# Patient Record
Sex: FEMALE | Race: BLACK OR AFRICAN AMERICAN | Employment: OTHER | URBAN - METROPOLITAN AREA
[De-identification: names, ages, dates, MRNs, and addresses within clinical notes are randomized per-mention and may not be internally consistent; named-entity substitution may affect disease eponyms.]

---

## 2017-01-09 ENCOUNTER — OFFICE VISIT (OUTPATIENT)
Dept: INTERNAL MEDICINE CLINIC | Age: 62
End: 2017-01-09

## 2017-01-09 VITALS
WEIGHT: 204.6 LBS | HEART RATE: 58 BPM | BODY MASS INDEX: 34.93 KG/M2 | SYSTOLIC BLOOD PRESSURE: 124 MMHG | OXYGEN SATURATION: 100 % | HEIGHT: 64 IN | TEMPERATURE: 97.8 F | RESPIRATION RATE: 14 BRPM | DIASTOLIC BLOOD PRESSURE: 66 MMHG

## 2017-01-09 DIAGNOSIS — E11.8 TYPE 2 DIABETES MELLITUS WITH COMPLICATION, UNSPECIFIED LONG TERM INSULIN USE STATUS: Primary | ICD-10-CM

## 2017-01-09 DIAGNOSIS — Z23 NEED FOR ZOSTER VACCINATION: ICD-10-CM

## 2017-01-09 DIAGNOSIS — E79.0 HYPERURICEMIA: ICD-10-CM

## 2017-01-09 DIAGNOSIS — E55.9 VITAMIN D DEFICIENCY: ICD-10-CM

## 2017-01-09 DIAGNOSIS — E04.9 THYROID ENLARGEMENT: ICD-10-CM

## 2017-01-09 DIAGNOSIS — I10 ESSENTIAL HYPERTENSION: ICD-10-CM

## 2017-01-09 LAB — HBA1C MFR BLD HPLC: 6.6 %

## 2017-01-09 NOTE — PATIENT INSTRUCTIONS
It was a pleasure to see you! As discussed:    Diabetes  Continue to take your medication   Try to  following a diabetic diet and continue exercising regularly  We will recheck your hemoglobin A1c at your next visit  For now check your glucose at home if your glucose is <90 or >300 call me or seek immediate medical attention if you faint, feel dizzy, confused, have chest pain. How can you care for yourself at home? Learn to recognize the early signs of low blood sugar. Signs include:   Nausea. Hunger. Feeling nervous, irritable, or shaky. Cold, clammy, wet skin. Sweating (when you are not exercising). A fast heartbeat. Numbness or tingling of the fingertips or lips. If you feel an episode of low blood sugar coming on, drink fruit juice or sugared (not diet) soda, or eat sugar in the form of candy, cubes, or tablets. [employer] are another [employer]. Eat small, frequent meals so that you do not get too hungry between meals. Balance extra exercise with eating more. Keep a written record of your low blood sugar episodes, including when you last ate and what you ate, so that you can learn what causes your blood sugar to drop. Make sure your family, friends, and coworkers know the symptoms of low blood sugar and know what to do to get your sugar level up. Wear medical alert jewelry that lists your condition. You can buy this at most drugsSearchMees. When should you call for help? Call 911 anytime you think you may need emergency care. For example, call if:   You have a seizure. You passed out (lost consciousness), or you suddenly become very sleepy or confused. (You may have very low blood sugar.)  Call your doctor now or seek immediate medical care if:   You have symptoms of low blood sugar, such as:   Sweating. Feeling nervous, shaky, and weak. Extreme hunger and slight nausea. Dizziness and headache. Blurred vision.           Musculoskeletal Chest Pain: Care Instructions  Your Care Instructions  Chest pain is not always a sign that something is wrong with your heart or that you have another serious problem. The doctor thinks your chest pain is caused by strained muscles or ligaments, inflamed chest cartilage, or another problem in your chest, rather than by your heart. You may need more tests to find the cause of your chest pain. Follow-up care is a key part of your treatment and safety. Be sure to make and go to all appointments, and call your doctor if you are having problems. Its also a good idea to know your test results and keep a list of the medicines you take. How can you care for yourself at home? · Take pain medicines exactly as directed. ¨ If the doctor gave you a prescription medicine for pain, take it as prescribed. ¨ If you are not taking a prescription pain medicine, ask your doctor if you can take an over-the-counter medicine. · Rest and protect the sore area. · Stop, change, or take a break from any activity that may be causing your pain or soreness. · Put ice or a cold pack on the sore area for 10 to 20 minutes at a time. Try to do this every 1 to 2 hours for the next 3 days (when you are awake) or until the swelling goes down. Put a thin cloth between the ice and your skin. · After 2 or 3 days, apply a heating pad set on low or a warm cloth to the area that hurts. Some doctors suggest that you go back and forth between hot and cold. · Do not wrap or tape your ribs for support. This may cause you to take smaller breaths, which could increase your risk of lung problems. · Mentholated creams such as Bengay or Icy Hot may soothe sore muscles. Follow the instructions on the package. · Follow your doctor's instructions for exercising. · Gentle stretching and massage may help you get better faster. Stretch slowly to the point just before pain begins, and hold the stretch for at least 15 to 30 seconds. Do this 3 or 4 times a day.  Stretch just after you have applied heat.  · As your pain gets better, slowly return to your normal activities. Any increased pain may be a sign that you need to rest a while longer. When should you call for help? Call 911 anytime you think you may need emergency care. For example, call if:  · You have chest pain or pressure. This may occur with:  ¨ Sweating. ¨ Shortness of breath. ¨ Nausea or vomiting. ¨ Pain that spreads from the chest to the neck, jaw, or one or both shoulders or arms. ¨ Dizziness or lightheadedness. ¨ A fast or uneven pulse. After calling 911, chew 1 adult-strength aspirin. Wait for an ambulance. Do not try to drive yourself. · You have sudden chest pain and shortness of breath, or you cough up blood. Call your doctor now or seek immediate medical care if:  · You have any trouble breathing. · Your chest pain gets worse. · Your chest pain occurs consistently with exercise and is relieved by rest.  Watch closely for changes in your health, and be sure to contact your doctor if:  · Your chest pain does not get better after 1 week. Where can you learn more? Go to http://nicola-roberto.info/. Enter V293 in the search box to learn more about \"Musculoskeletal Chest Pain: Care Instructions. \"  Current as of: May 27, 2016  Content Version: 11.1  © 4406-4692 [employer]. Care instructions adapted under license by [employer] (which disclaims liability or warranty for this information). If you have questions about a medical condition or this instruction, always ask your healthcare professional. [place] [address] any warranty or liability for your use of this information.

## 2017-01-09 NOTE — PROGRESS NOTES
HISTORY OF PRESENT ILLNESS  Delma Ron is a 59 y.o. female. HPI  Cardiovascular Review:  The patient has diabetes and hypertension. Body mass index is 35.12 kg/(m^2). Diet and Lifestyle: generally follows a diabetic diet regularly, nonsmoker  Home BP Monitoring: is not measured at home. Fasting glucose 140s  Pertinent ROS: taking medications as instructed, no medication side effects noted, no TIA's, no chest pain on exertion, no dyspnea on exertion, no swelling of ankles. Health Maintenance   Topic Date Due    ZOSTER VACCINE AGE 60>  05/26/2013    HEMOGLOBIN A1C Q6M  03/27/2017    EYE EXAM RETINAL OR DILATED Q1  04/19/2017    FOOT EXAM Q1  05/04/2017    MICROALBUMIN Q1  05/04/2017    LIPID PANEL Q1  05/04/2017    BREAST CANCER SCRN MAMMOGRAM  01/27/2018    PAP AKA CERVICAL CYTOLOGY  01/30/2018    GLAUCOMA SCREENING Q2Y  04/19/2018    COLONOSCOPY  06/13/2023    DTaP/Tdap/Td series (2 - Td) 05/04/2026    Hepatitis C Screening  Completed    Pneumococcal 19-64 Medium Risk  Completed    INFLUENZA AGE 9 TO ADULT  Completed       Review of Systems   Constitutional: Negative for diaphoresis, fever and weight loss. Eyes: Negative for blurred vision and pain. Respiratory: Negative for shortness of breath. Cardiovascular: Negative for chest pain, orthopnea and leg swelling. Neurological: Negative for focal weakness and headaches. Psychiatric/Behavioral: Negative for depression.      Patient Active Problem List    Diagnosis Date Noted    History of breast cancer 10/30/2015    Diabetes mellitus type 2, controlled ([address] 10/30/2015    HTN (hypertension) 01/21/2015    Hyperuricemia 01/21/2015       Current Outpatient Prescriptions   Medication Sig Dispense Refill    potassium chloride SA (MICRO-K) 10 mEq capsule TAKE 3 CAPSULES BY MOUTH EVERY  Cap 1    metFORMIN (GLUCOPHAGE) 1,000 mg tablet TAKE 1 TABLET BY MOUTH TWICE DAILY 180 Tab 3    hydroCHLOROthiazide (HYDRODIURIL) 25 mg tablet TAKE 1 TABLET BY MOUTH EVERY DAY 90 Tab 2    lisinopril (PRINIVIL, ZESTRIL) 10 mg tablet TAKE 1 TABLET BY MOUTH EVERY DAY 90 Tab 2    diclofenac (VOLTAREN) 1 % gel Apply  to affected area four (4) times daily as needed. 100 g 0    simvastatin (ZOCOR) 10 mg tablet TAKE 1 TABLET BY MOUTH EVERY EVENING 90 Tab 2    BETA-CAROTENE,A, W-C & E/ZN/CU (VISION-CHARANJIT PRESERVE PO) Take  by mouth two (2) times a day.  glucose blood VI test strips (ACCU-CHEK SMARTVIEW TEST STRIP) strip Please use two times daily as needed for Dx code 250.02 100 Strip 11    Cholecalciferol, Vitamin D3, (VITAMIN D3) 1,000 unit cap Take 1,000 Units by mouth daily.  glipiZIDE SR (GLUCOTROL) 5 mg CR tablet TAKE 1 TABLET BY MOUTH EVERY DAY 90 Tab 0       Allergies   Allergen Reactions    Pcn [Penicillins] Hives      Visit Vitals    /66 (BP 1 Location: Right arm, BP Patient Position: Sitting)    Pulse (!) 58    Temp 97.8 °F (36.6 °C) (Oral)    Resp 14    Ht 5' 4\" (1.626 m)    Wt 204 lb 9.6 oz (92.8 kg)    SpO2 100%    BMI 35.12 kg/m2       Physical Exam   Constitutional: She is oriented to person, place, and time. She appears well-developed. No distress. Eyes: Conjunctivae are normal.   Neck: Neck supple. Thyromegaly (possible RLL nodule ) present. Cardiovascular: Normal rate, regular rhythm and normal heart sounds. Pulmonary/Chest: Effort normal and breath sounds normal. No respiratory distress. She has no wheezes. She has no rales. She exhibits no tenderness. Lymphadenopathy:     She has cervical adenopathy. Neurological: She is alert and oriented to person, place, and time. Skin: Skin is warm. Psychiatric: She has a normal mood and affect. Recent Results (from the past 12 hour(s))   AMB POC HEMOGLOBIN A1C    Collection Time: 01/09/17  8:00 AM   Result Value Ref Range    Hemoglobin A1c (POC) 6.6 %       ASSESSMENT and PLAN  [place] was seen today for diabetes.     Diagnoses and all orders for this visit:    Type 2 diabetes mellitus with complication, unspecified long term insulin use status ([facility] [address]- A1c controlled. Optimize lifestyle   -     AMB POC HEMOGLOBIN A1C    Thyroid enlargement- thryomegaly on exam. Check for nodule  -     CBC WITH AUTOMATED DIFF  -     US THYROID/PARATHYROID/SOFT TISS; Future  -     TSH 3RD GENERATION  -     THYROID PANEL    Hyperuricemia  -     URIC ACID    Essential hypertension- well controlled. Counseled on diet and exercise. Continue current regimen.     -     METABOLIC PANEL, COMPREHENSIVE  -     LIPID PANEL  -     HEMOGLOBIN A1C WITH EAG    Vitamin D deficiency  -     VITAMIN D, 25 HYDROXY    Need for zoster vaccination  -     varicella zoster vacine live (ZOSTAVAX) 19,400 unit/0.65 mL susr injection; 1 Vial by SubCUTAneous route once for 1 dose. Follow-up Disposition:  Return in about 14 weeks (around 4/17/2017) for Follow-up. Medication risks/benefits/costs/interactions/alternatives discussed with patient. Victorino Mitchell  was given an after visit summary which includes diagnoses, current medications, & vitals. she expressed understanding with the diagnosis and plan.

## 2017-01-09 NOTE — MR AVS SNAPSHOT
Visit Information Date & Time Provider Department Dept. Phone Encounter #  
 1/9/2017  8:00 AM Angel Kim MD [facility] Internal Medicine [phone] [id number] Upcoming Health Maintenance Date Due ZOSTER VACCINE AGE 60> 5/26/2013 HEMOGLOBIN A1C Q6M 3/27/2017 EYE EXAM RETINAL OR DILATED Q1 4/19/2017 FOOT EXAM Q1 5/4/2017 MICROALBUMIN Q1 5/4/2017 LIPID PANEL Q1 5/4/2017 BREAST CANCER SCRN MAMMOGRAM 1/27/2018 PAP AKA CERVICAL CYTOLOGY 1/30/2018 GLAUCOMA SCREENING Q2Y 4/19/2018 COLONOSCOPY 6/13/2023 DTaP/Tdap/Td series (2 - Td) 5/4/2026 Allergies as of 1/9/2017  Review Complete On: 1/9/2017 By: Angel Kim MD  
  
 Severity Noted Reaction Type Reactions Pcn [Penicillins]  01/21/2015    Hives Current Immunizations  Never Reviewed Name Date Tdap 5/4/2016 Not reviewed this visit You Were Diagnosed With   
  
 Codes Comments Type 2 diabetes mellitus with complication, unspecified long term insulin use status (HCC)    -  Primary ICD-10-CM: E11.8 ICD-9-CM: 250.90 Thyroid enlargement     ICD-10-CM: E04.9 ICD-9-CM: 240.9 Hyperuricemia     ICD-10-CM: E79.0 ICD-9-CM: 790.6 Essential hypertension     ICD-10-CM: I10 
ICD-9-CM: 401.9 Vitamin D deficiency     ICD-10-CM: E55.9 ICD-9-CM: 268.9 Need for zoster vaccination     ICD-10-CM: B01 ICD-9-CM: V04.89 Vitals BP Pulse Temp Resp Height(growth percentile) Weight(growth percentile) 124/66 (BP 1 Location: Right arm, BP Patient Position: Sitting) (!) 58 97.8 °F (36.6 °C) (Oral) 14 5' 4\" (1.626 m) 204 lb 9.6 oz (92.8 kg) SpO2 BMI OB Status Smoking Status 100% 35.12 kg/m2 Postmenopausal Never Smoker Vitals History BMI and BSA Data Body Mass Index Body Surface Area  
 35.12 kg/m 2 2.05 m 2 Preferred Pharmacy Pharmacy Name Phone  [phone] [facility],[address], [address] [phone] Your Updated Medication List  
  
   
This list is accurate as of: 17  8:27 AM.  Always use your most recent med list.  
  
  
  
  
 diclofenac 1 % Gel Commonly known as:  VOLTAREN Apply  to affected area four (4) times daily as needed. glipiZIDE SR 5 mg CR tablet Commonly known as:  GLUCOTROL XL  
TAKE 1 TABLET BY MOUTH EVERY DAY  
  
 glucose blood VI test strips strip Commonly known as:  [address] Please use two times daily as needed for Dx code 250.02  
  
 hydroCHLOROthiazide 25 mg tablet Commonly known as:  HYDRODIURIL  
TAKE 1 TABLET BY MOUTH EVERY DAY  
  
 lisinopril 10 mg tablet Commonly known as:  PRINIVIL, ZESTRIL  
TAKE 1 TABLET BY MOUTH EVERY DAY  
  
 metFORMIN 1,000 mg tablet Commonly known as:  GLUCOPHAGE  
TAKE 1 TABLET BY MOUTH TWICE DAILY potassium chloride SA 10 mEq capsule Commonly known as:  MICRO-K  
TAKE 3 CAPSULES BY MOUTH EVERY DAY  
  
 simvastatin 10 mg tablet Commonly known as:  ZOCOR  
TAKE 1 TABLET BY MOUTH EVERY EVENING  
  
 varicella zoster vacine live 19,400 unit/0.65 mL Susr injection Commonly known as:  ZOSTAVAX  
1 Vial by SubCUTAneous route once for 1 dose. VISION-CHARANJIT PRESERVE PO Take  by mouth two (2) times a day. VITAMIN D3 1,000 unit Cap Generic drug:  cholecalciferol Take 1,000 Units by mouth daily. Prescriptions Printed Refills  
 varicella zoster vacine live (ZOSTAVAX) 19,400 unit/0.65 mL susr injection 0 Si Vial by SubCUTAneous route once for 1 dose. Class: Print Route: SubCUTAneous We Performed the Following AMB POC HEMOGLOBIN A1C [19462 CPT(R)] CBC WITH AUTOMATED DIFF [55221 CPT(R)] HEMOGLOBIN A1C WITH EAG [13138 CPT(R)] LIPID PANEL [19907 CPT(R)] METABOLIC PANEL, COMPREHENSIVE [72423 CPT(R)] THYROID PANEL P2398285 CPT(R)] TSH 3RD GENERATION [40375 CPT(R)] URIC ACID P0681244 CPT(R)] VITAMIN D, 25 HYDROXY D0065215 CPT(R)] To-Do List   
 01/09/2017 Imaging:  US THYROID/PARATHYROID/SOFT TISS   
  
 01/30/2017 1:20 PM  
  Appointment with [facility] at [address] [place] [address] ([phone]) Shower or bathe using soap and water. Do not use deodorant, powder, perfumes, or lotion the day of your exam.  If your prior mammograms were not performed at [facility] please bring films with you or forward prior images 2 days before your procedure. Check in at registration 15min before your appointment time unless you were instructed to do otherwise. A script is not necessary, but if you have one, please bring it on the day of the mammogram or have it faxed to the department. [facility] [phone] [facility]  [phone] [facility] [address] [facility]  [phone] [facility] [phone] [facility] [address] [facility] [address] [phone] Patient Instructions It was a pleasure to see you! As discussed: 
 
Diabetes Continue to take your medication Try to  following a diabetic diet and continue exercising regularly We will recheck your hemoglobin A1c at your next visit For now check your glucose at home if your glucose is <90 or >300 call me or seek immediate medical attention if you faint, feel dizzy, confused, have chest pain. How can you care for yourself at home? Learn to recognize the early signs of low blood sugar. Signs include:  
Nausea. Hunger. Feeling nervous, irritable, or shaky. Cold, clammy, wet skin. Sweating (when you are not exercising). A fast heartbeat. Numbness or tingling of the fingertips or lips. If you feel an episode of low blood sugar coming on, drink fruit juice or sugared (not diet) soda, or eat sugar in the form of candy, cubes, or tablets. [employer] are another [employer]. Eat small, frequent meals so that you do not get too hungry between meals. Balance extra exercise with eating more. Keep a written record of your low blood sugar episodes, including when you last ate and what you ate, so that you can learn what causes your blood sugar to drop. Make sure your family, friends, and coworkers know the symptoms of low blood sugar and know what to do to get your sugar level up. Wear medical alert jewelry that lists your condition. You can buy this at most drugstores. When should you call for help? Call 911 anytime you think you may need emergency care. For example, call if:  
You have a seizure. You passed out (lost consciousness), or you suddenly become very sleepy or confused. (You may have very low blood sugar.) Call your doctor now or seek immediate medical care if:  
You have symptoms of low blood sugar, such as:  
Sweating. Feeling nervous, shaky, and weak. Extreme hunger and slight nausea. Dizziness and headache. Blurred vision. Musculoskeletal Chest Pain: Care Instructions Your Care Instructions Chest pain is not always a sign that something is wrong with your heart or that you have another serious problem. The doctor thinks your chest pain is caused by strained muscles or ligaments, inflamed chest cartilage, or another problem in your chest, rather than by your heart. You may need more tests to find the cause of your chest pain. Follow-up care is a key part of your treatment and safety. Be sure to make and go to all appointments, and call your doctor if you are having problems. Its also a good idea to know your test results and keep a list of the medicines you take. How can you care for yourself at home? · Take pain medicines exactly as directed. ¨ If the doctor gave you a prescription medicine for pain, take it as prescribed. ¨ If you are not taking a prescription pain medicine, ask your doctor if you can take an over-the-counter medicine. · Rest and protect the sore area.  
· Stop, change, or take a break from any activity that may be causing your pain or soreness. · Put ice or a cold pack on the sore area for 10 to 20 minutes at a time. Try to do this every 1 to 2 hours for the next 3 days (when you are awake) or until the swelling goes down. Put a thin cloth between the ice and your skin. · After 2 or 3 days, apply a heating pad set on low or a warm cloth to the area that hurts. Some doctors suggest that you go back and forth between hot and cold. · Do not wrap or tape your ribs for support. This may cause you to take smaller breaths, which could increase your risk of lung problems. · Mentholated creams such as Bengay or Icy Hot may soothe sore muscles. Follow the instructions on the package. · Follow your doctor's instructions for exercising. · Gentle stretching and massage may help you get better faster. Stretch slowly to the point just before pain begins, and hold the stretch for at least 15 to 30 seconds. Do this 3 or 4 times a day. Stretch just after you have applied heat. · As your pain gets better, slowly return to your normal activities. Any increased pain may be a sign that you need to rest a while longer. When should you call for help? Call 911 anytime you think you may need emergency care. For example, call if: 
· You have chest pain or pressure. This may occur with: ¨ Sweating. ¨ Shortness of breath. ¨ Nausea or vomiting. ¨ Pain that spreads from the chest to the neck, jaw, or one or both shoulders or arms. ¨ Dizziness or lightheadedness. ¨ A fast or uneven pulse. After calling 911, chew 1 adult-strength aspirin. Wait for an ambulance. Do not try to drive yourself. · You have sudden chest pain and shortness of breath, or you cough up blood. Call your doctor now or seek immediate medical care if: 
· You have any trouble breathing. · Your chest pain gets worse. · Your chest pain occurs consistently with exercise and is relieved by rest. 
Watch closely for changes in your health, and be sure to contact your doctor if: · Your chest pain does not get better after 1 week. Where can you learn more? Go to http://nicola-roberto.info/. Enter V293 in the search box to learn more about \"Musculoskeletal Chest Pain: Care Instructions. \" Current as of: May 27, 2016 Content Version: 11.1 © 1624-6761 [employer]. Care instructions adapted under license by [employer] (which disclaims liability or warranty for this information). If you have questions about a medical condition or this instruction, always ask your healthcare professional. [address] any warranty or liability for your use of this information. Introducing \[facility] [place] [facility]\"" & HEALTH SERVICES! Arthur Nichols introduces [employer] patient portal. Now you can access parts of your medical record, email your doctor's office, and request medication refills online. 1. In your internet browser, go to [email or web address] 2. Click on the First Time User? Click Here link in the Sign In box. You will see the New Member Sign Up page. 3. Enter your [employer] Access Code exactly as it appears below. You will not need to use this code after youve completed the sign-up process. If you do not sign up before the expiration date, you must request a new code. · [employer] Access Code: [email or web address] Expires: 4/5/2017  9:59 AM 
 
4. Enter the last four digits of your Social Security Number (xxxx) and Date of Birth (mm/dd/yyyy) as indicated and click Submit. You will be taken to the next sign-up page. 5. Create a Broadcast[employer] ID. This will be your [employer] login ID and cannot be changed, so think of one that is secure and easy to remember. 6. Create a [employer] password. You can change your password at any time. 7. Enter your Password Reset Question and Answer. This can be used at a later time if you forget your password. 8. Enter your e-mail address.  You will receive e-mail notification when new information is available in [employer]. 9. Click Sign Up. You can now view and download portions of your medical record. 10. Click the Download Summary menu link to download a portable copy of your medical information. If you have questions, please visit the Frequently Asked Questions section of the [employer] website. Remember, [employer] is NOT to be used for urgent needs. For medical emergencies, dial 911. Now available from your iPhone and Android! Please provide this summary of care documentation to your next provider. Your primary care clinician is listed as Mehran Old. If you have any questions after today's visit, please call [phone].

## 2017-01-12 ENCOUNTER — HOSPITAL ENCOUNTER (OUTPATIENT)
Dept: ULTRASOUND IMAGING | Age: 62
Discharge: HOME | End: 2017-01-12
Attending: INTERNAL MEDICINE
Payer: COMMERCIAL

## 2017-01-12 DIAGNOSIS — E04.9 THYROID ENLARGEMENT: ICD-10-CM

## 2017-01-12 PROCEDURE — 76536 US EXAM OF HEAD AND NECK: CPT

## 2017-01-13 DIAGNOSIS — E04.2 MULTINODULAR GOITER: Primary | ICD-10-CM

## 2017-01-13 NOTE — PROGRESS NOTES
Please let Dahlia Rizo know her ultrasound shows her thyroid is enlarged. I need her to complete labs to determine the next steps. I have ordered a new set of labs for her - how would she like to receive the lab orders?  They have been printed

## 2017-01-17 NOTE — PROGRESS NOTES
Patient has been informed per drs result notes and recommendations, patient verbalizes understanding , lab slip mailed to pt

## 2017-01-24 LAB
FT4I SERPL CALC-MCNC: 2.1 (ref 1.2–4.9)
T3RU NFR SERPL: 30 % (ref 24–39)
T4 SERPL-MCNC: 7.1 UG/DL (ref 4.5–12)
THYROGLOB AB SERPL-ACNC: <1 IU/ML (ref 0–0.9)
THYROPEROXIDASE AB SERPL-ACNC: 35 IU/ML (ref 0–34)
TSH SERPL DL<=0.005 MIU/L-ACNC: 1.22 UIU/ML (ref 0.45–4.5)

## 2017-01-30 ENCOUNTER — HOSPITAL ENCOUNTER (OUTPATIENT)
Dept: MAMMOGRAPHY | Age: 62
Discharge: HOME | End: 2017-01-30
Attending: INTERNAL MEDICINE
Payer: COMMERCIAL

## 2017-01-30 DIAGNOSIS — Z12.31 VISIT FOR SCREENING MAMMOGRAM: ICD-10-CM

## 2017-01-30 PROCEDURE — 77067 SCR MAMMO BI INCL CAD: CPT

## 2017-02-10 DIAGNOSIS — R76.8 THYROID ANTIBODY POSITIVE: ICD-10-CM

## 2017-02-10 DIAGNOSIS — E04.2 MULTINODULAR GOITER: Primary | ICD-10-CM

## 2017-02-11 NOTE — PROGRESS NOTES
Please let Wilberto Celaya know her level is slightly elevated I recommend she see an endocrinologist (thyroid doctor) for further assessment and management. I have ordered a referral to Dr. Ihsan Garcia. She will be contacted by their office with an appt.

## 2017-02-16 ENCOUNTER — OFFICE VISIT (OUTPATIENT)
Dept: ENDOCRINOLOGY | Age: 62
End: 2017-02-16

## 2017-02-16 VITALS
BODY MASS INDEX: 34.83 KG/M2 | HEIGHT: 64 IN | WEIGHT: 204 LBS | HEART RATE: 62 BPM | RESPIRATION RATE: 16 BRPM | OXYGEN SATURATION: 99 % | SYSTOLIC BLOOD PRESSURE: 138 MMHG | DIASTOLIC BLOOD PRESSURE: 66 MMHG

## 2017-02-16 DIAGNOSIS — E04.2 MULTINODULAR GOITER: Primary | ICD-10-CM

## 2017-02-16 NOTE — PROGRESS NOTES
Endocrinology Visit    CC: thyroid nodules    Referring provider: Rosalia Walters MD    History of present illness:  Patient is an 59y.o. year old with history of DM2, breast cancer, HTN who referred for a multinodular goiter. This was discovered on exam by Dr Selena Acuña. An ultrasound has been done and showed multiple nodules, three of which were dominant: a 1.8 x 2.3 x 2.6 cm solid nodule on the right, a 2.6 x 2.7 x 3.1 cm solid-cystic nodule in the left, and a 1.3 x 2.6 x 4.4 cm solid nodule in the isthmus. The patient endorses dysphagia but notes she did not start noticing this until she found out she had nodules. She denies supine compression or changes to her voice. She has no history of craniocervical radiation. She has no family history of thyroid cancer. She has no history of thyroid dysfunction and recent TFTs were normal. TPO antibody was just slightly elevated. She denies racing heart, tremors, palpitations, heat or cold intolerance, changes to her weight or energy level. Other PMH is notable for type 2 diabetes which is well-controlled (A1c 6.9%) on metformin and glipizde combination therapy.     ROS: see HPI for pertinent positives and negatives, otherwise a 10 system review is negative    Problem list:  Patient Active Problem List   Diagnosis Code    HTN (hypertension) I10    Hyperuricemia E79.0    History of breast cancer Z85.3    Diabetes mellitus type 2, controlled ([address] E11.9    Multinodular goiter E04.2    Thyroid antibody positive R76.0    Diabetes ([address] E11.9    Hypertension I10       Medical history:  Past Medical History   Diagnosis Date    Breast cancer ([address]      lumpectomy with radiation, 2009    Diabetes ([address]     Hyperlipidemia     Hypertension        Past surgical history:  Past Surgical History   Procedure Laterality Date    Pr breast surgery procedure unlisted       lumpectomy    Hx orthopaedic       Bunion Surgery    Hx tubal ligation      Hx breast lumpectomy Left      2009       Medications:    Current Outpatient Prescriptions:     VIT A/C/E AC/ZNOX/CUPRIC OXIDE (EYE VITAMIN AND MINERALS PO), Take  by mouth., Disp: , Rfl:     potassium chloride SA (MICRO-K) 10 mEq capsule, TAKE 3 CAPSULES BY MOUTH EVERY DAY, Disp: 270 Cap, Rfl: 1    metFORMIN (GLUCOPHAGE) 1,000 mg tablet, TAKE 1 TABLET BY MOUTH TWICE DAILY, Disp: 180 Tab, Rfl: 3    hydroCHLOROthiazide (HYDRODIURIL) 25 mg tablet, TAKE 1 TABLET BY MOUTH EVERY DAY, Disp: 90 Tab, Rfl: 2    lisinopril (PRINIVIL, ZESTRIL) 10 mg tablet, TAKE 1 TABLET BY MOUTH EVERY DAY, Disp: 90 Tab, Rfl: 2    diclofenac (VOLTAREN) 1 % gel, Apply  to affected area four (4) times daily as needed. , Disp: 100 g, Rfl: 0    simvastatin (ZOCOR) 10 mg tablet, TAKE 1 TABLET BY MOUTH EVERY EVENING, Disp: 90 Tab, Rfl: 2    glucose blood VI test strips (ACCU-CHEK SMARTVIEW TEST STRIP) strip, Please use two times daily as needed for Dx code 250.02, Disp: 100 Strip, Rfl: 11    Cholecalciferol, Vitamin D3, (VITAMIN D3) 1,000 unit cap, Take 1,000 Units by mouth daily. , Disp: , Rfl:     glipiZIDE SR (GLUCOTROL) 5 mg CR tablet, TAKE 1 TABLET BY MOUTH EVERY DAY, Disp: 90 Tab, Rfl: 0    BETA-CAROTENE,A, W-C & E/ZN/CU (VISION-CHARANJIT PRESERVE PO), Take  by mouth two (2) times a day., Disp: , Rfl:     Allergies: Allergies   Allergen Reactions    Pcn [Penicillins] Hives       Social History:  Social History     Social History    Marital status:      Spouse name: N/A    Number of children: N/A    Years of education: N/A     Occupational History    Not on file.      Social History Main Topics    Smoking status: Never Smoker    Smokeless tobacco: Never Used    Alcohol use No    Drug use: Not on file    Sexual activity: No     Other Topics Concern    Not on file     Social History Narrative       Family History:  Family History   Problem Relation Age of Onset    Diabetes Father     Stroke Sister     Diabetes Brother     Heart Disease Mother        Physical Exam:  Visit Vitals    /66    Pulse 62    Resp 16    Ht 5' 4\" (1.626 m)    Wt 204 lb (92.5 kg)    SpO2 99%    BMI 35.02 kg/m2     Gen: NAD  Heent: normocephalic, atraumatic, mucous membranes moist, no lid lag, stare or exophthalmos. No scleral or conjunctival injection. Extra ocular muscles intact. Thyroid: moves well with swallowing, nodular but soft - nodules palpable at isthmus and in each lobe, no thyroid bruit  Heme/lymph: no cervical, supraclavicular or submandibular lymphadenopathy is appreciated. Pulmonary: clear to auscultation bilaterally, no wheezes/rhonchi or rales  Cardiovascular: regular rate and rhythm, no murmurs, rubs or gallops, good distal pulses  GI: soft non tender, nondistended, normal bowel sounds  Extremities: no clubbing, cyanosis or edema. No onocholysis   Neuro: no tremor of outstretched hands, reflexes are normal with normal relaxation phase.  Normal gait, normal concentration  Skin: normal texture, warm and dry  Psyche: normal affect with good insight into her medical conditions    Pertinent lab review:  Lab Results   Component Value Date/Time    TSH 1.220 01/23/2017 11:17 AM    T3 Uptake 30 01/23/2017 11:17 AM    T4, Free 1.20 05/04/2016 09:23 AM    T4, Total 7.1 01/23/2017 11:17 AM     Component      Latest Ref Rng & Units 1/23/2017 1/23/2017 1/23/2017 5/4/2016          11:17 AM 11:17 AM 11:17 AM  9:23 AM   T4, Total      4.5 - 12.0 ug/dL  7.1     T3 Uptake      24 - 39 %  30     Free Thyroxine Index      1.2 - 4.9  2.1     Thyroid peroxidase Ab      0 - 34 IU/mL 35 (H)      Thyroglobulin Ab      0.0 - 0.9 IU/mL <1.0      T4, Free      0.82 - 1.77 ng/dL       TSH      0.450 - 4.500 uIU/mL   1.220 1.130     Component      Latest Ref Rng & Units 5/4/2016           9:23 AM   T4, Total      4.5 - 12.0 ug/dL    T3 Uptake      24 - 39 %    Free Thyroxine Index      1.2 - 4.9    Thyroid peroxidase Ab      0 - 34 IU/mL    Thyroglobulin Ab      0.0 - 0.9 IU/mL    T4, Free      0.82 - 1.77 ng/dL 1.20   TSH      0.450 - 4.500 uIU/mL      Lab Results   Component Value Date/Time    Hemoglobin A1c 6.9 09/27/2016 04:04 PM    Hemoglobin A1c (POC) 6.6 01/09/2017 08:00 AM    Hemoglobin A1c, External 8.0 10/15/2014 08:35 AM     Ultrasound Jan 2017  EXAM: US THYROID/PARATHYROID/SOFT TISS      INDICATION: Enlarged thyroid gland. Nontoxic goiter.     COMPARISON: None.     TECHNIQUE: Real-time sonography of the thyroid gland was performed with a high  frequency linear transducer. Multiple static images were obtained.     FINDINGS:  The thyroid is heterogeneous in echotexture with multiple nodules on the right  measuring 1.8 x 2.3 x 2.6 and 0.6 x 0.7 x 0.9 cm, on the left large complex  cystic measuring 2.6 x 2.7 x 3.1 cm and isthmus measuring 1.3 x 2.6 x 4.4 cm.     The right lobe measures 3.9 x 4.5 x 9.9 cm and the left lobe measures 4.6 x 5.1  x 10.8 cm. The isthmus measures 1.4 cm.     IMPRESSION: Enlarged multinodular thyroid gland. Assessment and Plan:  Patient is a pleasant 59y.o. year old here for multinodular goiter. We reviewed her images together. We also discussed the pathophysiology of thyroid nodules and the approximate 5% risk of malignancy in solid nodules. Based on the FEROZ guidelines, I recommend thyroid FNA of the two dominant solid nodules: the 4.4cm one at the isthmus and the 2.6cm one in the right (the large left sided nodule has a prominent cystic component and is most likely benign). We have gone over this procedure in great detail and patient agrees to proceed. We discussed the implications of both positive and negative results. If benign pathology, we will plan to repeat ultrasound in 8-12 months. If positive, will refer for thyroidectomy and follow the patient closely thereafter. Patients thyroid levels were normal when checked in January.  Since TPO antibody is slightly elevated, recommend TFTs be checked annually since she is probably at increased risk of developing Hashimotos thyroiditis. I spent 45 minutes with the patient today and > 50% of the time was spent counseling the patient about thyroid nodules: their pathophysiology, diagnostic work-up, and management. She will return in 6 months or sooner if needed. Thank you for the opportunity to partake in this patients care.     Iggy Thompson MD  [facility]  [address] [place] [address]

## 2017-02-16 NOTE — MR AVS SNAPSHOT
Visit Information Date & Time Provider Department Dept. Phone Encounter #  
 2/16/2017  8:30 AM Ant Veliz, [address] [facility] [phone] [id number] Follow-up Instructions Return in about 6 months (around 8/16/2017). Your Appointments 4/17/2017  8:00 AM  
ROUTINE CARE with Kana Pollard MD  
[facility] Internal Medicine [facility]) Appt Note: f/u 14wks [address] [facility] [address]  
[address] [facility] Upcoming Health Maintenance Date Due  
 EYE EXAM RETINAL OR DILATED Q1 4/19/2017 FOOT EXAM Q1 5/4/2017 MICROALBUMIN Q1 5/4/2017 LIPID PANEL Q1 5/4/2017 HEMOGLOBIN A1C Q6M 7/9/2017 PAP AKA CERVICAL CYTOLOGY 1/30/2018 GLAUCOMA SCREENING Q2Y 4/19/2018 BREAST CANCER SCRN MAMMOGRAM 1/30/2019 COLONOSCOPY 6/13/2023 DTaP/Tdap/Td series (2 - Td) 5/4/2026 Allergies as of 2/16/2017  Review Complete On: 2/16/2017 By: Sreedhar Marquez Severity Noted Reaction Type Reactions Pcn [Penicillins]  01/21/2015    Hives Current Immunizations  Never Reviewed Name Date Tdap 5/4/2016 Not reviewed this visit You Were Diagnosed With   
  
 Codes Comments Multinodular goiter    -  Primary ICD-10-CM: E04.2 ICD-9-CM: 230. 1 Vitals BP Pulse Resp Height(growth percentile) Weight(growth percentile) SpO2  
 138/66 62 16 5' 4\" (1.626 m) 204 lb (92.5 kg) 99% BMI OB Status Smoking Status 35.02 kg/m2 Postmenopausal Never Smoker Vitals History BMI and BSA Data Body Mass Index Body Surface Area 35.02 kg/m 2 2.04 m 2 Preferred Pharmacy Pharmacy Name Phone [facility]  Ladora Prichard [phone] Your Updated Medication List  
  
   
 This list is accurate as of: 2/16/17  9:26 AM.  Always use your most recent med list.  
  
  
  
  
 diclofenac 1 % Gel Commonly known as:  VOLTAREN Apply  to affected area four (4) times daily as needed. EYE VITAMIN AND MINERALS PO Take  by mouth. glipiZIDE SR 5 mg CR tablet Commonly known as:  GLUCOTROL XL  
TAKE 1 TABLET BY MOUTH EVERY DAY  
  
 glucose blood VI test strips strip Commonly known as:  [address] Please use two times daily as needed for Dx code 250.02  
  
 hydroCHLOROthiazide 25 mg tablet Commonly known as:  HYDRODIURIL  
TAKE 1 TABLET BY MOUTH EVERY DAY  
  
 lisinopril 10 mg tablet Commonly known as:  PRINIVIL, ZESTRIL  
TAKE 1 TABLET BY MOUTH EVERY DAY  
  
 metFORMIN 1,000 mg tablet Commonly known as:  GLUCOPHAGE  
TAKE 1 TABLET BY MOUTH TWICE DAILY potassium chloride SA 10 mEq capsule Commonly known as:  MICRO-K  
TAKE 3 CAPSULES BY MOUTH EVERY DAY  
  
 simvastatin 10 mg tablet Commonly known as:  ZOCOR  
TAKE 1 TABLET BY MOUTH EVERY EVENING  
  
 VISION-CHARANJIT PRESERVE PO Take  by mouth two (2) times a day. VITAMIN D3 1,000 unit Cap Generic drug:  cholecalciferol Take 1,000 Units by mouth daily. Follow-up Instructions Return in about 6 months (around 8/16/2017). To-Do List   
 Around 02/20/2017 Imaging:  US GUIDE FINE NDL ASP W IMAGE Introducing [address]! Arthru Nichols introduces [employer] patient portal. Now you can access parts of your medical record, email your doctor's office, and request medication refills online. 1. In your internet browser, go to [email or web address] 2. Click on the First Time User? Click Here link in the Sign In box. You will see the New Member Sign Up page. 3. Enter your [employer] Access Code exactly as it appears below. You will not need to use this code after youve completed the sign-up process.  If you do not sign up before the expiration date, you must request a new code. · [employer] Access Code: [email or web address] Expires: 5/17/2017  9:26 AM 
 
4. Enter the last four digits of your Social Security Number (xxxx) and Date of Birth (mm/dd/yyyy) as indicated and click Submit. You will be taken to the next sign-up page. 5. Create a [employer] ID. This will be your [employer] login ID and cannot be changed, so think of one that is secure and easy to remember. 6. Create a [employer] password. You can change your password at any time. 7. Enter your Password Reset Question and Answer. This can be used at a later time if you forget your password. 8. Enter your e-mail address. You will receive e-mail notification when new information is available in [address] 9. Click Sign Up. You can now view and download portions of your medical record. 10. Click the Download Summary menu link to download a portable copy of your medical information. If you have questions, please visit the Frequently Asked Questions section of the [employer] website. Remember, [employer] is NOT to be used for urgent needs. For medical emergencies, dial 911. Now available from your iPhone and Android! Please provide this summary of care documentation to your next provider. Your primary care clinician is listed as Eri Ronquillo. If you have any questions after today's visit, please call [phone].

## 2017-02-16 NOTE — PROGRESS NOTES
Chief Complaint   Patient presents with    Thyroid Problem     \"I was preferred by my primary care because I have enlarge thyroids. \"

## 2017-03-03 ENCOUNTER — HOSPITAL ENCOUNTER (OUTPATIENT)
Dept: ULTRASOUND IMAGING | Age: 62
Discharge: HOME | End: 2017-03-03
Attending: INTERNAL MEDICINE
Payer: COMMERCIAL

## 2017-03-03 DIAGNOSIS — E04.2 MULTINODULAR GOITER: ICD-10-CM

## 2017-03-03 PROCEDURE — 76942 ECHO GUIDE FOR BIOPSY: CPT

## 2017-03-03 RX ORDER — LIDOCAINE HYDROCHLORIDE 10 MG/ML
1 INJECTION INFILTRATION; PERINEURAL
Status: ACTIVE | OUTPATIENT
Start: 2017-03-03 | End: 2017-03-03

## 2017-03-03 NOTE — DISCHARGE INSTRUCTIONS
[address] [facility]  Department of Radiology  [phone] Ultrasound Department  [phone] Emergency Department    BIOPSY DISCHARGE INSTRUCTIONS for Deng Contreras on 3/3/17  General Instructions:  - A biopsy is the removal of a small piece of tissue for microscopic examination or testing.  - Healthy tissue can be obtained for the purpose of tissue-type matching for transplants. - Unhealthy tissues are more commonly biopsied to diagnose disease. General Biopsy:  - A mass can grow in any area of the body, and we are taking a specimen as ordered by your doctor. - The risks are the same. They include bleeding, pain, and infection. Home Care Instructions:  - You may resume your regular diet and medication regimen. - You may use over the counter acetaminophen (Tylenol) or ibuprofen (Advil) for the soreness. - You may apply an ice pack to the affected area for 20-30 minutes at time for the first 24 hours. -- After that, you may apply a heat pack. - You may remove the bandaid tonight. - You may take a shower tonight. - Please keep the site clean and dry for 24-48 hours. - Do not soak in any kind of water (bath tub, hot tub, pool, ocean, etc) for 24-48 hours. - Do not participate in any kind of activity making you vigorously sweat for 24-48 hours. - Do not use any moisturizer or makeup on the site for 24-48 hours. Call If:  - You should call Dr. Yobani Perry if you have any questions or concerns about the biopsy site. - Call if you should have increased pain, fever, redness, drainage, or bleeding more than a small spot on the bandage. Follow-Up Instructions:  - Please see Dr. Yobani Perry as she has requested.     Discharging Technologist: Martha Diez

## 2017-03-31 LAB
25(OH)D3+25(OH)D2 SERPL-MCNC: 43.2 NG/ML (ref 30–100)
ALBUMIN SERPL-MCNC: 4.3 G/DL (ref 3.6–4.8)
ALBUMIN/GLOB SERPL: 1.4 {RATIO} (ref 1.2–2.2)
ALP SERPL-CCNC: 67 IU/L (ref 39–117)
ALT SERPL-CCNC: 16 IU/L (ref 0–32)
AST SERPL-CCNC: 16 IU/L (ref 0–40)
BASOPHILS # BLD AUTO: 0 X10E3/UL (ref 0–0.2)
BASOPHILS NFR BLD AUTO: 0 %
BILIRUB SERPL-MCNC: 0.5 MG/DL (ref 0–1.2)
BUN SERPL-MCNC: 19 MG/DL (ref 8–27)
BUN/CREAT SERPL: 23 (ref 11–26)
CALCIUM SERPL-MCNC: 9.9 MG/DL (ref 8.7–10.3)
CHLORIDE SERPL-SCNC: 103 MMOL/L (ref 96–106)
CHOLEST SERPL-MCNC: 184 MG/DL (ref 100–199)
CO2 SERPL-SCNC: 22 MMOL/L (ref 18–29)
CREAT SERPL-MCNC: 0.81 MG/DL (ref 0.57–1)
EOSINOPHIL # BLD AUTO: 0.1 X10E3/UL (ref 0–0.4)
EOSINOPHIL NFR BLD AUTO: 2 %
ERYTHROCYTE [DISTWIDTH] IN BLOOD BY AUTOMATED COUNT: 14.9 % (ref 12.3–15.4)
EST. AVERAGE GLUCOSE BLD GHB EST-MCNC: 146 MG/DL
FT4I SERPL CALC-MCNC: 2.4 (ref 1.2–4.9)
GLOBULIN SER CALC-MCNC: 3 G/DL (ref 1.5–4.5)
GLUCOSE SERPL-MCNC: 92 MG/DL (ref 65–99)
HBA1C MFR BLD: 6.7 % (ref 4.8–5.6)
HCT VFR BLD AUTO: 33.1 % (ref 34–46.6)
HDLC SERPL-MCNC: 71 MG/DL
HGB BLD-MCNC: 10.5 G/DL (ref 11.1–15.9)
IMM GRANULOCYTES # BLD: 0 X10E3/UL (ref 0–0.1)
IMM GRANULOCYTES NFR BLD: 0 %
LDLC SERPL CALC-MCNC: 94 MG/DL (ref 0–99)
LYMPHOCYTES # BLD AUTO: 1.8 X10E3/UL (ref 0.7–3.1)
LYMPHOCYTES NFR BLD AUTO: 36 %
MCH RBC QN AUTO: 27 PG (ref 26.6–33)
MCHC RBC AUTO-ENTMCNC: 31.7 G/DL (ref 31.5–35.7)
MCV RBC AUTO: 85 FL (ref 79–97)
MONOCYTES # BLD AUTO: 0.3 X10E3/UL (ref 0.1–0.9)
MONOCYTES NFR BLD AUTO: 7 %
NEUTROPHILS # BLD AUTO: 2.6 X10E3/UL (ref 1.4–7)
NEUTROPHILS NFR BLD AUTO: 55 %
PLATELET # BLD AUTO: 227 X10E3/UL (ref 150–379)
POTASSIUM SERPL-SCNC: 4.3 MMOL/L (ref 3.5–5.2)
PROT SERPL-MCNC: 7.3 G/DL (ref 6–8.5)
RBC # BLD AUTO: 3.89 X10E6/UL (ref 3.77–5.28)
SODIUM SERPL-SCNC: 145 MMOL/L (ref 134–144)
T3RU NFR SERPL: 33 % (ref 24–39)
T4 SERPL-MCNC: 7.3 UG/DL (ref 4.5–12)
TRIGL SERPL-MCNC: 93 MG/DL (ref 0–149)
TSH SERPL DL<=0.005 MIU/L-ACNC: 1.55 UIU/ML (ref 0.45–4.5)
URATE SERPL-MCNC: 8 MG/DL (ref 2.5–7.1)
VLDLC SERPL CALC-MCNC: 19 MG/DL (ref 5–40)
WBC # BLD AUTO: 4.8 X10E3/UL (ref 3.4–10.8)

## 2017-04-17 ENCOUNTER — OFFICE VISIT (OUTPATIENT)
Dept: INTERNAL MEDICINE CLINIC | Age: 62
End: 2017-04-17

## 2017-04-17 VITALS
OXYGEN SATURATION: 98 % | RESPIRATION RATE: 16 BRPM | HEART RATE: 62 BPM | BODY MASS INDEX: 35.34 KG/M2 | SYSTOLIC BLOOD PRESSURE: 112 MMHG | HEIGHT: 64 IN | DIASTOLIC BLOOD PRESSURE: 64 MMHG | TEMPERATURE: 97.9 F | WEIGHT: 207 LBS

## 2017-04-17 DIAGNOSIS — D64.9 ANEMIA, UNSPECIFIED TYPE: ICD-10-CM

## 2017-04-17 DIAGNOSIS — I10 ESSENTIAL HYPERTENSION: ICD-10-CM

## 2017-04-17 DIAGNOSIS — E11.9 CONTROLLED TYPE 2 DIABETES MELLITUS WITHOUT COMPLICATION, WITHOUT LONG-TERM CURRENT USE OF INSULIN (HCC): Primary | ICD-10-CM

## 2017-04-17 NOTE — LETTER
4/17/2017 8:32 AM 
 
Ms. Orion Mustafa [phone] Lab Review Your A1c has improved Your cholesterol is at goal.  
You were slightly anemic (red blood cells). I have ordered labs to recheck this at before your next appointment. The remainder of your labs were normal. Some labs that may have been tested and their explanation are: 
Your electrolytes, kidney & liver function (Metabolic Panel) Thyroid (TSH + T4, T3) Hormones (prolactin, vitamin D ) Resulted Orders CBC WITH AUTOMATED DIFF Result Value Ref Range WBC 4.8 3.4 - 10.8 x10E3/uL  
 RBC 3.89 3.77 - 5.28 x10E6/uL HGB 10.5 (L) 11.1 - 15.9 g/dL HCT 33.1 (L) 34.0 - 46.6 % MCV 85 79 - 97 fL  
 MCH 27.0 26.6 - 33.0 pg  
 MCHC 31.7 31.5 - 35.7 g/dL  
 RDW 14.9 12.3 - 15.4 % PLATELET 066 451 - 377 x10E3/uL NEUTROPHILS 55 % Lymphocytes 36 % MONOCYTES 7 % EOSINOPHILS 2 % BASOPHILS 0 %  
 ABS. NEUTROPHILS 2.6 1.4 - 7.0 x10E3/uL Abs Lymphocytes 1.8 0.7 - 3.1 x10E3/uL  
 ABS. MONOCYTES 0.3 0.1 - 0.9 x10E3/uL  
 ABS. EOSINOPHILS 0.1 0.0 - 0.4 x10E3/uL  
 ABS. BASOPHILS 0.0 0.0 - 0.2 x10E3/uL IMMATURE GRANULOCYTES 0 %  
 ABS. IMM. GRANS. 0.0 0.0 - 0.1 x10E3/uL Narrative Performed at:  [address]  [id number] : Tyrell Izquierdo MD, Phone:  [phone] METABOLIC PANEL, COMPREHENSIVE Result Value Ref Range Glucose 92 65 - 99 mg/dL BUN 19 8 - 27 mg/dL Creatinine 0.81 0.57 - 1.00 mg/dL GFR est non-AA 78 >59 mL/min/1.73 GFR est AA 89 >59 mL/min/1.73  
 BUN/Creatinine ratio 23 11 - 26 Comment: **Effective April 3, 2017 BUN/Creatinine Ratio** 
  reference interval will be changing to: Age                  Male          Female 
     0 days   -  7 days          9 - 25         9 - 26 
     8 days   - 30 days          8 - 28        10 - 35 1 month  -  6 months       11 - 57        11 - 54 
     7 months -  1 year         20 - 71 20 - 71 
     2 years  -  5 years        23 - 52        20 - 52 
     6 years  - 12 years        15 - 29        12 - 28 
    13 years  - 17 years        8 - 25        10 - 25 
    18 years  - 61 years         9 - 21         9 - 21 
               >59 years        10 - 25        15 - 29 Sodium 145 (H) 134 - 144 mmol/L Potassium 4.3 3.5 - 5.2 mmol/L Chloride 103 96 - 106 mmol/L  
 CO2 22 18 - 29 mmol/L Calcium 9.9 8.7 - 10.3 mg/dL Protein, total 7.3 6.0 - 8.5 g/dL Albumin 4.3 3.6 - 4.8 g/dL GLOBULIN, TOTAL 3.0 1.5 - 4.5 g/dL A-G Ratio 1.4 1.2 - 2.2 Comment: **Please note reference interval change** Bilirubin, total 0.5 0.0 - 1.2 mg/dL Alk. phosphatase 67 39 - 117 IU/L  
 AST (SGOT) 16 0 - 40 IU/L  
 ALT (SGPT) 16 0 - 32 IU/L Narrative Performed at:  [address]  [id number] : Alexandria Gillespie MD, Phone:  [phone] LIPID PANEL Result Value Ref Range Cholesterol, total 184 100 - 199 mg/dL Triglyceride 93 0 - 149 mg/dL HDL Cholesterol 71 >39 mg/dL VLDL, calculated 19 5 - 40 mg/dL LDL, calculated 94 0 - 99 mg/dL Narrative Performed at:  [address]  [id number] : Alexandria Gillespie MD, Phone:  [phone] HEMOGLOBIN A1C WITH EAG Result Value Ref Range Hemoglobin A1c 6.7 (H) 4.8 - 5.6 % Comment:  
            Pre-diabetes: 5.7 - 6.4 Diabetes: >6.4 Glycemic control for adults with diabetes: <7.0 Estimated average glucose 146 mg/dL Narrative Performed at:  [address]  [id number] : Alexandria Gillespie MD, Phone:  [phone] VITAMIN D, 25 HYDROXY Result Value Ref Range VITAMIN D, 25-HYDROXY 43.2 30.0 - 100.0 ng/mL Comment:  
   Vitamin D deficiency has been defined by the [facility] practice guideline as a 
level of serum 25-OH vitamin D less than 20 ng/mL (1,2). The Endocrine Society went on to further define vitamin D 
insufficiency as a level between 21 and 29 ng/mL (2). 1. IOM ([place] of [employer]). 2010. Dietary reference 
   intakes for calcium and D. [facility]: The 
   [employer]. 2. Phan MF, Marvin NC, Emiliano MULLER, et al. 
   Evaluation, treatment, and prevention of vitamin D 
   deficiency: an Endocrine Society clinical practice 
   guideline. JCEM. 2011 Jul; 96(7):1911-30. Narrative Performed at:  [address]  [id number] : Kaila Contreras MD, Phone:  [phone] URIC ACID Result Value Ref Range Uric acid 8.0 (H) 2.5 - 7.1 mg/dL Comment:  
              Therapeutic target for gout patients: <6.0 Narrative Performed at:  [address]  [id number] : Kaila Contreras MD, Phone:  [phone] TSH 3RD GENERATION Result Value Ref Range TSH 1.550 0.450 - 4.500 uIU/mL Narrative Performed at:  [address]  [id number] : Kaila Contreras MD, Phone:  [phone] THYROID PANEL Result Value Ref Range T4, Total 7.3 4.5 - 12.0 ug/dL  
 T3 Uptake 33 24 - 39 % Free Thyroxine Index 2.4 1.2 - 4.9 Narrative Performed at:  [address]  [id number] : Kaila Contreras MD, Phone:  [phone]

## 2017-04-17 NOTE — PATIENT INSTRUCTIONS
It was a pleasure to see you! As discussed:    Lab Review  Your A1c has improved  Your cholesterol is at goal.   You were slightly anemic (red blood cells). I have ordered labs to recheck this at before your next appointment. The remainder of your labs were normal. Some labs that may have been tested and their explanation are:  Your electrolytes, kidney & liver function (Metabolic Panel)   Thyroid (TSH + T4, T3)  Hormones (prolactin, vitamin D )       Nutrition Tips for Diabetes: After Your Visit  Your Care Instructions  A healthy diet is important to manage diabetes. It helps you lose weight (if you need to) and keep it off. It gives you the nutrition and energy your body needs and helps prevent heart disease. But a diet for diabetes does not mean that you have to eat special foods. You can eat what your family eats, including occasional sweets and other favorites. But you do have to pay attention to how often you eat and how much you eat of certain foods. The right plan for you will give you meals that help you keep your blood sugar at healthy levels. Try to eat a variety of foods and to spread carbohydrate throughout the day. Carbohydrate raises blood sugar higher and more quickly than any other nutrient does. Carbohydrate is found in sugar, breads and cereals, fruit, starchy vegetables such as potatoes and corn, and milk and yogurt. You may want to work with a dietitian or diabetes educator to help you plan meals and snacks. A dietitian or diabetes educator also can help you lose weight if that is one of your goals. The following tips can help you enjoy your meals and stay healthy. Follow-up care is a key part of your treatment and safety. Be sure to make and go to all appointments, and call your doctor if you are having problems. Its also a good idea to know your test results and keep a list of the medicines you take. How can you care for yourself at home?   · Learn which foods have carbohydrate and how much carbohydrate to eat. A dietitian or diabetes educator can help you learn to keep track of how much carbohydrate you eat. · Spread carbohydrate throughout the day. Eat some carbohydrate at all meals, but do not eat too much at any one time. · Plan meals to include food from all the food groups. These are the food groups and some example portion sizes:  ¨ Grains: 1 slice of bread (1 ounce), ½ cup of cooked cereal, and 1/3 cup of cooked pasta or rice. These have about 15 grams of carbohydrate in a serving. Choose whole grains such as whole wheat bread or crackers, oatmeal, and brown rice more often than refined grains. ¨ Fruit: 1 small fresh fruit, such as an apple or orange; ½ of a banana; ½ cup of chopped, cooked, or canned fruit; ½ cup of fruit juice; 1 cup of melon or raspberries; and 2 tablespoons of dried fruit. These have about 15 grams of carbohydrate in a serving. ¨ Dairy: 1 cup of nonfat or low-fat milk and 2/3 cup of plain yogurt. These have about 15 grams of carbohydrate in a serving. ¨ Protein foods: Beef, chicken, turkey, fish, eggs, tofu, cheese, cottage cheese, and peanut butter. A serving size of meat is 3 ounces, which is about the size of a deck of cards. Examples of meat substitute serving sizes (equal to 1 ounce of meat) are 1/4 cup of cottage cheese, 1 egg, 1 tablespoon of peanut butter, and ½ cup of tofu. These have very little or no carbohydrate per serving. ¨ Vegetables: Starchy vegetables such as ½ cup of cooked dried beans, peas, potatoes, or corn have about 15 grams of carbohydrate. Nonstarchy vegetables have very little carbohydrate, such as 1 cup of raw leafy vegetables (such as spinach), ½ cup of other vegetables (cooked or chopped), and 3/4 cup of vegetable juice. · Use the plate format to plan meals. It is a good, quick way to make sure that you have a balanced meal. It also helps you spread carbohydrate throughout the day. You divide your plate by types of foods.  Put vegetables on half the plate, meat or meat substitutes on one-quarter of the plate, and a grain or starchy vegetable (such as brown rice or a potato) in the final quarter of the plate. To this you can add a small piece of fruit and 1 cup of milk or yogurt, depending on how much carbohydrate you are supposed to eat at a meal.  · Talk to your dietitian or diabetes educator about ways to add limited amounts of sweets into your meal plan. You can eat these foods now and then, as long as you include the amount of carbohydrate they have in your daily carbohydrate allowance. · If you drink alcohol, limit it to no more than 1 drink a day for women and 2 drinks a day for men. If you are pregnant, no amount of alcohol is known to be safe. · Protein, fat, and fiber do not raise blood sugar as much as carbohydrate does. If you eat a lot of these nutrients in a meal, your blood sugar will rise more slowly than it would otherwise. · Limit saturated fats, such as those from meat and dairy products. Try to replace it with monounsaturated fat, such as olive oil. This is a healthier choice because people who have diabetes are at higher-than-average risk of heart disease. But use a modest amount of olive oil. A tablespoon of olive oil has 14 grams of fat and 120 calories. · Exercise lowers blood sugar. If you take insulin by shots or pump, you can use less than you would if you were not exercising. Keep in mind that timing matters. If you exercise within 1 hour after a meal, your body may need less insulin for that meal than it would if you exercised 3 hours after the meal. Test your blood sugar to find out how exercise affects your need for insulin. · Exercise on most days of the week. Aim for at least 30 minutes. Exercise helps you stay at a healthy weight and helps your body use insulin. Walking is an easy way to get exercise. Gradually increase the amount you walk every day.  You also may want to swim, bike, or do other activities. When you eat out  · Learn to estimate the serving sizes of foods that have carbohydrate. If you measure food at home, it will be easier to estimate the amount in a serving of restaurant food. · If the meal you order has too much carbohydrate (such as potatoes, corn, or baked beans), ask to have a low-carbohydrate food instead. Ask for a salad or green vegetables. · If you use insulin, check your blood sugar before and after eating out to help you plan how much to eat in the future. · If you eat more carbohydrate at a meal than you had planned, take a walk or do other exercise. This will help lower your blood sugar. Where can you learn more? Go to [employer]  Enter Q937 in the search box to learn more about \"Nutrition Tips for Diabetes: After Your Visit. \"   © 8668-1341 Healthwise, Incorporated. Care instructions adapted under license by [employer] (which disclaims liability or warranty for this information). This care instruction is for use with your licensed healthcare professional. If you have questions about a medical condition or this instruction, always ask your healthcare professional. [place] [address] any warranty or liability for your use of this information.   Content Version: 31.7.221878; Current as of: June 4, 2014

## 2017-04-17 NOTE — MR AVS SNAPSHOT
Visit Information Date & Time Provider Department Dept. Phone Encounter #  
 4/17/2017  8:00 AM Lonnie Tolbert MD [facility] [phone] [id number] Follow-up Instructions Return in about 4 months (around 8/17/2017) for Follow up- A1c. Your Appointments 8/16/2017  9:30 AM  
ROUTINE CARE with Priscilla Giron MD  
[facility] Appt Note: f/u thyroid cp30.00  
 [address] [place] [address] [facility] [id number] Upcoming Health Maintenance Date Due  
 EYE EXAM RETINAL OR DILATED Q1 4/19/2017 MICROALBUMIN Q1 5/4/2017 HEMOGLOBIN A1C Q6M 9/30/2017 PAP AKA CERVICAL CYTOLOGY 1/30/2018 LIPID PANEL Q1 3/30/2018 FOOT EXAM Q1 4/17/2018 GLAUCOMA SCREENING Q2Y 4/19/2018 BREAST CANCER SCRN MAMMOGRAM 1/30/2019 COLONOSCOPY 6/13/2023 DTaP/Tdap/Td series (2 - Td) 5/4/2026 Allergies as of 4/17/2017  Review Complete On: 4/17/2017 By: Lonnie Tolbert MD  
  
 Severity Noted Reaction Type Reactions Pcn [Penicillins]  01/21/2015    Hives Current Immunizations  Never Reviewed Name Date Tdap 5/4/2016 Not reviewed this visit You Were Diagnosed With   
  
 Codes Comments Controlled type 2 diabetes mellitus without complication, without long-term current use of insulin ([facility]    -  Primary ICD-10-CM: E11.9 ICD-9-CM: 250.00 Essential hypertension     ICD-10-CM: I10 
ICD-9-CM: 401.9 Anemia, unspecified type     ICD-10-CM: D64.9 ICD-9-CM: 588. 9 Vitals BP Pulse Temp Resp Height(growth percentile) Weight(growth percentile) 112/64 (BP 1 Location: Right arm, BP Patient Position: Sitting) 72 97.9 °F (36.6 °C) (Oral) 16 5' 4\" (1.626 m) 207 lb (93.9 kg) BMI OB Status Smoking Status 35.53 kg/m2 Postmenopausal Never Smoker BMI and BSA Data Body Mass Index Body Surface Area 35.53 kg/m 2 2.06 m 2 Preferred Pharmacy Pharmacy Name Phone [facility] AT [address] [place] [address] [phone] Your Updated Medication List  
  
   
This list is accurate as of: 4/17/17  8:36 AM.  Always use your most recent med list.  
  
  
  
  
 diclofenac 1 % Gel Commonly known as:  VOLTAREN Apply  to affected area four (4) times daily as needed. glucose blood VI test strips strip Commonly known as:  [address] Please use two times daily as needed for Dx code 250.02  
  
 hydroCHLOROthiazide 25 mg tablet Commonly known as:  HYDRODIURIL  
TAKE 1 TABLET BY MOUTH EVERY DAY  
  
 lisinopril 10 mg tablet Commonly known as:  PRINIVIL, ZESTRIL  
TAKE 1 TABLET BY MOUTH EVERY DAY  
  
 metFORMIN 1,000 mg tablet Commonly known as:  GLUCOPHAGE  
TAKE 1 TABLET BY MOUTH TWICE DAILY potassium chloride SA 10 mEq capsule Commonly known as:  MICRO-K  
TAKE 3 CAPSULES BY MOUTH EVERY DAY  
  
 simvastatin 10 mg tablet Commonly known as:  ZOCOR  
TAKE 1 TABLET BY MOUTH EVERY EVENING  
  
 VISION-CHARANJIT PRESERVE PO Take  by mouth two (2) times a day. VITAMIN D3 1,000 unit Cap Generic drug:  cholecalciferol Take 1,000 Units by mouth daily. Prescriptions Sent to Pharmacy Refills  
 glucose blood VI test strips (ACCU-CHEK SMARTVIEW TEST STRIP) strip 11 Sig: Please use two times daily as needed for Dx code 250.02 Class: Normal  
 Pharmacy: [facility] [address] #: [phone] We Performed the Following CBC WITH AUTOMATED DIFF [76736 CPT(R)] FERRITIN [51513 CPT(R)]  DIABETES FOOT EXAM [7 Custom] IRON PROFILE [id number] CPT(R)] MICROALBUMIN, UR, RAND [id number] CPT(R)] Follow-up Instructions Return in about 4 months (around 8/17/2017) for Follow up- A1c. Introducing [facility]! Jacquelyn Chau introduces [employer] patient portal. Now you can access parts of your medical record, email your doctor's office, and request medication refills online. 1. In your internet browser, go to [email or web address] 2. Click on the First Time User? Click Here link in the Sign In box. You will see the New Member Sign Up page. 3. Enter your [employer] Access Code exactly as it appears below. You will not need to use this code after youve completed the sign-up process. If you do not sign up before the expiration date, you must request a new code. · [employer] Access Code: [email or web address] Expires: 5/17/2017 10:26 AM 
 
4. Enter the last four digits of your Social Security Number (xxxx) and Date of Birth (mm/dd/yyyy) as indicated and click Submit. You will be taken to the next sign-up page. 5. Create a [employer] ID. This will be your [employer] login ID and cannot be changed, so think of one that is secure and easy to remember. 6. Create a [employer] password. You can change your password at any time. 7. Enter your Password Reset Question and Answer. This can be used at a later time if you forget your password. 8. Enter your e-mail address. You will receive e-mail notification when new information is available in [address] 9. Click Sign Up. You can now view and download portions of your medical record. 10. Click the Download Summary menu link to download a portable copy of your medical information. If you have questions, please visit the Frequently Asked Questions section of the [employer] website. Remember, [employer] is NOT to be used for urgent needs. For medical emergencies, dial 911. Now available from your iPhone and Android! Please provide this summary of care documentation to your next provider. Your primary care clinician is listed as Marina Mendez.  If you have any questions after today's visit, please call [phone].

## 2017-04-17 NOTE — PROGRESS NOTES
HISTORY OF PRESENT ILLNESS  Rick Loredo is a 59 y.o. female. HPI  Cardiovascular Review:  The patient has diabetes, hypertension and obesity due to excess calories and carbs. Diet and Lifestyle: does not rigorously follow a diabetic diet, nonsmoker Exercises sporadically   Home BP Monitoring: is not measured at home. Foot Exam: UTD will get records  Eye Exam: Next week   Pertinent ROS: taking medications as instructed, no medication side effects noted, no TIA's, no chest pain on exertion, no dyspnea on exertion, no swelling of ankles. SHx: Retired     Review of Systems   Constitutional: Negative for diaphoresis, fever and weight loss. Eyes: Negative for blurred vision and pain. Respiratory: Negative for shortness of breath. Cardiovascular: Negative for chest pain, orthopnea and leg swelling. Neurological: Negative for focal weakness and headaches. Psychiatric/Behavioral: Negative for depression. Patient Active Problem List    Diagnosis Date Noted    Diabetes ([facility]     Hypertension     Multinodular goiter 02/10/2017    Thyroid antibody positive 02/10/2017    History of breast cancer 10/30/2015    Diabetes mellitus type 2, controlled ([facility] 10/30/2015    HTN (hypertension) 01/21/2015    Hyperuricemia 01/21/2015       Current Outpatient Prescriptions   Medication Sig Dispense Refill    potassium chloride SA (MICRO-K) 10 mEq capsule TAKE 3 CAPSULES BY MOUTH EVERY  Cap 1    metFORMIN (GLUCOPHAGE) 1,000 mg tablet TAKE 1 TABLET BY MOUTH TWICE DAILY 180 Tab 3    hydroCHLOROthiazide (HYDRODIURIL) 25 mg tablet TAKE 1 TABLET BY MOUTH EVERY DAY 90 Tab 2    lisinopril (PRINIVIL, ZESTRIL) 10 mg tablet TAKE 1 TABLET BY MOUTH EVERY DAY 90 Tab 2    diclofenac (VOLTAREN) 1 % gel Apply  to affected area four (4) times daily as needed.  100 g 0    simvastatin (ZOCOR) 10 mg tablet TAKE 1 TABLET BY MOUTH EVERY EVENING 90 Tab 2    BETA-CAROTENE,A, W-C & E/ZN/CU (VISION-CHARANJIT PRESERVE PO) Take  by mouth two (2) times a day.  glucose blood VI test strips (ACCU-CHEK SMARTVIEW TEST STRIP) strip Please use two times daily as needed for Dx code 250.02 100 Strip 11    Cholecalciferol, Vitamin D3, (VITAMIN D3) 1,000 unit cap Take 1,000 Units by mouth daily. Allergies   Allergen Reactions    Pcn [Penicillins] Hives      Visit Vitals    /64 (BP 1 Location: Right arm, BP Patient Position: Sitting)    Pulse 72    Temp 97.9 °F (36.6 °C) (Oral)    Resp 16    Ht 5' 4\" (1.626 m)    Wt 207 lb (93.9 kg)    BMI 35.53 kg/m2       Physical Exam   Constitutional: She is oriented to person, place, and time. She appears well-developed. No distress. Eyes: Conjunctivae are normal.   Neck: Neck supple. No thyromegaly present. Cardiovascular: Normal rate, regular rhythm and normal heart sounds. Pulmonary/Chest: Effort normal and breath sounds normal. No respiratory distress. She has no wheezes. She has no rales. She exhibits no tenderness. Lymphadenopathy:     She has no cervical adenopathy. Neurological: She is alert and oriented to person, place, and time. Skin: Skin is warm. Psychiatric: She has a normal mood and affect.      Lab Results  Component Value Date/Time   WBC 4.8 03/30/2017 08:24 AM   HGB 10.5 03/30/2017 08:24 AM   HCT 33.1 03/30/2017 08:24 AM   PLATELET 906 81/09/2603 08:24 AM   MCV 85 03/30/2017 08:24 AM       Lab Results  Component Value Date/Time   Hemoglobin A1c 6.7 03/30/2017 08:24 AM   Hemoglobin A1c 6.9 09/27/2016 04:04 PM   Hemoglobin A1c 7.1 02/01/2016 09:22 AM   Hemoglobin A1c, External 8.0 10/15/2014 08:35 AM   Glucose 92 03/30/2017 08:24 AM   Microalb/Creat ratio (ug/mg creat.) <4.9 05/04/2016 09:23 AM   LDL, calculated 94 03/30/2017 08:24 AM   Creatinine 0.81 03/30/2017 08:24 AM      Lab Results  Component Value Date/Time   Cholesterol, total 184 03/30/2017 08:24 AM   HDL Cholesterol 71 03/30/2017 08:24 AM   LDL, calculated 94 03/30/2017 08:24 AM Triglyceride 93 03/30/2017 08:24 AM       Lab Results  Component Value Date/Time   ALT (SGPT) 16 03/30/2017 08:24 AM   AST (SGOT) 16 03/30/2017 08:24 AM   Alk. phosphatase 67 03/30/2017 08:24 AM   Bilirubin, total 0.5 03/30/2017 08:24 AM       Lab Results  Component Value Date/Time   GFR est AA 89 03/30/2017 08:24 AM   GFR est non-AA 78 03/30/2017 08:24 AM   Creatinine 0.81 03/30/2017 08:24 AM   BUN 19 03/30/2017 08:24 AM   Sodium 145 03/30/2017 08:24 AM   Potassium 4.3 03/30/2017 08:24 AM   Chloride 103 03/30/2017 08:24 AM   CO2 22 03/30/2017 08:24 AM      Lab Results  Component Value Date/Time   TSH 1.550 03/30/2017 08:24 AM   T3 Uptake 33 03/30/2017 08:24 AM   T4, Free 1.20 05/04/2016 09:23 AM   T4, Total 7.3 03/30/2017 08:24 AM      Lab Results   Component Value Date/Time    Glucose 92 03/30/2017 08:24 AM         ASSESSMENT and PLAN  [place] was seen today for diabetes and hypertension. Diagnoses and all orders for this visit:    Controlled type 2 diabetes mellitus without complication, without long-term current use of insulin ([address]-  Optimize diet and weight. Continue current medication regimen as A1c at good control. Red flags to warrant ER or earlier clinical evaluation reviewed. I have reviewed/discussed the above normal BMI with the patient. I have recommended the following interventions: dietary management education, guidance, and counseling . Luanne Ramirez See AVS for full details of plan and patient discussion.     -     glucose blood VI test strips (ACCU-CHEK SMARTVIEW TEST STRIP) strip; Please use two times daily as needed for Dx code 250.02  -      DIABETES FOOT EXAM  -     MICROALBUMIN, UR, RAND    Essential hypertension- well controlled. Counseled on diet and exercise. Continue current regimen. Anemia, unspecified type- no blood loss. Colonoscopy UTD (2013). Recheck labs prior to next appt. Red flags to warrant ER or earlier clinical evaluation reviewed.    See AVS for full details of plan and patient discussion.     -     CBC WITH AUTOMATED DIFF  -     IRON PROFILE  -     FERRITIN      Follow-up Disposition:  Return in about 4 months (around 8/17/2017) for Follow up- A1c. Medication risks/benefits/costs/interactions/alternatives discussed with patient. Shen Townsend  was given an after visit summary which includes diagnoses, current medications, & vitals. she expressed understanding with the diagnosis and plan.

## 2017-04-17 NOTE — PROGRESS NOTES
Discussed 04/17/17    Lab Review  Your A1c has improved  Your cholesterol is at goal.   You were slightly anemic (red blood cells). I have ordered labs to recheck this at before your next appointment.    The remainder of your labs were normal. Some labs that may have been tested and their explanation are:  Your electrolytes, kidney & liver function (Metabolic Panel)   Thyroid (TSH + T4, T3)  Hormones (prolactin, vitamin D )

## 2017-06-22 RX ORDER — POTASSIUM CHLORIDE 750 MG/1
CAPSULE, EXTENDED RELEASE ORAL
Qty: 270 CAP | Refills: 0 | Status: SHIPPED | OUTPATIENT
Start: 2017-06-22 | End: 2017-09-18 | Stop reason: SDUPTHER

## 2017-08-01 ENCOUNTER — OFFICE VISIT (OUTPATIENT)
Dept: INTERNAL MEDICINE CLINIC | Age: 62
End: 2017-08-01

## 2017-08-01 VITALS
TEMPERATURE: 97.8 F | OXYGEN SATURATION: 98 % | SYSTOLIC BLOOD PRESSURE: 120 MMHG | WEIGHT: 205.8 LBS | RESPIRATION RATE: 16 BRPM | HEIGHT: 64 IN | BODY MASS INDEX: 35.13 KG/M2 | HEART RATE: 62 BPM | DIASTOLIC BLOOD PRESSURE: 66 MMHG

## 2017-08-01 DIAGNOSIS — E04.2 MULTINODULAR GOITER: ICD-10-CM

## 2017-08-01 DIAGNOSIS — E11.9 CONTROLLED TYPE 2 DIABETES MELLITUS WITHOUT COMPLICATION, WITHOUT LONG-TERM CURRENT USE OF INSULIN (HCC): ICD-10-CM

## 2017-08-01 DIAGNOSIS — I10 ESSENTIAL HYPERTENSION: Primary | ICD-10-CM

## 2017-08-01 LAB — HBA1C MFR BLD HPLC: 6.6 %

## 2017-08-01 NOTE — PROGRESS NOTES
HISTORY OF PRESENT ILLNESS  Estle Collet is a 57 y.o. female. HPI  Cardiovascular Review:  The patient has diabetes, hypertension and obesity Body mass index is 35.33 kg/(m^2). Diet and Lifestyle: follows a diabetic diet regularly, exercises sporadically, nonsmoker  Home BP Monitoring: is not measured at home. Pertinent ROS: taking medications as instructed, no medication side effects noted, no TIA's, no chest pain on exertion, no dyspnea on exertion, no swelling of ankles. Thyroid Nodule   Patient is seen by  Dr. Hannah Salazar  for followup of thyroid nodule Has been seen in the past 6 months and had TSH checked. Thyroid ROS: denies fatigue, weight changes, heat/cold intolerance, bowel/skin changes or CVS symptoms. Interval History: Has seen her gynecologist. Bone Density   Shx: Retired   Health Maintenance   Topic Date Due    MICROALBUMIN Q1  05/04/2017    INFLUENZA AGE 9 TO ADULT  08/01/2017    HEMOGLOBIN A1C Q6M  09/30/2017    PAP AKA CERVICAL CYTOLOGY  01/30/2018    LIPID PANEL Q1  03/30/2018    FOOT EXAM Q1  04/17/2018    EYE EXAM RETINAL OR DILATED Q1  04/26/2018    BREAST CANCER SCRN MAMMOGRAM  01/30/2019    GLAUCOMA SCREENING Q2Y  04/26/2019    COLONOSCOPY  06/13/2023    DTaP/Tdap/Td series (2 - Td) 05/04/2026    Hepatitis C Screening  Completed    ZOSTER VACCINE AGE 60>  Completed    Pneumococcal 19-64 Medium Risk  Completed       Review of Systems   Constitutional: Negative for diaphoresis, fever and weight loss. Eyes: Negative for blurred vision and pain. Respiratory: Negative for shortness of breath. Cardiovascular: Negative for chest pain, orthopnea and leg swelling. Neurological: Negative for focal weakness and headaches. Psychiatric/Behavioral: Negative for depression.      Patient Active Problem List    Diagnosis Date Noted    Diabetes ([facility] [address]     Hypertension     Multinodular goiter 02/10/2017    Thyroid antibody positive 02/10/2017    History of breast cancer 10/30/2015    Diabetes mellitus type 2, controlled ([facility] [address] 10/30/2015    HTN (hypertension) 01/21/2015    Hyperuricemia 01/21/2015       Current Outpatient Prescriptions   Medication Sig Dispense Refill    potassium chloride SA (MICRO-K) 10 mEq capsule TAKE 3 CAPSULES BY MOUTH EVERY  Cap 0    simvastatin (ZOCOR) 10 mg tablet TAKE 1 TABLET BY MOUTH EVERY EVENING 90 Tab 2    glucose blood VI test strips (ACCU-CHEK SMARTVIEW TEST STRIP) strip Please use two times daily as needed for Dx code 250.02 100 Strip 11    metFORMIN (GLUCOPHAGE) 1,000 mg tablet TAKE 1 TABLET BY MOUTH TWICE DAILY 180 Tab 3    hydroCHLOROthiazide (HYDRODIURIL) 25 mg tablet TAKE 1 TABLET BY MOUTH EVERY DAY 90 Tab 2    lisinopril (PRINIVIL, ZESTRIL) 10 mg tablet TAKE 1 TABLET BY MOUTH EVERY DAY 90 Tab 2    diclofenac (VOLTAREN) 1 % gel Apply  to affected area four (4) times daily as needed. 100 g 0    BETA-CAROTENE,A, W-C & E/ZN/CU (VISION-CHARANJIT PRESERVE PO) Take  by mouth two (2) times a day.  Cholecalciferol, Vitamin D3, (VITAMIN D3) 1,000 unit cap Take 1,000 Units by mouth daily. Allergies   Allergen Reactions    Pcn [Penicillins] Hives      Visit Vitals    /66 (BP 1 Location: Right arm, BP Patient Position: Sitting)    Pulse 62    Temp 97.8 °F (36.6 °C) (Oral)    Resp 16    Ht 5' 4\" (1.626 m)    Wt 205 lb 12.8 oz (93.4 kg)    SpO2 98%    BMI 35.33 kg/m2       Physical Exam   Constitutional: She is oriented to person, place, and time. She appears well-developed. No distress. Eyes: Conjunctivae are normal.   Cardiovascular: Normal rate, regular rhythm and normal heart sounds. Pulmonary/Chest: Effort normal and breath sounds normal. No respiratory distress. She has no wheezes. She has no rales. She exhibits no tenderness. Musculoskeletal: She exhibits no edema. BLE: No moles or ulcers on plantar surface of his feet    Neurological: She is alert and oriented to person, place, and time. Skin: Skin is warm. Psychiatric: She has a normal mood and affect. Recent Results (from the past 12 hour(s))   AMB POC HEMOGLOBIN A1C    Collection Time: 08/01/17  8:15 AM   Result Value Ref Range    Hemoglobin A1c (POC) 6.6 %       ASSESSMENT and PLAN  Diagnoses and all orders for this visit:    1. Essential hypertension- well controlled. Counseled on diet and exercise. Continue current regimen. 2. Controlled type 2 diabetes mellitus without complication, without long-term current use of insulin ([address]-  Well controlled current regimen. -     AMB POC HEMOGLOBIN A1C  -     MICROALBUMIN, UR, RAND W/ MICROALBUMIN/CREA RATIO    3. Multinodular goiter- per Endocrinology       Follow-up Disposition:  Return in about 4 months (around 12/1/2017) for Physical - 30 minute appointment. Medication risks/benefits/costs/interactions/alternatives discussed with patient. Rebecca Newberry  was given an after visit summary which includes diagnoses, current medications, & vitals. she expressed understanding with the diagnosis and plan.

## 2017-08-01 NOTE — PROGRESS NOTES
Chief Complaint   Patient presents with    Diabetes     1. Have you been to the ER, urgent care clinic since your last visit? Hospitalized since your last visit? No    2. Have you seen or consulted any other health care providers outside of the [address] [place] [address] since your last visit? Include any pap smears or colon screening.  No

## 2017-08-01 NOTE — PATIENT INSTRUCTIONS
It was a pleasure to see you! As discussed:    Congratulations on your weight loss and positive lifestyle changes!!! High Blood Pressure (BP)  Well controlled today  -Continue to check your BP at home   -Follow a low sodium diet (<1500mg/ day)   -Exercise regularly goal, 150 minutes of cardiovascular exercise/ week  -If your blood pressure is too low (<90/60) or too high (>180/100) or you have any symptoms such as chest pain, dizziness, shortness of breath- seek immediate medical attention. See  Www.health.gov for more information. Learning About Tests When You Have Diabetes  Why do you need regular diabetes tests? Diabetes can be hard on your body if it's not well controlled. But having tests on a regular schedule can help you and your doctor find problems early, when it's easier to start managing them. What tests do you need? The tests you may have, how often you should have them, and the goals of the tests are:  A1c blood test. This test shows the average level of blood sugar over the past 2 to 3 months. It helps your doctor see whether blood sugar levels have been staying within your target range. · How often: Every 3 to 6 months  · Goal: A blood sugar level in your target range  Blood pressure test: This test measures the pressure of blood flow in the arteries. Controlling blood pressure can help prevent damage to nerves and blood vessels. · How often: Every 3 to 6 months  · Goal: A blood pressure level in your target range  Cholesterol test: This test measures the amount of a type of fat in the blood. It is common for people with diabetes to also have high cholesterol. Too much cholesterol in the blood can build up inside the blood vessels and raise the risk for heart attack and stroke. · How often:  At the time of your diabetes diagnosis, and as often as your doctor recommends after that  · Goal: A cholesterol level in your target range  Albumin-creatinine ratio test: This test checks for kidney damage by looking for the protein albumin (say \"al-BYOO-brett\") in the urine. Albumin is normally found in the blood. Kidney damage can let small amounts of it (microalbumin) leak into the urine. · How often: Once a year  · Goal: No protein in the urine  Blood creatinine test/estimated glomerular filtration (eGFR): The blood creatinine (say \"cqnd-VD-gy-neen\") level shows how well your kidneys are working. Creatinine is a waste product that muscles release into the blood. Blood creatinine is used to estimate the glomerular filtration rate. A high level of creatinine and/or a low eGFR may mean your kidneys are not working as well as they should. · How often: Once a year  · Goal: Normal level of creatinine in the blood. The eGFR goal is greater than 60 mL/min/1.73 m². Complete foot exam: The doctor checks for foot sores and whether any sensation has been lost.  · How often: Once a year  · Goal: Healthy feet with no foot ulcers or loss of feeling  Dental exam and cleaning: The dentist checks for gum disease and tooth decay. People with high blood sugar are more likely to have these problems. · How often: Every 6 months  · Goal: Healthy teeth and gums  Complete eye exam: High blood sugar levels can damage the eyes. This exam is done by an ophthalmologist or optometrist. It includes a dilated eye exam. The exam shows whether there's damage to the back of the eye (diabetic retinopathy). · How often: Once a year. If you don't have any signs of diabetic retinopathy, your doctor may recommend an exam every 2 years. · Goal: No damage to the back of the eye  Thyroid-stimulating hormone (TSH) blood test: This test checks for thyroid disease. Too little thyroid hormone can cause some medicines (like insulin) to stay in the body longer. This can cause low blood sugar. You may be tested if you have high cholesterol or are a woman over 48years old.   · How often: As part of your diabetes diagnosis, and as often as your doctor recommends after that  · Goal: Normal level of TSH in the blood  Follow-up care is a key part of your treatment and safety. Be sure to make and go to all appointments, and call your doctor if you are having problems. It's also a good idea to know your test results and keep a list of the medicines you take. Where can you learn more? Go to http://nicola-roberto.info/. Enter [id number] in the search box to learn more about \"Learning About Tests When You Have Diabetes. \"  Current as of: March 13, 2017  Content Version: 11.3  © 3532-6077 [employer]. Care instructions adapted under license by [employer] (which disclaims liability or warranty for this information). If you have questions about a medical condition or this instruction, always ask your healthcare professional. [address] any warranty or liability for your use of this information.

## 2017-08-01 NOTE — MR AVS SNAPSHOT
Visit Information Date & Time Provider Department Dept. Phone Encounter #  
 8/1/2017  8:00 AM Yassine Bradshaw MD [address] [id number] Internal Medicine [phone] [id number] Follow-up Instructions Return in about 4 months (around 12/1/2017) for Physical - 30 minute appointment. Your Appointments 8/16/2017  9:30 AM  
ROUTINE CARE with Jae Bryan MD  
[facility] [address] [facility]) Appt Note: f/u thyroid cp30.00  
 [address]  
[address] [facility] [address] [address] Upcoming Health Maintenance Date Due MICROALBUMIN Q1 5/4/2017 INFLUENZA AGE 9 TO ADULT 8/1/2017 HEMOGLOBIN A1C Q6M 9/30/2017 PAP AKA CERVICAL CYTOLOGY 1/30/2018 LIPID PANEL Q1 3/30/2018 FOOT EXAM Q1 4/17/2018 EYE EXAM RETINAL OR DILATED Q1 4/26/2018 BREAST CANCER SCRN MAMMOGRAM 1/30/2019 GLAUCOMA SCREENING Q2Y 4/26/2019 COLONOSCOPY 6/13/2023 DTaP/Tdap/Td series (2 - Td) 5/4/2026 Allergies as of 8/1/2017  Review Complete On: 8/1/2017 By: Yassine Bradshaw MD  
  
 Severity Noted Reaction Type Reactions Pcn [Penicillins]  01/21/2015    Hives Current Immunizations  Never Reviewed Name Date Tdap 5/4/2016 Zoster Vaccine, Live 1/10/2017 12:00 AM  
  
 Not reviewed this visit You Were Diagnosed With   
  
 Codes Comments Essential hypertension    -  Primary ICD-10-CM: I10 
ICD-9-CM: 401.9 Controlled type 2 diabetes mellitus without complication, without long-term current use of insulin ([facility] [address]     ICD-10-CM: E11.9 ICD-9-CM: 250.00 Multinodular goiter     ICD-10-CM: E04.2 ICD-9-CM: 547. 1 Vitals BP Pulse Temp Resp Height(growth percentile) Weight(growth percentile) 120/66 (BP 1 Location: Right arm, BP Patient Position: Sitting) 62 97.8 °F (36.6 °C) (Oral) 16 5' 4\" (1.626 m) 205 lb 12.8 oz (93.4 kg) SpO2 BMI OB Status Smoking Status 98% 35.33 kg/m2 Postmenopausal Never Smoker Vitals History BMI and BSA Data Body Mass Index Body Surface Area  
 35.33 kg/m 2 2.05 m 2 Preferred Pharmacy Pharmacy Name Phone [facility] [address]  [place] [phone] Your Updated Medication List  
  
   
This list is accurate as of: 8/1/17  8:36 AM.  Always use your most recent med list.  
  
  
  
  
 diclofenac 1 % Gel Commonly known as:  VOLTAREN Apply  to affected area four (4) times daily as needed. glucose blood VI test strips strip Commonly known as:  [address] Please use two times daily as needed for Dx code 250.02  
  
 hydroCHLOROthiazide 25 mg tablet Commonly known as:  HYDRODIURIL  
TAKE 1 TABLET BY MOUTH EVERY DAY  
  
 lisinopril 10 mg tablet Commonly known as:  PRINIVIL, ZESTRIL  
TAKE 1 TABLET BY MOUTH EVERY DAY  
  
 metFORMIN 1,000 mg tablet Commonly known as:  GLUCOPHAGE  
TAKE 1 TABLET BY MOUTH TWICE DAILY potassium chloride SA 10 mEq capsule Commonly known as:  MICRO-K  
TAKE 3 CAPSULES BY MOUTH EVERY DAY  
  
 simvastatin 10 mg tablet Commonly known as:  ZOCOR  
TAKE 1 TABLET BY MOUTH EVERY EVENING  
  
 VISION-CHARANJIT PRESERVE PO Take  by mouth two (2) times a day. VITAMIN D3 1,000 unit Cap Generic drug:  cholecalciferol Take 1,000 Units by mouth daily. We Performed the Following AMB POC HEMOGLOBIN A1C [62457 CPT(R)] MICROALBUMIN, UR, RAND W/ MICROALBUMIN/CREA RATIO [id number] CPT(R)] Follow-up Instructions Return in about 4 months (around 12/1/2017) for Physical - 30 minute appointment. Patient Instructions It was a pleasure to see you! As discussed: 
 
Congratulations on your weight loss and positive lifestyle changes!!! High Blood Pressure (BP) Well controlled today -Continue to check your BP at home  
-Follow a low sodium diet (<1500mg/ day) -Exercise regularly goal, 150 minutes of cardiovascular exercise/ week 
-If your blood pressure is too low (<90/60) or too high (>180/100) or you have any symptoms such as chest pain, dizziness, shortness of breath- seek immediate medical attention. See  Www.health.gov for more information. Learning About Tests When You Have Diabetes Why do you need regular diabetes tests? Diabetes can be hard on your body if it's not well controlled. But having tests on a regular schedule can help you and your doctor find problems early, when it's easier to start managing them. What tests do you need? The tests you may have, how often you should have them, and the goals of the tests are: 
A1c blood test. This test shows the average level of blood sugar over the past 2 to 3 months. It helps your doctor see whether blood sugar levels have been staying within your target range. · How often: Every 3 to 6 months · Goal: A blood sugar level in your target range Blood pressure test: This test measures the pressure of blood flow in the arteries. Controlling blood pressure can help prevent damage to nerves and blood vessels. · How often: Every 3 to 6 months · Goal: A blood pressure level in your target range Cholesterol test: This test measures the amount of a type of fat in the blood. It is common for people with diabetes to also have high cholesterol. Too much cholesterol in the blood can build up inside the blood vessels and raise the risk for heart attack and stroke. · How often: At the time of your diabetes diagnosis, and as often as your doctor recommends after that · Goal: A cholesterol level in your target range Albumin-creatinine ratio test: This test checks for kidney damage by looking for the protein albumin (say \"al-BYOO-brett\") in the urine. Albumin is normally found in the blood.  Kidney damage can let small amounts of it (microalbumin) leak into the urine. · How often: Once a year · Goal: No protein in the urine Blood creatinine test/estimated glomerular filtration (eGFR): The blood creatinine (say \"iasv-AE-ur-neen\") level shows how well your kidneys are working. Creatinine is a waste product that muscles release into the blood. Blood creatinine is used to estimate the glomerular filtration rate. A high level of creatinine and/or a low eGFR may mean your kidneys are not working as well as they should. · How often: Once a year · Goal: Normal level of creatinine in the blood. The eGFR goal is greater than 60 mL/min/1.73 m². Complete foot exam: The doctor checks for foot sores and whether any sensation has been lost. 
· How often: Once a year · Goal: Healthy feet with no foot ulcers or loss of feeling Dental exam and cleaning: The dentist checks for gum disease and tooth decay. People with high blood sugar are more likely to have these problems. · How often: Every 6 months · Goal: Healthy teeth and gums Complete eye exam: High blood sugar levels can damage the eyes. This exam is done by an ophthalmologist or optometrist. It includes a dilated eye exam. The exam shows whether there's damage to the back of the eye (diabetic retinopathy). · How often: Once a year. If you don't have any signs of diabetic retinopathy, your doctor may recommend an exam every 2 years. · Goal: No damage to the back of the eye Thyroid-stimulating hormone (TSH) blood test: This test checks for thyroid disease. Too little thyroid hormone can cause some medicines (like insulin) to stay in the body longer. This can cause low blood sugar. You may be tested if you have high cholesterol or are a woman over 48years old. · How often: As part of your diabetes diagnosis, and as often as your doctor recommends after that · Goal: Normal level of TSH in the blood Follow-up care is a key part of your treatment and safety.  Be sure to make and go to all appointments, and call your doctor if you are having problems. It's also a good idea to know your test results and keep a list of the medicines you take. Where can you learn more? Go to http://nicola-roberto.info/. Enter [id number] in the search box to learn more about \"Learning About Tests When You Have Diabetes. \" Current as of: March 13, 2017 Content Version: 11.3 © 8388-8133 [employer]. Care instructions adapted under license by [employer] (which disclaims liability or warranty for this information). If you have questions about a medical condition or this instruction, always ask your healthcare professional. [address] any warranty or liability for your use of this information. Introducing [facility]! Elaine Lobato introduces [employer] patient portal. Now you can access parts of your medical record, email your doctor's office, and request medication refills online. 1. In your internet browser, go to [email or web address] 2. Click on the First Time User? Click Here link in the Sign In box. You will see the New Member Sign Up page. 3. Enter your [employer] Access Code exactly as it appears below. You will not need to use this code after youve completed the sign-up process. If you do not sign up before the expiration date, you must request a new code. · [employer] Access Code: [email or web address] Expires: 10/30/2017  8:36 AM 
 
4. Enter the last four digits of your Social Security Number (xxxx) and Date of Birth (mm/dd/yyyy) as indicated and click Submit. You will be taken to the next sign-up page. 5. Create a [employer] ID. This will be your [employer] login ID and cannot be changed, so think of one that is secure and easy to remember. 6. Create a [employer] password. You can change your password at any time. 7. Enter your Password Reset Question and Answer.  This can be used at a later time if you forget your password. 8. Enter your e-mail address. You will receive e-mail notification when new information is available in [address] 9. Click Sign Up. You can now view and download portions of your medical record. 10. Click the Download Summary menu link to download a portable copy of your medical information. If you have questions, please visit the Frequently Asked Questions section of the [employer] website. Remember, [employer] is NOT to be used for urgent needs. For medical emergencies, dial 911. Now available from your iPhone and Android! Please provide this summary of care documentation to your next provider. Your primary care clinician is listed as Onesha Robretson. If you have any questions after today's visit, please call [phone].

## 2017-08-02 LAB
ALBUMIN/CREAT UR: 3.4 MG/G CREAT (ref 0–30)
CREAT UR-MCNC: 89.3 MG/DL
MICROALBUMIN UR-MCNC: 3 UG/ML

## 2017-08-16 ENCOUNTER — OFFICE VISIT (OUTPATIENT)
Dept: ENDOCRINOLOGY | Age: 62
End: 2017-08-16

## 2017-08-16 VITALS
HEART RATE: 68 BPM | DIASTOLIC BLOOD PRESSURE: 63 MMHG | SYSTOLIC BLOOD PRESSURE: 123 MMHG | RESPIRATION RATE: 16 BRPM | BODY MASS INDEX: 36.09 KG/M2 | WEIGHT: 211.4 LBS | OXYGEN SATURATION: 98 % | HEIGHT: 64 IN

## 2017-08-16 DIAGNOSIS — E04.2 MULTINODULAR GOITER: Primary | ICD-10-CM

## 2017-08-16 DIAGNOSIS — R76.8 THYROID ANTIBODY POSITIVE: ICD-10-CM

## 2017-08-16 DIAGNOSIS — E11.9 CONTROLLED TYPE 2 DIABETES MELLITUS WITHOUT COMPLICATION, WITHOUT LONG-TERM CURRENT USE OF INSULIN (HCC): ICD-10-CM

## 2017-08-16 NOTE — MR AVS SNAPSHOT
Visit Information Date & Time Provider Department Dept. Phone Encounter #  
 8/16/2017  9:30 AM Nehemiah Hyde, [facility] [phone] Follow-up Instructions Return in about 10 months (around 6/16/2018). Your Appointments 12/5/2017  8:00 AM  
ROUTINE CARE with Kathy Kamara MD  
[facility] [address]) Appt Note: 4 month f/u  
 [address] [facility] [address]  
[address] [place] [address] 57 Upcoming Health Maintenance Date Due INFLUENZA AGE 9 TO ADULT 8/1/2017 PAP AKA CERVICAL CYTOLOGY 1/30/2018 HEMOGLOBIN A1C Q6M 2/1/2018 LIPID PANEL Q1 3/30/2018 FOOT EXAM Q1 4/17/2018 EYE EXAM RETINAL OR DILATED Q1 4/26/2018 MICROALBUMIN Q1 8/1/2018 BREAST CANCER SCRN MAMMOGRAM 1/30/2019 GLAUCOMA SCREENING Q2Y 4/26/2019 COLONOSCOPY 6/13/2023 DTaP/Tdap/Td series (2 - Td) 5/4/2026 Allergies as of 8/16/2017  Review Complete On: 8/16/2017 By: Kasie Malloy Severity Noted Reaction Type Reactions Pcn [Penicillins]  01/21/2015    Hives Current Immunizations  Never Reviewed Name Date Tdap 5/4/2016 Zoster Vaccine, Live 1/10/2017 12:00 AM  
  
 Not reviewed this visit You Were Diagnosed With   
  
 Codes Comments Multinodular goiter    -  Primary ICD-10-CM: E04.2 ICD-9-CM: 853. 1 Thyroid antibody positive     ICD-10-CM: R76.0 ICD-9-CM: 795.79 Controlled type 2 diabetes mellitus without complication, without long-term current use of insulin ([facility]     ICD-10-CM: E11.9 ICD-9-CM: 250.00 Vitals BP Pulse Resp Height(growth percentile) Weight(growth percentile) SpO2  
 123/63 68 16 5' 4\" (1.626 m) 211 lb 6.4 oz (95.9 kg) 98% BMI OB Status Smoking Status 36.29 kg/m2 Postmenopausal Never Smoker Vitals History BMI and BSA Data Body Mass Index Body Surface Area  
 36.29 kg/m 2 2.08 m 2 Preferred Pharmacy Pharmacy Name Phone [facility]  [place] [phone] Your Updated Medication List  
  
   
This list is accurate as of: 8/16/17 10:00 AM.  Always use your most recent med list.  
  
  
  
  
 diclofenac 1 % Gel Commonly known as:  VOLTAREN Apply  to affected area four (4) times daily as needed. glucose blood VI test strips strip Commonly known as:  [address] Please use two times daily as needed for Dx code 250.02  
  
 hydroCHLOROthiazide 25 mg tablet Commonly known as:  HYDRODIURIL  
TAKE 1 TABLET BY MOUTH EVERY DAY  
  
 lisinopril 10 mg tablet Commonly known as:  PRINIVIL, ZESTRIL  
TAKE 1 TABLET BY MOUTH EVERY DAY  
  
 metFORMIN 1,000 mg tablet Commonly known as:  GLUCOPHAGE  
TAKE 1 TABLET BY MOUTH TWICE DAILY potassium chloride SA 10 mEq capsule Commonly known as:  MICRO-K  
TAKE 3 CAPSULES BY MOUTH EVERY DAY  
  
 simvastatin 10 mg tablet Commonly known as:  ZOCOR  
TAKE 1 TABLET BY MOUTH EVERY EVENING  
  
 VISION-CHARANJIT PRESERVE PO Take  by mouth two (2) times a day. VITAMIN D3 1,000 unit Cap Generic drug:  cholecalciferol Take 1,000 Units by mouth daily. We Performed the Following TSH AND FREE T4 [37737 CPT(R)] Follow-up Instructions Return in about 10 months (around 6/16/2018). To-Do List   
 Around 06/16/2018 Imaging:  US THYROID/PARATHYROID/SOFT TISS Introducing hospitals & HEALTH SERVICES! aVngie Hollingsworth introduces momondo patient portal. Now you can access parts of your medical record, email your doctor's office, and request medication refills online. 1. In your internet browser, go to [email or web address] 2. Click on the First Time User? Click Here link in the Sign In box.  You will see the New Member Sign Up page. 3. Enter your [facility] Access Code exactly as it appears below. You will not need to use this code after youve completed the sign-up process. If you do not sign up before the expiration date, you must request a new code. · [employer] Access Code: [email or web address] Expires: 10/30/2017  8:36 AM 
 
4. Enter the last four digits of your Social Security Number (xxxx) and Date of Birth (mm/dd/yyyy) as indicated and click Submit. You will be taken to the next sign-up page. 5. Create a [facility] ID. This will be your [employer] login ID and cannot be changed, so think of one that is secure and easy to remember. 6. Create a [employer] password. You can change your password at any time. 7. Enter your Password Reset Question and Answer. This can be used at a later time if you forget your password. 8. Enter your e-mail address. You will receive e-mail notification when new information is available in [address]. 9. Click Sign Up. You can now view and download portions of your medical record. 10. Click the Download Summary menu link to download a portable copy of your medical information. If you have questions, please visit the Frequently Asked Questions section of the [employer] website. Remember, [employer] is NOT to be used for urgent needs. For medical emergencies, dial 911. Now available from your iPhone and Android! Please provide this summary of care documentation to your next provider. Your primary care clinician is listed as Daija Pedroza. If you have any questions after today's visit, please call [phone].

## 2017-08-16 NOTE — PROGRESS NOTES
Endocrinology Visit    CC: thyroid nodules    History of present illness:  Patient is an 57 y.o. female with history of DM2, breast cancer, HTN who referred for a multinodular goiter. I saw her in initial consultation in February at which time I recommended FNA of the two dominant solid nodules: the 4.4cm one at the isthmus and the 2.6cm one in the right. This was done in March and pathology of both nodules returned benign. The patient endorses dysphagia but notes she did not start noticing this until she found out she had nodules. She denies supine compression or changes to her voice. She has no family history of thyroid cancer. She has no history of thyroid dysfunction and TFTs have been normal in the past. TPO antibody was just slightly elevated. She denies racing heart, tremors, palpitations, heat or cold intolerance, or changes to her energy level. Weight has increased 7 lbs in the past month without changed in her diet/exercise habits. Other PMH is notable for type 2 diabetes which is well-controlled (A1c 6.6%) on metformin alone. She tries to limit the carbs in her diet.      ROS: see HPI for pertinent positives and negatives, otherwise a 7 system review is negative    Problem list:  Patient Active Problem List   Diagnosis Code    HTN (hypertension) I10    Hyperuricemia E79.0    History of breast cancer Z85.3    Diabetes mellitus type 2, controlled ([address] E11.9    Multinodular goiter E04.2    Thyroid antibody positive R76.0    Diabetes ([address] E11.9    Hypertension I10       Medical history:  Past Medical History:   Diagnosis Date    Breast cancer ([address]     lumpectomy with radiation, 2009    Diabetes ([address]     Hyperlipidemia     Hypertension        Past surgical history:  Past Surgical History:   Procedure Laterality Date    BREAST SURGERY PROCEDURE UNLISTED      lumpectomy    HX BREAST LUMPECTOMY Left     2009    HX ORTHOPAEDIC      Bunion Surgery    HX TUBAL LIGATION Medications:    Current Outpatient Prescriptions:     potassium chloride SA (MICRO-K) 10 mEq capsule, TAKE 3 CAPSULES BY MOUTH EVERY DAY, Disp: 270 Cap, Rfl: 0    simvastatin (ZOCOR) 10 mg tablet, TAKE 1 TABLET BY MOUTH EVERY EVENING, Disp: 90 Tab, Rfl: 2    glucose blood VI test strips (ACCU-CHEK SMARTVIEW TEST STRIP) strip, Please use two times daily as needed for Dx code 250.02, Disp: 100 Strip, Rfl: 11    metFORMIN (GLUCOPHAGE) 1,000 mg tablet, TAKE 1 TABLET BY MOUTH TWICE DAILY, Disp: 180 Tab, Rfl: 3    hydroCHLOROthiazide (HYDRODIURIL) 25 mg tablet, TAKE 1 TABLET BY MOUTH EVERY DAY, Disp: 90 Tab, Rfl: 2    lisinopril (PRINIVIL, ZESTRIL) 10 mg tablet, TAKE 1 TABLET BY MOUTH EVERY DAY, Disp: 90 Tab, Rfl: 2    BETA-CAROTENE,A, W-C & E/ZN/CU (VISION-CHARANJIT PRESERVE PO), Take  by mouth two (2) times a day., Disp: , Rfl:     Cholecalciferol, Vitamin D3, (VITAMIN D3) 1,000 unit cap, Take 1,000 Units by mouth daily. , Disp: , Rfl:     diclofenac (VOLTAREN) 1 % gel, Apply  to affected area four (4) times daily as needed. , Disp: 100 g, Rfl: 0    Allergies: Allergies   Allergen Reactions    Pcn [Penicillins] Hives       Social History:  Social History     Social History    Marital status:      Spouse name: N/A    Number of children: N/A    Years of education: N/A     Occupational History    Not on file.      Social History Main Topics    Smoking status: Never Smoker    Smokeless tobacco: Never Used    Alcohol use No    Drug use: Not on file    Sexual activity: No     Other Topics Concern    Not on file     Social History Narrative       Family History:  Family History   Problem Relation Age of Onset    Diabetes Father     Stroke Sister     Diabetes Brother     Heart Disease Mother        Physical Exam:  Visit Vitals    /63    Pulse 68    Resp 16    Ht 5' 4\" (1.626 m)    Wt 211 lb 6.4 oz (95.9 kg)    SpO2 98%    BMI 36.29 kg/m2     Gen: NAD  Heent: mucous membranes moist, no lid lag, stare or exophthalmos. No scleral or conjunctival injection. Extra ocular muscles intact. Thyroid: moves well with swallowing, nodular but soft - nodules palpable at isthmus and in each lobe, no thyroid bruit  Heme/lymph: no cervical, supraclavicular or submandibular lymphadenopathy is appreciated. Pulmonary: clear to auscultation bilaterally, no wheezes/rhonchi or rales  Cardiovascular: regular rate and rhythm, no murmurs, rubs or gallops, good distal pulses  Extremities: no clubbing, cyanosis or edema. No onocholysis   Neuro: no tremor of outstretched hands, reflexes are normal with normal relaxation phase. Normal gait, normal concentration  Skin: normal texture, warm and dry  Psyche: normal affect with good insight into her medical conditions    Pertinent lab review:  Lab Results   Component Value Date/Time    TSH 1.550 03/30/2017 08:24 AM    T3 Uptake 33 03/30/2017 08:24 AM    T4, Free 1.20 05/04/2016 09:23 AM    T4, Total 7.3 03/30/2017 08:24 AM     Component      Latest Ref Rng & Units 1/23/2017 1/23/2017 1/23/2017 5/4/2016          11:17 AM 11:17 AM 11:17 AM  9:23 AM   T4, Total      4.5 - 12.0 ug/dL  7.1     T3 Uptake      24 - 39 %  30     Free Thyroxine Index      1.2 - 4.9  2.1     Thyroid peroxidase Ab      0 - 34 IU/mL 35 (H)      Thyroglobulin Ab      0.0 - 0.9 IU/mL <1.0      T4, Free      0.82 - 1.77 ng/dL       TSH      0.450 - 4.500 uIU/mL   1.220 1.130     Lab Results   Component Value Date/Time    Hemoglobin A1c 6.7 03/30/2017 08:24 AM    Hemoglobin A1c (POC) 6.6 08/01/2017 08:15 AM    Hemoglobin A1c, External 8.0 10/15/2014 08:35 AM     Ultrasound Jan 2017  EXAM: US THYROID/PARATHYROID/SOFT TISS      INDICATION: Enlarged thyroid gland. Nontoxic goiter.     COMPARISON: None.     TECHNIQUE: Real-time sonography of the thyroid gland was performed with a high  frequency linear transducer.  Multiple static images were obtained.     FINDINGS:  The thyroid is heterogeneous in echotexture with multiple nodules on the right  measuring 1.8 x 2.3 x 2.6 and 0.6 x 0.7 x 0.9 cm, on the left large complex  cystic measuring 2.6 x 2.7 x 3.1 cm and isthmus measuring 1.3 x 2.6 x 4.4 cm.     The right lobe measures 3.9 x 4.5 x 9.9 cm and the left lobe measures 4.6 x 5.1  x 10.8 cm. The isthmus measures 1.4 cm.     IMPRESSION: Enlarged multinodular thyroid gland. Assessment and Plan:  Bushra Ruelas is a pleasant 57 y.o. female here for multinodular goiter. She has two dominant solid nodules, a 4.4cm one at the isthmus and a 2.6cm one in the right - these were both FNA'd in March 2017 and were benign. She also has a large left sided nodule which has a prominent cystic component - I did not recommend FNA of this given its benign appearing features. I recommend a f/u US in June 2018 to evaluate for changes in nodule size. She is not having bothersome compressive symptoms at this time but discussed sx to be aware of, also discussed that surgery would be the best management option if these do develop. Patients thyroid levels were normal when checked in March. Since TPO antibody is slightly elevated, recommend TFTs be checked annually since she is probably at increased risk of developing Hashimotos thyroiditis. Repeat today given her recent weight gain. If TSH is uptrending significantly I may go ahead and start her on thyroid hormone replacement. I spent 25 minutes with the patient today and > 50% of the time was spent counseling the patient about thyroid nodules: their pathophysiology, diagnostic work-up, and management. She will return in 1 year or sooner if needed. Thank you for the opportunity to partake in this patients care.     Joi Garcia MD  [facility]  [facility]

## 2017-08-17 LAB
T4 FREE SERPL-MCNC: 1.15 NG/DL (ref 0.82–1.77)
TSH SERPL DL<=0.005 MIU/L-ACNC: 1.34 UIU/ML (ref 0.45–4.5)

## 2017-09-07 RX ORDER — HYDROCHLOROTHIAZIDE 25 MG/1
TABLET ORAL
Qty: 90 TAB | Refills: 0 | Status: SHIPPED | OUTPATIENT
Start: 2017-09-07 | End: 2017-12-09 | Stop reason: SDUPTHER

## 2017-11-01 ENCOUNTER — TELEPHONE (OUTPATIENT)
Dept: INTERNAL MEDICINE CLINIC | Age: 62
End: 2017-11-01

## 2017-11-01 DIAGNOSIS — I10 ESSENTIAL HYPERTENSION: ICD-10-CM

## 2017-11-01 DIAGNOSIS — Z00.00 ROUTINE GENERAL MEDICAL EXAMINATION AT A HEALTH CARE FACILITY: ICD-10-CM

## 2017-11-01 DIAGNOSIS — E11.9 CONTROLLED TYPE 2 DIABETES MELLITUS WITHOUT COMPLICATION, WITHOUT LONG-TERM CURRENT USE OF INSULIN (HCC): Primary | ICD-10-CM

## 2017-11-18 LAB
ALBUMIN SERPL-MCNC: 4.4 G/DL (ref 3.6–4.8)
ALBUMIN/GLOB SERPL: 1.5 {RATIO} (ref 1.2–2.2)
ALP SERPL-CCNC: 71 IU/L (ref 39–117)
ALT SERPL-CCNC: 17 IU/L (ref 0–32)
AST SERPL-CCNC: 14 IU/L (ref 0–40)
BILIRUB SERPL-MCNC: 0.4 MG/DL (ref 0–1.2)
BUN SERPL-MCNC: 20 MG/DL (ref 8–27)
BUN/CREAT SERPL: 31 (ref 12–28)
CALCIUM SERPL-MCNC: 10 MG/DL (ref 8.7–10.3)
CHLORIDE SERPL-SCNC: 103 MMOL/L (ref 96–106)
CHOLEST SERPL-MCNC: 185 MG/DL (ref 100–199)
CO2 SERPL-SCNC: 24 MMOL/L (ref 18–29)
CREAT SERPL-MCNC: 0.65 MG/DL (ref 0.57–1)
EST. AVERAGE GLUCOSE BLD GHB EST-MCNC: 143 MG/DL
GFR SERPLBLD CREATININE-BSD FMLA CKD-EPI: 109 ML/MIN/1.73
GFR SERPLBLD CREATININE-BSD FMLA CKD-EPI: 94 ML/MIN/1.73
GLOBULIN SER CALC-MCNC: 2.9 G/DL (ref 1.5–4.5)
GLUCOSE SERPL-MCNC: 98 MG/DL (ref 65–99)
HBA1C MFR BLD: 6.6 % (ref 4.8–5.6)
HDLC SERPL-MCNC: 69 MG/DL
LDLC SERPL CALC-MCNC: 102 MG/DL (ref 0–99)
MICROALBUMIN UR-MCNC: 7.8 UG/ML
POTASSIUM SERPL-SCNC: 4.2 MMOL/L (ref 3.5–5.2)
PROT SERPL-MCNC: 7.3 G/DL (ref 6–8.5)
SODIUM SERPL-SCNC: 144 MMOL/L (ref 134–144)
TRIGL SERPL-MCNC: 71 MG/DL (ref 0–149)
VLDLC SERPL CALC-MCNC: 14 MG/DL (ref 5–40)

## 2017-12-05 ENCOUNTER — OFFICE VISIT (OUTPATIENT)
Dept: INTERNAL MEDICINE CLINIC | Age: 62
End: 2017-12-05

## 2017-12-05 VITALS
HEIGHT: 64 IN | SYSTOLIC BLOOD PRESSURE: 144 MMHG | HEART RATE: 65 BPM | RESPIRATION RATE: 16 BRPM | BODY MASS INDEX: 35.55 KG/M2 | WEIGHT: 208.2 LBS | OXYGEN SATURATION: 98 % | TEMPERATURE: 97.8 F | DIASTOLIC BLOOD PRESSURE: 57 MMHG

## 2017-12-05 DIAGNOSIS — E78.2 MIXED HYPERLIPIDEMIA: Primary | ICD-10-CM

## 2017-12-05 DIAGNOSIS — E11.9 CONTROLLED TYPE 2 DIABETES MELLITUS WITHOUT COMPLICATION, WITHOUT LONG-TERM CURRENT USE OF INSULIN (HCC): ICD-10-CM

## 2017-12-05 DIAGNOSIS — I10 ESSENTIAL HYPERTENSION: ICD-10-CM

## 2017-12-05 RX ORDER — SIMVASTATIN 20 MG/1
20 TABLET, FILM COATED ORAL
Qty: 90 TAB | Refills: 0 | Status: SHIPPED | OUTPATIENT
Start: 2017-12-05 | End: 2018-11-20 | Stop reason: SDUPTHER

## 2017-12-05 NOTE — PATIENT INSTRUCTIONS
It was a pleasure to see you! As discussed:    Lab Review  Your LDL, bad cholesterol,  is elevated. Increase Simvastatin to 20mg daily. Your diabetes is well controlled. The remainder of your labs were normal. Some labs that may have been tested and their explanation are:  Your electrolytes, kidney & liver function (Metabolic Panel)   Anemia, blood cells (CBC)  Thyroid (TSH + T4, T3)  Hormones (prolactin, vitamin D )   Pregnancy (Beta HCG)    Diabetes (Hemoglobin A1c)        Learning About Tests When You Have Diabetes  Why do you need regular diabetes tests? Diabetes can be hard on your body if it's not well controlled. But having tests on a regular schedule can help you and your doctor find problems early, when it's easier to start managing them. What tests do you need? The tests you may have, how often you should have them, and the goals of the tests are:  A1c blood test. This test shows the average level of blood sugar over the past 2 to 3 months. It helps your doctor see whether blood sugar levels have been staying within your target range. · How often: Every 3 to 6 months  · Goal: A blood sugar level in your target range  Blood pressure test: This test measures the pressure of blood flow in the arteries. Controlling blood pressure can help prevent damage to nerves and blood vessels. · How often: Every 3 to 6 months  · Goal: A blood pressure level in your target range  Cholesterol test: This test measures the amount of a type of fat in the blood. It is common for people with diabetes to also have high cholesterol. Too much cholesterol in the blood can build up inside the blood vessels and raise the risk for heart attack and stroke. · How often:  At the time of your diabetes diagnosis, and as often as your doctor recommends after that  · Goal: A cholesterol level in your target range  Albumin-creatinine ratio test: This test checks for kidney damage by looking for the protein albumin (say \"al-BYOO-brett\") in the urine. Albumin is normally found in the blood. Kidney damage can let small amounts of it (microalbumin) leak into the urine. · How often: Once a year  · Goal: No protein in the urine  Blood creatinine test/estimated glomerular filtration (eGFR): The blood creatinine (say \"ajqx-IY-ie-neen\") level shows how well your kidneys are working. Creatinine is a waste product that muscles release into the blood. Blood creatinine is used to estimate the glomerular filtration rate. A high level of creatinine and/or a low eGFR may mean your kidneys are not working as well as they should. · How often: Once a year  · Goal: Normal level of creatinine in the blood. The eGFR goal is greater than 60 mL/min/1.73 m². Complete foot exam: The doctor checks for foot sores and whether any sensation has been lost.  · How often: Once a year  · Goal: Healthy feet with no foot ulcers or loss of feeling  Dental exam and cleaning: The dentist checks for gum disease and tooth decay. People with high blood sugar are more likely to have these problems. · How often: Every 6 months  · Goal: Healthy teeth and gums  Complete eye exam: High blood sugar levels can damage the eyes. This exam is done by an ophthalmologist or optometrist. It includes a dilated eye exam. The exam shows whether there's damage to the back of the eye (diabetic retinopathy). · How often: Once a year. If you don't have any signs of diabetic retinopathy, your doctor may recommend an exam every 2 years. · Goal: No damage to the back of the eye  Thyroid-stimulating hormone (TSH) blood test: This test checks for thyroid disease. Too little thyroid hormone can cause some medicines (like insulin) to stay in the body longer. This can cause low blood sugar. You may be tested if you have high cholesterol or are a woman over 48years old.   · How often: As part of your diabetes diagnosis, and as often as your doctor recommends after that  · Goal: Normal level of TSH in the blood  Follow-up care is a key part of your treatment and safety. Be sure to make and go to all appointments, and call your doctor if you are having problems. It's also a good idea to know your test results and keep a list of the medicines you take. Where can you learn more? Go to http://nicola-roberto.info/. Enter 01.14.46.38.08 in the search box to learn more about \"Learning About Tests When You Have Diabetes. \"  Current as of: March 13, 2017  Content Version: 11.4  © 4811-2749 [employer]. Care instructions adapted under license by [employer] (which disclaims liability or warranty for this information). If you have questions about a medical condition or this instruction, always ask your healthcare professional. [address] any warranty or liability for your use of this information.

## 2017-12-05 NOTE — PROGRESS NOTES
Lab Review  Your LDL, bad cholesterol,  is elevated. Increase Simvastatin to 20mg daily. Your diabetes is well controlled.    The remainder of your labs were normal. Some labs that may have been tested and their explanation are:  Your electrolytes, kidney & liver function (Metabolic Panel)   Anemia, blood cells (CBC)  Thyroid (TSH + T4, T3)  Hormones (prolactin, vitamin D )   Pregnancy (Beta HCG)    Diabetes (Hemoglobin A1c)

## 2017-12-05 NOTE — PROGRESS NOTES
HISTORY OF PRESENT ILLNESS  Junito Cueva is a 57 y.o. female. HPI   Abdominal Pain  Patient complains of abdominal bloating. The pain is described as Marda Meigs is located in the diffusely without radiation. Onset was 2 weeks ago. Symptoms have been unchanged since. Aggravating factors: she had dietary indiscretion on Thanksgiving. Alleviating factors: none. Associated symptoms: constipation, belching and fluctuance . The patient denies hematochezia, hematuria and melena. Diabetes Review:  The patient has diabetes. Diet and Lifestyle: follows a diabetic diet regularly, exercises sporadically  Home Glucose  Monitoring: is not measured at home. Pertinent ROS: taking medications as instructed, no medication side effects noted, no TIA's, no chest pain on exertion, no dyspnea on exertion, no swelling of ankles. Cardiovascular Review:  The patient has hypertension. Diet and Lifestyle: exercises sporadically  Home BP Monitoring: is not measured at home. Pertinent ROS: taking medications as instructed, no medication side effects noted, no TIA's, no chest pain on exertion, no dyspnea on exertion, no swelling of ankles.    ROS  Patient Active Problem List    Diagnosis Date Noted    Diabetes ([facility]     Hypertension     Multinodular goiter 02/10/2017    Thyroid antibody positive 02/10/2017    History of breast cancer 10/30/2015    Diabetes mellitus type 2, controlled ([facility] 10/30/2015    HTN (hypertension) 01/21/2015    Hyperuricemia 01/21/2015       Current Outpatient Prescriptions   Medication Sig Dispense Refill    potassium chloride SA (MICRO-K) 10 mEq capsule TAKE 3 CAPSULES BY MOUTH EVERY  Cap 1    hydroCHLOROthiazide (HYDRODIURIL) 25 mg tablet TAKE 1 TABLET BY MOUTH EVERY DAY 90 Tab 0    simvastatin (ZOCOR) 10 mg tablet TAKE 1 TABLET BY MOUTH EVERY EVENING 90 Tab 2    glucose blood VI test strips (ACCU-CHEK SMARTVIEW TEST STRIP) strip Please use two times daily as needed for Dx code 250.02 100 Strip 11    metFORMIN (GLUCOPHAGE) 1,000 mg tablet TAKE 1 TABLET BY MOUTH TWICE DAILY 180 Tab 3    lisinopril (PRINIVIL, ZESTRIL) 10 mg tablet TAKE 1 TABLET BY MOUTH EVERY DAY 90 Tab 2    diclofenac (VOLTAREN) 1 % gel Apply  to affected area four (4) times daily as needed. 100 g 0    BETA-CAROTENE,A, W-C & E/ZN/CU (VISION-CHARANJIT PRESERVE PO) Take  by mouth two (2) times a day.  Cholecalciferol, Vitamin D3, (VITAMIN D3) 1,000 unit cap Take 1,000 Units by mouth daily. Allergies   Allergen Reactions    Pcn [Penicillins] Hives      Visit Vitals    /57 (BP 1 Location: Left arm, BP Patient Position: Sitting)    Pulse 65    Temp 97.8 °F (36.6 °C) (Oral)    Resp 16    Ht 5' 4\" (1.626 m)    Wt 208 lb 3.2 oz (94.4 kg)    SpO2 98%    BMI 35.74 kg/m2       Physical Exam  Lab Results  Component Value Date/Time   WBC 4.8 03/30/2017 08:24 AM   HGB 10.5 03/30/2017 08:24 AM   HCT 33.1 03/30/2017 08:24 AM   PLATELET 981 04/12/0172 08:24 AM   MCV 85 03/30/2017 08:24 AM     Lab Results  Component Value Date/Time   Hemoglobin A1c 6.6 11/17/2017 09:43 AM   Hemoglobin A1c 6.7 03/30/2017 08:24 AM   Hemoglobin A1c 6.9 09/27/2016 04:04 PM   Hemoglobin A1c, External 8.0 10/15/2014 08:35 AM   Glucose 98 11/17/2017 09:43 AM   Microalb/Creat ratio (ug/mg creat.) 3.4 08/01/2017 12:00 AM   LDL, calculated 102 11/17/2017 09:43 AM   Creatinine 0.65 11/17/2017 09:43 AM      Lab Results  Component Value Date/Time   Cholesterol, total 185 11/17/2017 09:43 AM   HDL Cholesterol 69 11/17/2017 09:43 AM   LDL, calculated 102 11/17/2017 09:43 AM   LDL-C, External 64 10/15/2014 08:35 AM   Triglyceride 71 11/17/2017 09:43 AM   Lab Results  Component Value Date/Time   ALT (SGPT) 17 11/17/2017 09:43 AM   AST (SGOT) 14 11/17/2017 09:43 AM   Alk.  phosphatase 71 11/17/2017 09:43 AM   Bilirubin, total 0.4 11/17/2017 09:43 AM   Albumin 4.4 11/17/2017 09:43 AM   Protein, total 7.3 11/17/2017 09:43 AM   PLATELET 990 91/12/2434 08:24 AM       Lab Results  Component Value Date/Time   GFR est non-AA 94 11/17/2017 09:43 AM   GFR est  11/17/2017 09:43 AM   Creatinine 0.65 11/17/2017 09:43 AM   BUN 20 11/17/2017 09:43 AM   Sodium 144 11/17/2017 09:43 AM   Potassium 4.2 11/17/2017 09:43 AM   Chloride 103 11/17/2017 09:43 AM   CO2 24 11/17/2017 09:43 AM   Lab Results  Component Value Date/Time   TSH 1.340 08/16/2017 10:19 AM   T3 Uptake 33 03/30/2017 08:24 AM   T4, Free 1.15 08/16/2017 10:19 AM   T4, Total 7.3 03/30/2017 08:24 AM      Lab Results   Component Value Date/Time    Glucose 98 11/17/2017 09:43 AM               ASSESSMENT and PLAN  Diagnoses and all orders for this visit:    1. Mixed hyperlipidemia- LDL elevated increase to 20mg simvastatin. -     simvastatin (ZOCOR) 20 mg tablet; Take 1 Tab by mouth nightly. 2. Essential hypertension- sBP slightly elevated today . No med changes. Counseled on low sodium diet and exercise. Monitor BP at home and notify office if BP remains elevated. Parameters given. See AVS for full details of plan and patient discussion. 3. Controlled type 2 diabetes mellitus without complication, without long-term current use of insulin ([address]- A1c at goal.       Follow-up Disposition:  Return in about 4 months (around 4/5/2018) for Physical - 30 minute appointment. Medication risks/benefits/costs/interactions/alternatives discussed with patient. Villaemma Hector  was given an after visit summary which includes diagnoses, current medications, & vitals. she expressed understanding with the diagnosis and plan.

## 2017-12-05 NOTE — MR AVS SNAPSHOT
Visit Information Date & Time Provider Department Dept. Phone Encounter #  
 12/5/2017  8:00 AM Eri Ronquillo MD [facility] Internal Medicine [phone] [id number] Follow-up Instructions Return in about 4 months (around 4/5/2018) for Physical - 30 minute appointment. Your Appointments 7/18/2018  9:30 AM  
ROUTINE CARE with Boyd Ruelas MD  
[facility] Appt Note: 11 month f/u  
 [address] [place] [address]  
[address] [id number] [address] [address] Upcoming Health Maintenance Date Due  
 PAP AKA CERVICAL CYTOLOGY 1/30/2018 FOOT EXAM Q1 4/17/2018 EYE EXAM RETINAL OR DILATED Q1 4/26/2018 HEMOGLOBIN A1C Q6M 5/17/2018 MICROALBUMIN Q1 11/17/2018 LIPID PANEL Q1 11/17/2018 BREAST CANCER SCRN MAMMOGRAM 1/30/2019 GLAUCOMA SCREENING Q2Y 4/26/2019 COLONOSCOPY 6/13/2023 DTaP/Tdap/Td series (2 - Td) 5/4/2026 Allergies as of 12/5/2017  Review Complete On: 12/5/2017 By: Eri Ronquillo MD  
  
 Severity Noted Reaction Type Reactions Pcn [Penicillins]  01/21/2015    Hives Current Immunizations  Never Reviewed Name Date Tdap 5/4/2016 Zoster Vaccine, Live 1/10/2017 12:00 AM  
  
 Not reviewed this visit You Were Diagnosed With   
  
 Codes Comments Mixed hyperlipidemia    -  Primary ICD-10-CM: F15.9 ICD-9-CM: 272.2 Essential hypertension     ICD-10-CM: I10 
ICD-9-CM: 401.9 Controlled type 2 diabetes mellitus without complication, without long-term current use of insulin ([facility]     ICD-10-CM: E11.9 ICD-9-CM: 250.00 Vitals BP Pulse Temp Resp Height(growth percentile) Weight(growth percentile) 144/57 (BP 1 Location: Left arm, BP Patient Position: Sitting) 65 97.8 °F (36.6 °C) (Oral) 16 5' 4\" (1.626 m) 208 lb 3.2 oz (94.4 kg) SpO2 BMI OB Status Smoking Status 98% 35.74 kg/m2 Postmenopausal Never Smoker BMI and BSA Data Body Mass Index Body Surface Area 35.74 kg/m 2 2.06 m 2 Preferred Pharmacy Pharmacy Name Phone [facility] [employer] [facility] [facility] [address]  [place] [phone] Your Updated Medication List  
  
   
This list is accurate as of: 12/5/17  8:33 AM.  Always use your most recent med list.  
  
  
  
  
 diclofenac 1 % Gel Commonly known as:  VOLTAREN Apply  to affected area four (4) times daily as needed. glucose blood VI test strips strip Commonly known as:  [address] Please use two times daily as needed for Dx code 250.02  
  
 hydroCHLOROthiazide 25 mg tablet Commonly known as:  HYDRODIURIL  
TAKE 1 TABLET BY MOUTH EVERY DAY  
  
 lisinopril 10 mg tablet Commonly known as:  PRINIVIL, ZESTRIL  
TAKE 1 TABLET BY MOUTH EVERY DAY  
  
 metFORMIN 1,000 mg tablet Commonly known as:  GLUCOPHAGE  
TAKE 1 TABLET BY MOUTH TWICE DAILY potassium chloride SA 10 mEq capsule Commonly known as:  MICRO-K  
TAKE 3 CAPSULES BY MOUTH EVERY DAY  
  
 simvastatin 20 mg tablet Commonly known as:  ZOCOR Take 1 Tab by mouth nightly. VISION-CHARANJIT PRESERVE PO Take  by mouth two (2) times a day. VITAMIN D3 1,000 unit Cap Generic drug:  cholecalciferol Take 1,000 Units by mouth daily. Prescriptions Sent to Pharmacy Refills  
 simvastatin (ZOCOR) 20 mg tablet 0 Sig: Take 1 Tab by mouth nightly. Class: Normal  
 Pharmacy: [facility] [address] #: [phone] Route: Oral  
  
Follow-up Instructions Return in about 4 months (around 4/5/2018) for Physical - 30 minute appointment. Patient Instructions It was a pleasure to see you! As discussed: 
 
Lab Review Your LDL, bad cholesterol,  is elevated. Increase Simvastatin to 20mg daily. Your diabetes is well controlled. The remainder of your labs were normal. Some labs that may have been tested and their explanation are: 
Your electrolytes, kidney & liver function (Metabolic Panel) Anemia, blood cells (CBC) Thyroid (TSH + T4, T3) Hormones (prolactin, vitamin D ) Pregnancy (Beta HCG) Diabetes (Hemoglobin A1c) Learning About Tests When You Have Diabetes Why do you need regular diabetes tests? Diabetes can be hard on your body if it's not well controlled. But having tests on a regular schedule can help you and your doctor find problems early, when it's easier to start managing them. What tests do you need? The tests you may have, how often you should have them, and the goals of the tests are: 
A1c blood test. This test shows the average level of blood sugar over the past 2 to 3 months. It helps your doctor see whether blood sugar levels have been staying within your target range. · How often: Every 3 to 6 months · Goal: A blood sugar level in your target range Blood pressure test: This test measures the pressure of blood flow in the arteries. Controlling blood pressure can help prevent damage to nerves and blood vessels. · How often: Every 3 to 6 months · Goal: A blood pressure level in your target range Cholesterol test: This test measures the amount of a type of fat in the blood. It is common for people with diabetes to also have high cholesterol. Too much cholesterol in the blood can build up inside the blood vessels and raise the risk for heart attack and stroke. · How often: At the time of your diabetes diagnosis, and as often as your doctor recommends after that · Goal: A cholesterol level in your target range Albumin-creatinine ratio test: This test checks for kidney damage by looking for the protein albumin (say \"al-BYOO-brett\") in the urine.  Albumin is normally found in the blood. Kidney damage can let small amounts of it (microalbumin) leak into the urine. · How often: Once a year · Goal: No protein in the urine Blood creatinine test/estimated glomerular filtration (eGFR): The blood creatinine (say \"pjmm-SE-lh-neen\") level shows how well your kidneys are working. Creatinine is a waste product that muscles release into the blood. Blood creatinine is used to estimate the glomerular filtration rate. A high level of creatinine and/or a low eGFR may mean your kidneys are not working as well as they should. · How often: Once a year · Goal: Normal level of creatinine in the blood. The eGFR goal is greater than 60 mL/min/1.73 m². Complete foot exam: The doctor checks for foot sores and whether any sensation has been lost. 
· How often: Once a year · Goal: Healthy feet with no foot ulcers or loss of feeling Dental exam and cleaning: The dentist checks for gum disease and tooth decay. People with high blood sugar are more likely to have these problems. · How often: Every 6 months · Goal: Healthy teeth and gums Complete eye exam: High blood sugar levels can damage the eyes. This exam is done by an ophthalmologist or optometrist. It includes a dilated eye exam. The exam shows whether there's damage to the back of the eye (diabetic retinopathy). · How often: Once a year. If you don't have any signs of diabetic retinopathy, your doctor may recommend an exam every 2 years. · Goal: No damage to the back of the eye Thyroid-stimulating hormone (TSH) blood test: This test checks for thyroid disease. Too little thyroid hormone can cause some medicines (like insulin) to stay in the body longer. This can cause low blood sugar. You may be tested if you have high cholesterol or are a woman over 48years old. · How often: As part of your diabetes diagnosis, and as often as your doctor recommends after that · Goal: Normal level of TSH in the blood Follow-up care is a key part of your treatment and safety. Be sure to make and go to all appointments, and call your doctor if you are having problems. It's also a good idea to know your test results and keep a list of the medicines you take. Where can you learn more? Go to http://nicola-roberto.info/. Enter [id number] in the search box to learn more about \"Learning About Tests When You Have Diabetes. \" Current as of: March 13, 2017 Content Version: 11.4 © 6314-3107 [employer]. Care instructions adapted under license by [employer] (which disclaims liability or warranty for this information). If you have questions about a medical condition or this instruction, always ask your healthcare professional. [address] any warranty or liability for your use of this information. Introducing [facility]! [employer] introduces Honk patient portal. Now you can access parts of your medical record, email your doctor's office, and request medication refills online. 1. In your internet browser, go to [email or web address]. [employer]/Nerd Attack 2. Click on the First Time User? Click Here link in the Sign In box. You will see the New Member Sign Up page. 3. Enter your Honk Access Code exactly as it appears below. You will not need to use this code after youve completed the sign-up process. If you do not sign up before the expiration date, you must request a new code. · Honk Access Code: [email or web address] Expires: 3/5/2018  8:33 AM 
 
4. Enter the last four digits of your Social Security Number (xxxx) and Date of Birth (mm/dd/yyyy) as indicated and click Submit. You will be taken to the next sign-up page. 5. Create a Honk ID. This will be your Honk login ID and cannot be changed, so think of one that is secure and easy to remember. 6. Create a Honk password. You can change your password at any time. 7. Enter your Password Reset Question and Answer. This can be used at a later time if you forget your password. 8. Enter your e-mail address. You will receive e-mail notification when new information is available in [address] 9. Click Sign Up. You can now view and download portions of your medical record. 10. Click the Download Summary menu link to download a portable copy of your medical information. If you have questions, please visit the Frequently Asked Questions section of the [employer] website. Remember, [employer] is NOT to be used for urgent needs. For medical emergencies, dial 911. Now available from your iPhone and Android! Please provide this summary of care documentation to your next provider. Your primary care clinician is listed as Oliver Hernandez. If you have any questions after today's visit, please call [phone].

## 2017-12-05 NOTE — LETTER
12/5/2017 8:33 AM 
 
Ms. Naresh Miller [address] [id number] Dear Naresh Miller: 
 
Please find your most recent results below. Resulted Orders METABOLIC PANEL, COMPREHENSIVE Result Value Ref Range Glucose 98 65 - 99 mg/dL BUN 20 8 - 27 mg/dL Creatinine 0.65 0.57 - 1.00 mg/dL GFR est non-AA 94 >59 mL/min/1.73 GFR est  >59 mL/min/1.73  
 BUN/Creatinine ratio 31 (H) 12 - 28 Sodium 144 134 - 144 mmol/L Potassium 4.2 3.5 - 5.2 mmol/L Chloride 103 96 - 106 mmol/L  
 CO2 24 18 - 29 mmol/L Calcium 10.0 8.7 - 10.3 mg/dL Protein, total 7.3 6.0 - 8.5 g/dL Albumin 4.4 3.6 - 4.8 g/dL GLOBULIN, TOTAL 2.9 1.5 - 4.5 g/dL A-G Ratio 1.5 1.2 - 2.2 Bilirubin, total 0.4 0.0 - 1.2 mg/dL Alk. phosphatase 71 39 - 117 IU/L  
 AST (SGOT) 14 0 - 40 IU/L  
 ALT (SGPT) 17 0 - 32 IU/L Narrative Performed at:  [address]  [id number] : Mariaelena Jackson MD, Phone:  [phone] LIPID PANEL Result Value Ref Range Cholesterol, total 185 100 - 199 mg/dL Triglyceride 71 0 - 149 mg/dL HDL Cholesterol 69 >39 mg/dL VLDL, calculated 14 5 - 40 mg/dL LDL, calculated 102 (H) 0 - 99 mg/dL Narrative Performed at:  [address]  [id number] : Mariaelena Jackson MD, Phone:  [phone] HEMOGLOBIN A1C WITH EAG Result Value Ref Range Hemoglobin A1c 6.6 (H) 4.8 - 5.6 % Comment:  
            Pre-diabetes: 5.7 - 6.4 Diabetes: >6.4 Glycemic control for adults with diabetes: <7.0 Estimated average glucose 143 mg/dL Narrative Performed at:  [address]  [id number] : Mariaelena Jackson MD, Phone:  [phone] MICROALBUMIN, UR, RAND Result Value Ref Range Microalbumin, urine 7.8 Not Estab. ug/mL Narrative Performed at:  [address]  [id number] : Ashlee Golden MD, Phone:  [phone] RECOMMENDATIONS: 
Lab Review Your LDL, bad cholesterol,  is elevated. Increase Simvastatin to 20mg daily. Your diabetes is well controlled. The remainder of your labs were normal. Some labs that may have been tested and their explanation are: 
Your electrolytes, kidney & liver function (Metabolic Panel) Anemia, blood cells (CBC) Thyroid (TSH + T4, T3) Hormones (prolactin, vitamin D ) Pregnancy (Beta HCG) Diabetes (Hemoglobin A1c) Sincerely, Amisha Eaton MD

## 2017-12-14 ENCOUNTER — TELEPHONE (OUTPATIENT)
Dept: INTERNAL MEDICINE CLINIC | Age: 62
End: 2017-12-14

## 2017-12-14 NOTE — TELEPHONE ENCOUNTER
Please mail the results of her last labs to: [address] [facility] [address]

## 2018-01-31 ENCOUNTER — HOSPITAL ENCOUNTER (OUTPATIENT)
Dept: MAMMOGRAPHY | Age: 63
Discharge: HOME | End: 2018-01-31
Attending: INTERNAL MEDICINE
Payer: COMMERCIAL

## 2018-01-31 DIAGNOSIS — Z12.39 BREAST SCREENING: ICD-10-CM

## 2018-01-31 PROCEDURE — 77067 SCR MAMMO BI INCL CAD: CPT

## 2018-02-19 NOTE — PROGRESS NOTES
Dear Louanne Gaucher   Thank you for completing your mammogram.  Please find your most recent results below. Your results show NO clinical evidence of breast cancer. We will repeat the screening in 1 year. In the interim, continue to perform monthly self breast exams and notify me if you have any suspicious findings. Do not hesitate to contact the office if you have any questions or concerns before your next appointment.      Kind regards,        ----  Nithya Quigley MD  Internal Medicine/ [facility] Internal Medicine   [address]  Office: [phone]

## 2018-03-14 RX ORDER — POTASSIUM CHLORIDE 750 MG/1
CAPSULE, EXTENDED RELEASE ORAL
Qty: 270 CAP | Refills: 0 | Status: SHIPPED | OUTPATIENT
Start: 2018-03-14 | End: 2018-06-10 | Stop reason: SDUPTHER

## 2018-04-17 ENCOUNTER — HOSPITAL ENCOUNTER (OUTPATIENT)
Dept: GENERAL RADIOLOGY | Age: 63
Discharge: HOME | End: 2018-04-17
Payer: COMMERCIAL

## 2018-04-17 ENCOUNTER — OFFICE VISIT (OUTPATIENT)
Dept: INTERNAL MEDICINE CLINIC | Age: 63
End: 2018-04-17

## 2018-04-17 VITALS
HEIGHT: 64 IN | HEART RATE: 62 BPM | SYSTOLIC BLOOD PRESSURE: 134 MMHG | RESPIRATION RATE: 18 BRPM | WEIGHT: 214.2 LBS | DIASTOLIC BLOOD PRESSURE: 68 MMHG | BODY MASS INDEX: 36.57 KG/M2 | OXYGEN SATURATION: 98 % | TEMPERATURE: 97.8 F

## 2018-04-17 DIAGNOSIS — G89.29 CHRONIC BILATERAL LOW BACK PAIN WITH BILATERAL SCIATICA: ICD-10-CM

## 2018-04-17 DIAGNOSIS — Z00.00 WELL WOMAN EXAM (NO GYNECOLOGICAL EXAM): Primary | ICD-10-CM

## 2018-04-17 DIAGNOSIS — M54.41 CHRONIC BILATERAL LOW BACK PAIN WITH BILATERAL SCIATICA: ICD-10-CM

## 2018-04-17 DIAGNOSIS — E66.01 SEVERE OBESITY (BMI 35.0-39.9) WITH COMORBIDITY (HCC): ICD-10-CM

## 2018-04-17 DIAGNOSIS — I10 ESSENTIAL HYPERTENSION: ICD-10-CM

## 2018-04-17 DIAGNOSIS — E11.9 CONTROLLED TYPE 2 DIABETES MELLITUS WITHOUT COMPLICATION, WITHOUT LONG-TERM CURRENT USE OF INSULIN (HCC): ICD-10-CM

## 2018-04-17 DIAGNOSIS — M54.42 CHRONIC BILATERAL LOW BACK PAIN WITH BILATERAL SCIATICA: ICD-10-CM

## 2018-04-17 DIAGNOSIS — Z85.3 HISTORY OF BREAST CANCER: ICD-10-CM

## 2018-04-17 PROCEDURE — 72100 X-RAY EXAM L-S SPINE 2/3 VWS: CPT

## 2018-04-17 NOTE — PROGRESS NOTES
Chief Complaint   Patient presents with    Complete Physical     1. Have you been to the ER, urgent care clinic since your last visit? Hospitalized since your last visit? No    2. Have you seen or consulted any other health care providers outside of the [facility] since your last visit? Include any pap smears or colon screening.  Yes Where: Podiatrist Reason for visit: Annual     Pap scheduled for June or July    complains of back leg pain,

## 2018-04-17 NOTE — PROGRESS NOTES
HISTORY OF PRESENT ILLNESS  Ashwini Nicholas is a 57 y.o. female. Leg Pain    This is a chronic (10 years ) problem. The current episode started more than 1 week ago. The problem has not changed since onset. Pain location: bilateral thighs, back of legs  The quality of the pain is described as aching and intermittent. Pertinent negatives include no numbness, full range of motion, no stiffness and no tingling. Exacerbated by: bending to pick granddaughter  There has been no history of extremity trauma. Family history is significant for gout. (dad)      Health Maintenance   Topic Date Due    PAP AKA CERVICAL CYTOLOGY  01/30/2018    FOOT EXAM Q1  04/17/2018    EYE EXAM RETINAL OR DILATED Q1  04/26/2018    HEMOGLOBIN A1C Q6M  05/17/2018    Bone Densitometry (Dexa) Screening  05/26/2018    MICROALBUMIN Q1  11/17/2018    LIPID PANEL Q1  11/17/2018    GLAUCOMA SCREENING Q2Y  04/26/2019    BREAST CANCER SCRN MAMMOGRAM  01/31/2020    COLONOSCOPY  06/13/2023    DTaP/Tdap/Td series (2 - Td) 05/04/2026    Hepatitis C Screening  Completed    ZOSTER VACCINE AGE 60>  Completed    Pneumococcal 19-64 Medium Risk  Completed    Influenza Age 5 to Adult  Completed     Cardiovascular Review:  The patient has hypertension and obesity. Diet and Lifestyle: does not rigorously follow a diabetic diet, exercise decreased in winter. Plans to restart   Home BP Monitoring: is not measured at home. Pertinent ROS: taking medications as instructed, no medication side effects noted, no TIA's, no chest pain on exertion, no dyspnea on exertion, no swelling of ankles. Diabetes Review:  The patient has diabetes.   Diet and Lifestyle: does not rigorously follow a diabetic diet, exercises sporadically, nonsmoker  Home Glucose  Monitoring: is borderline controlled at home,  Pertinent ROS: taking medications as instructed, no medication side effects noted, no TIA's, no chest pain on exertion, no dyspnea on exertion, no swelling of ankles. Review of Systems   Musculoskeletal: Negative for stiffness. Neurological: Negative for tingling and numbness. Patient Active Problem List    Diagnosis Date Noted    Severe obesity (BMI 35.0-39. 9) with comorbidity ([facility] 04/17/2018    Diabetes ([facility]     Hypertension     Multinodular goiter 02/10/2017    Thyroid antibody positive 02/10/2017    History of breast cancer 10/30/2015    Diabetes mellitus type 2, controlled ([facility] 10/30/2015    HTN (hypertension) 01/21/2015    Hyperuricemia 01/21/2015       Current Outpatient Prescriptions   Medication Sig Dispense Refill    potassium chloride SA (MICRO-K) 10 mEq capsule TAKE 3 CAPSULES BY MOUTH EVERY  Cap 0    simvastatin (ZOCOR) 10 mg tablet TAKE 1 TABLET BY MOUTH EVERY EVENING 90 Tab 3    lisinopril (PRINIVIL, ZESTRIL) 10 mg tablet TAKE 1 TABLET BY MOUTH EVERY DAY 90 Tab 3    metFORMIN (GLUCOPHAGE) 1,000 mg tablet TAKE 1 TABLET BY MOUTH TWICE DAILY 180 Tab 1    hydroCHLOROthiazide (HYDRODIURIL) 25 mg tablet TAKE 1 TABLET BY MOUTH EVERY DAY 90 Tab 2    glucose blood VI test strips (ACCU-CHEK SMARTVIEW TEST STRIP) strip Please use two times daily as needed for Dx code 250.02 100 Strip 11    diclofenac (VOLTAREN) 1 % gel Apply  to affected area four (4) times daily as needed. 100 g 0    BETA-CAROTENE,A, W-C & E/ZN/CU (VISION-CHARANJIT PRESERVE PO) Take  by mouth two (2) times a day.  Cholecalciferol, Vitamin D3, (VITAMIN D3) 1,000 unit cap Take 1,000 Units by mouth daily.  simvastatin (ZOCOR) 20 mg tablet Take 1 Tab by mouth nightly. 90 Tab 0       Allergies   Allergen Reactions    Pcn [Penicillins] Hives      Visit Vitals    /68 (BP 1 Location: Right arm, BP Patient Position: Sitting)    Pulse 62    Temp 97.8 °F (36.6 °C) (Oral)    Resp 18    Ht 5' 4.37\" (1.635 m)    Wt 214 lb 3.2 oz (97.2 kg)    SpO2 98%    BMI 36.35 kg/m2         Physical Exam   Constitutional: She is oriented to person, place, and time.  She appears well-developed and well-nourished. HENT:   Right Ear: External ear normal.   Left Ear: External ear normal.   Mouth/Throat: Oropharynx is clear and moist. No oropharyngeal exudate. Eyes: Conjunctivae are normal. No scleral icterus. Neck: Normal range of motion. Neck supple. No thyromegaly present. Cardiovascular: Normal rate, regular rhythm and normal heart sounds. Exam reveals no gallop and no friction rub. No murmur heard. Pulmonary/Chest: Effort normal and breath sounds normal. No respiratory distress. She has no wheezes. She has no rales. She exhibits no tenderness. Abdominal: Soft. Bowel sounds are normal. She exhibits no distension. There is no tenderness. There is no rebound and no guarding. Genitourinary:   Genitourinary Comments: Deferred for gyn per pt request   Musculoskeletal: Normal range of motion. She exhibits no edema. Lumbar back: She exhibits tenderness, bony tenderness and spasm. Right foot: Normal.        Left foot: Normal.   BLE+ SLR     ADA Diabetic Foot Exam  Inspection  Dermatologic  skin status: Warm, well perfused, no increased thickness, dryness, cracking  Nails: WNL  No e/o infection between toes for fungal infection  No ulceration, calluses/blistering  Musculoskeletal- No significant deformities      Neurological assessment  10-g monofilament wnl on 1st toe and footpad in 3 locations ble  Sensation intact  Proprioception wnl   Neurological: She is alert and oriented to person, place, and time.        ASSESSMENT and PLAN

## 2018-04-17 NOTE — MR AVS SNAPSHOT
[address] [facility] [address] [facility] 57 
[phone] Patient: Akhil Baptiste MRN: [id number] YKP:[id number]/0945 Visit Information Date & Time Provider Department Dept. Phone Encounter #  
 4/17/2018 10:00 AM Zhang Tavarez MD [address] [address] Internal Medicine [phone] [id number] Follow-up Instructions Return in about 4 months (around 8/17/2018) for Follow-up, Hypertension. Your Appointments 7/18/2018  9:30 AM  
ROUTINE CARE with MD [facility] Appt Note: 11 month f/u  
 [address] [facility] [address] [facility] 57  
[id number] [place] [id number] Upcoming Health Maintenance Date Due  
 EYE EXAM RETINAL OR DILATED Q1 4/26/2018 HEMOGLOBIN A1C Q6M 5/17/2018 Bone Densitometry (Dexa) Screening 5/26/2018 MICROALBUMIN Q1 11/17/2018 LIPID PANEL Q1 11/17/2018 FOOT EXAM Q1 4/17/2019 GLAUCOMA SCREENING Q2Y 4/26/2019 BREAST CANCER SCRN MAMMOGRAM 1/31/2020 PAP AKA CERVICAL CYTOLOGY 8/1/2020 COLONOSCOPY 6/13/2023 DTaP/Tdap/Td series (2 - Td) 5/4/2026 Allergies as of 4/17/2018  Review Complete On: 4/17/2018 By: Zhang Tavarez MD  
  
 Severity Noted Reaction Type Reactions Pcn [Penicillins]  01/21/2015    Hives Current Immunizations  Never Reviewed Name Date Tdap 5/4/2016 Zoster Vaccine, Live 1/10/2017 12:00 AM  
  
 Not reviewed this visit You Were Diagnosed With   
  
 Codes Comments Well woman exam (no gynecological exam)    -  Primary ICD-10-CM: Z00.00 ICD-9-CM: V70.0 Chronic bilateral low back pain with bilateral sciatica     ICD-10-CM: M54.42, M54.41, G89.29 ICD-9-CM: 724.2, 724.3, 338.29 Controlled type 2 diabetes mellitus without complication, without long-term current use of insulin ([facility]     ICD-10-CM: E11.9 ICD-9-CM: 250.00 Severe obesity (BMI 35.0-39. 9) with comorbidity ([facility]     ICD-10-CM: E66.01 
ICD-9-CM: 278.01 Essential hypertension     ICD-10-CM: I10 
ICD-9-CM: 401.9 History of breast cancer     ICD-10-CM: Z85.3 ICD-9-CM: V10.3 Vitals BP Pulse Temp Resp Height(growth percentile) Weight(growth percentile) 134/68 (BP 1 Location: Right arm, BP Patient Position: Sitting) 62 97.8 °F (36.6 °C) (Oral) 18 5' 4.37\" (1.635 m) 214 lb 3.2 oz (97.2 kg) SpO2 BMI OB Status Smoking Status 98% 36.35 kg/m2 Postmenopausal Never Smoker Vitals History BMI and BSA Data Body Mass Index Body Surface Area  
 36.35 kg/m 2 2.1 m 2 Preferred Pharmacy Pharmacy Name Phone [facility] [address] [facility]  [place] [phone] Your Updated Medication List  
  
   
This list is accurate as of 4/17/18 10:39 AM.  Always use your most recent med list.  
  
  
  
  
 diclofenac 1 % Gel Commonly known as:  VOLTAREN Apply  to affected area four (4) times daily as needed. glucose blood VI test strips strip Commonly known as:  [address] Please use two times daily as needed for Dx code 250.02  
  
 hydroCHLOROthiazide 25 mg tablet Commonly known as:  HYDRODIURIL  
TAKE 1 TABLET BY MOUTH EVERY DAY  
  
 lisinopril 10 mg tablet Commonly known as:  PRINIVIL, ZESTRIL  
TAKE 1 TABLET BY MOUTH EVERY DAY  
  
 metFORMIN 1,000 mg tablet Commonly known as:  GLUCOPHAGE  
TAKE 1 TABLET BY MOUTH TWICE DAILY potassium chloride SA 10 mEq capsule Commonly known as:  MICRO-K  
TAKE 3 CAPSULES BY MOUTH EVERY DAY  
  
 * simvastatin 20 mg tablet Commonly known as:  ZOCOR Take 1 Tab by mouth nightly. * simvastatin 10 mg tablet Commonly known as:  ZOCOR  
TAKE 1 TABLET BY MOUTH EVERY EVENING  
  
 VISION-CHARANJIT PRESERVE PO Take  by mouth two (2) times a day. VITAMIN D3 1,000 unit Cap Generic drug:  cholecalciferol Take 1,000 Units by mouth daily. * Notice: This list has 2 medication(s) that are the same as other medications prescribed for you. Read the directions carefully, and ask your doctor or other care provider to review them with you. We Performed the Following HEMOGLOBIN A1C WITH EAG [09903 CPT(R)] LIPID PANEL [92442 CPT(R)] METABOLIC PANEL, COMPREHENSIVE [00158 CPT(R)] T4, FREE [id number] CPT(R)] TSH 3RD GENERATION [00468 CPT(R)] Follow-up Instructions Return in about 4 months (around 8/17/2018) for Follow-up, Hypertension. To-Do List   
 04/17/2018 Imaging:  XR SPINE LUMB 2 OR 3 V Patient Instructions It was a pleasure to see you! As discussed: 
 
Health Maintenance I have ordered your age appropriate labs please complete them. You will need to fast 10-12hrs before your appointment. Start paying more attention to your diet and start exercising. Goal for exercise is 150minutes of moderate exercise a week and diet goal is to eat 50% fruits and vegetables with minimal sugar, fat and oil daily. See health.gov or choosemyplate.gov for more details. Your cervical cancer and breast cancer screening will be completed by your ob/ gyn as scheduled. Leg pain Complete the xray. I will notify of next steps based on the information received. In the meantime please see below for more information. Well Visit, Women 48 to 72: Care Instructions Your Care Instructions Physical exams can help you stay healthy. Your doctor has checked your overall health and may have suggested ways to take good care of yourself. He or she also may have recommended tests. At home, you can help prevent illness with healthy eating, regular exercise, and other steps. Follow-up care is a key part of your treatment and safety.  Be sure to make and go to all appointments, and call your doctor if you are having problems. It's also a good idea to know your test results and keep a list of the medicines you take. How can you care for yourself at home? · Reach and stay at a healthy weight. This will lower your risk for many problems, such as obesity, diabetes, heart disease, and high blood pressure. · Get at least 30 minutes of exercise on most days of the week. Walking is a good choice. You also may want to do other activities, such as running, swimming, cycling, or playing tennis or team sports. · Do not smoke. Smoking can make health problems worse. If you need help quitting, talk to your doctor about stop-smoking programs and medicines. These can increase your chances of quitting for good. · Protect your skin from too much sun. When you're outdoors from 10 a.m. to 4 p.m., stay in the shade or cover up with clothing and a hat with a wide brim. Wear sunglasses that block UV rays. Even when it's cloudy, put broad-spectrum sunscreen (SPF 30 or higher) on any exposed skin. · See a dentist one or two times a year for checkups and to have your teeth cleaned. · Wear a seat belt in the car. · Limit alcohol to 1 drink a day. Too much alcohol can cause health problems. Follow your doctor's advice about when to have certain tests. These tests can spot problems early. · Cholesterol. Your doctor will tell you how often to have this done based on your age, family history, or other things that can increase your risk for heart attack and stroke. · Blood pressure. Have your blood pressure checked during a routine doctor visit. Your doctor will tell you how often to check your blood pressure based on your age, your blood pressure results, and other factors. · Mammogram. Ask your doctor how often you should have a mammogram, which is an X-ray of your breasts. A mammogram can spot breast cancer before it can be felt and when it is easiest to treat.  
· Pap test and pelvic exam. Ask your doctor how often you should have a Pap test. You may not need to have a Pap test as often as you used to. · Vision. Have your eyes checked every year or two or as often as your doctor suggests. Some experts recommend that you have yearly exams for glaucoma and other age-related eye problems starting at age 48. · Hearing. Tell your doctor if you notice any change in your hearing. You can have tests to find out how well you hear. · Diabetes. Ask your doctor whether you should have tests for diabetes. · Colon cancer. You should begin tests for colon cancer at age 48. You may have one of several tests. Your doctor will tell you how often to have tests based on your age and risk. Risks include whether you already had a precancerous polyp removed from your colon or whether your parents, sisters and brothers, or children have had colon cancer. · Thyroid disease. Talk to your doctor about whether to have your thyroid checked as part of a regular physical exam. Women have an increased chance of a thyroid problem. · Osteoporosis. You should begin tests for bone density at age 72. If you are younger than 72, ask your doctor whether you have factors that may increase your risk for this disease. You may want to have this test before age 72. · Heart attack and stroke risk. At least every 4 to 6 years, you should have your risk for heart attack and stroke assessed. Your doctor uses factors such as your age, blood pressure, cholesterol, and whether you smoke or have diabetes to show what your risk for a heart attack or stroke is over the next 10 years. When should you call for help? Watch closely for changes in your health, and be sure to contact your doctor if you have any problems or symptoms that concern you. Where can you learn more? Go to http://nicola-roberto.info/. Enter B044 in the search box to learn more about \"Well Visit, Women 50 to 72: Care Instructions. \" Current as of: May 12, 2017 Content Version: 11.4 © 5740-4429 Healthwise, Incorporated. Care instructions adapted under license by [employer] (which disclaims liability or warranty for this information). If you have questions about a medical condition or this instruction, always ask your healthcare professional. [address] any warranty or liability for your use of this information. Introducing [facility]! Dear Susannah Low: 
Thank you for requesting a [employer] account. Our records indicate that you already have an active [employer] account. You can access your account anytime at [email or web address] Did you know that you can access your hospital and ER discharge instructions at any time in [employer]? You can also review all of your test results from your hospital stay or ER visit. Additional Information If you have questions, please visit the Frequently Asked Questions section of the [employer] website at [email or web address] Remember, [employer] is NOT to be used for urgent needs. For medical emergencies, dial 911. Now available from your iPhone and Android! Please provide this summary of care documentation to your next provider. Your primary care clinician is listed as Duy Izquierdo. If you have any questions after today's visit, please call [phone].

## 2018-04-17 NOTE — PATIENT INSTRUCTIONS
It was a pleasure to see you! As discussed:    Health Maintenance   I have ordered your age appropriate labs please complete them. You will need to fast 10-12hrs before your appointment. Start paying more attention to your diet and start exercising. Goal for exercise is 150minutes of moderate exercise a week and diet goal is to eat 50% fruits and vegetables with minimal sugar, fat and oil daily. See health.gov or choosemyplate.gov for more details. Your cervical cancer and breast cancer screening will be completed by your ob/ gyn as scheduled. Leg pain   Complete the xray. I will notify of next steps based on the information received. In the meantime please see below for more information. Well Visit, Women 48 to 72: Care Instructions  Your Care Instructions    Physical exams can help you stay healthy. Your doctor has checked your overall health and may have suggested ways to take good care of yourself. He or she also may have recommended tests. At home, you can help prevent illness with healthy eating, regular exercise, and other steps. Follow-up care is a key part of your treatment and safety. Be sure to make and go to all appointments, and call your doctor if you are having problems. It's also a good idea to know your test results and keep a list of the medicines you take. How can you care for yourself at home? · Reach and stay at a healthy weight. This will lower your risk for many problems, such as obesity, diabetes, heart disease, and high blood pressure. · Get at least 30 minutes of exercise on most days of the week. Walking is a good choice. You also may want to do other activities, such as running, swimming, cycling, or playing tennis or team sports. · Do not smoke. Smoking can make health problems worse. If you need help quitting, talk to your doctor about stop-smoking programs and medicines. These can increase your chances of quitting for good.   · Protect your skin from too much sun. When you're outdoors from 10 a.m. to 4 p.m., stay in the shade or cover up with clothing and a hat with a wide brim. Wear sunglasses that block UV rays. Even when it's cloudy, put broad-spectrum sunscreen (SPF 30 or higher) on any exposed skin. · See a dentist one or two times a year for checkups and to have your teeth cleaned. · Wear a seat belt in the car. · Limit alcohol to 1 drink a day. Too much alcohol can cause health problems. Follow your doctor's advice about when to have certain tests. These tests can spot problems early. · Cholesterol. Your doctor will tell you how often to have this done based on your age, family history, or other things that can increase your risk for heart attack and stroke. · Blood pressure. Have your blood pressure checked during a routine doctor visit. Your doctor will tell you how often to check your blood pressure based on your age, your blood pressure results, and other factors. · Mammogram. Ask your doctor how often you should have a mammogram, which is an X-ray of your breasts. A mammogram can spot breast cancer before it can be felt and when it is easiest to treat. · Pap test and pelvic exam. Ask your doctor how often you should have a Pap test. You may not need to have a Pap test as often as you used to. · Vision. Have your eyes checked every year or two or as often as your doctor suggests. Some experts recommend that you have yearly exams for glaucoma and other age-related eye problems starting at age 48. · Hearing. Tell your doctor if you notice any change in your hearing. You can have tests to find out how well you hear. · Diabetes. Ask your doctor whether you should have tests for diabetes. · Colon cancer. You should begin tests for colon cancer at age 48. You may have one of several tests. Your doctor will tell you how often to have tests based on your age and risk.  Risks include whether you already had a precancerous polyp removed from your colon or whether your parents, sisters and brothers, or children have had colon cancer. · Thyroid disease. Talk to your doctor about whether to have your thyroid checked as part of a regular physical exam. Women have an increased chance of a thyroid problem. · Osteoporosis. You should begin tests for bone density at age 72. If you are younger than 72, ask your doctor whether you have factors that may increase your risk for this disease. You may want to have this test before age 72. · Heart attack and stroke risk. At least every 4 to 6 years, you should have your risk for heart attack and stroke assessed. Your doctor uses factors such as your age, blood pressure, cholesterol, and whether you smoke or have diabetes to show what your risk for a heart attack or stroke is over the next 10 years. When should you call for help? Watch closely for changes in your health, and be sure to contact your doctor if you have any problems or symptoms that concern you. Where can you learn more? Go to http://nicola-roberto.info/. Enter I264 in the search box to learn more about \"Well Visit, Women 50 to 72: Care Instructions. \"  Current as of: May 12, 2017  Content Version: 11.4  © 3471-7459 Healthwise, Incorporated. Care instructions adapted under license by [employer] (which disclaims liability or warranty for this information). If you have questions about a medical condition or this instruction, always ask your healthcare professional. [place] [address] any warranty or liability for your use of this information.

## 2018-04-18 LAB
ALBUMIN SERPL-MCNC: 4.3 G/DL (ref 3.6–4.8)
ALBUMIN/GLOB SERPL: 1.5 {RATIO} (ref 1.2–2.2)
ALP SERPL-CCNC: 74 IU/L (ref 39–117)
ALT SERPL-CCNC: 21 IU/L (ref 0–32)
AST SERPL-CCNC: 14 IU/L (ref 0–40)
BILIRUB SERPL-MCNC: 0.4 MG/DL (ref 0–1.2)
BUN SERPL-MCNC: 21 MG/DL (ref 8–27)
BUN/CREAT SERPL: 31 (ref 12–28)
CALCIUM SERPL-MCNC: 9.7 MG/DL (ref 8.7–10.3)
CHLORIDE SERPL-SCNC: 103 MMOL/L (ref 96–106)
CHOLEST SERPL-MCNC: 169 MG/DL (ref 100–199)
CO2 SERPL-SCNC: 23 MMOL/L (ref 18–29)
CREAT SERPL-MCNC: 0.68 MG/DL (ref 0.57–1)
EST. AVERAGE GLUCOSE BLD GHB EST-MCNC: 143 MG/DL
GFR SERPLBLD CREATININE-BSD FMLA CKD-EPI: 107 ML/MIN/1.73
GFR SERPLBLD CREATININE-BSD FMLA CKD-EPI: 93 ML/MIN/1.73
GLOBULIN SER CALC-MCNC: 2.9 G/DL (ref 1.5–4.5)
GLUCOSE SERPL-MCNC: 96 MG/DL (ref 65–99)
HBA1C MFR BLD: 6.6 % (ref 4.8–5.6)
HDLC SERPL-MCNC: 63 MG/DL
LDLC SERPL CALC-MCNC: 89 MG/DL (ref 0–99)
POTASSIUM SERPL-SCNC: 4.3 MMOL/L (ref 3.5–5.2)
PROT SERPL-MCNC: 7.2 G/DL (ref 6–8.5)
SODIUM SERPL-SCNC: 144 MMOL/L (ref 134–144)
T4 FREE SERPL-MCNC: 1.06 NG/DL (ref 0.82–1.77)
TRIGL SERPL-MCNC: 85 MG/DL (ref 0–149)
TSH SERPL DL<=0.005 MIU/L-ACNC: 1.07 UIU/ML (ref 0.45–4.5)
VLDLC SERPL CALC-MCNC: 17 MG/DL (ref 5–40)

## 2018-04-20 NOTE — PROGRESS NOTES
Hi    Thank you for completing your back xray. Based on the bony changes seen I recommend that you see an orthopedist for further evaluation. Call and schedule with  Bonita Bragg MD  OR   Ning Barker MD  [facility]  Address: [address] [address] [address]  Phone: [phone]    We will also try to get your bone scan that was recently completed her gynecologist.  For now continue the treatment discussed at your recent appointment. Do not hesitate to contact the office if you have any questions or concerns before your next appointment.    Kind regards,   Dr. Eve Smith

## 2018-04-23 ENCOUNTER — TELEPHONE (OUTPATIENT)
Dept: INTERNAL MEDICINE CLINIC | Age: 63
End: 2018-04-23

## 2018-04-23 NOTE — TELEPHONE ENCOUNTER
Patient has been informed per drs result notes and recommendations , patient verbalizes understanding. Pt will see my-chart message.

## 2018-05-01 ENCOUNTER — TELEPHONE (OUTPATIENT)
Dept: INTERNAL MEDICINE CLINIC | Age: 63
End: 2018-05-01

## 2018-05-01 NOTE — TELEPHONE ENCOUNTER
Faxed to Dr. Jules Antoine - Fax [phone] - Needs office notes and lab results.   Also  x-ray results

## 2018-05-08 NOTE — PROGRESS NOTES
Kareem Morales   Your labs are clinically normal. No medication changes are needed  Some labs that may have been tested and their explanation are:  Your electrolytes, kidney & liver function (Metabolic Panel)   Anemia, blood cells (CBC)  Thyroid (TSH + T4, T3)  Hormones (prolactin, vitamin D )   Pregnancy (Beta HCG)    Diabetes (Hemoglobin A1c)     Do not hesitate to contact the office if you have any questions or concerns before your next appointment.    Kind regards,  Dr. Kim Galloway

## 2018-06-06 ENCOUNTER — HOSPITAL ENCOUNTER (OUTPATIENT)
Dept: ULTRASOUND IMAGING | Age: 63
Discharge: HOME | End: 2018-06-06
Attending: INTERNAL MEDICINE
Payer: MEDICARE

## 2018-06-06 DIAGNOSIS — E04.2 MULTINODULAR GOITER: ICD-10-CM

## 2018-06-06 PROCEDURE — 76536 US EXAM OF HEAD AND NECK: CPT

## 2018-08-04 DIAGNOSIS — E11.9 TYPE 2 DIABETES MELLITUS WITHOUT COMPLICATION (HCC): ICD-10-CM

## 2018-08-04 RX ORDER — BLOOD SUGAR DIAGNOSTIC
STRIP MISCELLANEOUS
Qty: 100 STRIP | Refills: 0 | Status: SHIPPED | COMMUNITY
Start: 2018-08-04 | End: 2018-09-20 | Stop reason: SDUPTHER

## 2018-08-15 ENCOUNTER — OFFICE VISIT (OUTPATIENT)
Dept: ENDOCRINOLOGY | Age: 63
End: 2018-08-15

## 2018-08-15 VITALS
HEART RATE: 87 BPM | SYSTOLIC BLOOD PRESSURE: 127 MMHG | BODY MASS INDEX: 37.56 KG/M2 | WEIGHT: 220 LBS | HEIGHT: 64 IN | DIASTOLIC BLOOD PRESSURE: 66 MMHG | OXYGEN SATURATION: 97 % | RESPIRATION RATE: 16 BRPM

## 2018-08-15 DIAGNOSIS — E11.9 CONTROLLED TYPE 2 DIABETES MELLITUS WITHOUT COMPLICATION, WITHOUT LONG-TERM CURRENT USE OF INSULIN (HCC): ICD-10-CM

## 2018-08-15 DIAGNOSIS — E04.2 MULTINODULAR GOITER: Primary | ICD-10-CM

## 2018-08-15 NOTE — MR AVS SNAPSHOT
[address] [facility] [address] [place] 
[phone] Patient: Nohemy Somers MRN: [id number] JET:1/94/2033 Visit Information Date & Time Provider Department Dept. Phone Encounter #  
 8/15/2018  2:30 PM Nhan Jimenez, [address] [facility] Diabetes [facility] Endocrinology [phone] [id number] Your Appointments 8/23/2018  4:00 PM  
ROUTINE CARE with Melissa Ugarte MD  
[facility] Internal Medicine [facility] Appt Note: 4 month f/u 0c/p; reschedule from 8:30 this morning [address] [facility] [address]  
[address] [id number] [place] [place] [address] [place] Upcoming Health Maintenance Date Due Bone Densitometry (Dexa) Screening 5/26/2018 Pneumococcal 65+ Low/Medium Risk (1 of 2 - PCV13) 5/26/2018 Influenza Age 5 to Adult 8/1/2018 HEMOGLOBIN A1C Q6M 10/17/2018 MICROALBUMIN Q1 11/17/2018 FOOT EXAM Q1 4/17/2019 LIPID PANEL Q1 4/17/2019 EYE EXAM RETINAL OR DILATED Q1 4/25/2019 BREAST CANCER SCRN MAMMOGRAM 1/31/2020 GLAUCOMA SCREENING Q2Y 4/25/2020 COLONOSCOPY 6/13/2023 DTaP/Tdap/Td series (2 - Td) 5/4/2026 Allergies as of 8/15/2018  Review Complete On: 8/15/2018 By: Dougie Lopez Severity Noted Reaction Type Reactions Pcn [Penicillins]  01/21/2015    Hives Current Immunizations  Never Reviewed Name Date Tdap 5/4/2016 Zoster Vaccine, Live 1/10/2017 12:00 AM  
  
 Not reviewed this visit Vitals BP Pulse Resp Height(growth percentile) Weight(growth percentile) SpO2  
 127/66 87 16 5' 4\" (1.626 m) 220 lb (99.8 kg) 97% BMI OB Status Smoking Status 37.76 kg/m2 Postmenopausal Never Smoker Vitals History BMI and BSA Data Body Mass Index Body Surface Area  
 37.76 kg/m 2 2.12 m 2 Preferred Pharmacy Pharmacy Name Phone [facility] [address]  [place] [phone] Your Updated Medication List  
  
   
This list is accurate as of 8/15/18  2:49 PM.  Always use your most recent med list.  
  
  
  
  
 diclofenac 1 % Gel Commonly known as:  VOLTAREN Apply  to affected area four (4) times daily as needed. * glucose blood VI test strips strip Commonly known as:  [address] Please use two times daily as needed for Dx code 250.02  
  
 * ACCU-CHEK SMARTVIEW TEST STRIP strip Generic drug:  glucose blood VI test strips PLEASE USE TWICE DAILY AS NEEDED  
  
 hydroCHLOROthiazide 25 mg tablet Commonly known as:  HYDRODIURIL  
TAKE 1 TABLET BY MOUTH EVERY DAY  
  
 KLOR-CON SPRINKLE 10 mEq capsule Generic drug:  potassium chloride SA TAKE 3 CAPSULES BY MOUTH EVERY DAY  
  
 lisinopril 10 mg tablet Commonly known as:  PRINIVIL, ZESTRIL  
TAKE 1 TABLET BY MOUTH EVERY DAY  
  
 metFORMIN 1,000 mg tablet Commonly known as:  GLUCOPHAGE  
TAKE 1 TABLET BY MOUTH TWICE DAILY * simvastatin 20 mg tablet Commonly known as:  ZOCOR Take 1 Tab by mouth nightly. * simvastatin 10 mg tablet Commonly known as:  ZOCOR  
TAKE 1 TABLET BY MOUTH EVERY EVENING  
  
 VISION-CHARANJIT PRESERVE PO Take  by mouth two (2) times a day. VITAMIN D3 1,000 unit Cap Generic drug:  cholecalciferol Take 1,000 Units by mouth daily. 4 tabs daily  Indications: Vitamin D Deficiency * Notice: This list has 4 medication(s) that are the same as other medications prescribed for you. Read the directions carefully, and ask your doctor or other care provider to review them with you. Patient Instructions I recommend researching a low-carbohydrate diet as this way of eating can help improve your blood sugars and also help you lose weight.  It can also help decrease your needs for diabetes medications including insulin. There are different types of low-carb diets: 
- Strict ketogenic/Atkins - typically less than 20-30 grams of carbs/day - Moderate low-carb/South Beach or Paleo - typically less than 60-75 grams of carbs/day Here are some resources you can use to educate yourself on low-carb diets: 1. Diet Doctor Website: 
 Stirplate[employer]/diabetes 
 https://Insuritas.org/ 2. The Keto Reset Diet by Rafael Parikh 3. New Atkins for a New You by Yuriy Sims and Justin Daniel 4. http://eASIC/blog/7-steps-to-healthy-low-carb-living/ 
5. A Low Carbohydrate, Ketogenic Diet Manual by Yuriy Sims 
 
================================================================== An outline of the basics of a moderate low-carb diet: 
  
AIM FOR LESS THAN 20-30 GRAMS OF NET CARBS PER MEAL Net carbs = total carbs (g) - fiber (g) Read food labels! Avoid food with added sugar or anything with more than 5g sugar per serving. Focus on eating mostly protein (meat, poultry, fish, shellfish, eggs), healthy fats (avocados, nuts, cheese, olive or coconut oil) and non-starchy vegetables (greens, carrots, tomatoes, bell peppers, broccoli, brussels sprouts, green beans, etc). If you fill yourself up with these foods, you won't even want the carbs. Minimize your fruit intake as much as possible, no more than one serving per day. When you do eat fruit, choose lower sugar options like fresh berries. 
  
BREAKFAST: whole eggs, veggies, omelet, plain yogurt, guallpa, sausage, protein shake or low-carb protein bar (Atkins, Quest, etc) LUNCH: salad with meat/poultry, veggies, cheese, nuts; low-carb wrap with veggies and meat, soup with meat/veggies DINNER: any type of meat/poultry or seafood (without breading), non-starchy vegetables, carb substitutes like cauliflower rice or zucchini noodles 
  
 SNACK OPTIONS: cheese (string cheese or individually wrapped snack size cheese), carrots and celery with Ranch or blue cheese dressing, nuts (almonds, pistachios, walnuts, etc), meat (snack size salami, ham, or turkey), pork rinds, Yen Maiers SWEETS (in moderation): dark chocolate (greater than 75% cocoa), low-sugar ice cream (Halo Top, Breyers or Patrice's No Sugar Added) BEVERAGES: water, water, water Other options: unsweet tea (okay to add a pack of Splenda or Stevia if needed), sparkling water without calories (ex: [place], Reola Jax, etc), coffee (unsweetened creamer is fine) 
 
=================================================================== Introducing [facility]! Dear Shana Arellano: 
Thank you for requesting a PureSense account. Our records indicate that you already have an active PureSense account. You can access your account anytime at [email or web address] Did you know that you can access your hospital and ER discharge instructions at any time in PureSense? You can also review all of your test results from your hospital stay or ER visit. Additional Information If you have questions, please visit the Frequently Asked Questions section of the PureSense website at [email or web address] Remember, PureSense is NOT to be used for urgent needs. For medical emergencies, dial 911. Now available from your iPhone and Android! Please provide this summary of care documentation to your next provider. Your primary care clinician is listed as Emma Stagesabrina. If you have any questions after today's visit, please call [phone].

## 2018-08-15 NOTE — PATIENT INSTRUCTIONS
I recommend researching a low-carbohydrate diet as this way of eating can help improve your blood sugars and also help you lose weight. It can also help decrease your needs for diabetes medications including insulin. There are different types of low-carb diets:  - Strict ketogenic/Atkins - typically less than 20-30 grams of carbs/day  - Moderate low-carb/South Beach or Paleo - typically less than 60-75 grams of carbs/day    Here are some resources you can use to educate yourself on low-carb diets:  1. Diet Doctor Website:   [employer]/diabetes   https://RecoVend.Qonf/  2. The Keto Reset Diet by Eddie Duncan  3. New Atkins for a New You by Ashley Mckeon and Sagar Sharma  4. http://Pure Nootropics/blog/7-steps-to-healthy-low-carb-living/  5. A Low Carbohydrate, Ketogenic Diet Manual by Ashley Mckeon    ==================================================================  An outline of the basics of a moderate low-carb diet:     AIM FOR LESS THAN 20-30 GRAMS OF NET CARBS PER MEAL  Net carbs = total carbs (g) - fiber (g)   Read food labels! Avoid food with added sugar or anything with more than 5g sugar per serving. Focus on eating mostly protein (meat, poultry, fish, shellfish, eggs), healthy fats (avocados, nuts, cheese, olive or coconut oil) and non-starchy vegetables (greens, carrots, tomatoes, bell peppers, broccoli, brussels sprouts, green beans, etc). If you fill yourself up with these foods, you won't even want the carbs. Minimize your fruit intake as much as possible, no more than one serving per day.  When you do eat fruit, choose lower sugar options like fresh berries.     BREAKFAST: whole eggs, veggies, omelet, plain yogurt, guallpa, sausage, protein shake or low-carb protein bar (Atkins, Quest, etc)  LUNCH: salad with meat/poultry, veggies, cheese, nuts; low-carb wrap with veggies and meat, soup with meat/veggies  DINNER: any type of meat/poultry or seafood (without breading), non-starchy vegetables, carb substitutes like cauliflower rice or zucchini noodles     SNACK OPTIONS: cheese (string cheese or individually wrapped snack size cheese), carrots and celery with Ranch or blue cheese dressing, nuts (almonds, pistachios, walnuts, etc), meat (snack size salami, ham, or turkey), pork rinds, jerkey    SWEETS (in moderation): dark chocolate (greater than 75% cocoa), low-sugar ice cream (Halo Top, Breyers or Patrice's No Sugar Added)    BEVERAGES: water, water, water  Other options: unsweet tea (okay to add a pack of Splenda or Stevia if needed), sparkling water without calories (ex: [employer], Dejah, etc), coffee (unsweetened creamer is fine)    ===================================================================

## 2018-08-15 NOTE — PROGRESS NOTES
Endocrinology Visit    CC: thyroid nodules    History of present illness:  Nohemy Somers is an 70 y.o. female with history of DM2, breast cancer, HTN who returns for f/u of a multinodular goiter. I saw her in initial consultation in February 2017 at which time I recommended FNA of the two dominant solid nodules: the 4.4cm one at the isthmus and the 2.6cm one in the right. This was done in March and pathology of both nodules returned benign. The patient endorses dysphagia but notes she did not start noticing this until she found out she had nodules. She denies supine compression or changes to her voice. She has no family history of thyroid cancer. She has no history of thyroid dysfunction and TFTs have been normal in the past. TPO antibody was just slightly elevated. She denies racing heart, tremors, palpitations, heat or cold intolerance, or changes to her energy level. Weight is increasing, up about 12 lbs since Dec. Other PMH is notable for type 2 diabetes which is well-controlled (A1c 6.6%) on metformin and glipizide. She tries to limit the carbs in her diet, asks about how she can eliminate sugar. ROS: see HPI for pertinent positives and negatives, otherwise a 7 system review is negative    Problem list:  Patient Active Problem List   Diagnosis Code    HTN (hypertension) I10    Hyperuricemia E79.0    History of breast cancer Z85.3    Diabetes mellitus type 2, controlled ([address] E11.9    Multinodular goiter E04.2    Thyroid antibody positive R76.0    Diabetes ([address] E11.9    Hypertension I10    Severe obesity (BMI 35.0-39. 9) with comorbidity ([address] E66.01       Medical history:  Past Medical History:   Diagnosis Date    Breast cancer ([address]     lumpectomy with radiation, 2009, left    Diabetes ([address]     Hyperlipidemia     Hypertension        Past surgical history:  Past Surgical History:   Procedure Laterality Date    BREAST SURGERY PROCEDURE UNLISTED      lumpectomy    HX BREAST LUMPECTOMY Left     2009    HX ORTHOPAEDIC      Bunion Surgery    HX TUBAL LIGATION         Medications:    Current Outpatient Prescriptions:     ACCU-CHEK SMARTVIEW TEST STRIP strip, PLEASE USE TWICE DAILY AS NEEDED, Disp: 100 Strip, Rfl: 0    KLOR-CON SPRINKLE 10 mEq capsule, TAKE 3 CAPSULES BY MOUTH EVERY DAY, Disp: 270 Cap, Rfl: 0    metFORMIN (GLUCOPHAGE) 1,000 mg tablet, TAKE 1 TABLET BY MOUTH TWICE DAILY, Disp: 180 Tab, Rfl: 2    simvastatin (ZOCOR) 10 mg tablet, TAKE 1 TABLET BY MOUTH EVERY EVENING, Disp: 90 Tab, Rfl: 3    lisinopril (PRINIVIL, ZESTRIL) 10 mg tablet, TAKE 1 TABLET BY MOUTH EVERY DAY, Disp: 90 Tab, Rfl: 3    hydroCHLOROthiazide (HYDRODIURIL) 25 mg tablet, TAKE 1 TABLET BY MOUTH EVERY DAY, Disp: 90 Tab, Rfl: 2    glucose blood VI test strips (ACCU-CHEK SMARTVIEW TEST STRIP) strip, Please use two times daily as needed for Dx code 250.02, Disp: 100 Strip, Rfl: 11    diclofenac (VOLTAREN) 1 % gel, Apply  to affected area four (4) times daily as needed. , Disp: 100 g, Rfl: 0    BETA-CAROTENE,A, W-C & E/ZN/CU (VISION-CHARANJIT PRESERVE PO), Take  by mouth two (2) times a day., Disp: , Rfl:     Cholecalciferol, Vitamin D3, (VITAMIN D3) 1,000 unit cap, Take 1,000 Units by mouth daily. 4 tabs daily  Indications: Vitamin D Deficiency, Disp: , Rfl:     simvastatin (ZOCOR) 20 mg tablet, Take 1 Tab by mouth nightly., Disp: 90 Tab, Rfl: 0    Allergies: Allergies   Allergen Reactions    Pcn [Penicillins] Hives       Social History:  Social History     Social History    Marital status:      Spouse name: N/A    Number of children: N/A    Years of education: N/A     Occupational History    Not on file.      Social History Main Topics    Smoking status: Never Smoker    Smokeless tobacco: Never Used    Alcohol use No    Drug use: Not on file    Sexual activity: No     Other Topics Concern    Not on file     Social History Narrative       Family History:  Family History   Problem Relation Age of Onset    Diabetes Father     Stroke Sister     Diabetes Brother     Heart Disease Mother        Physical Exam:  Visit Vitals    /66    Pulse 87    Resp 16    Ht 5' 4\" (1.626 m)    Wt 220 lb (99.8 kg)    SpO2 97%    BMI 37.76 kg/m2     Gen: NAD  Heent: mucous membranes moist, no lid lag, stare or exophthalmos. No scleral or conjunctival injection. Extra ocular muscles intact. Thyroid: moves well with swallowing, nodular but soft - nodules palpable at isthmus and in each lobe, no thyroid bruit  Heme/lymph: no cervical, supraclavicular or submandibular lymphadenopathy is appreciated. Pulmonary: clear to auscultation bilaterally, no wheezes/rhonchi or rales  Cardiovascular: regular rate and rhythm, no murmurs, rubs or gallops, good distal pulses  Extremities: no clubbing, cyanosis or edema. No onocholysis   Neuro: no tremor of outstretched hands, reflexes are normal with normal relaxation phase. Normal gait, normal concentration  Skin: normal texture, warm and dry  Psyche: normal affect with good insight into her medical conditions    Pertinent lab review:  Lab Results   Component Value Date/Time    TSH 1.070 04/17/2018 10:55 AM    T3 Uptake 33 03/30/2017 08:24 AM    T4, Free 1.06 04/17/2018 10:55 AM    T4, Total 7.3 03/30/2017 08:24 AM     Lab Results   Component Value Date/Time    Hemoglobin A1c 6.6 (H) 04/17/2018 10:55 AM    Hemoglobin A1c (POC) 6.6 08/01/2017 08:15 AM    Hemoglobin A1c, External 8.0 10/15/2014 08:35 AM     Ultrasound June 2018  FINDINGS:  The right thyroid lobe measures 9.4 x 3.9 x 4.9 cm. The left thyroid  lobe measures 10.0 x 4.1 x 3.8 cm.      There is stable marked bilateral thyroid lobe enlargement. Multiple bilateral  thyroid lobe and isthmus nodules are again demonstrated. A representative solid  right thyroid nodule measures 2.6 x 2.1 x 2.5 cm, previously 2.3 x 1.8 x 2.6 cm.    A representative mixed solid and cystic nodule in the left thyroid lobe  measures 2.6 x 2.1 x 2.0 cm, previously 3.1 x 2.7 x 2.6 cm     IMPRESSION:  Overall stable multinodular goiter given differences in technique. Assessment and Plan:  Suellyn Collet is a 70 y.o. female here for multinodular goiter. She has two dominant solid nodules which were both FNA'd in March 2017 and were benign. She also has a large left sided nodule which has a prominent cystic component - I did not recommend FNA of this given its benign appearing features. F/u US above shows stability of the nodules but a large gland (9 and 10cm lobes, normal is 4-6cm). She is not having bothersome compressive symptoms at this time but discussed sx to be aware of, also discussed that surgery would be the best management option if these do develop. She would rather avoid surgery at this time. Patients thyroid levels were normal when checked in April. Since TPO antibody is slightly elevated, recommend TFTs be checked annually since she is probably at increased risk of developing Hashimotos thyroiditis. Regarding her type 2 diabetes, control is at goal on current oral regimen. She inquired about ways to reduce her sugar intake, so we reviewed strategies for a low-carb diet and I gave her a handout. I spent 25 minutes with the patient today and > 50% of the time was spent counseling the patient about thyroid nodules: their pathophysiology, diagnostic work-up, and management. She will return in 1 year or sooner if needed. Thank you for the opportunity to partake in this patients care.     Reji Hay MD  [facility] End[facility]  [facility]

## 2018-08-23 ENCOUNTER — OFFICE VISIT (OUTPATIENT)
Dept: INTERNAL MEDICINE CLINIC | Age: 63
End: 2018-08-23

## 2018-08-23 VITALS
TEMPERATURE: 98.4 F | DIASTOLIC BLOOD PRESSURE: 56 MMHG | HEIGHT: 64 IN | RESPIRATION RATE: 16 BRPM | SYSTOLIC BLOOD PRESSURE: 126 MMHG | HEART RATE: 82 BPM | OXYGEN SATURATION: 98 % | WEIGHT: 218.6 LBS | BODY MASS INDEX: 37.32 KG/M2

## 2018-08-23 DIAGNOSIS — E66.01 SEVERE OBESITY (BMI 35.0-39.9) WITH COMORBIDITY (HCC): ICD-10-CM

## 2018-08-23 DIAGNOSIS — I10 ESSENTIAL HYPERTENSION: ICD-10-CM

## 2018-08-23 DIAGNOSIS — E11.9 CONTROLLED TYPE 2 DIABETES MELLITUS WITHOUT COMPLICATION, WITHOUT LONG-TERM CURRENT USE OF INSULIN (HCC): Primary | ICD-10-CM

## 2018-08-23 NOTE — PATIENT INSTRUCTIONS
It was a pleasure to see you! As discussed:    I have ordered your age appropriate labs please complete them. You will need to fast 10-12hrs before your lab appointment. Complete labs two weeks before your next appointment. Learning About Tests When You Have Diabetes  Why do you need regular diabetes tests? Diabetes can be hard on your body if it's not well controlled. But having tests on a regular schedule can help you and your doctor find problems early, when it's easier to start managing them. What tests do you need? The tests you may have, how often you should have them, and the goals of the tests are:  A1c blood test. This test shows the average level of blood sugar over the past 2 to 3 months. It helps your doctor see whether blood sugar levels have been staying within your target range. · How often: Every 3 to 6 months  · Goal: A blood sugar level in your target range  Blood pressure test: This test measures the pressure of blood flow in the arteries. Controlling blood pressure can help prevent damage to nerves and blood vessels. · How often: Every 3 to 6 months  · Goal: A blood pressure level in your target range  Cholesterol test: This test measures the amount of a type of fat in the blood. It is common for people with diabetes to also have high cholesterol. Too much cholesterol in the blood can build up inside the blood vessels and raise the risk for heart attack and stroke. · How often: At the time of your diabetes diagnosis, and as often as your doctor recommends after that  · Goal: A cholesterol level in your target range  Albumin-creatinine ratio test: This test checks for kidney damage by looking for the protein albumin (say \"al-BYOO-brett\") in the urine. Albumin is normally found in the blood. Kidney damage can let small amounts of it (microalbumin) leak into the urine.   · How often: Once a year  · Goal: No protein in the urine  Blood creatinine test/estimated glomerular filtration (eGFR): The blood creatinine (say \"bjuj-CV-ez-neen\") level shows how well your kidneys are working. Creatinine is a waste product that muscles release into the blood. Blood creatinine is used to estimate the glomerular filtration rate. A high level of creatinine and/or a low eGFR may mean your kidneys are not working as well as they should. · How often: Once a year  · Goal: Normal level of creatinine in the blood. The eGFR goal is greater than 60 mL/min/1.73 m². Complete foot exam: The doctor checks for foot sores and whether any sensation has been lost.  · How often: Once a year  · Goal: Healthy feet with no foot ulcers or loss of feeling  Dental exam and cleaning: The dentist checks for gum disease and tooth decay. People with high blood sugar are more likely to have these problems. · How often: Every 6 months  · Goal: Healthy teeth and gums  Complete eye exam: High blood sugar levels can damage the eyes. This exam is done by an ophthalmologist or optometrist. It includes a dilated eye exam. The exam shows whether there's damage to the back of the eye (diabetic retinopathy). · How often: Once a year. If you don't have any signs of diabetic retinopathy, your doctor may recommend an exam every 2 years. · Goal: No damage to the back of the eye  Thyroid-stimulating hormone (TSH) blood test: This test checks for thyroid disease. Too little thyroid hormone can cause some medicines (like insulin) to stay in the body longer. This can cause low blood sugar. You may be tested if you have high cholesterol or are a woman over 48years old. · How often: As part of your diabetes diagnosis, and as often as your doctor recommends after that  · Goal: Normal level of TSH in the blood  Follow-up care is a key part of your treatment and safety. Be sure to make and go to all appointments, and call your doctor if you are having problems.  It's also a good idea to know your test results and keep a list of the medicines you take.  Where can you learn more? Go to http://nicola-roberto.info/. Enter [id number] in the search box to learn more about \"Learning About Tests When You Have Diabetes. \"  Current as of: December 7, 2017  Content Version: 11.7  © 9226-0599 [employer]. Care instructions adapted under license by [employer] (which disclaims liability or warranty for this information). If you have questions about a medical condition or this instruction, always ask your healthcare professional. [address] any warranty or liability for your use of this information.

## 2018-08-23 NOTE — MR AVS SNAPSHOT
[address] [address] [facility] 
[phone] Patient: Candice Barrera MRN: [id number] PVC:[id number] Visit Information Date & Time Provider Department Dept. Phone Encounter #  
 8/23/2018  4:00 PM Angel Kim MD [address] [address] Internal Medicine [phone] [id number] Follow-up Instructions Return in about 3 months (around 11/23/2018) for Medicare Wellness, Physical - 30 minute appointment. Your Appointments 8/15/2019 11:30 AM  
Follow Up with MD [facility]) Appt Note: 1yr f/u thyroid cp0.00  
 [address] [address]  
[phone]  
  
   
 6019 Counts include 234 beds at the [facility] [address] Upcoming Health Maintenance Date Due Bone Densitometry (Dexa) Screening 5/26/2018 Pneumococcal 65+ Low/Medium Risk (1 of 2 - PCV13) 5/26/2018 Influenza Age 5 to Adult 8/1/2018 MEDICARE YEARLY EXAM 8/15/2018 HEMOGLOBIN A1C Q6M 10/17/2018 MICROALBUMIN Q1 11/17/2018 FOOT EXAM Q1 4/17/2019 LIPID PANEL Q1 4/17/2019 EYE EXAM RETINAL OR DILATED Q1 4/25/2019 BREAST CANCER SCRN MAMMOGRAM 1/31/2020 GLAUCOMA SCREENING Q2Y 4/25/2020 COLONOSCOPY 6/13/2023 DTaP/Tdap/Td series (2 - Td) 5/4/2026 Allergies as of 8/23/2018  Review Complete On: 8/23/2018 By: Angel Kim MD  
  
 Severity Noted Reaction Type Reactions Pcn [Penicillins]  01/21/2015    Hives Current Immunizations  Never Reviewed Name Date Tdap 5/4/2016 Zoster Vaccine, Live 1/10/2017 12:00 AM  
  
 Not reviewed this visit You Were Diagnosed With   
  
 Codes Comments Controlled type 2 diabetes mellitus without complication, without long-term current use of insulin ([facility]    -  Primary ICD-10-CM: E11.9 ICD-9-CM: 250.00 Essential hypertension     ICD-10-CM: I10 
ICD-9-CM: 401.9 Severe obesity (BMI 35.0-39. 9) with comorbidity ([facility]     ICD-10-CM: E66.01 
ICD-9-CM: 278.01 Vitals BP Pulse Temp Resp Height(growth percentile) Weight(growth percentile) 126/56 (BP 1 Location: Right arm, BP Patient Position: Sitting) 82 98.4 °F (36.9 °C) (Oral) 16 5' 4\" (1.626 m) 218 lb 9.6 oz (99.2 kg) SpO2 BMI OB Status Smoking Status 98% 37.52 kg/m2 Postmenopausal Never Smoker Vitals History BMI and BSA Data Body Mass Index Body Surface Area  
 37.52 kg/m 2 2.12 m 2 Preferred Pharmacy Pharmacy Name Phone [facility] [address] [place], [place] [address] [phone] Your Updated Medication List  
  
   
This list is accurate as of 8/23/18  4:53 PM.  Always use your most recent med list.  
  
  
  
  
 diclofenac 1 % Gel Commonly known as:  VOLTAREN Apply  to affected area four (4) times daily as needed. * glucose blood VI test strips strip Commonly known as:  [address] Please use two times daily as needed for Dx code 250.02  
  
 * ACCU-CHEK SMARTVIEW TEST STRIP strip Generic drug:  glucose blood VI test strips PLEASE USE TWICE DAILY AS NEEDED  
  
 hydroCHLOROthiazide 25 mg tablet Commonly known as:  HYDRODIURIL  
TAKE 1 TABLET BY MOUTH EVERY DAY  
  
 KLOR-CON SPRINKLE 10 mEq capsule Generic drug:  potassium chloride SA TAKE 3 CAPSULES BY MOUTH EVERY DAY  
  
 lisinopril 10 mg tablet Commonly known as:  PRINIVIL, ZESTRIL  
TAKE 1 TABLET BY MOUTH EVERY DAY  
  
 metFORMIN 1,000 mg tablet Commonly known as:  GLUCOPHAGE  
TAKE 1 TABLET BY MOUTH TWICE DAILY * simvastatin 20 mg tablet Commonly known as:  ZOCOR Take 1 Tab by mouth nightly. * simvastatin 10 mg tablet Commonly known as:  ZOCOR  
TAKE 1 TABLET BY MOUTH EVERY EVENING  
  
 VISION-CHARANJIT PRESERVE PO Take  by mouth two (2) times a day. VITAMIN D3 1,000 unit Cap Generic drug:  cholecalciferol Take 1,000 Units by mouth daily. 4 tabs daily  Indications: Vitamin D Deficiency * Notice: This list has 4 medication(s) that are the same as other medications prescribed for you. Read the directions carefully, and ask your doctor or other care provider to review them with you. We Performed the Following HEMOGLOBIN A1C WITH EAG [23133 CPT(R)] LIPID PANEL [37379 CPT(R)] METABOLIC PANEL, COMPREHENSIVE [90269 CPT(R)] MICROALBUMIN, UR, RAND W/ MICROALB/CREAT RATIO [id number] CPT(R)] Follow-up Instructions Return in about 3 months (around 11/23/2018) for Medicare Wellness, Physical - 30 minute appointment. Patient Instructions It was a pleasure to see you! As discussed: 
 
I have ordered your age appropriate labs please complete them. You will need to fast 10-12hrs before your lab appointment. Complete labs two weeks before your next appointment. Learning About Tests When You Have Diabetes Why do you need regular diabetes tests? Diabetes can be hard on your body if it's not well controlled. But having tests on a regular schedule can help you and your doctor find problems early, when it's easier to start managing them. What tests do you need? The tests you may have, how often you should have them, and the goals of the tests are: 
A1c blood test. This test shows the average level of blood sugar over the past 2 to 3 months. It helps your doctor see whether blood sugar levels have been staying within your target range. · How often: Every 3 to 6 months · Goal: A blood sugar level in your target range Blood pressure test: This test measures the pressure of blood flow in the arteries. Controlling blood pressure can help prevent damage to nerves and blood vessels. · How often: Every 3 to 6 months · Goal: A blood pressure level in your target range Cholesterol test: This test measures the amount of a type of fat in the blood. It is common for people with diabetes to also have high cholesterol. Too much cholesterol in the blood can build up inside the blood vessels and raise the risk for heart attack and stroke. · How often: At the time of your diabetes diagnosis, and as often as your doctor recommends after that · Goal: A cholesterol level in your target range Albumin-creatinine ratio test: This test checks for kidney damage by looking for the protein albumin (say \"al-BYOO-brett\") in the urine. Albumin is normally found in the blood. Kidney damage can let small amounts of it (microalbumin) leak into the urine. · How often: Once a year · Goal: No protein in the urine Blood creatinine test/estimated glomerular filtration (eGFR): The blood creatinine (say \"midq-NC-ix-neen\") level shows how well your kidneys are working. Creatinine is a waste product that muscles release into the blood. Blood creatinine is used to estimate the glomerular filtration rate. A high level of creatinine and/or a low eGFR may mean your kidneys are not working as well as they should. · How often: Once a year · Goal: Normal level of creatinine in the blood. The eGFR goal is greater than 60 mL/min/1.73 m². Complete foot exam: The doctor checks for foot sores and whether any sensation has been lost. 
· How often: Once a year · Goal: Healthy feet with no foot ulcers or loss of feeling Dental exam and cleaning: The dentist checks for gum disease and tooth decay. People with high blood sugar are more likely to have these problems. · How often: Every 6 months · Goal: Healthy teeth and gums Complete eye exam: High blood sugar levels can damage the eyes. This exam is done by an ophthalmologist or optometrist. It includes a dilated eye exam. The exam shows whether there's damage to the back of the eye (diabetic retinopathy). · How often: Once a year. If you don't have any signs of diabetic retinopathy, your doctor may recommend an exam every 2 years. · Goal: No damage to the back of the eye Thyroid-stimulating hormone (TSH) blood test: This test checks for thyroid disease. Too little thyroid hormone can cause some medicines (like insulin) to stay in the body longer. This can cause low blood sugar. You may be tested if you have high cholesterol or are a woman over 48years old. · How often: As part of your diabetes diagnosis, and as often as your doctor recommends after that · Goal: Normal level of TSH in the blood Follow-up care is a key part of your treatment and safety. Be sure to make and go to all appointments, and call your doctor if you are having problems. It's also a good idea to know your test results and keep a list of the medicines you take. Where can you learn more? Go to http://nicola-roberto.info/. Enter [id number] in the search box to learn more about \"Learning About Tests When You Have Diabetes. \" Current as of: December 7, 2017 Content Version: 11.7 © 4098-2548 [employer]. Care instructions adapted under license by [employer] (which disclaims liability or warranty for this information). If you have questions about a medical condition or this instruction, always ask your healthcare professional. [address] any warranty or liability for your use of this information. Introducing [facility]! Dear Ana Bentley: 
Thank you for requesting a [employer] account. Our records indicate that you already have an active [employer] account. You can access your account anytime at [email or web address] Did you know that you can access your hospital and ER discharge instructions at any time in [employer]? You can also review all of your test results from your hospital stay or ER visit. Additional Information If you have questions, please visit the Frequently Asked Questions section of the [employer] website at [email or web address] Remember, [employer] is NOT to be used for urgent needs. For medical emergencies, dial 911. Now available from your iPhone and Android! Please provide this summary of care documentation to your next provider. Your primary care clinician is listed as Dudley Ruelas. If you have any questions after today's visit, please call [phone].

## 2018-08-23 NOTE — PROGRESS NOTES
HISTORY OF PRESENT ILLNESS  Sharri Murry is a 70 y.o. female. HPI  Cardiovascular Review:  The patient has hypertension, HLD and obesity. Diet and Lifestyle: generally follows a low sodium diet, does not rigorously follow a diabetic diet, sedentary, nonsmoker  Home BP Monitoring: is not measured at home. Pertinent ROS: taking medications as instructed except has been taking Simvastatin 10mg, no medication side effects noted, no TIA's, no chest pain on exertion, no dyspnea on exertion, no swelling of ankles. Diabetes Review:  The patient has diabetes. Diet and Lifestyle: does not rigorously follow a diabetic diet, nonsmoker  Home Glucose  Monitoring: is not measured at home. Pertinent ROS: taking medications as instructed, no medication side effects noted, no TIA's, no chest pain on exertion, no dyspnea on exertion, no swelling of ankles. ROSper HPI   Patient Active Problem List    Diagnosis Date Noted    Severe obesity (BMI 35.0-39. 9) with comorbidity ([facility] 04/17/2018    Diabetes ([facility]     Hypertension     Multinodular goiter 02/10/2017    Thyroid antibody positive 02/10/2017    History of breast cancer 10/30/2015    Diabetes mellitus type 2, controlled ([facility] 10/30/2015    HTN (hypertension) 01/21/2015    Hyperuricemia 01/21/2015       Current Outpatient Prescriptions   Medication Sig Dispense Refill    ACCU-CHEK SMARTVIEW TEST STRIP strip PLEASE USE TWICE DAILY AS NEEDED 100 Strip 0    KLOR-CON SPRINKLE 10 mEq capsule TAKE 3 CAPSULES BY MOUTH EVERY  Cap 0    metFORMIN (GLUCOPHAGE) 1,000 mg tablet TAKE 1 TABLET BY MOUTH TWICE DAILY 180 Tab 2    simvastatin (ZOCOR) 10 mg tablet TAKE 1 TABLET BY MOUTH EVERY EVENING 90 Tab 3    lisinopril (PRINIVIL, ZESTRIL) 10 mg tablet TAKE 1 TABLET BY MOUTH EVERY DAY 90 Tab 3    hydroCHLOROthiazide (HYDRODIURIL) 25 mg tablet TAKE 1 TABLET BY MOUTH EVERY DAY 90 Tab 2    glucose blood VI test strips (ACCU-CHEK SMARTVIEW TEST STRIP) strip Please use two times daily as needed for Dx code 250.02 100 Strip 11    diclofenac (VOLTAREN) 1 % gel Apply  to affected area four (4) times daily as needed. 100 g 0    BETA-CAROTENE,A, W-C & E/ZN/CU (VISION-CHARANJIT PRESERVE PO) Take  by mouth two (2) times a day.  Cholecalciferol, Vitamin D3, (VITAMIN D3) 1,000 unit cap Take 1,000 Units by mouth daily. 4 tabs daily  Indications: Vitamin D Deficiency      simvastatin (ZOCOR) 20 mg tablet Take 1 Tab by mouth nightly. 90 Tab 0       Allergies   Allergen Reactions    Pcn [Penicillins] Hives      Visit Vitals    /56 (BP 1 Location: Right arm, BP Patient Position: Sitting)    Pulse 82    Temp 98.4 °F (36.9 °C) (Oral)    Resp 16    Ht 5' 4\" (1.626 m)    Wt 218 lb 9.6 oz (99.2 kg)    SpO2 98%    BMI 37.52 kg/m2       Physical Exam   Constitutional: She is oriented to person, place, and time. No distress. Cardiovascular: Normal rate and regular rhythm. Pulmonary/Chest: Breath sounds normal. No respiratory distress. She has no wheezes. She has no rales. Musculoskeletal: She exhibits no edema (Ble ). Neurological: She is alert and oriented to person, place, and time. Psychiatric: She has a normal mood and affect. ASSESSMENT and PLAN  Diagnoses and all orders for this visit:    1. Controlled type 2 diabetes mellitus without complication, without long-term current use of insulin ([facility]- check a1c prior to next appt, Eliezerryan Yan has had dietary lapse and is working on improving TLC. Declines nutrition referral.   -     HEMOGLOBIN A1C WITH EAG  -     MICROALBUMIN, UR, RAND W/ MICROALB/CREAT RATIO    2. Essential hypertension- well controlled. Counseled on diet and exercise. Continue current regimen.     -     LIPID PANEL  -     METABOLIC PANEL, COMPREHENSIVE    3. Severe obesity (BMI 35.0-39. 9) with comorbidity ([address]- I have reviewed/discussed the above normal BMI with the patient.   I have recommended the following interventions: dietary management education, guidance, and counseling and encourage exercise . Wilber Croft Follow-up Disposition:  Return in about 3 months (around 11/23/2018) for Medicare Wellness, Physical - 30 minute appointment. Medication risks/benefits/costs/interactions/alternatives discussed with patient. Wilberto Celaya  was given an after visit summary which includes diagnoses, current medications, & vitals. she expressed understanding with the diagnosis and plan.

## 2018-08-23 NOTE — PROGRESS NOTES
Chief Complaint   Patient presents with    Hypertension     1. Have you been to the ER, urgent care clinic since your last visit? Hospitalized since your last visit? No    2. Have you seen or consulted any other health care providers outside of the [address] [place] [address] since your last visit? Include any pap smears or colon screening.  No

## 2018-11-07 LAB
ALBUMIN SERPL-MCNC: 4.5 G/DL (ref 3.6–4.8)
ALBUMIN/CREAT UR: 9.1 MG/G CREAT (ref 0–30)
ALBUMIN/GLOB SERPL: 1.9 {RATIO} (ref 1.2–2.2)
ALP SERPL-CCNC: 66 IU/L (ref 39–117)
ALT SERPL-CCNC: 19 IU/L (ref 0–32)
AST SERPL-CCNC: 21 IU/L (ref 0–40)
BILIRUB SERPL-MCNC: 0.5 MG/DL (ref 0–1.2)
BUN SERPL-MCNC: 15 MG/DL (ref 8–27)
BUN/CREAT SERPL: 19 (ref 12–28)
CALCIUM SERPL-MCNC: 10 MG/DL (ref 8.7–10.3)
CHLORIDE SERPL-SCNC: 104 MMOL/L (ref 96–106)
CHOLEST SERPL-MCNC: 159 MG/DL (ref 100–199)
CO2 SERPL-SCNC: 24 MMOL/L (ref 20–29)
CREAT SERPL-MCNC: 0.81 MG/DL (ref 0.57–1)
CREAT UR-MCNC: 152.2 MG/DL
EST. AVERAGE GLUCOSE BLD GHB EST-MCNC: 146 MG/DL
GLOBULIN SER CALC-MCNC: 2.4 G/DL (ref 1.5–4.5)
GLUCOSE SERPL-MCNC: 92 MG/DL (ref 65–99)
HBA1C MFR BLD: 6.7 % (ref 4.8–5.6)
HDLC SERPL-MCNC: 62 MG/DL
LDLC SERPL CALC-MCNC: 73 MG/DL (ref 0–99)
MICROALBUMIN UR-MCNC: 13.8 UG/ML
POTASSIUM SERPL-SCNC: 4.1 MMOL/L (ref 3.5–5.2)
PROT SERPL-MCNC: 6.9 G/DL (ref 6–8.5)
SODIUM SERPL-SCNC: 142 MMOL/L (ref 134–144)
TRIGL SERPL-MCNC: 120 MG/DL (ref 0–149)
VLDLC SERPL CALC-MCNC: 24 MG/DL (ref 5–40)

## 2018-11-20 ENCOUNTER — OFFICE VISIT (OUTPATIENT)
Dept: INTERNAL MEDICINE CLINIC | Age: 63
End: 2018-11-20

## 2018-11-20 VITALS
OXYGEN SATURATION: 96 % | RESPIRATION RATE: 16 BRPM | HEART RATE: 64 BPM | BODY MASS INDEX: 37.18 KG/M2 | WEIGHT: 217.8 LBS | TEMPERATURE: 98 F | HEIGHT: 64 IN | DIASTOLIC BLOOD PRESSURE: 78 MMHG | SYSTOLIC BLOOD PRESSURE: 135 MMHG

## 2018-11-20 DIAGNOSIS — Z00.00 MEDICARE ANNUAL WELLNESS VISIT, INITIAL: Primary | ICD-10-CM

## 2018-11-20 DIAGNOSIS — Z23 ENCOUNTER FOR IMMUNIZATION: ICD-10-CM

## 2018-11-20 DIAGNOSIS — Z13.39 SCREENING FOR ALCOHOLISM: ICD-10-CM

## 2018-11-20 DIAGNOSIS — E11.9 CONTROLLED TYPE 2 DIABETES MELLITUS WITHOUT COMPLICATION, WITHOUT LONG-TERM CURRENT USE OF INSULIN (HCC): ICD-10-CM

## 2018-11-20 DIAGNOSIS — I10 ESSENTIAL HYPERTENSION: ICD-10-CM

## 2018-11-20 DIAGNOSIS — Z13.31 SCREENING FOR DEPRESSION: ICD-10-CM

## 2018-11-20 NOTE — PATIENT INSTRUCTIONS
It was a pleasure to see you! As discussed: Your labs are clinically stable. No medication changes needed. Some labs that may have been tested and their explanation are: 
Your electrolytes, kidney & liver function (Metabolic Panel) Anemia, blood cells (CBC) Thyroid (TSH + T4, T3) Hormones (prolactin, vitamin D ) Pregnancy (Beta HCG) Diabetes (Hemoglobin A1c) Lipid Panel (Cholesterol, HDL \"good\", LDL \"bad\") Nutrition Tips for Diabetes: After Your Visit Your Care Instructions A healthy diet is important to manage diabetes. It helps you lose weight (if you need to) and keep it off. It gives you the nutrition and energy your body needs and helps prevent heart disease. But a diet for diabetes does not mean that you have to eat special foods. You can eat what your family eats, including occasional sweets and other favorites. But you do have to pay attention to how often you eat and how much you eat of certain foods. The right plan for you will give you meals that help you keep your blood sugar at healthy levels. Try to eat a variety of foods and to spread carbohydrate throughout the day. Carbohydrate raises blood sugar higher and more quickly than any other nutrient does. Carbohydrate is found in sugar, breads and cereals, fruit, starchy vegetables such as potatoes and corn, and milk and yogurt. You may want to work with a dietitian or diabetes educator to help you plan meals and snacks. A dietitian or diabetes educator also can help you lose weight if that is one of your goals. The following tips can help you enjoy your meals and stay healthy. Follow-up care is a key part of your treatment and safety. Be sure to make and go to all appointments, and call your doctor if you are having problems. Its also a good idea to know your test results and keep a list of the medicines you take. How can you care for yourself at home? · Learn which foods have carbohydrate and how much carbohydrate to eat. A dietitian or diabetes educator can help you learn to keep track of how much carbohydrate you eat. · Spread carbohydrate throughout the day. Eat some carbohydrate at all meals, but do not eat too much at any one time. · Plan meals to include food from all the food groups. These are the food groups and some example portion sizes: ¨ Grains: 1 slice of bread (1 ounce), ½ cup of cooked cereal, and 1/3 cup of cooked pasta or rice. These have about 15 grams of carbohydrate in a serving. Choose whole grains such as whole wheat bread or crackers, oatmeal, and brown rice more often than refined grains. ¨ Fruit: 1 small fresh fruit, such as an apple or orange; ½ of a banana; ½ cup of chopped, cooked, or canned fruit; ½ cup of fruit juice; 1 cup of melon or raspberries; and 2 tablespoons of dried fruit. These have about 15 grams of carbohydrate in a serving. ¨ Dairy: 1 cup of nonfat or low-fat milk and 2/3 cup of plain yogurt. These have about 15 grams of carbohydrate in a serving. ¨ Protein foods: Beef, chicken, turkey, fish, eggs, tofu, cheese, cottage cheese, and peanut butter. A serving size of meat is 3 ounces, which is about the size of a deck of cards. Examples of meat substitute serving sizes (equal to 1 ounce of meat) are 1/4 cup of cottage cheese, 1 egg, 1 tablespoon of peanut butter, and ½ cup of tofu. These have very little or no carbohydrate per serving. ¨ Vegetables: Starchy vegetables such as ½ cup of cooked dried beans, peas, potatoes, or corn have about 15 grams of carbohydrate. Nonstarchy vegetables have very little carbohydrate, such as 1 cup of raw leafy vegetables (such as spinach), ½ cup of other vegetables (cooked or chopped), and 3/4 cup of vegetable juice. · Use the plate format to plan meals.  It is a good, quick way to make sure that you have a balanced meal. It also helps you spread carbohydrate throughout the day. You divide your plate by types of foods. Put vegetables on half the plate, meat or meat substitutes on one-quarter of the plate, and a grain or starchy vegetable (such as brown rice or a potato) in the final quarter of the plate. To this you can add a small piece of fruit and 1 cup of milk or yogurt, depending on how much carbohydrate you are supposed to eat at a meal. 
· Talk to your dietitian or diabetes educator about ways to add limited amounts of sweets into your meal plan. You can eat these foods now and then, as long as you include the amount of carbohydrate they have in your daily carbohydrate allowance. · If you drink alcohol, limit it to no more than 1 drink a day for women and 2 drinks a day for men. If you are pregnant, no amount of alcohol is known to be safe. · Protein, fat, and fiber do not raise blood sugar as much as carbohydrate does. If you eat a lot of these nutrients in a meal, your blood sugar will rise more slowly than it would otherwise. · Limit saturated fats, such as those from meat and dairy products. Try to replace it with monounsaturated fat, such as olive oil. This is a healthier choice because people who have diabetes are at higher-than-average risk of heart disease. But use a modest amount of olive oil. A tablespoon of olive oil has 14 grams of fat and 120 calories. · Exercise lowers blood sugar. If you take insulin by shots or pump, you can use less than you would if you were not exercising. Keep in mind that timing matters. If you exercise within 1 hour after a meal, your body may need less insulin for that meal than it would if you exercised 3 hours after the meal. Test your blood sugar to find out how exercise affects your need for insulin. · Exercise on most days of the week. Aim for at least 30 minutes. Exercise helps you stay at a healthy weight and helps your body use insulin. Walking is an easy way to get exercise.  Gradually increase the amount you walk every day. You also may want to swim, bike, or do other activities. When you eat out · Learn to estimate the serving sizes of foods that have carbohydrate. If you measure food at home, it will be easier to estimate the amount in a serving of restaurant food. · If the meal you order has too much carbohydrate (such as potatoes, corn, or baked beans), ask to have a low-carbohydrate food instead. Ask for a salad or green vegetables. · If you use insulin, check your blood sugar before and after eating out to help you plan how much to eat in the future. · If you eat more carbohydrate at a meal than you had planned, take a walk or do other exercise. This will help lower your blood sugar. Where can you learn more? Go to AdoTube.be Enter O844 in the search box to learn more about \"Nutrition Tips for Diabetes: After Your Visit. \"  
© 1913-6739 [employer]. Care instructions adapted under license by [employer] (which disclaims liability or warranty for this information). This care instruction is for use with your licensed healthcare professional. If you have questions about a medical condition or this instruction, always ask your healthcare professional. [address] [address] any warranty or liability for your use of this information. Content Version: 72.9.932163; Current as of: June 4, 2014

## 2018-11-20 NOTE — PROGRESS NOTES
HISTORY OF PRESENT ILLNESS Cj Doss is a 70 y.o. female. HPI Cardiovascular Review: 
The patient has hypertension, hyperlipidemia and obesity. Diet and Lifestyle: exercises sporadically, nonsmoker Home BP Monitoring: is not measured at home. Pertinent ROS: no TIA's, no chest pain on exertion, no dyspnea on exertion, no swelling of ankles. Diabetes Review: 
The patient has diabetes. Diet and Lifestyle: exercises sporadically Home Glucose  Monitoring: is well controlled at home, ranging 140's. Pertinent ROS: taking medications as instructed, no medication side effects noted, no TIA's, no chest pain on exertion, no dyspnea on exertion, no swelling of ankles. This is an Initial Medicare Annual Wellness Exam (AWV) (Performed 12 months after IPPE or effective date of Medicare Part B enrollment, Once in a lifetime) I have reviewed the patient's medical history in detail and updated the computerized patient record. History Past Medical History:  
Diagnosis Date  Breast cancer ([facility] [address] lumpectomy with radiation, 2009, left  Diabetes ([facility] [address]  Hyperlipidemia  Hypertension Past Surgical History:  
Procedure Laterality Date  BREAST SURGERY PROCEDURE UNLISTED    
 lumpectomy  HX BREAST LUMPECTOMY Left   
 2009  HX ORTHOPAEDIC [address] Surgery  HX TUBAL LIGATION Current Outpatient Medications Medication Sig Dispense Refill  ACCU-CHEK SMARTVIEW TEST STRIP strip PLEASE USE TWICE DAILY AS NEEDED 100 Strip 11  
 hydroCHLOROthiazide (HYDRODIURIL) 25 mg tablet TAKE 1 TABLET BY MOUTH EVERY DAY 90 Tab 2  
 metFORMIN (GLUCOPHAGE) 1,000 mg tablet TAKE 1 TABLET BY MOUTH TWICE DAILY 180 Tab 2  
 simvastatin (ZOCOR) 10 mg tablet TAKE 1 TABLET BY MOUTH EVERY EVENING 90 Tab 3  
 lisinopril (PRINIVIL, ZESTRIL) 10 mg tablet TAKE 1 TABLET BY MOUTH EVERY DAY 90 Tab 3  
 glucose blood VI test strips (ACCU-CHEK SMARTVIEW TEST STRIP) strip Please use two times daily as needed for Dx code 250.02 100 Strip 11  
 diclofenac (VOLTAREN) 1 % gel Apply  to affected area four (4) times daily as needed. 100 g 0  
 BETA-CAROTENE,A, W-C & E/ZN/CU (VISION-CHARANJIT PRESERVE PO) Take  by mouth two (2) times a day.  Cholecalciferol, Vitamin D3, (VITAMIN D3) 1,000 unit cap Take 1,000 Units by mouth daily. 4 tabs daily  Indications: Vitamin D Deficiency  KLOR-CON SPRINKLE 10 mEq capsule TAKE 3 CAPSULES BY MOUTH EVERY  Cap 0 Allergies Allergen Reactions  Pcn [Penicillins] Hives Family History Problem Relation Age of Onset  Diabetes Father  Stroke Sister  Diabetes Brother  Heart Disease Mother Social History Tobacco Use  Smoking status: Never Smoker  Smokeless tobacco: Never Used Substance Use Topics  Alcohol use: No  
 
Patient Active Problem List  
Diagnosis Code  
 HTN (hypertension) I10  
 Hyperuricemia E79.0  History of breast cancer Z85.3  Diabetes mellitus type 2, controlled ([facility] [address] E11.9  Multinodular goiter E04.2  Thyroid antibody positive R76.0  Diabetes ([facility] [address] E11.9  Severe obesity (BMI 35.0-39. 9) with comorbidity ([facility] [address] E66.01 Depression Risk Factor Screening: PHQ over the last two weeks 11/20/2018 Little interest or pleasure in doing things Not at all Feeling down, depressed, irritable, or hopeless Not at all Total Score PHQ 2 0 Alcohol Risk Factor Screening: You do not drink alcohol or very rarely. Functional Ability and Level of Safety:  
 
Hearing Loss Hearing is good. Activities of Daily Living The home contains: [employer] [facility] [employer] Patient does total self care Fall Risk Fall Risk Assessment, last 12 mths 11/20/2018 Able to walk? Yes Fall in past 12 months? No  
 
 
Abuse Screen Patient is not abused Cognitive Screening Evaluation of Cognitive Function: 
Has your family/caregiver stated any concerns about your memory: no 
 
 
 Patient Care Team  
Patient Care Team: 
Chante Musa MD as PCP - General (Internal Medicine) Sherri Jennings MD as Consulting Provider (Endocrinology) [facility] Physical Exam  
Constitutional: She is oriented to person, place, and time. She appears well-developed and well-nourished. HENT:  
Right Ear: External ear normal.  
Left Ear: External ear normal.  
Mouth/Throat: Oropharynx is clear and moist. No oropharyngeal exudate. Eyes: Conjunctivae are normal. No scleral icterus. Neck: Normal range of motion. Neck supple. No thyromegaly present. Cardiovascular: Normal rate, regular rhythm and normal heart sounds. Exam reveals no gallop and no friction rub. No murmur heard. Pulmonary/Chest: Effort normal and breath sounds normal. No respiratory distress. She has no wheezes. She has no rales. She exhibits no tenderness. Abdominal: Soft. Bowel sounds are normal. She exhibits no distension. There is no tenderness. There is no rebound and no guarding. Musculoskeletal: Normal range of motion. She exhibits no edema or tenderness. Neurological: She is alert and oriented to person, place, and time. Lab Results Component Value Date/Time Hemoglobin A1c 6.7 (H) 11/06/2018 08:59 AM  
 Hemoglobin A1c 6.6 (H) 04/17/2018 10:55 AM  
 Hemoglobin A1c 6.6 (H) 11/17/2017 09:43 AM  
 Hemoglobin A1c, External 8.0 10/15/2014 08:35 AM  
 Glucose 92 11/06/2018 08:59 AM  
 Microalb/Creat ratio (ug/mg creat.) 9.1 11/06/2018 08:59 AM  
 LDL, calculated 73 11/06/2018 08:59 AM  
 Creatinine 0.81 11/06/2018 08:59 AM  
  
Lab Results Component Value Date/Time Cholesterol, total 159 11/06/2018 08:59 AM  
 HDL Cholesterol 62 11/06/2018 08:59 AM  
 LDL, calculated 73 11/06/2018 08:59 AM  
 LDL-C, External 64 10/15/2014 08:35 AM  
 Triglyceride 120 11/06/2018 08:59 AM  
 
Lab Results Component Value Date/Time  ALT (SGPT) 19 11/06/2018 08:59 AM  
 AST (SGOT) 21 11/06/2018 08:59 AM  
 Alk. phosphatase 66 11/06/2018 08:59 AM  
 Bilirubin, total 0.5 11/06/2018 08:59 AM  
 Albumin 4.5 11/06/2018 08:59 AM  
 Protein, total 6.9 11/06/2018 08:59 AM  
 PLATELET 890 17/36/2164 08:24 AM  
 
Lab Results Component Value Date/Time GFR est non-AA 76 11/06/2018 08:59 AM  
 GFR est AA 88 11/06/2018 08:59 AM  
 Creatinine 0.81 11/06/2018 08:59 AM  
 BUN 15 11/06/2018 08:59 AM  
 Sodium 142 11/06/2018 08:59 AM  
 Potassium 4.1 11/06/2018 08:59 AM  
 Chloride 104 11/06/2018 08:59 AM  
 CO2 24 11/06/2018 08:59 AM  
 
Lab Results Component Value Date/Time TSH 1.070 04/17/2018 10:55 AM  
 T3 Uptake 33 03/30/2017 08:24 AM  
 T4, Free 1.06 04/17/2018 10:55 AM  
 T4, Total 7.3 03/30/2017 08:24 AM  
  
Lab Results Component Value Date/Time Glucose 92 11/06/2018 08:59 AM  
   
ASSESSMENT and PLAN Assessment/Plan Education and counseling provided: 
Are appropriate based on today's review and evaluation Diagnoses and all orders for this visit: 
 
1. Medicare annual wellness visit, initial- Health Maintenance reviewed and addressed as ordered below 2. Essential hypertension- well controlled. Counseled on diet and exercise. Continue current regimen.  
 
-     METABOLIC PANEL, COMPREHENSIVE; Future -     MAGNESIUM; Future -     LIPID PANEL; Future 3. Controlled type 2 diabetes mellitus without complication, without long-term current use of insulin ([facility] [address]- A1c at goal.  
-     HEMOGLOBIN A1C WITH EAG; Future 4. Encounter for immunization- rx given. -     varicella-zoster recombinant, PF, (SHINGRIX, PF,) 50 mcg/0.5 mL susr injection; 0.5 mL by IntraMUSCular route once for 1 dose. -     pneumococcal 13 cj conj dip (PREVNAR-13) 0.5 mL syrg injection; 0.5 mL by IntraMUSCular route once for 1 dose. 5. Screening for depression- wnl 6. Screening for alcoholism- wnl.   
 
 
Follow-up Disposition: 
Return in about 4 months (around 3/20/2019) for Follow-up, Labs completed 2 weeks before appointment. Medication risks/benefits/costs/interactions/alternatives discussed with patient. Panchito Larios  was given an after visit summary which includes diagnoses, current medications, & vitals. she expressed understanding with the diagnosis and plan. Health Maintenance Due Topic Date Due  Shingrix Vaccine Age 50> (1 of 2) 05/26/2003  Bone Densitometry (Dexa) Screening  05/26/2018  Pneumococcal 65+ Low/Medium Risk (1 of 2 - PCV13) 05/26/2018  Influenza Age 5 to Adult  08/01/2018

## 2019-02-04 ENCOUNTER — HOSPITAL ENCOUNTER (OUTPATIENT)
Dept: MAMMOGRAPHY | Age: 64
Discharge: HOME | End: 2019-02-04
Attending: INTERNAL MEDICINE
Payer: MEDICARE

## 2019-02-04 DIAGNOSIS — Z12.39 SCREENING BREAST EXAMINATION: ICD-10-CM

## 2019-02-04 PROCEDURE — 77067 SCR MAMMO BI INCL CAD: CPT

## 2019-02-11 RX ORDER — SIMVASTATIN 10 MG/1
TABLET, FILM COATED ORAL
Qty: 90 TAB | Refills: 0 | Status: SHIPPED | OUTPATIENT
Start: 2019-02-11 | End: 2019-05-13 | Stop reason: SDUPTHER

## 2019-02-15 RX ORDER — POTASSIUM CHLORIDE 750 MG/1
CAPSULE, EXTENDED RELEASE ORAL
Qty: 270 CAP | Refills: 0 | Status: SHIPPED | OUTPATIENT
Start: 2019-02-15 | End: 2019-05-13 | Stop reason: SDUPTHER

## 2019-02-20 DIAGNOSIS — E11.9 CONTROLLED TYPE 2 DIABETES MELLITUS WITHOUT COMPLICATION, WITHOUT LONG-TERM CURRENT USE OF INSULIN (HCC): ICD-10-CM

## 2019-02-20 DIAGNOSIS — I10 ESSENTIAL HYPERTENSION: ICD-10-CM

## 2019-03-25 ENCOUNTER — HOSPITAL ENCOUNTER (OUTPATIENT)
Dept: LAB | Age: 64
Discharge: HOME | End: 2019-03-25
Payer: MEDICARE

## 2019-03-25 PROCEDURE — 83735 ASSAY OF MAGNESIUM: CPT

## 2019-03-25 PROCEDURE — 80053 COMPREHEN METABOLIC PANEL: CPT

## 2019-03-25 PROCEDURE — 36415 COLL VENOUS BLD VENIPUNCTURE: CPT

## 2019-03-25 PROCEDURE — 80061 LIPID PANEL: CPT

## 2019-03-25 PROCEDURE — 83036 HEMOGLOBIN GLYCOSYLATED A1C: CPT

## 2019-03-26 LAB
ALBUMIN SERPL-MCNC: 4.1 G/DL (ref 3.6–4.8)
ALBUMIN/GLOB SERPL: 1.4 {RATIO} (ref 1.2–2.2)
ALP SERPL-CCNC: 76 IU/L (ref 39–117)
ALT SERPL-CCNC: 24 IU/L (ref 0–32)
AST SERPL-CCNC: 16 IU/L (ref 0–40)
BILIRUB SERPL-MCNC: 0.6 MG/DL (ref 0–1.2)
BUN SERPL-MCNC: 14 MG/DL (ref 8–27)
BUN/CREAT SERPL: 16 (ref 12–28)
CALCIUM SERPL-MCNC: 10.1 MG/DL (ref 8.7–10.3)
CHLORIDE SERPL-SCNC: 106 MMOL/L (ref 96–106)
CHOLEST SERPL-MCNC: 162 MG/DL (ref 100–199)
CO2 SERPL-SCNC: 25 MMOL/L (ref 20–29)
CREAT SERPL-MCNC: 0.86 MG/DL (ref 0.57–1)
EST. AVERAGE GLUCOSE BLD GHB EST-MCNC: 143 MG/DL
GLOBULIN SER CALC-MCNC: 3 G/DL (ref 1.5–4.5)
GLUCOSE SERPL-MCNC: 105 MG/DL (ref 65–99)
HBA1C MFR BLD: 6.6 % (ref 4.8–5.6)
HDLC SERPL-MCNC: 60 MG/DL
LDLC SERPL CALC-MCNC: 79 MG/DL (ref 0–99)
MAGNESIUM SERPL-MCNC: 1.8 MG/DL (ref 1.6–2.3)
POTASSIUM SERPL-SCNC: 4.4 MMOL/L (ref 3.5–5.2)
PROT SERPL-MCNC: 7.1 G/DL (ref 6–8.5)
SODIUM SERPL-SCNC: 145 MMOL/L (ref 134–144)
TRIGL SERPL-MCNC: 115 MG/DL (ref 0–149)
VLDLC SERPL CALC-MCNC: 23 MG/DL (ref 5–40)

## 2019-04-10 ENCOUNTER — OFFICE VISIT (OUTPATIENT)
Dept: INTERNAL MEDICINE CLINIC | Age: 64
End: 2019-04-10

## 2019-04-10 VITALS
OXYGEN SATURATION: 96 % | WEIGHT: 218.2 LBS | HEIGHT: 64 IN | DIASTOLIC BLOOD PRESSURE: 74 MMHG | RESPIRATION RATE: 16 BRPM | HEART RATE: 74 BPM | TEMPERATURE: 97.9 F | BODY MASS INDEX: 37.25 KG/M2 | SYSTOLIC BLOOD PRESSURE: 125 MMHG

## 2019-04-10 DIAGNOSIS — E04.2 MULTINODULAR GOITER: ICD-10-CM

## 2019-04-10 DIAGNOSIS — I10 ESSENTIAL HYPERTENSION: ICD-10-CM

## 2019-04-10 DIAGNOSIS — Z23 ENCOUNTER FOR IMMUNIZATION: ICD-10-CM

## 2019-04-10 DIAGNOSIS — E11.9 CONTROLLED TYPE 2 DIABETES MELLITUS WITHOUT COMPLICATION, WITHOUT LONG-TERM CURRENT USE OF INSULIN (HCC): Primary | ICD-10-CM

## 2019-04-10 DIAGNOSIS — E78.2 MIXED HYPERLIPIDEMIA: ICD-10-CM

## 2019-04-10 NOTE — PATIENT INSTRUCTIONS
It was a pleasure to see you! As discussed: Your labs look good! Continue working on optimizing your diet and exercise. It has been a pleasure caring for you! For your next appointment:   Schedule with Dr. Liam Ponce or Nader Boyd at   [address] [place] [address]  [phone]         Learning About Diabetes Food Guidelines  Your Care Instructions  Meal planning is important to manage diabetes. It helps keep your blood sugar at a target level (which you set with your doctor). You don't have to eat special foods. You can eat what your family eats, including sweets once in a while. But you do have to pay attention to how often you eat and how much you eat of certain foods. You may want to work with a dietitian or a certified diabetes educator (CDE) to help you plan meals and snacks. A dietitian or CDE can also help you lose weight if that is one of your goals. What should you know about eating carbs? Managing the amount of carbohydrate (carbs) you eat is an important part of healthy meals when you have diabetes. Carbohydrate is found in many foods. · Learn which foods have carbs. And learn the amounts of carbs in different foods. ? Bread, cereal, pasta, and rice have about 15 grams of carbs in a serving. A serving is 1 slice of bread (1 ounce), ½ cup of cooked cereal, or 1/3 cup of cooked pasta or rice. ? Fruits have 15 grams of carbs in a serving. A serving is 1 small fresh fruit, such as an apple or orange; ½ of a banana; ½ cup of cooked or canned fruit; ½ cup of fruit juice; 1 cup of melon or raspberries; or 2 tablespoons of dried fruit. ? Milk and no-sugar-added yogurt have 15 grams of carbs in a serving. A serving is 1 cup of milk or 2/3 cup of no-sugar-added yogurt. ? Starchy vegetables have 15 grams of carbs in a serving. A serving is ½ cup of mashed potatoes or sweet potato; 1 cup winter squash; ½ of a small baked potato; ½ cup of cooked beans; or ½ cup cooked corn or green peas.   · Learn how much carbs to eat each day and at each meal. A dietitian or CDE can teach you how to keep track of the amount of carbs you eat. This is called carbohydrate counting. · If you are not sure how to count carbohydrate grams, use the Plate Method to plan meals. It is a good, quick way to make sure that you have a balanced meal. It also helps you spread carbs throughout the day. ? Divide your plate by types of foods. Put non-starchy vegetables on half the plate, meat or other protein food on one-quarter of the plate, and a grain or starchy vegetable in the final quarter of the plate. To this you can add a small piece of fruit and 1 cup of milk or yogurt, depending on how many carbs you are supposed to eat at a meal.  · Try to eat about the same amount of carbs at each meal. Do not \"save up\" your daily allowance of carbs to eat at one meal.  · Proteins have very little or no carbs per serving. Examples of proteins are beef, chicken, turkey, fish, eggs, tofu, cheese, cottage cheese, and peanut butter. A serving size of meat is 3 ounces, which is about the size of a deck of cards. Examples of meat substitute serving sizes (equal to 1 ounce of meat) are 1/4 cup of cottage cheese, 1 egg, 1 tablespoon of peanut butter, and ½ cup of tofu. How can you eat out and still eat healthy? · Learn to estimate the serving sizes of foods that have carbohydrate. If you measure food at home, it will be easier to estimate the amount in a serving of restaurant food. · If the meal you order has too much carbohydrate (such as potatoes, corn, or baked beans), ask to have a low-carbohydrate food instead. Ask for a salad or green vegetables. · If you use insulin, check your blood sugar before and after eating out to help you plan how much to eat in the future. · If you eat more carbohydrate at a meal than you had planned, take a walk or do other exercise. This will help lower your blood sugar. What else should you know?   · Limit saturated fat, such as the fat from meat and dairy products. This is a healthy choice because people who have diabetes are at higher risk of heart disease. So choose lean cuts of meat and nonfat or low-fat dairy products. Use olive or canola oil instead of butter or shortening when cooking. · Don't skip meals. Your blood sugar may drop too low if you skip meals and take insulin or certain medicines for diabetes. · Check with your doctor before you drink alcohol. Alcohol can cause your blood sugar to drop too low. Alcohol can also cause a bad reaction if you take certain diabetes medicines. Follow-up care is a key part of your treatment and safety. Be sure to make and go to all appointments, and call your doctor if you are having problems. It's also a good idea to know your test results and keep a list of the medicines you take. Where can you learn more? Go to http://nicola-roberto.info/. Enter V476 in the search box to learn more about \"Learning About Diabetes Food Guidelines. \"  Current as of: July 25, 2018  Content Version: 11.9  © 7255-7343 [employer]. Care instructions adapted under license by [employer] (which disclaims liability or warranty for this information). If you have questions about a medical condition or this instruction, always ask your healthcare professional. [address] any warranty or liability for your use of this information.

## 2019-04-10 NOTE — PROGRESS NOTES
Chief Complaint   Patient presents with    Hypertension     1. Have you been to the ER, urgent care clinic since your last visit? Hospitalized since your last visit? No    2. Have you seen or consulted any other health care providers outside of the [address] [place] [address] since your last visit? Include any pap smears or colon screening.  Yes Where: Dr Del Castillo-Ophthalmologist Reason for visit: routine eye exam/ Podiatrist Dr Hunter bah

## 2019-04-10 NOTE — PROGRESS NOTES
HISTORY OF PRESENT ILLNESS  Magdaleno Rivas is a 70 y.o. female. HPI  Diabetes Review:  The patient has diabetes. Diet and Lifestyle: does not rigorously follow a diabetic diet, nonsmoker, alcohol intake none  Home Glucose  Monitoring: is not measured at home. Pertinent ROS: taking medications as instructed, no medication side effects noted, no TIA's, no chest pain on exertion, no dyspnea on exertion, no swelling of ankles. Cardiovascular Review:  The patient has hypertension. Diet and Lifestyle: plans to exercise regularly but not currently , nonsmoker, reduced carbohydrate diet, high sugar/ high carbohydrate diet, Drinks soda/ sweetened beverages frequently  Home BP Monitoring: is not measured at home. Pertinent ROS: no TIA's, no chest pain on exertion, no dyspnea on exertion, no swelling of ankles, taking medications as instructed\",no medication side effects noted. ROSper HPI   Patient Active Problem List    Diagnosis Date Noted    Severe obesity (BMI 35.0-39. 9) with comorbidity ([facility] [address] 04/17/2018    Diabetes ([facility] [address]     Multinodular goiter 02/10/2017    Thyroid antibody positive 02/10/2017    History of breast cancer 10/30/2015    Diabetes mellitus type 2, controlled ([facility] [address] 10/30/2015    HTN (hypertension) 01/21/2015    Hyperuricemia 01/21/2015       Current Outpatient Medications   Medication Sig Dispense Refill    metFORMIN (GLUCOPHAGE) 1,000 mg tablet TAKE 1 TABLET BY MOUTH TWICE DAILY 180 Tab 0    potassium chloride SA (MICRO-K) 10 mEq capsule TAKE 3 CAPSULES BY MOUTH EVERY  Cap 0    simvastatin (ZOCOR) 10 mg tablet TAKE 1 TABLET BY MOUTH EVERY EVENING 90 Tab 0    lisinopril (PRINIVIL, ZESTRIL) 10 mg tablet TAKE 1 TABLET BY MOUTH EVERY DAY 90 Tab 1    ACCU-CHEK SMARTVIEW TEST STRIP strip PLEASE USE TWICE DAILY AS NEEDED 100 Strip 11    hydroCHLOROthiazide (HYDRODIURIL) 25 mg tablet TAKE 1 TABLET BY MOUTH EVERY DAY 90 Tab 2    glucose blood VI test strips (ACCU-CHEK SMARTVIEW TEST STRIP) strip Please use two times daily as needed for Dx code 250.02 100 Strip 11    diclofenac (VOLTAREN) 1 % gel Apply  to affected area four (4) times daily as needed. 100 g 0    BETA-CAROTENE,A, W-C & E/ZN/CU (VISION-CHARANJIT PRESERVE PO) Take  by mouth two (2) times a day.  Cholecalciferol, Vitamin D3, (VITAMIN D3) 1,000 unit cap Take 5,000 Units by mouth daily. 4 tabs daily  Indications: vitamin D deficiency         Allergies   Allergen Reactions    Pcn [Penicillins] Hives      Visit Vitals  /74 (BP 1 Location: Right arm, BP Patient Position: Sitting)   Pulse 74   Temp 97.9 °F (36.6 °C) (Oral)   Resp 16   Ht 5' 4\" (1.626 m)   Wt 218 lb 3.2 oz (99 kg)   SpO2 96%   BMI 37.45 kg/m²       Physical Exam   Constitutional: She is oriented to person, place, and time. She appears well-developed. No distress. Eyes: Conjunctivae are normal.   Neck: Neck supple. Cardiovascular: Normal rate, regular rhythm and normal heart sounds. Pulmonary/Chest: Effort normal and breath sounds normal. No respiratory distress. She has no wheezes. She has no rales. She exhibits no tenderness. Musculoskeletal: She exhibits no edema (BLE). Neurological: She is alert and oriented to person, place, and time. Skin: Skin is warm. Psychiatric: She has a normal mood and affect. ASSESSMENT and PLAN  Diagnoses and all orders for this visit:    1. Controlled type 2 diabetes mellitus without complication, without long-term current use of insulin ([address]- A1c at goal.o med changes. Work on optimizing lifestyle factors. 2. Essential hypertension- well controlled. Counseled on diet and exercise. Continue current regimen. 3. Mixed hyperlipidemia- check lipids. 4. Multinodular goiter- per Endocrinology    5. Encounter for immunization- rx given. -     varicella-zoster recombinant, PF, (SHINGRIX, PF,) 50 mcg/0.5 mL susr injection; 0.5 mL by IntraMUSCular route once for 1 dose.   -     pneumococcal 13 cj conj dip (PREVNAR-13) 0.5 mL syrg injection; 0.5 mL by IntraMUSCular route once for 1 dose. Medication risks/benefits/costs/interactions/alternatives discussed with patient. Deng Contreras  was given an after visit summary which includes diagnoses, current medications, & vitals. she expressed understanding with the diagnosis and plan.

## 2019-05-07 ENCOUNTER — OFFICE VISIT (OUTPATIENT)
Dept: PRIMARY CARE CLINIC | Age: 64
End: 2019-05-07

## 2019-05-07 VITALS
WEIGHT: 215.2 LBS | HEIGHT: 64 IN | SYSTOLIC BLOOD PRESSURE: 140 MMHG | DIASTOLIC BLOOD PRESSURE: 80 MMHG | TEMPERATURE: 98.4 F | HEART RATE: 79 BPM | RESPIRATION RATE: 16 BRPM | BODY MASS INDEX: 36.74 KG/M2 | OXYGEN SATURATION: 98 %

## 2019-05-07 DIAGNOSIS — I10 ESSENTIAL HYPERTENSION: ICD-10-CM

## 2019-05-07 DIAGNOSIS — E78.2 MIXED HYPERLIPIDEMIA: ICD-10-CM

## 2019-05-07 DIAGNOSIS — E04.2 MULTINODULAR GOITER: ICD-10-CM

## 2019-05-07 DIAGNOSIS — M1A.0710 IDIOPATHIC CHRONIC GOUT OF RIGHT FOOT WITHOUT TOPHUS: ICD-10-CM

## 2019-05-07 DIAGNOSIS — R20.2 PARESTHESIA: ICD-10-CM

## 2019-05-07 DIAGNOSIS — E66.9 OBESITY (BMI 30-39.9): ICD-10-CM

## 2019-05-07 DIAGNOSIS — E11.9 CONTROLLED TYPE 2 DIABETES MELLITUS WITHOUT COMPLICATION, WITHOUT LONG-TERM CURRENT USE OF INSULIN (HCC): Primary | ICD-10-CM

## 2019-05-07 PROBLEM — R76.8 THYROID ANTIBODY POSITIVE: Status: RESOLVED | Noted: 2017-02-10 | Resolved: 2019-05-07

## 2019-05-07 NOTE — PROGRESS NOTES
Written by iPyush Hilario, as dictated by Dr. Jenni Mcqueen MD. 
 
[address] Junito Cueva is a 70 y.o. female. HPI The patient comes in today to establish care. She was previously under the care of Dr. Felipe Marrero () for 7 years, who she had been seeing every 4 months. Pt has [id number] of CHF (mother), DM-2 (father), and gout (father & brother). Denies constipation. She is taking metformin 1,000 mg for DM-2. Last HbA1c was 6.6% on 03/25/2019. Pt has been watching her diet. She weighs 215 lbs today and has lost weight from 218 lbs on 04/10/2019. Patient admits that she has not been doing much to lose weight despite discussions with Dr. Felipe Marrero. BP is high today at 154/77, 140/80 on repeat. She attributes her elevated BP to traveling to her appointment. Compliant on lisinopril 10 mg and HCTZ 25 mg. Patient occasionally checks her BP at the pharmacy, but does not monitor her BP regularly. Denies leg swelling. Patient was under the care of Dr. Beryle Keeler (endocrinology) but requests a referral to a new endocrinologist. She had a thyroid biopsy for multinodular goiter and was being monitored. The pt previously had gout in her foot, which she has been controlling with diet. Patient has not had a gout flare up in more than 1 year. Compliant on simvastatin 10 mg. She has been taking vitamin D but has not been taking vitamin B12. Followed by Dr. Francia Castro (OB/GYN). Pt has HX of breast cancer in L breast which was treated with lumpectomy and radiation in 2009. She was under the care of oncology at [facility] but was released to Dr. Geoff Meredith for regular mammograms. Last mammogram was in 02/2019. Followed by podiatry and was told that she might have diabetic neuropathy as she is having an odd sensation in her feet. Patient Active Problem List  
Diagnosis Code  
 HTN (hypertension) I10  
 Hyperuricemia E79.0  History of breast cancer Z85.3  Diabetes mellitus type 2, controlled ([facility] E11.9  Multinodular goiter E04.2  Severe obesity (BMI 35.0-39. 9) with comorbidity ([facility] E66.01  
  
 
Current Outpatient Medications on File Prior to Visit Medication Sig Dispense Refill  metFORMIN (GLUCOPHAGE) 1,000 mg tablet TAKE 1 TABLET BY MOUTH TWICE DAILY 180 Tab 0  
 potassium chloride SA (MICRO-K) 10 mEq capsule TAKE 3 CAPSULES BY MOUTH EVERY  Cap 0  
 simvastatin (ZOCOR) 10 mg tablet TAKE 1 TABLET BY MOUTH EVERY EVENING 90 Tab 0  
 lisinopril (PRINIVIL, ZESTRIL) 10 mg tablet TAKE 1 TABLET BY MOUTH EVERY DAY 90 Tab 1  ACCU-CHEK SMARTVIEW TEST STRIP strip PLEASE USE TWICE DAILY AS NEEDED 100 Strip 11  
 hydroCHLOROthiazide (HYDRODIURIL) 25 mg tablet TAKE 1 TABLET BY MOUTH EVERY DAY 90 Tab 2  
 glucose blood VI test strips (ACCU-CHEK SMARTVIEW TEST STRIP) strip Please use two times daily as needed for Dx code 250.02 100 Strip 11  
 diclofenac (VOLTAREN) 1 % gel Apply  to affected area four (4) times daily as needed. 100 g 0  
 BETA-CAROTENE,A, W-C & E/ZN/CU (VISION-CHARANJIT PRESERVE PO) Take  by mouth two (2) times a day.  Cholecalciferol, Vitamin D3, (VITAMIN D3) 1,000 unit cap Take 5,000 Units by mouth daily. 4 tabs daily  Indications: vitamin D deficiency No current facility-administered medications on file prior to visit. Allergies Allergen Reactions  Pcn [Penicillins] Hives Past Medical History:  
Diagnosis Date  Breast cancer ([facility] lumpectomy with radiation, 2009, left  Diabetes ([facility]  Hyperlipidemia  Hypertension Past Surgical History:  
Procedure Laterality Date  BREAST SURGERY PROCEDURE UNLISTED    
 lumpectomy  HX BREAST LUMPECTOMY Left   
 2009  HX ORTHOPAEDIC [address] [facility]  HX TUBAL LIGATION Family History Problem Relation Age of Onset  Diabetes Father  Stroke Sister  Diabetes Brother  Heart Disease Mother Social History Socioeconomic History  Marital status:  Spouse name: Not on file  Number of children: Not on file  Years of education: Not on file  Highest education level: Not on file Occupational History  Not on file Social Needs  Financial resource strain: Not on file  Food insecurity:  
  Worry: Not on file Inability: Not on file  Transportation needs:  
  Medical: Not on file Non-medical: Not on file Tobacco Use  Smoking status: Never Smoker  Smokeless tobacco: Never Used Substance and Sexual Activity  Alcohol use: No  
 Drug use: Not on file  Sexual activity: Never Lifestyle  Physical activity:  
  Days per week: Not on file Minutes per session: Not on file  Stress: Not on file Relationships  Social connections:  
  Talks on phone: Not on file Gets together: Not on file Attends [employer] service: Not on file Active member of club or organization: Not on file Attends meetings of clubs or organizations: Not on file Relationship status: Not on file  Intimate partner violence:  
  Fear of current or ex partner: Not on file Emotionally abused: Not on file Physically abused: Not on file Forced sexual activity: Not on file Other Topics Concern  Not on file Social History Narrative  Not on file Orders Only on 02/20/2019 Component Date Value Ref Range Status  Hemoglobin A1c 03/25/2019 6.6* 4.8 - 5.6 % Final  
 Estimated average glucose 03/25/2019 143  mg/dL Final  
 Glucose 03/25/2019 105* 65 - 99 mg/dL Final  
 BUN 03/25/2019 14  8 - 27 mg/dL Final  
 Creatinine 03/25/2019 0.86  0.57 - 1.00 mg/dL Final  
 GFR est non-AA 03/25/2019 71  >59 mL/min/1.73 Final  
 GFR est AA 03/25/2019 82  >59 mL/min/1.73 Final  
 BUN/Creatinine ratio 03/25/2019 16  12 - 28 Final  
 Sodium 03/25/2019 145* 134 - 144 mmol/L Final  
 Potassium 03/25/2019 4.4  3.5 - 5.2 mmol/L Final  
  Chloride 03/25/2019 106  96 - 106 mmol/L Final  
 CO2 03/25/2019 25  20 - 29 mmol/L Final  
 Calcium 03/25/2019 10.1  8.7 - 10.3 mg/dL Final  
 Protein, total 03/25/2019 7.1  6.0 - 8.5 g/dL Final  
 Albumin 03/25/2019 4.1  3.6 - 4.8 g/dL Final  
 GLOBULIN, TOTAL 03/25/2019 3.0  1.5 - 4.5 g/dL Final  
 A-G Ratio 03/25/2019 1.4  1.2 - 2.2 Final  
 Bilirubin, total 03/25/2019 0.6  0.0 - 1.2 mg/dL Final  
 Alk. phosphatase 03/25/2019 76  39 - 117 IU/L Final  
 AST (SGOT) 03/25/2019 16  0 - 40 IU/L Final  
 ALT (SGPT) 03/25/2019 24  0 - 32 IU/L Final  
 Magnesium 03/25/2019 1.8  1.6 - 2.3 mg/dL Final  
 Cholesterol, total 03/25/2019 162  100 - 199 mg/dL Final  
 Triglyceride 03/25/2019 115  0 - 149 mg/dL Final  
 HDL Cholesterol 03/25/2019 60  >39 mg/dL Final  
 VLDL, calculated 03/25/2019 23  5 - 40 mg/dL Final  
 LDL, calculated 03/25/2019 79  0 - 99 mg/dL Final  
 
 
Review of Systems Constitutional: Negative for malaise/fatigue. HENT: Negative for congestion. Eyes: Negative for blurred vision and pain. Respiratory: Negative for cough and shortness of breath. Cardiovascular: Negative for chest pain, palpitations and leg swelling. Gastrointestinal: Negative for abdominal pain, constipation and heartburn. Genitourinary: Negative for frequency and urgency. Musculoskeletal: Negative for joint pain and myalgias. Neurological: Positive for tingling (BL foot). Negative for dizziness, sensory change, weakness and headaches. Psychiatric/Behavioral: Negative for depression, memory loss and substance abuse. Visit Vitals /80 (BP 1 Location: Right arm, BP Patient Position: Sitting) Pulse 79 Temp 98.4 °F (36.9 °C) (Oral) Resp 16 Ht 5' 4\" (1.626 m) Wt 215 lb 3.2 oz (97.6 kg) SpO2 98% BMI 36.94 kg/m² Physical Exam  
Constitutional: She is oriented to person, place, and time. She appears well-developed. No distress. Obese HENT:  
 Right Ear: External ear normal.  
Left Ear: External ear normal.  
Eyes: Conjunctivae and EOM are normal. Right eye exhibits no discharge. Left eye exhibits no discharge. Neck: Normal range of motion. Neck supple. Thyroid mass and thyromegaly present. Cardiovascular: Normal rate and regular rhythm. Pulmonary/Chest: Effort normal and breath sounds normal. She has no wheezes. Abdominal: Soft. Bowel sounds are normal. There is no tenderness. Lymphadenopathy:  
  She has no cervical adenopathy. Neurological: She is alert and oriented to person, place, and time. Skin: She is not diaphoretic. Psychiatric: She has a normal mood and affect. Her behavior is normal.  
Nursing note and vitals reviewed. ASSESSMENT and PLAN 
  ICD-10-CM ICD-9-CM 1. Controlled type 2 diabetes mellitus without complication, without long-term current use of insulin ([facility]) Y36.1 314.95 METABOLIC PANEL, COMPREHENSIVE  
   HEMOGLOBIN A1C WITH EAG 
 
CMP and HbA1c ordered. Will discuss labs at next appointment. No changed to medication at this time. Encouraged diet and exercise. 2. Multinodular goiter E04.2 241.1 REFERRAL TO ENDOCRINOLOGY  
   TSH 3RD GENERATION Referred to endocrinology. TSH ordered. 3. Essential hypertension I10 401.9 BP is well-controlled on current medication. No change to dosage at this time. 4. Idiopathic chronic gout of right foot without tophus [id number] 274.02 URIC ACID Uric acid ordered. If her uric acid is high I will take her off of HCTZ. Recommended taking dry celery seeds with a glass of water every morning. 5. Mixed hyperlipidemia E78.2 272.2 LIPID PANEL Lipid panel ordered. Compliant on simvastatin 10 mg.  
6. Obesity (BMI 30-39. 9) E66.9 278.00 Encouraged diet and exercise. 7. Paresthesia R20.2 782.0 VITAMIN B12 Vitamin B12. Discussed that this may be due to taking metformin. She should take OTC vitamin B12 500 mcg. This plan was reviewed with the patient and patient agrees. All questions were answered. This scribe documentation was reviewed by me and accurately reflects the examination and decisions made by me. This note will not be viewable in [address]

## 2019-05-07 NOTE — PROGRESS NOTES
Chief Complaint Patient presents with  Establish Care  
  only concern is thyroid and needs another referral for endocrinologist as she has also lost that doctor.

## 2019-05-08 RX ORDER — SIMVASTATIN 10 MG/1
TABLET, FILM COATED ORAL
Qty: 90 TAB | Refills: 0 | OUTPATIENT
Start: 2019-05-08

## 2019-05-13 RX ORDER — SIMVASTATIN 10 MG/1
TABLET, FILM COATED ORAL
Qty: 90 TAB | Refills: 0 | Status: SHIPPED | OUTPATIENT
Start: 2019-05-13 | End: 2019-08-06 | Stop reason: ALTCHOICE

## 2019-05-13 RX ORDER — POTASSIUM CHLORIDE 750 MG/1
CAPSULE, EXTENDED RELEASE ORAL
Qty: 270 CAP | Refills: 0 | Status: SHIPPED | OUTPATIENT
Start: 2019-05-13 | End: 2019-08-09 | Stop reason: SDUPTHER

## 2019-05-23 DIAGNOSIS — I10 ESSENTIAL HYPERTENSION: Primary | ICD-10-CM

## 2019-05-24 RX ORDER — LISINOPRIL 10 MG/1
TABLET ORAL
Qty: 90 TAB | Refills: 1 | Status: SHIPPED | OUTPATIENT
Start: 2019-05-24 | End: 2019-12-23

## 2019-05-24 RX ORDER — LISINOPRIL 10 MG/1
TABLET ORAL
Qty: 90 TAB | Refills: 0 | OUTPATIENT
Start: 2019-05-24

## 2019-06-07 DIAGNOSIS — E11.9 CONTROLLED TYPE 2 DIABETES MELLITUS WITHOUT COMPLICATION, WITHOUT LONG-TERM CURRENT USE OF INSULIN (HCC): Primary | ICD-10-CM

## 2019-06-07 RX ORDER — METFORMIN HYDROCHLORIDE 1000 MG/1
TABLET ORAL
Qty: 180 TAB | Refills: 0 | Status: SHIPPED | OUTPATIENT
Start: 2019-06-07 | End: 2019-09-03 | Stop reason: SDUPTHER

## 2019-07-23 DIAGNOSIS — E78.2 MIXED HYPERLIPIDEMIA: ICD-10-CM

## 2019-07-23 DIAGNOSIS — E04.2 NONTOXIC MULTINODULAR GOITER: ICD-10-CM

## 2019-07-23 DIAGNOSIS — E04.2 MULTINODULAR GOITER: ICD-10-CM

## 2019-07-23 DIAGNOSIS — M1A.0710 IDIOPATHIC CHRONIC GOUT, RIGHT ANKLE AND FOOT, WITHOUT TOPHUS (TOPHI): Primary | ICD-10-CM

## 2019-07-23 DIAGNOSIS — E11.9 TYPE 2 DIABETES MELLITUS WITHOUT COMPLICATION, UNSPECIFIED WHETHER LONG TERM INSULIN USE (HCC): ICD-10-CM

## 2019-07-23 DIAGNOSIS — R20.2 PARESTHESIA: ICD-10-CM

## 2019-07-23 DIAGNOSIS — E11.9 CONTROLLED TYPE 2 DIABETES MELLITUS WITHOUT COMPLICATION, WITHOUT LONG-TERM CURRENT USE OF INSULIN (HCC): ICD-10-CM

## 2019-07-23 DIAGNOSIS — M1A.0710 IDIOPATHIC CHRONIC GOUT OF RIGHT FOOT WITHOUT TOPHUS: ICD-10-CM

## 2019-07-23 DIAGNOSIS — E53.8 VITAMIN B 12 DEFICIENCY: ICD-10-CM

## 2019-07-24 LAB
ALBUMIN SERPL-MCNC: 4.3 G/DL (ref 3.6–4.8)
ALBUMIN/GLOB SERPL: 1.6 {RATIO} (ref 1.2–2.2)
ALP SERPL-CCNC: 66 IU/L (ref 39–117)
ALT SERPL-CCNC: 14 IU/L (ref 0–32)
AST SERPL-CCNC: 13 IU/L (ref 0–40)
BILIRUB SERPL-MCNC: 0.5 MG/DL (ref 0–1.2)
BUN SERPL-MCNC: 17 MG/DL (ref 8–27)
BUN/CREAT SERPL: 24 (ref 12–28)
CALCIUM SERPL-MCNC: 10.1 MG/DL (ref 8.7–10.3)
CHLORIDE SERPL-SCNC: 105 MMOL/L (ref 96–106)
CHOLEST SERPL-MCNC: 154 MG/DL (ref 100–199)
CO2 SERPL-SCNC: 20 MMOL/L (ref 20–29)
CREAT SERPL-MCNC: 0.72 MG/DL (ref 0.57–1)
EST. AVERAGE GLUCOSE BLD GHB EST-MCNC: 143 MG/DL
GLOBULIN SER CALC-MCNC: 2.7 G/DL (ref 1.5–4.5)
GLUCOSE SERPL-MCNC: 97 MG/DL (ref 65–99)
HBA1C MFR BLD: 6.6 % (ref 4.8–5.6)
HDLC SERPL-MCNC: 58 MG/DL
LDLC SERPL CALC-MCNC: 77 MG/DL (ref 0–99)
POTASSIUM SERPL-SCNC: 4.2 MMOL/L (ref 3.5–5.2)
PROT SERPL-MCNC: 7 G/DL (ref 6–8.5)
SODIUM SERPL-SCNC: 143 MMOL/L (ref 134–144)
TRIGL SERPL-MCNC: 95 MG/DL (ref 0–149)
TSH SERPL DL<=0.005 MIU/L-ACNC: 1.51 UIU/ML (ref 0.45–4.5)
URATE SERPL-MCNC: 7.3 MG/DL (ref 2.5–7.1)
VIT B12 SERPL-MCNC: 1197 PG/ML (ref 232–1245)
VLDLC SERPL CALC-MCNC: 19 MG/DL (ref 5–40)

## 2019-08-06 ENCOUNTER — OFFICE VISIT (OUTPATIENT)
Dept: PRIMARY CARE CLINIC | Age: 64
End: 2019-08-06

## 2019-08-06 VITALS
TEMPERATURE: 98.1 F | WEIGHT: 211.2 LBS | RESPIRATION RATE: 16 BRPM | SYSTOLIC BLOOD PRESSURE: 130 MMHG | BODY MASS INDEX: 36.06 KG/M2 | DIASTOLIC BLOOD PRESSURE: 78 MMHG | HEART RATE: 70 BPM | OXYGEN SATURATION: 98 % | HEIGHT: 64 IN

## 2019-08-06 DIAGNOSIS — E78.2 MIXED HYPERLIPIDEMIA: ICD-10-CM

## 2019-08-06 DIAGNOSIS — E79.0 HYPERURICEMIA: ICD-10-CM

## 2019-08-06 DIAGNOSIS — M79.10 MYALGIA: ICD-10-CM

## 2019-08-06 DIAGNOSIS — I10 ESSENTIAL HYPERTENSION: ICD-10-CM

## 2019-08-06 DIAGNOSIS — I10 ESSENTIAL HYPERTENSION: Primary | ICD-10-CM

## 2019-08-06 DIAGNOSIS — E66.9 OBESITY (BMI 30-39.9): ICD-10-CM

## 2019-08-06 DIAGNOSIS — E11.9 CONTROLLED TYPE 2 DIABETES MELLITUS WITHOUT COMPLICATION, WITHOUT LONG-TERM CURRENT USE OF INSULIN (HCC): ICD-10-CM

## 2019-08-06 PROBLEM — E66.01 SEVERE OBESITY (HCC): Status: ACTIVE | Noted: 2019-08-06

## 2019-08-06 PROBLEM — E66.01 SEVERE OBESITY (HCC): Status: RESOLVED | Noted: 2019-08-06 | Resolved: 2019-08-06

## 2019-08-06 PROBLEM — E66.01 SEVERE OBESITY (BMI 35.0-39.9) WITH COMORBIDITY (HCC): Status: RESOLVED | Noted: 2018-04-17 | Resolved: 2019-08-06

## 2019-08-06 RX ORDER — PRAVASTATIN SODIUM 20 MG/1
20 TABLET ORAL
Qty: 90 TAB | Refills: 0 | Status: SHIPPED | OUTPATIENT
Start: 2019-08-06 | End: 2019-10-27 | Stop reason: SDUPTHER

## 2019-08-06 RX ORDER — INDAPAMIDE 2.5 MG/1
2.5 TABLET, FILM COATED ORAL DAILY
Qty: 30 TAB | Refills: 2 | Status: SHIPPED | OUTPATIENT
Start: 2019-08-06 | End: 2019-08-06 | Stop reason: SDUPTHER

## 2019-08-06 RX ORDER — LANOLIN ALCOHOL/MO/W.PET/CERES
1000 CREAM (GRAM) TOPICAL DAILY
COMMUNITY

## 2019-08-06 RX ORDER — INDAPAMIDE 2.5 MG/1
TABLET, FILM COATED ORAL
Qty: 90 TAB | Refills: 2 | Status: SHIPPED | OUTPATIENT
Start: 2019-08-06 | End: 2020-03-10 | Stop reason: ALTCHOICE

## 2019-08-06 NOTE — PROGRESS NOTES
Written by Deb William, as dictated by Dr. Lex Dominguez MD.    Héctor Martin is a 75 y.o. female. HPI  The patient presents today for a follow-up and to discuss her lab results. Labs were drawn 07/23/19. Uric acid was elevated at 7.3. CMP, lipid panel, TSH, and Vitamin B12 were normal.    Pt recently purchased her BP monitor at home, but reports that she has not use it yet. She would like to bring it into the office for help with it. Compliant on Lisinopril 10 mg and HCTZ 25 mg. Compliant on Metformin 1,000 mg BID. Previously her HgA1c was 6.6%. She reports to have been watching her diet. Pt weighs 211 lbs today, which has decreased from 215 lbs on 05/07/19. Pt admits that she has not been exercising. She denies constipation. Compliant on Simvastatin 10 mg. Pt reports pain in both upper thighs, and believes that it might be due to simvastatin. Pt states she receives the flu shot was annually. Pt has received the two doses of Shingrix shot in 05/2019 and 07/2019. Pt reports having received a pneumonia shot in 04/2019. Patient Active Problem List   Diagnosis Code    HTN (hypertension) I10    Hyperuricemia E79.0    History of breast cancer Z85.3    Diabetes mellitus type 2, controlled ([facility] E11.9    Multinodular goiter E04.2    Obesity (BMI 30-39. 9) E66.9    Mixed hyperlipidemia E78.2        Current Outpatient Medications on File Prior to Visit   Medication Sig Dispense Refill    cyanocobalamin 1,000 mcg tablet Take 1,000 mcg by mouth daily.       metFORMIN (GLUCOPHAGE) 1,000 mg tablet TAKE 1 TABLET BY MOUTH TWICE DAILY 180 Tab 0    lisinopril (PRINIVIL, ZESTRIL) 10 mg tablet TAKE 1 TABLET BY MOUTH EVERY DAY 90 Tab 1    potassium chloride SA (MICRO-K) 10 mEq capsule TAKE 3 CAPSULES BY MOUTH EVERY  Cap 0    ACCU-CHEK SMARTVIEW TEST STRIP strip PLEASE USE TWICE DAILY AS NEEDED 100 Strip 11    glucose blood VI test strips (ACCU-CHEK SMARTVIEW TEST STRIP) strip Please use two times daily as needed for Dx code 250.02 100 Strip 11    diclofenac (VOLTAREN) 1 % gel Apply  to affected area four (4) times daily as needed. 100 g 0    BETA-CAROTENE,A, W-C & E/ZN/CU (VISION-CHARANJIT PRESERVE PO) Take  by mouth two (2) times a day.  Cholecalciferol, Vitamin D3, (VITAMIN D3) 1,000 unit cap Take 5,000 Units by mouth daily. 4 tabs daily  Indications: vitamin D deficiency       No current facility-administered medications on file prior to visit.         Allergies   Allergen Reactions    Pcn [Penicillins] Hives       Past Medical History:   Diagnosis Date    Breast cancer ([facility]     lumpectomy with radiation, 2009, left    Diabetes ([facility]     Hyperlipidemia     Hypertension        Past Surgical History:   Procedure Laterality Date    BREAST SURGERY PROCEDURE UNLISTED      lumpectomy    HX BREAST LUMPECTOMY Left     2009    HX ORTHOPAEDIC      Bunion Surgery    HX TUBAL LIGATION         Family History   Problem Relation Age of Onset    Diabetes Father     Stroke Sister     Diabetes Brother     Heart Disease Mother        Social History     Socioeconomic History    Marital status:      Spouse name: Not on file    Number of children: Not on file    Years of education: Not on file    Highest education level: Not on file   Occupational History    Not on file   Social Needs    Financial resource strain: Not on file    Food insecurity:     Worry: Not on file     Inability: Not on file    Transportation needs:     Medical: Not on file     Non-medical: Not on file   Tobacco Use    Smoking status: Never Smoker    Smokeless tobacco: Never Used   Substance and Sexual Activity    Alcohol use: No    Drug use: Never    Sexual activity: Never   Lifestyle    Physical activity:     Days per week: Not on file     Minutes per session: Not on file    Stress: Not on file   Relationships    Social connections:     Talks on phone: Not on file Gets together: Not on file     Attends Islam service: Not on file     Active member of club or organization: Not on file     Attends meetings of clubs or organizations: Not on file     Relationship status: Not on file    Intimate partner violence:     Fear of current or ex partner: Not on file     Emotionally abused: Not on file     Physically abused: Not on file     Forced sexual activity: Not on file   Other Topics Concern    Not on file   Social History Narrative    Not on file       Orders Only on 07/23/2019   Component Date Value Ref Range Status    Glucose 07/23/2019 97  65 - 99 mg/dL Final    BUN 07/23/2019 17  8 - 27 mg/dL Final    Creatinine 07/23/2019 0.72  0.57 - 1.00 mg/dL Final    GFR est non-AA 07/23/2019 88  >59 mL/min/1.73 Final    GFR est AA 07/23/2019 101  >59 mL/min/1.73 Final    BUN/Creatinine ratio 07/23/2019 24  12 - 28 Final    Sodium 07/23/2019 143  134 - 144 mmol/L Final    Potassium 07/23/2019 4.2  3.5 - 5.2 mmol/L Final    Chloride 07/23/2019 105  96 - 106 mmol/L Final    CO2 07/23/2019 20  20 - 29 mmol/L Final    Calcium 07/23/2019 10.1  8.7 - 10.3 mg/dL Final    Protein, total 07/23/2019 7.0  6.0 - 8.5 g/dL Final    Albumin 07/23/2019 4.3  3.6 - 4.8 g/dL Final    GLOBULIN, TOTAL 07/23/2019 2.7  1.5 - 4.5 g/dL Final    A-G Ratio 07/23/2019 1.6  1.2 - 2.2 Final    Bilirubin, total 07/23/2019 0.5  0.0 - 1.2 mg/dL Final    Alk.  phosphatase 07/23/2019 66  39 - 117 IU/L Final    AST (SGOT) 07/23/2019 13  0 - 40 IU/L Final    ALT (SGPT) 07/23/2019 14  0 - 32 IU/L Final    Hemoglobin A1c 07/23/2019 6.6* 4.8 - 5.6 % Final    Estimated average glucose 07/23/2019 143  mg/dL Final    Cholesterol, total 07/23/2019 154  100 - 199 mg/dL Final    Triglyceride 07/23/2019 95  0 - 149 mg/dL Final    HDL Cholesterol 07/23/2019 58  >39 mg/dL Final    VLDL, calculated 07/23/2019 19  5 - 40 mg/dL Final    LDL, calculated 07/23/2019 77  0 - 99 mg/dL Final    TSH 07/23/2019 1.510  0.450 - 4.500 uIU/mL Final    Uric acid 07/23/2019 7.3* 2.5 - 7.1 mg/dL Final    Vitamin B12 07/23/2019 1,197  232 - 1,245 pg/mL Final       Review of Systems   Constitutional: Negative for malaise/fatigue. HENT: Negative for congestion. Eyes: Negative for blurred vision and pain. Respiratory: Negative for cough and shortness of breath. Cardiovascular: Negative for chest pain and palpitations. Gastrointestinal: Negative for abdominal pain, constipation and heartburn. Genitourinary: Negative for frequency and urgency. Musculoskeletal: Positive for myalgias (BL thighs). Negative for joint pain. Neurological: Negative for dizziness, tingling, sensory change, weakness and headaches. Psychiatric/Behavioral: Negative for depression, memory loss and substance abuse. Visit Vitals  /78 (BP 1 Location: Right arm, BP Patient Position: Sitting)   Pulse 70   Temp 98.1 °F (36.7 °C) (Oral)   Resp 16   Ht 5' 4\" (1.626 m)   Wt 211 lb 3.2 oz (95.8 kg)   SpO2 98%   BMI 36.25 kg/m²       Physical Exam   Constitutional: She is oriented to person, place, and time. She appears well-developed. No distress. obese   HENT:   Right Ear: External ear normal.   Left Ear: External ear normal.   Eyes: Conjunctivae and EOM are normal. Right eye exhibits no discharge. Left eye exhibits no discharge. Neck: Normal range of motion. Neck supple. Cardiovascular: Normal rate, regular rhythm and normal heart sounds. Pulmonary/Chest: Effort normal and breath sounds normal. She has no wheezes. Abdominal: Soft. Bowel sounds are normal. There is no tenderness. Lymphadenopathy:     She has no cervical adenopathy. Neurological: She is alert and oriented to person, place, and time. Skin: She is not diaphoretic. Psychiatric: She has a normal mood and affect. Her behavior is normal.   Nursing note and vitals reviewed. ASSESSMENT and PLAN    ICD-10-CM ICD-9-CM    1.  Essential hypertension I10 401.9 Indapamide (LOZOL) 2.5 mg tablet sent to pharmacy. Indapamide 2.5 mg prescribed. Potential side effects were discussed. Pt should take 1 tab daily. Pt was advised to monitor her BP at home. 2. Hyperuricemia E79.0 790.6 Will be changing from HCTZ 25 mg to Indapamine 2.5 mg.   3. Controlled type 2 diabetes mellitus without complication, without long-term current use of insulin (HCC) E11.9 250.00 Continue Metformin same dose. 4. Myalgia M79.10 729.1 Changing from simvastatin 10 mg to Pravachol 20 mg. Pt should see an improvement in the thigh pain. If no improvement, pt should let me know. 5. Mixed hyperlipidemia E78.2 272.2 pravastatin (PRAVACHOL) 20 mg tablet sent to pharmacy. 6. Obesity (BMI 30-39. 9) E66.9 278.00 Encouraged diet and exercise. This plan was reviewed with the patient and patient agrees. All questions were answered. This scribe documentation was reviewed by me and accurately reflects the examination and decisions made by me. This note will not be viewable in [address]

## 2019-08-06 NOTE — PROGRESS NOTES
Visit Vitals  /78 (BP 1 Location: Right arm, BP Patient Position: Sitting)   Pulse 70   Temp 98.1 °F (36.7 °C) (Oral)   Resp 16   Ht 5' 4\" (1.626 m)   Wt 211 lb 3.2 oz (95.8 kg)   SpO2 98%   BMI 36.25 kg/m²           Chief Complaint   Patient presents with    Follow Up Chronic Condition     Diabetes, HTN, Previously a [place] of Dr. Emma Salazar; Last [address] was 11/20/2018-Dr. Gilda Bragg    Labs     Review Labs     Sleep Problem     Patient is having problems falling to sleep              HM Due WNL/Reviewed        ====Chang Duarte Invitation====    Patient was invited to [facility] on this date and given the information folder for review. Recommended appointment with Chang Duarte facilitator for ACP conversation regarding advance directives. [x] Yes  [] No  Referral sent to [facility] team member or Coordinator for follow-up    [] Yes  [x] No  Patient scheduled an appointment. [facility]            1. Have you been to the ER, urgent care clinic since your last visit? Hospitalized since your last visit? Denies     2. Have you seen or consulted any other health care providers outside of the [address] [place] [address] since your last visit? Include any pap smears or colon screening.  Podiatrist

## 2019-08-09 RX ORDER — POTASSIUM CHLORIDE 750 MG/1
CAPSULE, EXTENDED RELEASE ORAL
Qty: 270 CAP | Refills: 0 | Status: SHIPPED | OUTPATIENT
Start: 2019-08-09 | End: 2019-11-05 | Stop reason: SDUPTHER

## 2019-09-03 DIAGNOSIS — E11.9 CONTROLLED TYPE 2 DIABETES MELLITUS WITHOUT COMPLICATION, WITHOUT LONG-TERM CURRENT USE OF INSULIN (HCC): ICD-10-CM

## 2019-09-03 RX ORDER — METFORMIN HYDROCHLORIDE 1000 MG/1
TABLET ORAL
Qty: 180 TAB | Refills: 0 | Status: SHIPPED | OUTPATIENT
Start: 2019-09-03 | End: 2019-11-30 | Stop reason: SDUPTHER

## 2019-09-30 ENCOUNTER — OFFICE VISIT (OUTPATIENT)
Dept: ENDOCRINOLOGY | Age: 64
End: 2019-09-30

## 2019-09-30 VITALS
SYSTOLIC BLOOD PRESSURE: 120 MMHG | HEIGHT: 64 IN | BODY MASS INDEX: 36.84 KG/M2 | HEART RATE: 72 BPM | DIASTOLIC BLOOD PRESSURE: 53 MMHG | WEIGHT: 215.8 LBS

## 2019-09-30 DIAGNOSIS — E04.2 MULTINODULAR GOITER: Primary | ICD-10-CM

## 2019-09-30 DIAGNOSIS — E66.01 SEVERE OBESITY (HCC): ICD-10-CM

## 2019-09-30 NOTE — PROGRESS NOTES
Moise Cr is an 70 y.o. female with history of DM2, breast cancer, HTN who returns for f/u of a multinodular goiter. Dr Fadumo Bass saw her in initial consultation in February 2017 at which time she recommended FNA of the two dominant solid nodules: the 4.4cm one at the isthmus and the 2.6cm one in the right. This was done in March and pathology of both nodules returned benign.      The patient endorses dysphagia but notes she did not start noticing this until she found out she had nodules. She denies supine compression or changes to her voice. She has no family history of thyroid cancer. She has no history of thyroid dysfunction and TFTs have been normal in the past. TPO antibody was just slightly elevated. She denies racing heart, tremors, palpitations, heat or cold intolerance, or changes to her energy level. Weight is increasing, up about 12 lbs since Dec. Other PMH is notable for type 2 diabetes which is well-controlled (A1c 6.6%) on metformin and glipizide. She tries to limit the carbs in her diet, asks about how she can eliminate sugar. She returns today in follow-up of her multinodular goiter. She has absolutely no symptoms related to the goiter. There is no dysphagia and no shortness of breath. She has not noted any change in size and her friends have been not told her that the goiter appears to be increasing in size. She had a recent TSH of 1.5 and she has no symptoms of hyper or hypothyroidism. Examination  Blood pressure 120/53  Pulse 72 and regular  Weight 215 pounds  HEENT unremarkable. There was no lid lag, proptosis, or stare, examination of the thyroid reveals an easily palpable multinodular goiter with multiple nodules in the 2 to 4 cm range. Based on my examination, they are no different from the previous thyroid ultrasound obtained in March 2017. The nodules moves easily with swallowing and they are soft in texture.     Impression multinodular goiter with a benign FNA in 2017 and no evidence of change in size or consistency of the goiter. Plan: I have rescheduled a thyroid ultrasound to confirm the stability of the goiter. We will see her back in 1 to 2 years as needed. EXAM: US THYROID/PARATHYROID/SOFT TISS     INDICATION: Follow-up multinodular goiter. COMPARISON: 6/6/2018. TECHNIQUE: Real-time sonography of the thyroid gland was performed with a high  frequency linear transducer. Multiple static images were obtained. FINDINGS:  Multinodular enlargement of the thyroid gland is again noted. The representative  solid right thyroid nodule measures 2.7 x 2.0 x 2.7 cm, previously 2.6 x 2.1 x  2.5 cm. The representative mixed cystic and solid nodule in the left gland has  increased in size and now measures 3.9 x 2.5 x 3.2 cm. Large isthmus nodule is  stable. The right lobe measures 9.1 x 3.9 x 3.3 cm and the left lobe measures 10.2 x 4.5  x 3.6 cm. The isthmus measures 14 mm. Impression     IMPRESSION: Multinodular enlargement of the thyroid gland again demonstrated. There has been an increase in the size of the previously described complex  cystic lesion in the left gland. Other nodules are stable.

## 2019-10-07 ENCOUNTER — HOSPITAL ENCOUNTER (OUTPATIENT)
Dept: ULTRASOUND IMAGING | Age: 64
Discharge: HOME | End: 2019-10-07
Attending: INTERNAL MEDICINE
Payer: MEDICARE

## 2019-10-07 DIAGNOSIS — E04.2 MULTINODULAR GOITER: ICD-10-CM

## 2019-10-07 PROCEDURE — 76536 US EXAM OF HEAD AND NECK: CPT

## 2019-10-27 DIAGNOSIS — E78.2 MIXED HYPERLIPIDEMIA: ICD-10-CM

## 2019-10-27 RX ORDER — PRAVASTATIN SODIUM 20 MG/1
TABLET ORAL
Qty: 90 TAB | Refills: 0 | Status: SHIPPED | OUTPATIENT
Start: 2019-10-27 | End: 2020-01-23

## 2019-11-05 RX ORDER — POTASSIUM CHLORIDE 750 MG/1
CAPSULE, EXTENDED RELEASE ORAL
Qty: 270 CAP | Refills: 0 | Status: SHIPPED | OUTPATIENT
Start: 2019-11-05 | End: 2020-02-07

## 2019-11-18 DIAGNOSIS — E04.2 MULTINODULAR GOITER: ICD-10-CM

## 2019-11-18 DIAGNOSIS — I10 ESSENTIAL HYPERTENSION: ICD-10-CM

## 2019-11-18 DIAGNOSIS — E78.2 MIXED HYPERLIPIDEMIA: Primary | ICD-10-CM

## 2019-11-18 DIAGNOSIS — E11.9 CONTROLLED TYPE 2 DIABETES MELLITUS WITHOUT COMPLICATION, WITHOUT LONG-TERM CURRENT USE OF INSULIN (HCC): ICD-10-CM

## 2019-11-19 LAB
ALBUMIN SERPL-MCNC: 4.2 G/DL (ref 3.6–4.8)
ALBUMIN/GLOB SERPL: 1.6 {RATIO} (ref 1.2–2.2)
ALP SERPL-CCNC: 68 IU/L (ref 39–117)
ALT SERPL-CCNC: 16 IU/L (ref 0–32)
AST SERPL-CCNC: 12 IU/L (ref 0–40)
BILIRUB SERPL-MCNC: 0.5 MG/DL (ref 0–1.2)
BUN SERPL-MCNC: 16 MG/DL (ref 8–27)
BUN/CREAT SERPL: 19 (ref 12–28)
CALCIUM SERPL-MCNC: 10 MG/DL (ref 8.7–10.3)
CHLORIDE SERPL-SCNC: 102 MMOL/L (ref 96–106)
CHOLEST SERPL-MCNC: 168 MG/DL (ref 100–199)
CO2 SERPL-SCNC: 24 MMOL/L (ref 20–29)
CREAT SERPL-MCNC: 0.83 MG/DL (ref 0.57–1)
ERYTHROCYTE [DISTWIDTH] IN BLOOD BY AUTOMATED COUNT: 13.3 % (ref 12.3–15.4)
GLOBULIN SER CALC-MCNC: 2.6 G/DL (ref 1.5–4.5)
GLUCOSE SERPL-MCNC: 104 MG/DL (ref 65–99)
HCT VFR BLD AUTO: 32.4 % (ref 34–46.6)
HDLC SERPL-MCNC: 62 MG/DL
HGB BLD-MCNC: 10.6 G/DL (ref 11.1–15.9)
LDLC SERPL CALC-MCNC: 91 MG/DL (ref 0–99)
MCH RBC QN AUTO: 27.9 PG (ref 26.6–33)
MCHC RBC AUTO-ENTMCNC: 32.7 G/DL (ref 31.5–35.7)
MCV RBC AUTO: 85 FL (ref 79–97)
PLATELET # BLD AUTO: 219 X10E3/UL (ref 150–450)
POTASSIUM SERPL-SCNC: 4 MMOL/L (ref 3.5–5.2)
PROT SERPL-MCNC: 6.8 G/DL (ref 6–8.5)
RBC # BLD AUTO: 3.8 X10E6/UL (ref 3.77–5.28)
SODIUM SERPL-SCNC: 142 MMOL/L (ref 134–144)
TRIGL SERPL-MCNC: 75 MG/DL (ref 0–149)
TSH SERPL DL<=0.005 MIU/L-ACNC: 1.16 UIU/ML (ref 0.45–4.5)
VLDLC SERPL CALC-MCNC: 15 MG/DL (ref 5–40)
WBC # BLD AUTO: 4.6 X10E3/UL (ref 3.4–10.8)

## 2019-11-26 ENCOUNTER — OFFICE VISIT (OUTPATIENT)
Dept: PRIMARY CARE CLINIC | Age: 64
End: 2019-11-26

## 2019-11-26 VITALS
HEART RATE: 83 BPM | WEIGHT: 217.2 LBS | SYSTOLIC BLOOD PRESSURE: 128 MMHG | TEMPERATURE: 98.2 F | DIASTOLIC BLOOD PRESSURE: 66 MMHG | HEIGHT: 64 IN | RESPIRATION RATE: 16 BRPM | BODY MASS INDEX: 37.08 KG/M2 | OXYGEN SATURATION: 99 %

## 2019-11-26 DIAGNOSIS — Z23 NEED FOR DIPHTHERIA-TETANUS-PERTUSSIS (TDAP) VACCINE: ICD-10-CM

## 2019-11-26 DIAGNOSIS — E11.9 CONTROLLED TYPE 2 DIABETES MELLITUS WITHOUT COMPLICATION, WITHOUT LONG-TERM CURRENT USE OF INSULIN (HCC): ICD-10-CM

## 2019-11-26 DIAGNOSIS — E78.2 MIXED HYPERLIPIDEMIA: ICD-10-CM

## 2019-11-26 DIAGNOSIS — Z71.89 ACP (ADVANCE CARE PLANNING): ICD-10-CM

## 2019-11-26 DIAGNOSIS — E66.9 OBESITY (BMI 30-39.9): ICD-10-CM

## 2019-11-26 DIAGNOSIS — E04.2 MULTIPLE THYROID NODULES: ICD-10-CM

## 2019-11-26 DIAGNOSIS — Z00.00 MEDICARE ANNUAL WELLNESS VISIT, SUBSEQUENT: Primary | ICD-10-CM

## 2019-11-26 DIAGNOSIS — Z12.11 COLON CANCER SCREENING: ICD-10-CM

## 2019-11-26 DIAGNOSIS — I10 ESSENTIAL HYPERTENSION: ICD-10-CM

## 2019-11-26 PROBLEM — E66.01 SEVERE OBESITY (HCC): Status: RESOLVED | Noted: 2019-09-30 | Resolved: 2019-11-26

## 2019-11-26 LAB
ALBUMIN UR QL STRIP: 10 MG/L
CREATININE, URINE POC: 8.8 MG/DL
MICROALBUMIN/CREAT RATIO POC: <30 MG/G

## 2019-11-26 NOTE — PROGRESS NOTES
Jane Hardin is a 75 y.o. female and presents for Annual Medicare Wellness Visit. Assessment of cognitive impairment: Alert and oriented x 3. Depression Screen:   3 most recent PHQ Screens 11/26/2019   Little interest or pleasure in doing things Not at all   Feeling down, depressed, irritable, or hopeless Not at all   Total Score PHQ 2 0   Trouble falling or staying asleep, or sleeping too much Not at all   Feeling tired or having little energy Not at all   Poor appetite, weight loss, or overeating Not at all   Feeling bad about yourself - or that you are a failure or have let yourself or your family down Not at all   Trouble concentrating on things such as school, work, reading, or watching TV Not at all   Moving or speaking so slowly that other people could have noticed; or the opposite being so fidgety that others notice Not at all   Thoughts of being better off dead, or hurting yourself in some way Not at all   PHQ 9 Score 0       Fall Risk Assessment:    Fall Risk Assessment, last 12 mths 11/26/2019   Able to walk? Yes   Fall in past 12 months? No   Fall with injury? -   Number of falls in past 12 months -   Fall Risk Score -       Abuse Screen:   Abuse Screening Questionnaire 11/26/2019   Do you ever feel afraid of your partner? N   Are you in a relationship with someone who physically or mentally threatens you? N   Is it safe for you to go home? Y       Activities of Daily Living:  Self-care.    Requires assistance with: no ADLs  Patient handle his/her own medications  yes Use of pill box  yes  Activities of Daily Living:   ADL Assessment 11/26/2019   Feeding yourself No Help Needed   Getting from bed to chair No Help Needed   Getting dressed No Help Needed   Bathing or showering No Help Needed   Walk across the room (includes cane/walker) No Help Needed   Using the telphone No Help Needed   Taking your medications No Help Needed   Preparing meals No Help Needed   Managing money (expenses/bills) No Help Needed   Moderately strenuous housework (laundry) No Help Needed   Shopping for personal items (toiletries/medicines) No Help Needed   Shopping for groceries No Help Needed   Driving No Help Needed   Climbing a flight of stairs No Help Needed   Getting to places beyond walking distances No Help Needed       Health Maintenance:  Daily Aspirin: no  Bone Density: done by Dr. Becky Schroeder in 2014   Glaucoma Screening: Dr. Navya Albert, 09/2019, no glaucoma, small cataract, slight changes to eyeglasses RX, and 6 months f/u (04/2020)   Immunizations:    Tetanus: script sent to the pharmacy. .  Influenza: 10/14/2019. Shingles:7/26/2019, 5/14/2019  . PPSV 23 will give next year. [place] Prevnar-13: 4/5/2019  Cancer screening:    Cervical: done by OBGYN. Breast: 02/04/2019- ANNUAL, normal.  Colon: 06/13/2013- Due in 2023. Prostate:  N/A    Alcohol Risk Screen:   On any occasion during the past 3 months, have you had more than 3 drinks(female) or 4 drinks (male) containing alcohol in one? No  Do you average more than 7 drinks (female) or 14 drinks (male) per week? No  Type and amount:Patient denies drinking alcohol    Hearing Loss:  Hearing is good. denies any hearing loss    Vision Loss:   Wears glasses, contact lenses, or have any other visual impairment  yes    Adult Nutrition Screen:  No risk factors noted. Advance Care Planning:   End of Life Planning: has NO advanced directive - not interested in additional information, reviewed DNR/DNI and patient is not interested- Per patient she has information from previous visit and is still reviewing/considering. Offered [facility]  [place] ACP-Facilitator appointment no- patient declines and states she already has information to review       Medications/Allergies: Reviewed with patient  Prior to Admission medications    Medication Sig Start Date End Date Taking?  Authorizing Provider   potassium chloride SA (MICRO-K) 10 mEq capsule TAKE 3 CAPSULES BY MOUTH EVERY DAY 11/5/19  Yes Regis Kumar MD   pravastatin (PRAVACHOL) 20 mg tablet TAKE 1 TABLET BY MOUTH EVERY NIGHT 10/27/19  Yes Regis Kumar MD   metFORMIN (GLUCOPHAGE) 1,000 mg tablet TAKE 1 TABLET BY MOUTH TWICE DAILY 9/3/19  Yes Regis Kumar MD   cyanocobalamin 1,000 mcg tablet Take 1,000 mcg by mouth daily. Yes Provider, Historical   indapamide (LOZOL) 2.5 mg tablet TAKE 1 TABLET BY MOUTH DAILY 8/6/19  Yes Regis Kumar MD   lisinopril (PRINIVIL, ZESTRIL) 10 mg tablet TAKE 1 TABLET BY MOUTH EVERY DAY 5/24/19  Yes Connor Lawler MD   ACCU-CHEK SMARTVIEW TEST STRIP strip PLEASE USE TWICE DAILY AS NEEDED 9/20/18  Yes Connor Lawler MD   glucose blood VI test strips (ACCU-CHEK SMARTVIEW TEST STRIP) strip Please use two times daily as needed for Dx code 250.02 4/17/17  Yes Connor Lawler MD   diclofenac (VOLTAREN) 1 % gel Apply  to affected area four (4) times daily as needed. 10/31/16  Yes Connor Lawler MD   BETA-CAROTENE,A, W-C & E/ZN/CU (VISION-CHARANJIT PRESERVE PO) Take  by mouth two (2) times a day. Yes Provider, Historical   Cholecalciferol, Vitamin D3, (VITAMIN D3) 1,000 unit cap Take 5,000 Units by mouth daily. 4 tabs daily  Indications: vitamin D deficiency   Yes Provider, Historical       Allergies   Allergen Reactions    Pcn [Penicillins] Hives       Objective:  Visit Vitals  /65 (BP 1 Location: Right arm, BP Patient Position: Sitting)   Pulse 83   Temp 98.2 °F (36.8 °C) (Oral)   Resp 16   Ht 5' 4\" (1.626 m)   Wt 217 lb 3.2 oz (98.5 kg)   SpO2 99%   BMI 37.28 kg/m²    Body mass index is 37.28 kg/m². Problem List: Reviewed with patient and discussed risk factors. Patient Active Problem List   Diagnosis Code    HTN (hypertension) I10    Hyperuricemia E79.0    History of breast cancer Z85.3    Diabetes mellitus type 2, controlled ([address] E11.9    Multinodular goiter E04.2    Obesity (BMI 30-39. 9) E66.9    Mixed hyperlipidemia E78.2    Severe obesity ([facility] [address] E66.01       PSH: Reviewed with patient  Past Surgical History:   Procedure Laterality Date    BREAST SURGERY PROCEDURE UNLISTED      lumpectomy    HX BREAST LUMPECTOMY Left     2009    HX ORTHOPAEDIC      Bunion Surgery    HX TUBAL LIGATION          SH: Reviewed with patient  Social History     Tobacco Use    Smoking status: Never Smoker    Smokeless tobacco: Never Used   Substance Use Topics    Alcohol use: No    Drug use: Never       FH: Reviewed with patient  Family History   Problem Relation Age of Onset    Diabetes Father     Stroke Sister     Diabetes Brother     Heart Disease Mother        Current medical providers:    Patient Care Team:  Iglesia Moore MD as PCP - General (Internal Medicine)  Iglesia Moore MD as PCP - [facility] Empaneled Provider  Gayathri Sexton MD as Consulting Provider (Endocrinology)    Plan:    Diagnoses and all orders for this visit:    Medicare annual wellness visit, subsequent  Age appropriate immunization & Health screening discussed with her. ACP (advance care planning)  Advised to make an appointment with [facility] & complete the paper work. She has all the information.     Need for diphtheria-tetanus-pertussis (Tdap) vaccine  -     diph,Pertuss,Acell,,Tet Vac-PF (ADACEL) 2 Lf-(2.5-5-3-5 mcg)-5Lf/0.5 mL susp; 0.5 mL by IntraMUSCular route once for 1 dose., Normal, Disp-0.5 mL, R-0    Colon cancer screening  -     OCCULT BLOOD IMMUNOASSAY,DIAGNOSTIC      Health Maintenance   Topic Date Due    MICROALBUMIN Q1  11/06/2019    HEMOGLOBIN A1C Q6M  01/23/2020    FOOT EXAM Q1  03/05/2020    Pneumococcal 65+ years (2 of 2 - PPSV23) 04/05/2020    LIPID PANEL Q1  11/18/2020    MEDICARE YEARLY EXAM  11/26/2020    BREAST CANCER SCRN MAMMOGRAM  02/04/2021    GLAUCOMA SCREENING Q2Y  10/23/2021    EYE EXAM RETINAL OR DILATED  10/23/2021    COLONOSCOPY  06/13/2023    DTaP/Tdap/Td series (2 - Td) 05/04/2026    Hepatitis C Screening  Completed    Bone Densitometry (Dexa) Screening  Completed    Shingrix Vaccine Age 49>  Completed    Influenza Age 5 to Adult  Completed          Urinary/ Fecal Incontinence:  Denies either     Regular physical exercise: Patient states she walks at the mall, light housework and yard work     Patient verbalized understanding of information presented. AVS and Medicare Part B Preventive Services Table printed and given to pt and reviewed. See table for findings under Recommendation and Scheduled. All of the patient's questions were answered. Progress Note    Name: Ant Kapadia Date: 2019  Ethnicity: NON-  Race: [address].  MRN: [id number]  Age: 75 y.o.  : 1955  Sex: Female       HPI:   Signa Blizzard. Emi Salas is a 75y.o. year old female who presents today for  Follow up & discuss lab results. HbA1C was checked  In the office , it came back 6.3% improved from 6.6%. She is trying to cut down on carbs but admits it`s hard during the Holidays. Her Labs showed Hg stable at 10.6. Lipid panel came back normal. Her joint pain has improved since we switched her to Pravachol from zocor. LFT `s normal. Fasting glucose 104. Her blood pressure readings are running in normal range. Tolerating Lozol very well. No side effects from Metformin. She was seen by Dr. Jarrod Simms for thyroid nodules and he did not recommend biopsy. Follow up with him will be in 1 year.     Visit Vitals  /66 (BP 1 Location: Right arm, BP Patient Position: Sitting)   Pulse 83   Temp 98.2 °F (36.8 °C) (Oral)   Resp 16   Ht 5' 4\" (1.626 m)   Wt 217 lb 3.2 oz (98.5 kg)   SpO2 99%   BMI 37.28 kg/m²       Review of Systems   Constitutional: negative for fevers and night  sweats  Eyes: negative for visual disturbance and redness  Ears, nose, mouth, throat, and face: negative for nasal congestion, sore mouth and hoarseness  Respiratory: negative for hemoptysis, pleurisy/chest pain or wheezing  Cardiovascular: negative for chest pain, irregular heart beats  Gastrointestinal: negative for change in bowel habits, melena and diarrhea  Musculoskeletal:   negative for stiff joints and neck pain  Neurological: negative for dizziness and headaches  Behavioral/Psych: negative for aggressive behavior and behavior problems    Physical Examination     General:  Alert, cooperative, no distress, appears stated age. Head:  Normocephalic, without obvious abnormality, atraumatic. Eyes:  Conjunctivae/corneas clear. PERRL, EOMs intact. Ears:  Normal TMs and external ear canals both ears. Nose: Nares normal. Septum midline. Mucosa normal. No drainage or sinus tenderness. Throat: Lips, mucosa, and tongue normal. Teeth and gums normal.   Neck: Supple, no adenopathy, thyroid: no enlargement/tenderness/nodules, no carotid bruit and no JVD. Back:   Symmetric, no curvature. ROM normal. No CVA tenderness. Lungs:   Clear to auscultation bilaterally. Heart:  Regular rate and rhythm, S1, S2 normal, no murmur       Abdomen:   Soft, non-tender. Bowel sounds normal. No masses,  No organomegaly. Extremities: Extremities normal, atraumatic, no cyanosis or edema. Pulses: 2+ and symmetric all extremities. Skin: Skin color, texture, turgor normal. No rashes or lesions. Lymph nodes: Cervical, supraclavicular, and axillary nodes normal.   Neurologic: CNII-XII intact. Normal strength, sensation and reflexes throughout. Assessment/Plan   Diagnoses and all orders for this visit:      Essential hypertension  -     CMP normal. Continue Lisinopril and Lozol. Controlled type 2 diabetes mellitus without complication, without long-term current use of insulin (HCC)  Continue same regimen as HbA1C is improving.  -     AMB POC URINE, MICROALBUMIN, SEMIQUANT (3 RESULTS)  -     HEMOGLOBIN A1C WITH EAG    Mixed hyperlipidemia  Doing well on Pravachol. Lipid profile nl. Multiple thyroid nodules  Followed by Endocrinologist.    Obesity (BMI 30-39. 9)  Advised daily walk & trying weight watcher.       Kiera Guardado MD  11/26/2019  7:55 PM

## 2019-11-30 DIAGNOSIS — E11.9 CONTROLLED TYPE 2 DIABETES MELLITUS WITHOUT COMPLICATION, WITHOUT LONG-TERM CURRENT USE OF INSULIN (HCC): ICD-10-CM

## 2019-12-02 RX ORDER — METFORMIN HYDROCHLORIDE 1000 MG/1
TABLET ORAL
Qty: 180 TAB | Refills: 0 | Status: SHIPPED | OUTPATIENT
Start: 2019-12-02 | End: 2020-02-27

## 2019-12-07 LAB — HEMOCCULT STL QL IA: NEGATIVE

## 2019-12-22 DIAGNOSIS — I10 ESSENTIAL HYPERTENSION: ICD-10-CM

## 2019-12-23 RX ORDER — LISINOPRIL 10 MG/1
TABLET ORAL
Qty: 90 TAB | Refills: 1 | Status: SHIPPED | OUTPATIENT
Start: 2019-12-23 | End: 2020-06-11

## 2020-01-23 DIAGNOSIS — E78.2 MIXED HYPERLIPIDEMIA: ICD-10-CM

## 2020-01-23 RX ORDER — PRAVASTATIN SODIUM 20 MG/1
TABLET ORAL
Qty: 90 TAB | Refills: 0 | Status: SHIPPED | OUTPATIENT
Start: 2020-01-23 | End: 2020-04-20

## 2020-02-05 ENCOUNTER — HOSPITAL ENCOUNTER (OUTPATIENT)
Dept: MAMMOGRAPHY | Age: 65
Discharge: HOME | End: 2020-02-05
Attending: INTERNAL MEDICINE
Payer: MEDICARE

## 2020-02-05 DIAGNOSIS — Z12.31 VISIT FOR SCREENING MAMMOGRAM: ICD-10-CM

## 2020-02-05 PROCEDURE — 77067 SCR MAMMO BI INCL CAD: CPT

## 2020-02-07 RX ORDER — POTASSIUM CHLORIDE 750 MG/1
CAPSULE, EXTENDED RELEASE ORAL
Qty: 270 CAP | Refills: 0 | Status: SHIPPED | OUTPATIENT
Start: 2020-02-07 | End: 2020-05-05

## 2020-02-10 ENCOUNTER — HOSPITAL ENCOUNTER (OUTPATIENT)
Dept: MAMMOGRAPHY | Age: 65
Discharge: HOME | End: 2020-02-10
Attending: INTERNAL MEDICINE
Payer: MEDICARE

## 2020-02-10 ENCOUNTER — TELEPHONE (OUTPATIENT)
Dept: PRIMARY CARE CLINIC | Age: 65
End: 2020-02-10

## 2020-02-10 DIAGNOSIS — R92.8 ABNORMAL MAMMOGRAM: ICD-10-CM

## 2020-02-10 PROCEDURE — 77065 DX MAMMO INCL CAD UNI: CPT

## 2020-02-10 NOTE — TELEPHONE ENCOUNTER
Nurse Leroy Vargas from [facility] called and informed that the pt will have a spiral Breast Biopsy on feb 18th and she is faxing orders for that and requested Dr Amanda Vidal to sign and fax back to [phone]. Call back number [phone].

## 2020-02-11 NOTE — PROGRESS NOTES
Nikki Huntley you are doing well. I got your mammogram report & left a message on your voicemail. Let me know if you need anything from me.

## 2020-02-12 ENCOUNTER — TELEPHONE (OUTPATIENT)
Dept: MAMMOGRAPHY | Age: 65
End: 2020-02-12

## 2020-02-12 NOTE — TELEPHONE ENCOUNTER
Received form, just needs to know who dr Belle Sayres uses for breast surgeon follow up if results come back positive

## 2020-02-12 NOTE — TELEPHONE ENCOUNTER
Called and LVM for Kayce Sanford at [facility], advised we have not received forms and wanted to follow-up. Request for a return phone call. End of encounter.

## 2020-02-12 NOTE — TELEPHONE ENCOUNTER
2/12/2020 @ [phone] - called coordination of care and spoke with Anthony Benitez to place pt. On the schedule for her procedure (Stereotactic left breast biopsy with post clip insertion) on February 18, 2020 @ 1100 here at [id number] [address] [facility] Stereotactic room. Anthony Benitez states she is on the schedule. Informed if authorization is needed, coordination of care to obtain.

## 2020-02-13 NOTE — TELEPHONE ENCOUNTER
Called and left a second VM for Johnella Opitz, advising that the paper work has not been received and Dr. Lisa Meyer prefers Dr. Neena Lisa. Asked that she please call back if anything additional is needed. End of encounter.

## 2020-02-18 ENCOUNTER — HOSPITAL ENCOUNTER (OUTPATIENT)
Dept: MAMMOGRAPHY | Age: 65
Discharge: HOME | End: 2020-02-18
Attending: INTERNAL MEDICINE
Payer: MEDICARE

## 2020-02-18 VITALS — RESPIRATION RATE: 20 BRPM | WEIGHT: 206 LBS | HEIGHT: 60 IN | BODY MASS INDEX: 40.44 KG/M2

## 2020-02-18 DIAGNOSIS — R92.8 ABNORMAL MAMMOGRAM OF LEFT BREAST: ICD-10-CM

## 2020-02-18 DIAGNOSIS — R92.0 MAMMOGRAPHIC MICROCALCIFICATION: ICD-10-CM

## 2020-02-18 PROCEDURE — 88360 TUMOR IMMUNOHISTOCHEM/MANUAL: CPT

## 2020-02-18 PROCEDURE — 77065 DX MAMMO INCL CAD UNI: CPT

## 2020-02-18 PROCEDURE — A4648 IMPLANTABLE TISSUE MARKER: HCPCS

## 2020-02-18 PROCEDURE — 88305 TISSUE EXAM BY PATHOLOGIST: CPT

## 2020-02-18 PROCEDURE — 77030013689 HC GD NDL EVIVA HOLO -A

## 2020-02-18 PROCEDURE — 74011000250 HC RX REV CODE- 250: Performed by: RADIOLOGY

## 2020-02-18 PROCEDURE — 77030030538 HC HNDPC BIOP EVIVA HOLO -C

## 2020-02-18 PROCEDURE — 19081 BX BREAST 1ST LESION STRTCTC: CPT

## 2020-02-18 RX ORDER — LIDOCAINE HYDROCHLORIDE 10 MG/ML
12 INJECTION, SOLUTION EPIDURAL; INFILTRATION; INTRACAUDAL; PERINEURAL
Status: COMPLETED | OUTPATIENT
Start: 2020-02-18 | End: 2020-02-18

## 2020-02-18 RX ORDER — LIDOCAINE HYDROCHLORIDE AND EPINEPHRINE 10; 10 MG/ML; UG/ML
8 INJECTION, SOLUTION INFILTRATION; PERINEURAL ONCE
Status: COMPLETED | OUTPATIENT
Start: 2020-02-18 | End: 2020-02-18

## 2020-02-18 RX ORDER — SODIUM BICARBONATE 84 MG/ML
2.5 INJECTION, SOLUTION INTRAVENOUS
Status: COMPLETED | OUTPATIENT
Start: 2020-02-18 | End: 2020-02-18

## 2020-02-18 RX ADMIN — LIDOCAINE HYDROCHLORIDE,EPINEPHRINE BITARTRATE 8 ML: 10; .01 INJECTION, SOLUTION INFILTRATION; PERINEURAL at 11:00

## 2020-02-18 RX ADMIN — LIDOCAINE HYDROCHLORIDE 12 ML: 10 INJECTION, SOLUTION EPIDURAL; INFILTRATION; INTRACAUDAL; PERINEURAL at 11:00

## 2020-02-18 RX ADMIN — SODIUM BICARBONATE 2.5 MEQ: 84 INJECTION, SOLUTION INTRAVENOUS at 11:00

## 2020-02-18 NOTE — ROUTINE PROCESS
1115 - Pt ambulated into department w/upright steady gait, w/o assistance, A/O x4     Present for pre procedure consult and consent - questions reviewed with understanding - consent signed. Discharge instructions reviewed, will review again at discharge. 1220 - Discharge instructions reviewed with understanding, questions reviewed, copy given to patient. Post procedure Mammo completed and reviewed MD, pt cleared for discharge. Biopsy site clean and dry, hemostasis achieved. Steri strips with DSD placed. Ice pack held by patient. Pt verbalized she would like to be notified by phone of any results. Pt encouraged to call department if she has not received her results in 3-5 business days. Pt advised not answer cell phone call while driving. Specimen verified w/Zoya RT prior to being carried to gross room by [facility] RN.

## 2020-02-18 NOTE — DISCHARGE INSTRUCTIONS
[address]  [phone]      Breast Biopsy Discharge Instructions          1. After the biopsy, we will place a clean covered ice pack over the biopsy site, within the bra - you should leave the ice pack on 30 minutes and then remove the ice pack for 1-2 hours until bedtime. If needed you can continue applying ice the following day. It is a good idea to wear your bra for support, both day and night unless this causes you discomfort. 2. You may take Tylenol (two tablets) every 4 to 6 hours as needed for pain. Do not take aspirin or aspirin products (e.g. ibuprofen, Advil, Motrin) as these may cause more bleeding. 3. You may expect some bruising and skin discoloration in the biopsy area. This is normal and generally should resolve in 5 to 7 days. 4. Most women do not find it necessary to restrict their activities after the procedure. You should rest as needed on the day of your biopsy. The next day, if you are feeling okay, you may resume your regular work/activity schedule. Avoid strenuous activity and heavy lifting, jogging, aerobics, or vacuuming for 48 hours after the procedure. 5. 48 hours after your biopsy, remove the large outer dressing and leave the steri-strips (tiny pieces of tape) in place. The steri-strips will usually fall off in a few days. You may shower 48 hours after your biopsy and you may get the steri-strips wet. If still present after 4 days, you may gently peel the strips off. Keep the area clean and dry and shower daily. 6. If you have bleeding from the incision area, hold firm pressure on the area for 20 minutes. This should control any slight oozing that might occur.   If you develop persistent bleeding or pain which does not respond to the above measures or if you develop a fever, excessive swelling, redness, heat or drainage, please call the Breast Health Navigator at [phone] during normal business hours (7 a.m. - 5 p.m.). After business hours, call [phone] and ask for the on-call Radiology Nurse to be paged, or your referring physician. 7. You will receive your biopsy results (pathology report) in 3-5 business days - the radiologist will review your results and you will receive a call from the radiology department and/or from your doctor.

## 2020-02-19 NOTE — PROGRESS NOTES
Dr. Harry Chaney reviewed pathology results and called results to patient. Informed pt. That we would be calling in the morning with an appt. With Dr. Perez Sender per Dr. Gómez Strong request.  Pt informed she is not available on 2/25, 3/4 or 3/10 - any other day is fine and pt. Prefers early morning.

## 2020-02-20 ENCOUNTER — TELEPHONE (OUTPATIENT)
Dept: MAMMOGRAPHY | Age: 65
End: 2020-02-20

## 2020-02-20 NOTE — PROGRESS NOTES
Final pathology report routed to Dr. Colleen Paris and [address] [place] [address] for Dr. Juany Jain.

## 2020-02-20 NOTE — TELEPHONE ENCOUNTER
1429 - returned call to pt. And informed pt. Of her appt. For 2/2/8/2020 at 1:00 p.m. (arrival time of 1230 p.m.) with Dr. Rosaline Kinney at [address] [place] [address] - pt. Agreed to appt. Date/time/place.

## 2020-02-27 DIAGNOSIS — E11.9 CONTROLLED TYPE 2 DIABETES MELLITUS WITHOUT COMPLICATION, WITHOUT LONG-TERM CURRENT USE OF INSULIN (HCC): ICD-10-CM

## 2020-02-27 RX ORDER — METFORMIN HYDROCHLORIDE 1000 MG/1
TABLET ORAL
Qty: 180 TAB | Refills: 0 | Status: SHIPPED | OUTPATIENT
Start: 2020-02-27 | End: 2020-06-01

## 2020-02-28 ENCOUNTER — OFFICE VISIT (OUTPATIENT)
Dept: SURGERY | Age: 65
End: 2020-02-28

## 2020-02-28 VITALS
BODY MASS INDEX: 36.15 KG/M2 | HEIGHT: 65 IN | SYSTOLIC BLOOD PRESSURE: 134 MMHG | DIASTOLIC BLOOD PRESSURE: 48 MMHG | WEIGHT: 217 LBS | HEART RATE: 68 BPM

## 2020-02-28 DIAGNOSIS — Z85.3 HISTORY OF LEFT BREAST CANCER: ICD-10-CM

## 2020-02-28 DIAGNOSIS — D05.12 DUCTAL CARCINOMA IN SITU (DCIS) OF LEFT BREAST WITH COMEDONECROSIS: Primary | ICD-10-CM

## 2020-02-28 PROBLEM — E66.01 OBESITY, MORBID (HCC): Status: ACTIVE | Noted: 2020-02-28

## 2020-02-28 RX ORDER — ALPRAZOLAM 0.5 MG/1
0.5 TABLET ORAL
Qty: 12 TAB | Refills: 0 | Status: SHIPPED | OUTPATIENT
Start: 2020-02-28 | End: 2020-03-10 | Stop reason: ALTCHOICE

## 2020-02-28 NOTE — LETTER
2/28/2020 2:28 PM 
 
Patient:  Victorino Mitchell YOB: 1955 Date of Visit: 2/28/2020 Dear Dr. Renata Paz: Thank you for referring Ms. Bright Hernández to me for evaluation/treatment. Below are the relevant portions of my assessment and plan of care. HISTORY OF PRESENT ILLNESS Victorino Mitchell is a 75 y.o. female. HPI 
NEW patient presents for consultation at the request of Dr. Yesika Prasad for new diagnosis of LEFT breast DCIS. This was diagnosed after her screening mammogram showed calcifications. She is not feeling any breast lumps, has no nipple discharge/retraction or skin change. Says that she has had some pain in the upper breast since the biopsy. 02/18/20: LEFT breast stereo bx. PATH: DCIS, solid and comedo pattern with comedonecrosis and intraluminal coarse calcification, nuclear grade 2, ER+(99%), CO not performed. Clinical stage 0. PMH - LEFT breast cancer (probably DCIS) in 2009. Had surgery by Dr. Nieves Dance and whole breast radiation by Dr. Denver Morejon at [address] [place] [address]. FH - A paternal aunt had breast cancer and possible ovarian cancer. A paternal first cousin was just diagnosed with breast cancer in her mid-61s. Her son has skin cancer. BILATERAL screening mammogram 2/5/2020, BIRADS 0, incomplete. [facility] Results (most recent): 
    
Results from Hospital Encounter encounter on 02/18/20 [facility] POST BX IMAGING LT INCL CAD  
  Addendum Addendum: ADDENDUM TO stereotactic left breast biopsy performed 2/18/2020. PATHOLOGY RESULTS: Ductal carcinoma in situ. RESULTS CONVEYED: Results called to the patient by Dr. Matias Laureano 2/19/2020. ASSESSMENT: These results are concordant with the imaging findings. RECOMMENDATIONS: Imaging Department nurse will facilitate a surgical consultation appointment with Dr. Driss Barton, as requested. Girma Miller MD 2/19/2020  6:19 PM        
  Narrative STUDY: STEREOTACTIC LEFT BREAST BIOPSY 
  
INDICATION:  Calcifications.   PROCEDURE: The risks and benefits of the procedure were explained to the patient, questions 
were answered, and informed consent was obtained. 
  
Calcifications in the 6:00 location of the left breast were localized using 
stereotactic technique. The skin was prepped, and local anesthetic was applied. Multiple biopsy cores were obtained using 9 gauge vacuum-assisted Eviva device 
and an inferior approach. Magnification radiography confirmed the presence of 
calcifications in the specimens. A metallic clip was deployed at the biopsy 
site. There were no immediate complications. Post-biopsy digital mammogram 
confirms the presence of the metallic clip at the intended biopsy site. Specimens were forwarded to Pathology for histologic evaluation. 
   
  Impression IMPRESSION: Satisfactory left breast stereotactic biopsy of calcifications. Further recommendations will be based on final pathology results. 
  
   
  
 
Past Medical History:  
Diagnosis Date  Breast cancer ([facility] [address] lumpectomy with radiation, 2009, left  Breast cancer, left ([facility] [address] 2020 DCIS   
 Diabetes ([facility] [address]  Hyperlipidemia  Hypertension  Radiation therapy complication 2009 Past Surgical History:  
Procedure Laterality Date  BREAST SURGERY PROCEDURE UNLISTED    
 lumpectomy  HX BREAST LUMPECTOMY Left   
 2009  HX ORTHOPAEDIC [address] Surgery  HX TUBAL LIGATION Social History Socioeconomic History  Marital status:  Spouse name: Not on file  Number of children: Not on file  Years of education: Not on file  Highest education level: Not on file Occupational History  Not on file Social Needs  Financial resource strain: Not on file  Food insecurity:  
  Worry: Not on file Inability: Not on file  Transportation needs:  
  Medical: Not on file Non-medical: Not on file Tobacco Use  Smoking status: Never Smoker  Smokeless tobacco: Never Used Substance and Sexual Activity  Alcohol use: No  
 Drug use: Never  Sexual activity: Never Lifestyle  Physical activity:  
  Days per week: Not on file Minutes per session: Not on file  Stress: Not on file Relationships  Social connections:  
  Talks on phone: Not on file Gets together: Not on file Attends Christianity service: Not on file Active member of club or organization: Not on file Attends meetings of clubs or organizations: Not on file Relationship status: Not on file  Intimate partner violence:  
  Fear of current or ex partner: Not on file Emotionally abused: Not on file Physically abused: Not on file Forced sexual activity: Not on file Other Topics Concern  Not on file Social History Narrative  Not on file Current Outpatient Medications on File Prior to Visit Medication Sig Dispense Refill  metFORMIN (GLUCOPHAGE) 1,000 mg tablet TAKE 1 TABLET BY MOUTH TWICE DAILY 180 Tab 0  
 potassium chloride SA (MICRO-K) 10 mEq capsule TAKE 3 CAPSULES BY MOUTH EVERY  Cap 0  
 pravastatin (PRAVACHOL) 20 mg tablet TAKE 1 TABLET BY MOUTH EVERY NIGHT 90 Tab 0  
 lisinopril (PRINIVIL, ZESTRIL) 10 mg tablet TAKE 1 TABLET BY MOUTH EVERY DAY 90 Tab 1  cyanocobalamin 1,000 mcg tablet Take 1,000 mcg by mouth daily.  indapamide (LOZOL) 2.5 mg tablet TAKE 1 TABLET BY MOUTH DAILY 90 Tab 2  ACCU-CHEK SMARTVIEW TEST STRIP strip PLEASE USE TWICE DAILY AS NEEDED 100 Strip 11  
 glucose blood VI test strips (ACCU-CHEK SMARTVIEW TEST STRIP) strip Please use two times daily as needed for Dx code 250.02 100 Strip 11  
 diclofenac (VOLTAREN) 1 % gel Apply  to affected area four (4) times daily as needed. 100 g 0  
 BETA-CAROTENE,A, W-C & E/ZN/CU (VISION-CHARANJIT PRESERVE PO) Take  by mouth two (2) times a day.     
 Cholecalciferol, Vitamin D3, (VITAMIN D3) 1,000 unit cap Take 5,000 Units by mouth daily. 4 tabs daily  Indications: vitamin D deficiency No current facility-administered medications on file prior to visit. Allergies Allergen Reactions  Pcn [Penicillins] Hives OB History No obstetric history on file. Obstetric Comments Menarche 13, LMP 2004, # of children 1, age of 4st delivery 23, Hysterectomy/oophorectomy No/No, Breast bx Yes, history of breast feeding No, BCP No, Hormone therapy No 
  
  
  
 
 
ROS Constitutional: Negative. HENT: Negative. Eyes: Negative. Respiratory: Negative. Cardiovascular: Negative. Gastrointestinal: Negative. Genitourinary: Negative. Musculoskeletal: Negative. Skin: Negative. Neurological: Negative. Endo/Heme/Allergies: Negative. Psychiatric/Behavioral: Negative. Physical Exam 
Exam conducted with a chaperone present. Cardiovascular:  
   Rate and Rhythm: Normal rate and regular rhythm. Heart sounds: Normal heart sounds. Pulmonary:  
   Breath sounds: Normal breath sounds. Chest:  
   Breasts: Breasts are symmetrical.  
      Right: Normal. No swelling, bleeding, inverted nipple, mass, nipple discharge, skin change or tenderness. Left: Normal. No swelling, bleeding, inverted nipple, mass, nipple discharge, skin change or tenderness. Lymphadenopathy:  
   Cervical:  
   Right cervical: No superficial, deep or posterior cervical adenopathy. Left cervical: No superficial, deep or posterior cervical adenopathy. Upper Body:  
   Right upper body: No supraclavicular or axillary adenopathy. Left upper body: No supraclavicular or axillary adenopathy. BREAST ULTRASOUND, Pre-op Planning Indication: Pre-op planning, LEFT breast 
Technique: The area was scanned using a high-frequency linear-array near-field transducer Findings: Bx clip at 6:00, just above the inframammary fold Impression: Breast cancer Disposition: Surgery ASSESSMENT and PLAN 
  ICD-10-CM ICD-9-CM 1. 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  
   ALPRAZolam (XANAX) 0.5 mg tablet 2. History of left breast cancer Z85.3 V10.3 Pt presents for for consultation for treatment of LEFT breast DCIS, ER+, clinical stage 0. No palpable findings on exam. US visualizes bx clip at LEFT breast 6:00, just above the inframammary fold. We had a long discussion of options for treatment. A total of 60 minutes were spent face-to-face with the patient, accompanied by a friend, during this encounter, and over half of that time was spent on counseling and coordination of care. We discussed in depth the pathology results, and the need for surgery following MRI for extent of disease and surgical planning. The goals of treatment are to treat the breast, ensure that there is no invasive component, and to reduce risk of recurrence. Discussed treatment options with risks, complications, benefits, and limitations, including lumpectomy and mastectomy. Pt notes preference for breast conservation. Since pt's previous treatment was long ago, we discussed repeating the same treatment for this new DCIS, with lumpectomy and possible partial breast XRT. Reviewed the importance of negative margins, and that if margins are positive for cancer cells, we will re-excise in a second surgery. Pt would not need a SLNBx for DCIS. However, if invasive cancer is found, will perform SLNBx in a second operation. We also covered risk reduction strategies as well as post-surgical therapies including hormonal therapy with estrogen blocker, and possible partial breast XRT. Discussed the use of DCISionRT to help determine need for XRT based on risk of recurrence. Discussed estrogen blocking pill for further risk reduction for ER+ disease. Will refer to radiation oncology and medical oncology for further consultation following surgery. Pt qualifies for genetic testing given personal and family history. Sample collected by RN in office today, will f/u with the results. Will order MRI (prescription given for Xanax) and plan further from there - radiology will call pt to schedule. This plan was reviewed with the patient and patient agrees. All questions were answered. Written by Ronak Bernal, as dictated by Dr. Lauryn Garduno MD. 
 
If you have questions, please do not hesitate to call me. I look forward to following Ms. Martha Diaz along with you.  
 
 
 
Sincerely, 
 
 
Blanche Hammer., MD

## 2020-02-28 NOTE — PROGRESS NOTES
HISTORY OF PRESENT ILLNESS Candice Barrera is a 75 y.o. female. HPI    NEW patient presents for consultation at the request of Dr. Rena Herzog for new diagnosis of LEFT breast DCIS. This was diagnosed after her screening mammogram showed calcifications. She is not feeling any breast lumps, has no nipple discharge/retraction or skin change. Says that she has had some pain in the breast since the biopsy. PMH - LEFT breast cancer (probably DCIS) in 2009. Had surgery by Dr. Shell Magana and whole breast radiation by Dr. Brodie Mcneill at [facility]. FH - A paternal aunt had breast cancer and possible ovarian cancer. A paternal first cousin was just diagnosed with breast cancer in her mid-61s. BILATERAL screening mammogram 2/5/2020, BIRADS 0, incomplete. [facility] Results (most recent): 
Results from Hospital Encounter encounter on 02/18/20 [facility] POST BX IMAGING LT INCL CAD Addendum Addendum: ADDENDUM TO stereotactic left breast biopsy performed 2/18/2020. PATHOLOGY RESULTS: Ductal carcinoma in situ. RESULTS CONVEYED: Results called to the patient by Dr. Yousuf Siegel 2/19/2020. ASSESSMENT: These results are concordant with the imaging findings. RECOMMENDATIONS: Imaging Department nurse will facilitate a surgical consultation appointment with Dr. Juany Jain, as requested. Aristides Thornton MD 2/19/2020  6:19 PM        
 Narrative STUDY: STEREOTACTIC LEFT BREAST BIOPSY INDICATION:  Calcifications. PROCEDURE: The risks and benefits of the procedure were explained to the patient, questions 
were answered, and informed consent was obtained. Calcifications in the 6:00 location of the left breast were localized using 
stereotactic technique. The skin was prepped, and local anesthetic was applied. Multiple biopsy cores were obtained using 9 gauge vacuum-assisted Eviva device 
and an inferior approach.  Magnification radiography confirmed the presence of 
 calcifications in the specimens. A metallic clip was deployed at the biopsy 
site. There were no immediate complications. Post-biopsy digital mammogram 
confirms the presence of the metallic clip at the intended biopsy site. Specimens were forwarded to Pathology for histologic evaluation. Impression IMPRESSION: Satisfactory left breast stereotactic biopsy of calcifications. Further recommendations will be based on final pathology results. Review of Systems Constitutional: Negative. HENT: Negative. Eyes: Negative. Respiratory: Negative. Cardiovascular: Negative. Gastrointestinal: Negative. Genitourinary: Negative. Musculoskeletal: Negative. Skin: Negative. Neurological: Negative. Endo/Heme/Allergies: Negative. Psychiatric/Behavioral: Negative. Physical Exam 
 
ASSESSMENT and PLAN 
{ASSESSMENT/PLAN:[id number]}

## 2020-02-28 NOTE — COMMUNICATION BODY
HISTORY OF PRESENT ILLNESS Naresh Miller is a 75 y.o. female. HPI 
NEW patient presents for consultation at the request of Dr. Serena Gayle for new diagnosis of LEFT breast DCIS. This was diagnosed after her screening mammogram showed calcifications. She is not feeling any breast lumps, has no nipple discharge/retraction or skin change. Says that she has had some pain in the upper breast since the biopsy. 02/18/20: LEFT breast stereo bx. PATH: DCIS, solid and comedo pattern with comedonecrosis and intraluminal coarse calcification, nuclear grade 2, ER+(99%), DE not performed. Clinical stage 0. PMH - LEFT breast cancer (probably DCIS) in 2009. Had surgery by Dr. Tete Wilcox and whole breast radiation by Dr. Madison Mohs at [facility]. FH - A paternal aunt had breast cancer and possible ovarian cancer. A paternal first cousin was just diagnosed with breast cancer in her mid-61s. Her son has skin cancer. BILATERAL screening mammogram 2/5/2020, BIRADS 0, incomplete. [facility] Results (most recent): 
    
Results from Hospital Encounter encounter on 02/18/20 [facility] POST BX IMAGING LT INCL CAD  
  Addendum Addendum: ADDENDUM TO stereotactic left breast biopsy performed 2/18/2020. PATHOLOGY RESULTS: Ductal carcinoma in situ. RESULTS CONVEYED: Results called to the patient by Dr. Tami Stewart 2/19/2020. ASSESSMENT: These results are concordant with the imaging findings. RECOMMENDATIONS: Imaging Department nurse will facilitate a surgical consultation appointment with Dr. Michelle Kaiser, as requested. Steve Varela MD 2/19/2020  6:19 PM        
  Narrative STUDY: STEREOTACTIC LEFT BREAST BIOPSY 
  
INDICATION:  Calcifications. 
  
PROCEDURE: The risks and benefits of the procedure were explained to the patient, questions 
were answered, and informed consent was obtained. 
  
Calcifications in the 6:00 location of the left breast were localized using stereotactic technique. The skin was prepped, and local anesthetic was applied. Multiple biopsy cores were obtained using 9 gauge vacuum-assisted Eviva device 
and an inferior approach. Magnification radiography confirmed the presence of 
calcifications in the specimens. A metallic clip was deployed at the biopsy 
site. There were no immediate complications. Post-biopsy digital mammogram 
confirms the presence of the metallic clip at the intended biopsy site. Specimens were forwarded to Pathology for histologic evaluation. 
   
  Impression IMPRESSION: Satisfactory left breast stereotactic biopsy of calcifications. Further recommendations will be based on final pathology results. 
  
   
  
 
Past Medical History:  
Diagnosis Date  Breast cancer ([facility] [address] lumpectomy with radiation, 2009, left  Breast cancer, left ([facility] [address] 2020 DCIS   
 Diabetes ([facility] [address]  Hyperlipidemia  Hypertension  Radiation therapy complication 2009 Past Surgical History:  
Procedure Laterality Date  BREAST SURGERY PROCEDURE UNLISTED    
 lumpectomy  HX BREAST LUMPECTOMY Left   
 2009  HX ORTHOPAEDIC [address] Surgery  HX TUBAL LIGATION Social History Socioeconomic History  Marital status:  Spouse name: Not on file  Number of children: Not on file  Years of education: Not on file  Highest education level: Not on file Occupational History  Not on file Social Needs  Financial resource strain: Not on file  Food insecurity:  
  Worry: Not on file Inability: Not on file  Transportation needs:  
  Medical: Not on file Non-medical: Not on file Tobacco Use  Smoking status: Never Smoker  Smokeless tobacco: Never Used Substance and Sexual Activity  Alcohol use: No  
 Drug use: Never  Sexual activity: Never Lifestyle  Physical activity:  
  Days per week: Not on file Minutes per session: Not on file  Stress: Not on file Relationships  Social connections:  
  Talks on phone: Not on file Gets together: Not on file Attends Jew service: Not on file Active member of club or organization: Not on file Attends meetings of clubs or organizations: Not on file Relationship status: Not on file  Intimate partner violence:  
  Fear of current or ex partner: Not on file Emotionally abused: Not on file Physically abused: Not on file Forced sexual activity: Not on file Other Topics Concern  Not on file Social History Narrative  Not on file Current Outpatient Medications on File Prior to Visit Medication Sig Dispense Refill  metFORMIN (GLUCOPHAGE) 1,000 mg tablet TAKE 1 TABLET BY MOUTH TWICE DAILY 180 Tab 0  
 potassium chloride SA (MICRO-K) 10 mEq capsule TAKE 3 CAPSULES BY MOUTH EVERY  Cap 0  
 pravastatin (PRAVACHOL) 20 mg tablet TAKE 1 TABLET BY MOUTH EVERY NIGHT 90 Tab 0  
 lisinopril (PRINIVIL, ZESTRIL) 10 mg tablet TAKE 1 TABLET BY MOUTH EVERY DAY 90 Tab 1  cyanocobalamin 1,000 mcg tablet Take 1,000 mcg by mouth daily.  indapamide (LOZOL) 2.5 mg tablet TAKE 1 TABLET BY MOUTH DAILY 90 Tab 2  ACCU-CHEK SMARTVIEW TEST STRIP strip PLEASE USE TWICE DAILY AS NEEDED 100 Strip 11  
 glucose blood VI test strips (ACCU-CHEK SMARTVIEW TEST STRIP) strip Please use two times daily as needed for Dx code 250.02 100 Strip 11  
 diclofenac (VOLTAREN) 1 % gel Apply  to affected area four (4) times daily as needed. 100 g 0  
 BETA-CAROTENE,A, W-C & E/ZN/CU (VISION-CHARANJIT PRESERVE PO) Take  by mouth two (2) times a day.  Cholecalciferol, Vitamin D3, (VITAMIN D3) 1,000 unit cap Take 5,000 Units by mouth daily. 4 tabs daily  Indications: vitamin D deficiency No current facility-administered medications on file prior to visit. Allergies Allergen Reactions  Pcn [Penicillins] Hives OB History No obstetric history on file. Obstetric Comments Menarche 13, LMP 2004, # of children 1, age of 4st delivery 23, Hysterectomy/oophorectomy No/No, Breast bx Yes, history of breast feeding No, BCP No, Hormone therapy No 
  
  
  
 
 
ROS Constitutional: Negative. HENT: Negative. Eyes: Negative. Respiratory: Negative. Cardiovascular: Negative. Gastrointestinal: Negative. Genitourinary: Negative. Musculoskeletal: Negative. Skin: Negative. Neurological: Negative. Endo/Heme/Allergies: Negative. Psychiatric/Behavioral: Negative. Physical Exam 
Exam conducted with a chaperone present. Cardiovascular:  
   Rate and Rhythm: Normal rate and regular rhythm. Heart sounds: Normal heart sounds. Pulmonary:  
   Breath sounds: Normal breath sounds. Chest:  
   Breasts: Breasts are symmetrical.  
      Right: Normal. No swelling, bleeding, inverted nipple, mass, nipple discharge, skin change or tenderness. Left: Normal. No swelling, bleeding, inverted nipple, mass, nipple discharge, skin change or tenderness. Lymphadenopathy:  
   Cervical:  
   Right cervical: No superficial, deep or posterior cervical adenopathy. Left cervical: No superficial, deep or posterior cervical adenopathy. Upper Body:  
   Right upper body: No supraclavicular or axillary adenopathy. Left upper body: No supraclavicular or axillary adenopathy. BREAST ULTRASOUND, Pre-op Planning Indication: Pre-op planning, LEFT breast 
Technique: The area was scanned using a high-frequency linear-array near-field transducer Findings: Bx clip at 6:00, just above the inframammary fold Impression: Breast cancer Disposition: Surgery ASSESSMENT and PLAN 
  ICD-10-CM ICD-9-CM 1. 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  
   ALPRAZolam (XANAX) 0.5 mg tablet 2. History of left breast cancer Z85.3 V10.3 Pt presents for for consultation for treatment of LEFT breast DCIS, ER+, clinical stage 0. No palpable findings on exam. US visualizes bx clip at LEFT breast 6:00, just above the inframammary fold. We had a long discussion of options for treatment. A total of 60 minutes were spent face-to-face with the patient, accompanied by a friend, during this encounter, and over half of that time was spent on counseling and coordination of care. We discussed in depth the pathology results, and the need for surgery following MRI for extent of disease and surgical planning. The goals of treatment are to treat the breast, ensure that there is no invasive component, and to reduce risk of recurrence. Discussed treatment options with risks, complications, benefits, and limitations, including lumpectomy and mastectomy. Pt notes preference for breast conservation. Since pt's previous treatment was long ago, we discussed repeating the same treatment for this new DCIS, with lumpectomy and possible partial breast XRT. Reviewed the importance of negative margins, and that if margins are positive for cancer cells, we will re-excise in a second surgery. Pt would not need a SLNBx for DCIS. However, if invasive cancer is found, will perform SLNBx in a second operation. We also covered risk reduction strategies as well as post-surgical therapies including hormonal therapy with estrogen blocker, and possible partial breast XRT. Discussed the use of DCISionRT to help determine need for XRT based on risk of recurrence. Discussed estrogen blocking pill for further risk reduction for ER+ disease. Will refer to radiation oncology and medical oncology for further consultation following surgery. Pt qualifies for genetic testing given personal and family history. Sample collected by RN in office today, will f/u with the results.  
 
Will order MRI (prescription given for Xanax) and plan further from there - radiology will call pt to schedule. This plan was reviewed with the patient and patient agrees. All questions were answered.  
 
Written by Morgan Workman, as dictated by Dr. Claudeen Kanner, MD.

## 2020-02-28 NOTE — PROGRESS NOTES
HISTORY OF PRESENT ILLNESS  Linda Eckert is a 75 y.o. female. HPI  NEW patient presents for consultation at the request of Dr. Adarsh Wise for new diagnosis of LEFT breast DCIS. This was diagnosed after her screening mammogram showed calcifications. She is not feeling any breast lumps, has no nipple discharge/retraction or skin change. Says that she has had some pain in the upper breast since the biopsy. 02/18/20: LEFT breast stereo bx. PATH: DCIS, solid and comedo pattern with comedonecrosis and intraluminal coarse calcification, nuclear grade 2, ER+(99%), DC not performed. Clinical stage 0. PMH - LEFT breast cancer (probably DCIS) in 2009. Had surgery by Dr. Montse Campoverde and whole breast radiation by Dr. Antonia Harrell at [facility]. FH - A paternal aunt had breast cancer and possible ovarian cancer. A paternal first cousin was just diagnosed with breast cancer in her mid-61s. Her son has skin cancer. BILATERAL screening mammogram 2/5/2020, BIRADS 0, incomplete. [facility] Results (most recent):       Results from [place] encounter on 02/18/20   [facility] POST BX IMAGING LT INCL CAD     Addendum Addendum: ADDENDUM TO stereotactic left breast biopsy performed 2/18/2020. PATHOLOGY RESULTS: Ductal carcinoma in situ. RESULTS CONVEYED: Results called to the patient by Dr. Ermelinda Brown 2/19/2020. ASSESSMENT: These results are concordant with the imaging findings. RECOMMENDATIONS: Imaging Department nurse will facilitate a surgical consultation appointment with Dr. Jing Nevarez, as requested. Carmen Saunders MD 2/19/2020  6:19 PM           Narrative STUDY: STEREOTACTIC LEFT BREAST BIOPSY     INDICATION:  Calcifications.     PROCEDURE:  The risks and benefits of the procedure were explained to the patient, questions  were answered, and informed consent was obtained.     Calcifications in the 6:00 location of the left breast were localized using  stereotactic technique.  The skin was prepped, and local anesthetic was applied. Multiple biopsy cores were obtained using 9 gauge vacuum-assisted Eviva device  and an inferior approach. Magnification radiography confirmed the presence of  calcifications in the specimens. A metallic clip was deployed at the biopsy  site. There were no immediate complications. Post-biopsy digital mammogram  confirms the presence of the metallic clip at the intended biopsy site. Specimens were forwarded to Pathology for histologic evaluation.        Impression IMPRESSION: Satisfactory left breast stereotactic biopsy of calcifications.   Further recommendations will be based on final pathology results.              Past Medical History:   Diagnosis Date    Breast cancer ([facility]     lumpectomy with radiation, 2009, left    Breast cancer, left ([facility] [facility] 2020    DCIS     [facility]     Hyperlipidemia     Hypertension     Radiation therapy complication     [id number]       Past Surgical History:   Procedure Laterality Date    BREAST SURGERY PROCEDURE UNLISTED      lumpectomy    HX BREAST LUMPECTOMY Left     2009    HX ORTHOPAEDIC      Bunion Surgery    HX TUBAL LIGATION         Social History     Socioeconomic History    Marital status:      Spouse name: Not on file    Number of children: Not on file    Years of education: Not on file    Highest education level: Not on file   Occupational History    Not on file   Social Needs    Financial resource strain: Not on file    Food insecurity:     Worry: Not on file     Inability: Not on file    Transportation needs:     Medical: Not on file     Non-medical: Not on file   Tobacco Use    Smoking status: Never Smoker    Smokeless tobacco: Never Used   Substance and Sexual Activity    Alcohol use: No    Drug use: Never    Sexual activity: Never   Lifestyle    Physical activity:     Days per week: Not on file     Minutes per session: Not on file    Stress: Not on file   Relationships    Social connections:     Talks on phone: Not on file     Gets together: Not on file     Attends Mosque service: Not on file     Active member of club or organization: Not on file     Attends meetings of clubs or organizations: Not on file     Relationship status: Not on file    Intimate partner violence:     Fear of current or ex partner: Not on file     Emotionally abused: Not on file     Physically abused: Not on file     Forced sexual activity: Not on file   Other Topics Concern    Not on file   Social History Narrative    Not on file       Current Outpatient Medications on File Prior to Visit   Medication Sig Dispense Refill    metFORMIN (GLUCOPHAGE) 1,000 mg tablet TAKE 1 TABLET BY MOUTH TWICE DAILY 180 Tab 0    potassium chloride SA (MICRO-K) 10 mEq capsule TAKE 3 CAPSULES BY MOUTH EVERY  Cap 0    pravastatin (PRAVACHOL) 20 mg tablet TAKE 1 TABLET BY MOUTH EVERY NIGHT 90 Tab 0    lisinopril (PRINIVIL, ZESTRIL) 10 mg tablet TAKE 1 TABLET BY MOUTH EVERY DAY 90 Tab 1    cyanocobalamin 1,000 mcg tablet Take 1,000 mcg by mouth daily.  indapamide (LOZOL) 2.5 mg tablet TAKE 1 TABLET BY MOUTH DAILY 90 Tab 2    ACCU-CHEK SMARTVIEW TEST STRIP strip PLEASE USE TWICE DAILY AS NEEDED 100 Strip 11    glucose blood VI test strips (ACCU-CHEK SMARTVIEW TEST STRIP) strip Please use two times daily as needed for Dx code 250.02 100 Strip 11    diclofenac (VOLTAREN) 1 % gel Apply  to affected area four (4) times daily as needed. 100 g 0    BETA-CAROTENE,A, W-C & E/ZN/CU (VISION-CHARANJIT PRESERVE PO) Take  by mouth two (2) times a day.  Cholecalciferol, Vitamin D3, (VITAMIN D3) 1,000 unit cap Take 5,000 Units by mouth daily. 4 tabs daily  Indications: vitamin D deficiency       No current facility-administered medications on file prior to visit. Allergies   Allergen Reactions    Pcn [Penicillins] Hives       OB History    No obstetric history on file.       Obstetric Comments   Menarche 13, LMP 2004, # of children 1, age of 4st delivery 23, Hysterectomy/oophorectomy No/No, Breast bx Yes, history of breast feeding No, BCP No, Hormone therapy No               ROS  Constitutional: Negative. HENT: Negative. Eyes: Negative. Respiratory: Negative. Cardiovascular: Negative. Gastrointestinal: Negative. Genitourinary: Negative. Musculoskeletal: Negative. Skin: Negative. Neurological: Negative. Endo/Heme/Allergies: Negative. Psychiatric/Behavioral: Negative. Physical Exam  Exam conducted with a chaperone present. Cardiovascular:      Rate and Rhythm: Normal rate and regular rhythm. Heart sounds: Normal heart sounds. Pulmonary:      Breath sounds: Normal breath sounds. Chest:      Breasts: Breasts are symmetrical.         Right: Normal. No swelling, bleeding, inverted nipple, mass, nipple discharge, skin change or tenderness. Left: Normal. No swelling, bleeding, inverted nipple, mass, nipple discharge, skin change or tenderness. Lymphadenopathy:      Cervical:      Right cervical: No superficial, deep or posterior cervical adenopathy. Left cervical: No superficial, deep or posterior cervical adenopathy. Upper Body:      Right upper body: No supraclavicular or axillary adenopathy. Left upper body: No supraclavicular or axillary adenopathy. BREAST ULTRASOUND, Pre-op Planning  Indication: Pre-op planning, LEFT breast  Technique: The area was scanned using a high-frequency linear-array near-field transducer  Findings: Bx clip at 6:00, just above the inframammary fold  Impression: Breast cancer  Disposition: Surgery      ASSESSMENT and PLAN    ICD-10-CM ICD-9-CM    1. 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 History of left breast cancer Z85.3 V10.3       Pt presents for for consultation for treatment of LEFT breast DCIS, ER+, clinical stage 0.  No palpable findings on exam. US visualizes bx clip at LEFT breast 6:00, just above the inframammary fold. We had a long discussion of options for treatment. A total of 60 minutes were spent face-to-face with the patient, accompanied by a friend, during this encounter, and over half of that time was spent on counseling and coordination of care. We discussed in depth the pathology results, and the need for surgery following MRI for extent of disease and surgical planning. The goals of treatment are to treat the breast, ensure that there is no invasive component, and to reduce risk of recurrence. Discussed treatment options with risks, complications, benefits, and limitations, including lumpectomy and mastectomy. Pt notes preference for breast conservation. Since pt's previous treatment was long ago, we discussed repeating the same treatment for this new DCIS, with lumpectomy and possible partial breast XRT. Reviewed the importance of negative margins, and that if margins are positive for cancer cells, we will re-excise in a second surgery. Pt would not need a SLNBx for DCIS. However, if invasive cancer is found, will perform SLNBx in a second operation. We also covered risk reduction strategies as well as post-surgical therapies including hormonal therapy with estrogen blocker, and possible partial breast XRT. Discussed the use of DCISionRT to help determine need for XRT based on risk of recurrence. Discussed estrogen blocking pill for further risk reduction for ER+ disease. Will refer to radiation oncology and medical oncology for further consultation following surgery. Pt qualifies for genetic testing given personal and family history. Sample collected by RN in office today, will f/u with the results. Will order MRI (prescription given for Xanax) and plan further from there - radiology will call pt to schedule. This plan was reviewed with the patient and patient agrees. All questions were answered.     Written by Viviana Vigil Annel Gar, as dictated by Dr. Alina Otoole MD.

## 2020-03-02 DIAGNOSIS — Z85.3 HISTORY OF LEFT BREAST CANCER: ICD-10-CM

## 2020-03-02 DIAGNOSIS — D05.12 DUCTAL CARCINOMA IN SITU (DCIS) OF LEFT BREAST WITH COMEDONECROSIS: Primary | ICD-10-CM

## 2020-03-02 DIAGNOSIS — Z80.3 FAMILY HISTORY OF BREAST CANCER IN FEMALE: ICD-10-CM

## 2020-03-05 ENCOUNTER — HOSPITAL ENCOUNTER (OUTPATIENT)
Dept: MRI IMAGING | Age: 65
Discharge: HOME | End: 2020-03-05
Attending: SURGERY
Payer: MEDICARE

## 2020-03-05 VITALS — WEIGHT: 217 LBS | BODY MASS INDEX: 36.67 KG/M2

## 2020-03-05 DIAGNOSIS — D05.12 DUCTAL CARCINOMA IN SITU (DCIS) OF LEFT BREAST WITH COMEDONECROSIS: ICD-10-CM

## 2020-03-05 LAB — CREAT BLD-MCNC: 0.9 MG/DL (ref 0.6–1.3)

## 2020-03-05 PROCEDURE — A9585 GADOBUTROL INJECTION: HCPCS | Performed by: SURGERY

## 2020-03-05 PROCEDURE — 74011250636 HC RX REV CODE- 250/636: Performed by: SURGERY

## 2020-03-05 PROCEDURE — 82565 ASSAY OF CREATININE: CPT

## 2020-03-05 PROCEDURE — 77049 MRI BREAST C-+ W/CAD BI: CPT

## 2020-03-05 RX ADMIN — GADOBUTROL 10 ML: 604.72 INJECTION INTRAVENOUS at 09:07

## 2020-03-06 ENCOUNTER — TELEPHONE (OUTPATIENT)
Dept: SURGERY | Age: 65
End: 2020-03-06

## 2020-03-09 ENCOUNTER — TELEPHONE (OUTPATIENT)
Dept: SURGERY | Age: 65
End: 2020-03-09

## 2020-03-09 ENCOUNTER — DOCUMENTATION ONLY (OUTPATIENT)
Dept: SURGERY | Age: 65
End: 2020-03-09

## 2020-03-09 DIAGNOSIS — C50.912 MALIGNANT NEOPLASM OF LEFT FEMALE BREAST, UNSPECIFIED ESTROGEN RECEPTOR STATUS, UNSPECIFIED SITE OF BREAST (HCC): ICD-10-CM

## 2020-03-09 DIAGNOSIS — C77.3 SECONDARY MALIGNANT NEOPLASM OF BRACHIAL LYMPH NODE (HCC): Primary | ICD-10-CM

## 2020-03-10 ENCOUNTER — OFFICE VISIT (OUTPATIENT)
Dept: PRIMARY CARE CLINIC | Age: 65
End: 2020-03-10

## 2020-03-10 ENCOUNTER — HOSPITAL ENCOUNTER (OUTPATIENT)
Dept: PREADMISSION TESTING | Age: 65
Discharge: HOME | End: 2020-03-10
Payer: MEDICARE

## 2020-03-10 VITALS
HEIGHT: 65 IN | WEIGHT: 216.2 LBS | OXYGEN SATURATION: 99 % | TEMPERATURE: 97.7 F | SYSTOLIC BLOOD PRESSURE: 101 MMHG | HEART RATE: 63 BPM | DIASTOLIC BLOOD PRESSURE: 58 MMHG | BODY MASS INDEX: 36.02 KG/M2 | RESPIRATION RATE: 14 BRPM

## 2020-03-10 VITALS
SYSTOLIC BLOOD PRESSURE: 112 MMHG | DIASTOLIC BLOOD PRESSURE: 67 MMHG | RESPIRATION RATE: 16 BRPM | HEIGHT: 65 IN | HEART RATE: 62 BPM | TEMPERATURE: 97.9 F | BODY MASS INDEX: 35.99 KG/M2 | WEIGHT: 216 LBS

## 2020-03-10 DIAGNOSIS — Z85.3 HISTORY OF LEFT BREAST CANCER: ICD-10-CM

## 2020-03-10 DIAGNOSIS — D05.12 INTRADUCTAL CARCINOMA IN SITU OF LEFT BREAST: Primary | ICD-10-CM

## 2020-03-10 DIAGNOSIS — I10 ESSENTIAL HYPERTENSION: ICD-10-CM

## 2020-03-10 DIAGNOSIS — E79.0 HYPERURICEMIA: ICD-10-CM

## 2020-03-10 DIAGNOSIS — D05.12 DUCTAL CARCINOMA IN SITU (DCIS) OF LEFT BREAST WITH COMEDONECROSIS: ICD-10-CM

## 2020-03-10 DIAGNOSIS — E11.9 CONTROLLED TYPE 2 DIABETES MELLITUS WITHOUT COMPLICATION, WITHOUT LONG-TERM CURRENT USE OF INSULIN (HCC): Primary | ICD-10-CM

## 2020-03-10 PROBLEM — E66.01 OBESITY, MORBID (HCC): Status: RESOLVED | Noted: 2020-02-28 | Resolved: 2020-03-10

## 2020-03-10 PROCEDURE — 93005 ELECTROCARDIOGRAM TRACING: CPT

## 2020-03-10 RX ORDER — INDAPAMIDE 1.25 MG/1
1.25 TABLET, FILM COATED ORAL DAILY
Qty: 90 TAB | Refills: 0 | Status: SHIPPED | OUTPATIENT
Start: 2020-03-10 | End: 2020-05-26

## 2020-03-10 NOTE — PROGRESS NOTES
Written by Harrison Desai, as dictated by Dr. Eusebio Deal MD.    Milly Damico is a 75 y.o. female. HPI  The patient presents today for follow-up. Patient is fasting for labs this morning. Following her 3D Mammogram in 01/2020, she went for a breast biopsy, which was found to be cancerous. She has since seen Dr. Beatrice Councilman (Breast Surgery), who ordered a breast MRI and will perform a L lumpectomy on 03/12/2020 at [facility]. She is upset as this will be her second bout with breast cancer. She will have her pre-op examination later today. She saw Dr. Duffy (Podiatry) on 03/04/2020, but about 2 weeks prior to her visit, she had joint pain in her R foot, and was advised that it could be related to gout. She would like to get her Uric Acid levels checked today. BP in office is low today at 101/58. She has not taken any of her BP medication this morning. Compliant on Lisinopril 10 mg and Indapamide 2.5 mg otherwise. Denies lightheadedness or dizziness. She does have a BP monitor at home. She has been having difficulty sleeping at night because of the procedure and thinking about her appointment today. Compliant on Metformin 1,000 mg BID, and Pravastatin 20 mg. Patient Active Problem List   Diagnosis Code    HTN (hypertension) I10    Hyperuricemia E79.0    History of left breast cancer Z85.3    Diabetes mellitus type 2, controlled ([address] E11.9    Multinodular goiter E04.2    Obesity (BMI 30-39. 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  indapamide (LOZOL) 2.5 mg tablet TAKE 1 TABLET BY MOUTH DAILY 90 Tab 2    ACCU-CHEK SMARTVIEW TEST STRIP strip PLEASE USE TWICE DAILY AS NEEDED 100 Strip 11    glucose blood VI test strips (ACCU-CHEK SMARTVIEW TEST STRIP) strip Please use two times daily as needed for Dx code 250.02 100 Strip 11    diclofenac (VOLTAREN) 1 % gel Apply  to affected area four (4) times daily as needed. 100 g 0    BETA-CAROTENE,A, W-C & E/ZN/CU (VISION-CHARANJIT PRESERVE PO) Take  by mouth two (2) times a day.  Cholecalciferol, Vitamin D3, (VITAMIN D3) 1,000 unit cap Take 5,000 Units by mouth daily. 4 tabs daily  Indications: vitamin D deficiency      ALPRAZolam (XANAX) 0.5 mg tablet Take 1 Tab by mouth every six (6) hours as needed for Anxiety (take as directed). Max Daily Amount: 2 mg. Indications: anxious 12 Tab 0     No current facility-administered medications on file prior to visit.         Allergies   Allergen Reactions    Pcn [Penicillins] Hives       Past Medical History:   Diagnosis Date    Breast cancer ([facility]     lumpectomy with radiation, 2009, left    Breast cancer, left ([facility] 2020    DCIS     Diabetes ([facility]     Hyperlipidemia     Hypertension     Radiation therapy complication     4949       Past Surgical History:   Procedure Laterality Date    BREAST SURGERY PROCEDURE UNLISTED      lumpectomy    HX BREAST LUMPECTOMY Left     2009    HX ORTHOPAEDIC      Bunion Surgery    HX TUBAL LIGATION         Family History   Problem Relation Age of Onset    Diabetes Father     Stroke Sister     Diabetes Brother     Heart Disease Mother        Social History     Socioeconomic History    Marital status:      Spouse name: Not on file    Number of children: Not on file    Years of education: Not on file    Highest education level: Not on file   Occupational History    Not on file   Social Needs    Financial resource strain: Not on file    Food insecurity:     Worry: Not on file     Inability: Not on file    Transportation needs:     Medical: Not on file     Non-medical: Not on file   Tobacco Use    Smoking status: Never Smoker    Smokeless tobacco: Never Used   Substance and Sexual Activity    Alcohol use: No    Drug use: Never    Sexual activity: Never   Lifestyle    Physical activity:     Days per week: Not on file     Minutes per session: Not on file    Stress: Not on file   Relationships    Social connections:     Talks on phone: Not on file     Gets together: Not on file     Attends Spiritism service: Not on file     Active member of club or organization: Not on file     Attends meetings of clubs or organizations: Not on file     Relationship status: Not on file    Intimate partner violence:     Fear of current or ex partner: Not on file     Emotionally abused: Not on file     Physically abused: Not on file     Forced sexual activity: Not on file   Other Topics Concern    Not on file   Social History Narrative    Not on file       Hospital Outpatient Visit on 03/05/2020   Component Date Value Ref Range Status    Creatinine (POC) 03/05/2020 0.9  0.6 - 1.3 mg/dL Final    GFRAA, POC 03/05/2020 >60  >60 ml/min/1.73m2 Final    GFRNA, POC 03/05/2020 >60  >60 ml/min/1.73m2 Final       Review of Systems   Constitutional: Negative for malaise/fatigue and weight loss. Eyes: Negative for blurred vision and photophobia. Respiratory: Negative for cough and shortness of breath. Cardiovascular: Negative for chest pain and leg swelling. Musculoskeletal: Negative for joint pain and myalgias. Neurological: Negative for dizziness and headaches. Psychiatric/Behavioral: Negative for depression and substance abuse. The patient is not nervous/anxious and does not have insomnia.       Visit Vitals  /58 (BP 1 Location: Left arm, BP Patient Position: Sitting)   Pulse 63   Temp 97.7 °F (36.5 °C) (Oral)   Resp 14   Ht 5' 4.5\" (1.638 m)   Wt 216 lb 3.2 oz (98.1 kg)   SpO2 99%   BMI 36.54 kg/m²       Physical Exam  Vitals signs and nursing note reviewed. Constitutional:       General: She is not in acute distress. Appearance: Normal appearance. She is well-developed and well-groomed. She is obese. She is not diaphoretic. HENT:      Head: Normocephalic and atraumatic. Right Ear: External ear normal.      Left Ear: External ear normal.   Eyes:      General:         Right eye: No discharge. Left eye: No discharge. Conjunctiva/sclera: Conjunctivae normal.      Pupils: Pupils are equal, round, and reactive to light. Neck:      Musculoskeletal: Normal range of motion and neck supple. Cardiovascular:      Rate and Rhythm: Normal rate and regular rhythm. Heart sounds: Normal heart sounds. No murmur. No friction rub. No gallop. Pulmonary:      Effort: Pulmonary effort is normal.      Breath sounds: Normal breath sounds. No wheezing. Abdominal:      General: Bowel sounds are normal.      Palpations: Abdomen is soft. Tenderness: There is no abdominal tenderness. Musculoskeletal: Normal range of motion. Feet:      Comments: Diabetic foot exam:   Left: Vibratory sensation normal    Sharp/dull discrimination normal    Filament test normal sensation with micro filament   Pulse DP: 2+ (normal)   Deformities: None  Right: Vibratory sensation normal   Sharp/dull discrimination normal   Filament test normal sensation with micro filament   Pulse DP: 2+ (normal)   Deformities: None   Neurological:      Mental Status: She is alert and oriented to person, place, and time. Deep Tendon Reflexes: Reflexes are normal and symmetric. Psychiatric:         Behavior: Behavior normal.         Thought Content: Thought content normal.         ASSESSMENT and PLAN    ICD-10-CM ICD-9-CM    1.  Controlled type 2 diabetes mellitus without complication, without long-term current use of insulin (HCC) X03.5 475.14 METABOLIC PANEL, COMPREHENSIVE      CBC W/O DIFF      HEMOGLOBIN A1C WITH EAG      HM DIABETES FOOT EXAM    CMP, CBC, and Hemoglobin A1c. Diabetic Foot Exam performed in office. 2. Ductal carcinoma in situ (DCIS) of left breast with comedonecrosis D05.12 233.0 Patient will have L lumpectomy on 03/12/2020. Followed by Breast Surgery. 3. Essential hypertension I10 401.9 indapamide (LOZOL) 1.25 mg tablet sent to pharmacy. Indapamide 1.25 mg prescribed. Pt should tab daily. Decreased dosage due to BP low without medication this morning. Advised patient to monitor her BP at home. If BP < 120/70, pt should not take Indapamide. Written instructions were given to patient. 4. Hyperuricemia E79.0 790.6 URIC ACID    Uric Acid ordered. This plan was reviewed with the patient and patient agrees. All questions were answered. This scribe documentation was reviewed by me and accurately reflects the examination and decisions made by me. This note will not be viewable in [address]

## 2020-03-10 NOTE — PROGRESS NOTES
Chief Complaint   Patient presents with    Cholesterol Problem    Hypertension    Diabetes    Gout     right foot  pt seeing podiatrist  Dr. Lara Luevano Breast Problem     breast cancer left   Pt getting biopsy  This coming March 12th  [facility]     1. Have you been to the ER, urgent care clinic since your last visit? Hospitalized since your last visit?no    2. Have you seen or consulted any other health care providers outside of the [address] [place] [address] since your last visit? Include any pap smears or colon screening.  No    Health Maintenance Due   Topic Date Due    Foot Exam Q1  03/05/2020

## 2020-03-10 NOTE — PERIOP NOTES
PRE-OPERATIVE INSTRUCTIONS REVIEWED WITH PATIENT. PT GIVEN TIME TO ASK QUESTIONS     PATIENT GIVEN 2-BOTTLE OF CHG SOAP. PATIENT GIVEN SSI INFECTION FAQ SHEET.

## 2020-03-11 LAB
ALBUMIN SERPL-MCNC: 4.4 G/DL (ref 3.8–4.8)
ALBUMIN/GLOB SERPL: 1.8 {RATIO} (ref 1.2–2.2)
ALP SERPL-CCNC: 70 IU/L (ref 39–117)
ALT SERPL-CCNC: 13 IU/L (ref 0–32)
AST SERPL-CCNC: 14 IU/L (ref 0–40)
ATRIAL RATE: 59 BPM
BILIRUB SERPL-MCNC: 0.5 MG/DL (ref 0–1.2)
BUN SERPL-MCNC: 19 MG/DL (ref 8–27)
BUN/CREAT SERPL: 20 (ref 12–28)
CALCIUM SERPL-MCNC: 10 MG/DL (ref 8.7–10.3)
CALCULATED P AXIS, ECG09: 59 DEGREES
CALCULATED R AXIS, ECG10: 39 DEGREES
CALCULATED T AXIS, ECG11: 63 DEGREES
CHLORIDE SERPL-SCNC: 102 MMOL/L (ref 96–106)
CO2 SERPL-SCNC: 25 MMOL/L (ref 20–29)
CREAT SERPL-MCNC: 0.93 MG/DL (ref 0.57–1)
DIAGNOSIS, 93000: NORMAL
ERYTHROCYTE [DISTWIDTH] IN BLOOD BY AUTOMATED COUNT: 13.4 % (ref 11.7–15.4)
EST. AVERAGE GLUCOSE BLD GHB EST-MCNC: 143 MG/DL
GLOBULIN SER CALC-MCNC: 2.5 G/DL (ref 1.5–4.5)
GLUCOSE SERPL-MCNC: 93 MG/DL (ref 65–99)
HBA1C MFR BLD: 6.6 % (ref 4.8–5.6)
HCT VFR BLD AUTO: 31.5 % (ref 34–46.6)
HGB BLD-MCNC: 10 G/DL (ref 11.1–15.9)
MCH RBC QN AUTO: 27.5 PG (ref 26.6–33)
MCHC RBC AUTO-ENTMCNC: 31.7 G/DL (ref 31.5–35.7)
MCV RBC AUTO: 87 FL (ref 79–97)
P-R INTERVAL, ECG05: 166 MS
PLATELET # BLD AUTO: 234 X10E3/UL (ref 150–450)
POTASSIUM SERPL-SCNC: 3.9 MMOL/L (ref 3.5–5.2)
PROT SERPL-MCNC: 6.9 G/DL (ref 6–8.5)
Q-T INTERVAL, ECG07: 404 MS
QRS DURATION, ECG06: 86 MS
QTC CALCULATION (BEZET), ECG08: 399 MS
RBC # BLD AUTO: 3.64 X10E6/UL (ref 3.77–5.28)
SODIUM SERPL-SCNC: 142 MMOL/L (ref 134–144)
URATE SERPL-MCNC: 8.7 MG/DL (ref 2.5–7.1)
VENTRICULAR RATE, ECG03: 59 BPM
WBC # BLD AUTO: 5.1 X10E3/UL (ref 3.4–10.8)

## 2020-03-12 ENCOUNTER — HOSPITAL ENCOUNTER (OUTPATIENT)
Age: 65
Setting detail: OUTPATIENT SURGERY
Discharge: HOME | End: 2020-03-12
Attending: SURGERY | Admitting: SURGERY
Payer: MEDICARE

## 2020-03-12 ENCOUNTER — ANESTHESIA (OUTPATIENT)
Dept: MEDSURG UNIT | Age: 65
End: 2020-03-12
Payer: MEDICARE

## 2020-03-12 ENCOUNTER — ANESTHESIA EVENT (OUTPATIENT)
Dept: MEDSURG UNIT | Age: 65
End: 2020-03-12
Payer: MEDICARE

## 2020-03-12 VITALS
WEIGHT: 216 LBS | SYSTOLIC BLOOD PRESSURE: 145 MMHG | RESPIRATION RATE: 16 BRPM | HEART RATE: 84 BPM | OXYGEN SATURATION: 96 % | BODY MASS INDEX: 36.5 KG/M2 | TEMPERATURE: 98.4 F | DIASTOLIC BLOOD PRESSURE: 70 MMHG

## 2020-03-12 DIAGNOSIS — D05.12 INTRADUCTAL CARCINOMA IN SITU OF LEFT BREAST: ICD-10-CM

## 2020-03-12 DIAGNOSIS — C77.3 SECONDARY MALIGNANT NEOPLASM OF BRACHIAL LYMPH NODE (HCC): ICD-10-CM

## 2020-03-12 DIAGNOSIS — C50.912 MALIGNANT NEOPLASM OF LEFT FEMALE BREAST, UNSPECIFIED ESTROGEN RECEPTOR STATUS, UNSPECIFIED SITE OF BREAST (HCC): ICD-10-CM

## 2020-03-12 DIAGNOSIS — Z85.3 HISTORY OF LEFT BREAST CANCER: ICD-10-CM

## 2020-03-12 LAB
GLUCOSE BLD STRIP.AUTO-MCNC: 141 MG/DL (ref 65–100)
SERVICE CMNT-IMP: ABNORMAL

## 2020-03-12 PROCEDURE — 77030038552 HC DRN WND MDII -A: Performed by: SURGERY

## 2020-03-12 PROCEDURE — 77030011640 HC PAD GRND REM COVD -A: Performed by: SURGERY

## 2020-03-12 PROCEDURE — 77030031139 HC SUT VCRL2 J&J -A: Performed by: SURGERY

## 2020-03-12 PROCEDURE — 76030000001 HC AMB SURG OR TIME 1 TO 1.5: Performed by: SURGERY

## 2020-03-12 PROCEDURE — 77030018836 HC SOL IRR NACL ICUM -A: Performed by: SURGERY

## 2020-03-12 PROCEDURE — 77030040361 HC SLV COMPR DVT MDII -B: Performed by: SURGERY

## 2020-03-12 PROCEDURE — 88307 TISSUE EXAM BY PATHOLOGIST: CPT

## 2020-03-12 PROCEDURE — 74011250636 HC RX REV CODE- 250/636: Performed by: ANESTHESIOLOGY

## 2020-03-12 PROCEDURE — 77030040922 HC BLNKT HYPOTHRM STRY -A

## 2020-03-12 PROCEDURE — 77030010509 HC AIRWY LMA MSK TELE -A: Performed by: NURSE PRACTITIONER

## 2020-03-12 PROCEDURE — 74011250636 HC RX REV CODE- 250/636: Performed by: NURSE PRACTITIONER

## 2020-03-12 PROCEDURE — 82962 GLUCOSE BLOOD TEST: CPT

## 2020-03-12 PROCEDURE — 77030011267 HC ELECTRD BLD COVD -A: Performed by: SURGERY

## 2020-03-12 PROCEDURE — 77030002996 HC SUT SLK J&J -A: Performed by: SURGERY

## 2020-03-12 PROCEDURE — 74011000250 HC RX REV CODE- 250: Performed by: NURSE PRACTITIONER

## 2020-03-12 PROCEDURE — 77030002933 HC SUT MCRYL J&J -A: Performed by: SURGERY

## 2020-03-12 PROCEDURE — 74011000250 HC RX REV CODE- 250: Performed by: SURGERY

## 2020-03-12 PROCEDURE — 76060000062 HC AMB SURG ANES 1 TO 1.5 HR: Performed by: SURGERY

## 2020-03-12 PROCEDURE — 76210000040 HC AMBSU PH I REC FIRST 0.5 HR: Performed by: SURGERY

## 2020-03-12 PROCEDURE — 74011250636 HC RX REV CODE- 250/636: Performed by: SURGERY

## 2020-03-12 RX ORDER — SODIUM CHLORIDE, SODIUM LACTATE, POTASSIUM CHLORIDE, CALCIUM CHLORIDE 600; 310; 30; 20 MG/100ML; MG/100ML; MG/100ML; MG/100ML
INJECTION, SOLUTION INTRAVENOUS
Status: DISCONTINUED | OUTPATIENT
Start: 2020-03-12 | End: 2020-03-12 | Stop reason: HOSPADM

## 2020-03-12 RX ORDER — LIDOCAINE HYDROCHLORIDE AND EPINEPHRINE 10; 10 MG/ML; UG/ML
30 INJECTION, SOLUTION INFILTRATION; PERINEURAL ONCE
Status: DISCONTINUED | OUTPATIENT
Start: 2020-03-12 | End: 2020-03-12 | Stop reason: HOSPADM

## 2020-03-12 RX ORDER — BUPIVACAINE HYDROCHLORIDE AND EPINEPHRINE 5; 5 MG/ML; UG/ML
30 INJECTION, SOLUTION EPIDURAL; INTRACAUDAL; PERINEURAL ONCE
Status: DISCONTINUED | OUTPATIENT
Start: 2020-03-12 | End: 2020-03-12 | Stop reason: HOSPADM

## 2020-03-12 RX ORDER — VANCOMYCIN/0.9 % SOD CHLORIDE 1.5G/250ML
1500 PLASTIC BAG, INJECTION (ML) INTRAVENOUS
Status: COMPLETED | OUTPATIENT
Start: 2020-03-12 | End: 2020-03-12

## 2020-03-12 RX ORDER — LIDOCAINE HYDROCHLORIDE 20 MG/ML
INJECTION, SOLUTION EPIDURAL; INFILTRATION; INTRACAUDAL; PERINEURAL AS NEEDED
Status: DISCONTINUED | OUTPATIENT
Start: 2020-03-12 | End: 2020-03-12 | Stop reason: HOSPADM

## 2020-03-12 RX ORDER — FENTANYL CITRATE 50 UG/ML
INJECTION, SOLUTION INTRAMUSCULAR; INTRAVENOUS AS NEEDED
Status: DISCONTINUED | OUTPATIENT
Start: 2020-03-12 | End: 2020-03-12 | Stop reason: HOSPADM

## 2020-03-12 RX ORDER — DEXAMETHASONE SODIUM PHOSPHATE 4 MG/ML
INJECTION, SOLUTION INTRA-ARTICULAR; INTRALESIONAL; INTRAMUSCULAR; INTRAVENOUS; SOFT TISSUE AS NEEDED
Status: DISCONTINUED | OUTPATIENT
Start: 2020-03-12 | End: 2020-03-12 | Stop reason: HOSPADM

## 2020-03-12 RX ORDER — HYDROCODONE BITARTRATE AND ACETAMINOPHEN 7.5; 325 MG/1; MG/1
1 TABLET ORAL
Qty: 20 TAB | Refills: 0 | Status: SHIPPED | OUTPATIENT
Start: 2020-03-12 | End: 2020-03-19

## 2020-03-12 RX ORDER — ONDANSETRON 2 MG/ML
INJECTION INTRAMUSCULAR; INTRAVENOUS AS NEEDED
Status: DISCONTINUED | OUTPATIENT
Start: 2020-03-12 | End: 2020-03-12 | Stop reason: HOSPADM

## 2020-03-12 RX ORDER — GLYCOPYRROLATE 0.2 MG/ML
INJECTION INTRAMUSCULAR; INTRAVENOUS AS NEEDED
Status: DISCONTINUED | OUTPATIENT
Start: 2020-03-12 | End: 2020-03-12

## 2020-03-12 RX ORDER — SODIUM CHLORIDE, SODIUM LACTATE, POTASSIUM CHLORIDE, CALCIUM CHLORIDE 600; 310; 30; 20 MG/100ML; MG/100ML; MG/100ML; MG/100ML
50 INJECTION, SOLUTION INTRAVENOUS CONTINUOUS
Status: DISCONTINUED | OUTPATIENT
Start: 2020-03-12 | End: 2020-03-12 | Stop reason: HOSPADM

## 2020-03-12 RX ORDER — MIDAZOLAM HYDROCHLORIDE 1 MG/ML
INJECTION, SOLUTION INTRAMUSCULAR; INTRAVENOUS AS NEEDED
Status: DISCONTINUED | OUTPATIENT
Start: 2020-03-12 | End: 2020-03-12 | Stop reason: HOSPADM

## 2020-03-12 RX ORDER — KETOROLAC TROMETHAMINE 30 MG/ML
INJECTION, SOLUTION INTRAMUSCULAR; INTRAVENOUS AS NEEDED
Status: DISCONTINUED | OUTPATIENT
Start: 2020-03-12 | End: 2020-03-12 | Stop reason: HOSPADM

## 2020-03-12 RX ORDER — PROPOFOL 10 MG/ML
INJECTION, EMULSION INTRAVENOUS AS NEEDED
Status: DISCONTINUED | OUTPATIENT
Start: 2020-03-12 | End: 2020-03-12 | Stop reason: HOSPADM

## 2020-03-12 RX ADMIN — ONDANSETRON HYDROCHLORIDE 4 MG: 2 INJECTION, SOLUTION INTRAMUSCULAR; INTRAVENOUS at 13:41

## 2020-03-12 RX ADMIN — KETOROLAC TROMETHAMINE 30 MG: 30 INJECTION, SOLUTION INTRAMUSCULAR; INTRAVENOUS at 14:42

## 2020-03-12 RX ADMIN — PROPOFOL 200 MG: 10 INJECTION, EMULSION INTRAVENOUS at 13:35

## 2020-03-12 RX ADMIN — DEXAMETHASONE SODIUM PHOSPHATE 4 MG: 4 INJECTION, SOLUTION INTRAMUSCULAR; INTRAVENOUS at 13:41

## 2020-03-12 RX ADMIN — SODIUM CHLORIDE, SODIUM LACTATE, POTASSIUM CHLORIDE, AND CALCIUM CHLORIDE 50 ML/HR: 600; 310; 30; 20 INJECTION, SOLUTION INTRAVENOUS at 12:17

## 2020-03-12 RX ADMIN — LIDOCAINE HYDROCHLORIDE 100 MG: 20 INJECTION, SOLUTION EPIDURAL; INFILTRATION; INTRACAUDAL; PERINEURAL at 13:35

## 2020-03-12 RX ADMIN — SODIUM CHLORIDE, POTASSIUM CHLORIDE, SODIUM LACTATE AND CALCIUM CHLORIDE: 600; 310; 30; 20 INJECTION, SOLUTION INTRAVENOUS at 13:21

## 2020-03-12 RX ADMIN — VANCOMYCIN HYDROCHLORIDE 1500 MG: 10 INJECTION, POWDER, LYOPHILIZED, FOR SOLUTION INTRAVENOUS at 12:16

## 2020-03-12 RX ADMIN — MIDAZOLAM 2 MG: 1 INJECTION INTRAMUSCULAR; INTRAVENOUS at 13:21

## 2020-03-12 RX ADMIN — FENTANYL CITRATE 50 MCG: 50 INJECTION, SOLUTION INTRAMUSCULAR; INTRAVENOUS at 14:02

## 2020-03-12 NOTE — ANESTHESIA PREPROCEDURE EVALUATION
Relevant Problems   No relevant active problems       Anesthetic History   No history of anesthetic complications            Review of Systems / Medical History  Patient summary reviewed, nursing notes reviewed and pertinent labs reviewed    Pulmonary  Within defined limits                 Neuro/Psych   Within defined limits           Cardiovascular    Hypertension              Exercise tolerance: >4 METS     GI/Hepatic/Renal                Endo/Other    Diabetes  Hypothyroidism  Arthritis     Other Findings              Physical Exam    Airway  Mallampati: I  TM Distance: > 6 cm  Neck ROM: normal range of motion   Mouth opening: Normal     Cardiovascular    Rhythm: regular  Rate: normal         Dental  No notable dental hx       Pulmonary  Breath sounds clear to auscultation               Abdominal         Other Findings            Anesthetic Plan    ASA: 2  Anesthesia type: general          Induction: Intravenous  Anesthetic plan and risks discussed with: Patient

## 2020-03-12 NOTE — BRIEF OP NOTE
BRIEF OPERATIVE NOTE    Date of Procedure: 3/12/2020   Preoperative Diagnosis: LEFT BREAST CANCER  Postoperative Diagnosis: LEFT BREAST CANCER    Procedure(s):  LEFT BREAST LUMPECTOMY WITH ULTRASOUND  Surgeon(s) and Role:     * Araceli Kidd MD - Primary         Surgical Assistant: Sonya Starr    Surgical Staff:  Circ-1: Flo Castro RN  Scrub RN-1: Luna Ryan RN  Surg Asst-1: Bill Adamson  Event Time In   Incision Start 1404   Incision Close      Anesthesia: General   Estimated Blood Loss: Minimal  Specimens:   ID Type Source Tests Collected by Time Destination   1 : Left breast lumpectomy Fresh Breast  Araceli Kidd MD 3/12/2020 1409 Pathology      Findings: clip directly visualized   Complications: none  Implants: * No implants in log *

## 2020-03-12 NOTE — ROUTINE PROCESS
Patient: Dina Granados MRN: [id number]  SSN: [id number] YOB: 1955  Age: 75 y.o. Sex: female Patient is status post Procedure(s): LEFT BREAST LUMPECTOMY WITH ULTRASOUND. Surgeon(s) and Role: Lynda Multani MD - Primary Local/Dose/Irrigation:  See [facility] Peripheral IV 03/12/20 Right Arm (Active) Airway - Endotracheal Tube 03/12/20 Oral (Active) Dressing/Packing:  Wound Breast Left-Dressing Type: Topical skin adhesive/glue (03/12/20 1300) Splint/Cast:  ] Other:

## 2020-03-12 NOTE — H&P
HISTORY OF PRESENT ILLNESS  Joe Young is a 75 y.o. female. HPI  NEW patient presents for consultation at the request of Dr. Fermin Page for new diagnosis of LEFT breast DCIS.  This was diagnosed after her screening mammogram showed calcifications. Mily Fowler is not feeling any breast lumps, has no nipple discharge/retraction or skin change.  Says that she has had some pain in the upper breast since the biopsy.      02/18/20: LEFT breast stereo bx. PATH: DCIS, solid and comedo pattern with comedonecrosis and intraluminal coarse calcification, nuclear grade 2, ER+(99%), SD not performed. Clinical stage 0.     PMH - LEFT breast cancer (probably DCIS) in 2009.  Had surgery by Dr. Toan Higuera and whole breast radiation by Dr. Connie Daniel at [facility].       Danilo West - A paternal aunt had breast cancer and possible ovarian cancer.  A paternal first cousin was just diagnosed with breast cancer in her [address] son has skin cancer.     BILATERAL screening mammogram 2/5/2020, BIRADS 0, incomplete.     [facility] Results (most recent):          Results from [place] encounter on 02/18/20   [facility] INCL CAD     Addendum Addendum: ADDENDUM TO stereotactic left breast biopsy performed 2/18/2020.  PATHOLOGY RESULTS: Ductal carcinoma in situ.  RESULTS CONVEYED: Results called to the patient by Dr. Cori Halsted 2/19/2020. Kole Vasquez: These results are concordant with the imaging findings.    RECOMMENDATIONS: Imaging Department nurse will facilitate a surgical consultation appointment with Dr. Jing Aiken, as requested.       Steve Soto MD 2/19/2020  6:19 PM           Narrative STUDY: STEREOTACTIC LEFT BREAST BIOPSY     INDICATION:  Calcifications.     PROCEDURE:  The risks and benefits of the procedure were explained to the patient, questions  were answered, and informed consent was obtained.     Calcifications in the 6:00 location of the left breast were localized using  stereotactic technique.  The skin was prepped, and local anesthetic was applied. Multiple biopsy cores were obtained using 9 gauge vacuum-assisted Eviva device  and an inferior approach. Magnification radiography confirmed the presence of  calcifications in the specimens. A metallic clip was deployed at the biopsy  site. There were no immediate complications. Post-biopsy digital mammogram  confirms the presence of the metallic clip at the intended biopsy site. Specimens were forwarded to Pathology for histologic evaluation.        Impression IMPRESSION: Satisfactory left breast stereotactic biopsy of calcifications.   Further recommendations will be based on final pathology results.                    Past Medical History:   Diagnosis Date    Breast cancer [facility])       lumpectomy with radiation, 2009, left    Breast cancer, left ([facility] [address] 2020     DCIS     Diabetes ([facility] [address]      Hyperlipidemia      Hypertension      Radiation therapy complication       [id number]               Past Surgical History:   Procedure Laterality Date    BREAST SURGERY PROCEDURE UNLISTED         lumpectomy    HX BREAST LUMPECTOMY Left       2009    HX ORTHOPAEDIC         Bunion Surgery    HX TUBAL LIGATION             Social History            Socioeconomic History    Marital status:        Spouse name: Not on file    Number of children: Not on file    Years of education: Not on file    Highest education level: Not on file   Occupational History    Not on file   Social Needs    Financial resource strain: Not on file    Food insecurity:       Worry: Not on file       Inability: Not on file    Transportation needs:       Medical: Not on file       Non-medical: Not on file   Tobacco Use    Smoking status: Never Smoker    Smokeless tobacco: Never Used   Substance and Sexual Activity    Alcohol use: No    Drug use: Never    Sexual activity: Never   Lifestyle    Physical activity:       Days per week: Not on file       Minutes per session: Not on file    Stress: Not on file Relationships    Social connections:       Talks on phone: Not on file       Gets together: Not on file       Attends Shinto service: Not on file       Active member of club or organization: Not on file       Attends meetings of clubs or organizations: Not on file       Relationship status: Not on file    Intimate partner violence:       Fear of current or ex partner: Not on file       Emotionally abused: Not on file       Physically abused: Not on file       Forced sexual activity: Not on file   Other Topics Concern    Not on file   Social History Narrative    Not on file                Current Outpatient Medications on File Prior to Visit   Medication Sig Dispense Refill    metFORMIN (GLUCOPHAGE) 1,000 mg tablet TAKE 1 TABLET BY MOUTH TWICE DAILY 180 Tab 0    potassium chloride SA (MICRO-K) 10 mEq capsule TAKE 3 CAPSULES BY MOUTH EVERY  Cap 0    pravastatin (PRAVACHOL) 20 mg tablet TAKE 1 TABLET BY MOUTH EVERY NIGHT 90 Tab 0    lisinopril (PRINIVIL, ZESTRIL) 10 mg tablet TAKE 1 TABLET BY MOUTH EVERY DAY 90 Tab 1    cyanocobalamin 1,000 mcg tablet Take 1,000 mcg by mouth daily.        indapamide (LOZOL) 2.5 mg tablet TAKE 1 TABLET BY MOUTH DAILY 90 Tab 2    ACCU-CHEK SMARTVIEW TEST STRIP strip PLEASE USE TWICE DAILY AS NEEDED 100 Strip 11    glucose blood VI test strips (ACCU-CHEK SMARTVIEW TEST STRIP) strip Please use two times daily as needed for Dx code 250.02 100 Strip 11    diclofenac (VOLTAREN) 1 % gel Apply  to affected area four (4) times daily as needed. 100 g 0    BETA-CAROTENE,A, W-C & E/ZN/CU (VISION-CHARANJIT PRESERVE PO) Take  by mouth two (2) times a day.        Cholecalciferol, Vitamin D3, (VITAMIN D3) 1,000 unit cap Take 5,000 Units by mouth daily.  4 tabs daily  Indications: vitamin D deficiency          No current facility-administered medications on file prior to visit.               Allergies   Allergen Reactions    Pcn [Penicillins] Hives         OB History    No obstetric history on file.       Obstetric Comments   Menarche 13, LMP 2004, # of children 1, age of 4st delivery 23, Hysterectomy/oophorectomy No/No, Breast bx Yes, history of breast feeding No, BCP No, Hormone therapy No                   ROS  Constitutional: Negative.    HENT: Negative.    Eyes: Negative.    Respiratory: Negative.    Cardiovascular: Negative.    Gastrointestinal: Negative.    Genitourinary: Negative.    Musculoskeletal: Negative.    Skin: Negative.    Neurological: Negative.    Endo/Heme/Allergies: Negative.    Psychiatric/Behavioral: Negative.          Physical Exam  Exam conducted with a chaperone present. Cardiovascular:      Rate and Rhythm: Normal rate and regular rhythm. Heart sounds: Normal heart sounds. Pulmonary:      Breath sounds: Normal breath sounds. Chest:      Breasts: Breasts are symmetrical.         Right: Normal. No swelling, bleeding, inverted nipple, mass, nipple discharge, skin change or tenderness. Left: Normal. No swelling, bleeding, inverted nipple, mass, nipple discharge, skin change or tenderness. Lymphadenopathy:      Cervical:      Right cervical: No superficial, deep or posterior cervical adenopathy. Left cervical: No superficial, deep or posterior cervical adenopathy. Upper Body:      Right upper body: No supraclavicular or axillary adenopathy. Left upper body: No supraclavicular or axillary adenopathy.       BREAST ULTRASOUND, Pre-op Planning  Indication: Pre-op planning, LEFT breast  Technique: The area was scanned using a high-frequency linear-array near-field transducer  Findings: Bx clip at 6:00, just above the inframammary fold  Impression: Breast cancer  Disposition: Surgery        ASSESSMENT and PLAN      ICD-10-CM ICD-9-CM     1. Ductal carcinoma in situ (DCIS) of left breast with comedonecrosis D05.12 233.0 MRI BREAST BI W WO CONT         ALPRAZolam (XANAX) 0.5 mg tablet   2.  History of left breast cancer Z85.3 V10.3   Pt presents for for consultation for treatment of LEFT breast DCIS, ER+, clinical stage 0. No palpable findings on exam. US visualizes bx clip at LEFT breast 6:00, just above the inframammary fold.     We had a long discussion of options for treatment. A total of 60 minutes were spent face-to-face with the patient, accompanied by a friend, during this encounter, and over half of that time was spent on counseling and coordination of care. We discussed in depth the pathology results, and the need for surgery following MRI for extent of disease and surgical planning. The goals of treatment are to treat the breast, ensure that there is no invasive component, and to reduce risk of recurrence.     Discussed treatment options with risks, complications, benefits, and limitations, including lumpectomy and mastectomy. Pt notes preference for breast conservation. Since pt's previous treatment was long ago, we discussed repeating the same treatment for this new DCIS, with lumpectomy and possible partial breast XRT. Reviewed the importance of negative margins, and that if margins are positive for cancer cells, we will re-excise in a second surgery. Pt would not need a SLNBx for DCIS. However, if invasive cancer is found, will perform SLNBx in a second operation.     We also covered risk reduction strategies as well as post-surgical therapies including hormonal therapy with estrogen blocker, and possible partial breast XRT. Discussed the use of DCISionRT to help determine need for XRT based on risk of recurrence. Discussed estrogen blocking pill for further risk reduction for ER+ disease. Will refer to radiation oncology and medical oncology for further consultation following surgery.     Pt qualifies for genetic testing given personal and family history.  Sample collected by RN in office today, will f/u with the results.     Will order MRI (prescription given for Xanax) and plan further from there - radiology will call pt to schedule. This plan was reviewed with the patient and patient agrees. All questions were answered.

## 2020-03-12 NOTE — OP NOTES
[address]   OPERATIVE REPORT    Name:  Rajni Dickerson  MR#:  [id number]  :  1955  ACCOUNT #:  [de-identified]  DATE OF SERVICE:  2020    PREOPERATIVE DIAGNOSIS:  Ductal carcinoma in situ of the left breast.    POSTOPERATIVE DIAGNOSIS:  Ductal carcinoma in situ of the left breast.    PROCEDURE PERFORMED:  Left lumpectomy with intraoperative ultrasound. SURGEON:  Dee Sullivan MD    ASSISTANT:  Thomas Newell SA    ANESTHESIA:  General.    COMPLICATIONS:  None. SPECIMENS REMOVED:  Left breast tissue. IMPLANTS:  None. ESTIMATED BLOOD LOSS:  Minimal.    INDICATIONS:  The patient is a 78-year-old female who had previous carcinoma of the left breast, was treated with lumpectomy and radiation, who has a second primary at the 6 o'clock position in the inframammary fold which was ductal carcinoma in situ. PROCEDURE:  After satisfactory induction of general LMA anesthesia, the patient was prepped and draped in sterile fashion. Intraoperative ultrasound was performed and the breast was marked. An incision was made just above the inframammary fold at 6 o'clock on the left breast and deepened through the subcutaneous tissue with Bovie cautery. A standard lumpectomy was then performed down the chest wall. The clip was directly visualized. The specimen was oriented and sent to pathology. All dissection planes were made hemostatic. Tissue advancement flaps were then created in the breast by dissecting the breast parenchyma superiorly over a distance of 6 x 5 cm for a total tissue advancement of approximately 30 sq cm. A second incision was made in the anterior portion of this parenchymal flap and the parenchymal was rotated inferiorly to fill the lumpectomy defect. It was secured with interrupted 3-0 Vicryl sutures. The deep subcutaneous tissues were reapproximated with interrupted 3-0 Vicryl sutures.   The subcutaneous tissue was reapproximated with interrupted 3-0 Vicryl and skin was closed with a running subcuticular 4-0 Monocryl. The patient tolerated the procedure well. No complications. She was taken to the recovery room in stable condition. Berto Huang MD      HARDEEP/[id number]  D:  03/12/2020 14:35  T:  03/12/2020 15:50  JOB #:  [id number]  CC:   MD Pat Foy MD

## 2020-03-12 NOTE — DISCHARGE INSTRUCTIONS
Discharge Instructions from Dr. Dhara Em    · I will call you with the pathology results, typically within 1 week from today. · You may shower, but no hot tubs, swimming pools, or baths until your incision is healed. · No heavy lifting with the affected extremity (nothing greater than 5 pounds), and limit its use for the next 4-5 days. · You may use an ice pack for comfort for the next couple of days, but do not place ice directly on the skin. Rather, use a towel or clothing to serve as a barrier between skin and ice to prevent injury. · If I placed a drain, follow the drain instructions provided, especially as you keep a record of the drain output. · Follow medication instructions carefully. · Watch for signs of infection as listed below. · Redness  · Swelling  · Drainage from the incision or from your nipple that appears infected  · Fever over 101 degrees for consecutive readings, or over 99.5 if you are currently undergoing chemotherapy. · Call our office (number is below) for a follow-up appointment. · If you have any problems, our phone number is [phone].

## 2020-03-13 ENCOUNTER — TELEPHONE (OUTPATIENT)
Dept: SURGERY | Age: 65
End: 2020-03-13

## 2020-03-13 NOTE — TELEPHONE ENCOUNTER
Returned patient's Hello message. She is POD #1 after a LEFT lumpectomy. Noticed some bright red blood last night on her dressing, but today the drainage seems to be \"slowing down. \"  Says that she almost \"called 911. \"   I told her to keep this covered over the weekend and to call Monday if things are not better. I encouraged her not to go to the ER over the weekend if she has problems but rather to call our office to be connected to the on-call provider. She was very appreciative.

## 2020-03-13 NOTE — PROGRESS NOTES
Chaparro Calderon, hope you are getting ready for your surgery. Your HbA1C( Diabetes number) came back same. Uric acid levels are higher than before. We are not going to make any changes until your procedure is done. Tiffani Altamirano! Let me know if you need anything.

## 2020-03-13 NOTE — ANESTHESIA POSTPROCEDURE EVALUATION
Post-Anesthesia Evaluation and Assessment    Patient: Jose Gilliland MRN: [id number]  SSN: [id number]    YOB: 1955  Age: 75 y.o. Sex: female      I have evaluated the patient and they are stable and ready for discharge from the PACU. Cardiovascular Function/Vital Signs  Visit Vitals  /70   Pulse 84   Temp 36.9 °C (98.4 °F)   Resp 16   Wt 98 kg (216 lb)   SpO2 96%   BMI 36.50 kg/m²       Patient is status post General anesthesia for Procedure(s):  LEFT BREAST LUMPECTOMY WITH ULTRASOUND. Nausea/Vomiting: None    Postoperative hydration reviewed and adequate. Pain:  Pain Scale 1: Numeric (0 - 10) (03/12/20 1500)  Pain Intensity 1: 0 (03/12/20 1500)   Managed    Neurological Status:   Neuro (WDL): Within Defined Limits (03/12/20 1500)   At baseline    Mental Status, Level of Consciousness: Alert and  oriented to person, place, and time    Pulmonary Status:   O2 Device: Room air (03/12/20 1518)   Adequate oxygenation and airway patent    Complications related to anesthesia: None    Post-anesthesia assessment completed. No concerns    Signed By: Chintan Parks MD     March 13, 2020              Procedure(s):  LEFT BREAST LUMPECTOMY WITH ULTRASOUND. general    <BSHSIANPOST>    Vitals Value Taken Time   /67 3/12/2020  3:00 PM   Temp 36.9 °C (98.4 °F) 3/12/2020  2:48 PM   Pulse 81 3/12/2020  3:05 PM   Resp 19 3/12/2020  3:05 PM   SpO2 97 % 3/12/2020  3:05 PM   Vitals shown include unvalidated device data.

## 2020-03-18 ENCOUNTER — DOCUMENTATION ONLY (OUTPATIENT)
Dept: SURGERY | Age: 65
End: 2020-03-18

## 2020-03-18 ENCOUNTER — TELEPHONE (OUTPATIENT)
Dept: SURGERY | Age: 65
End: 2020-03-18

## 2020-03-18 NOTE — PROGRESS NOTES
Faxed TRF to [facility] to order [phone] per order of Dr. Edu Bo, confirmation received. Insurance letter mailed to patient.

## 2020-03-24 ENCOUNTER — TELEPHONE (OUTPATIENT)
Dept: SURGERY | Age: 65
End: 2020-03-24

## 2020-03-24 NOTE — TELEPHONE ENCOUNTER
Attempted to call patient with genetic testing results (GENEVA VUS), but she was not home and not available on her cell phone. I will try again tomorrow.

## 2020-03-30 ENCOUNTER — TELEPHONE (OUTPATIENT)
Dept: SURGERY | Age: 65
End: 2020-03-30

## 2020-03-30 NOTE — TELEPHONE ENCOUNTER
Called patient to see how she was healing. Incision has healed - glue has come off and bruising has resolved. Reviewed DCISionRT results - low risk. Does not want XRT and declined referral.    Reviewed genetic testing results - VUS of NBN. Would like to see med onc to discuss AI, but is uncomfortable coming for appointments at this time due to COVID-19. Would like to see med onc in June which is not unreasonable. Patient is ok to cancel post-op visit since she is not having issues and we are in the midst of the Covid-19 pandemic. She will contact the office if she has issues. Follow-up with me in 4-5 months.

## 2020-04-01 ENCOUNTER — DOCUMENTATION ONLY (OUTPATIENT)
Dept: SURGERY | Age: 65
End: 2020-04-01

## 2020-04-01 DIAGNOSIS — D05.12 DUCTAL CARCINOMA IN SITU OF LEFT BREAST: Primary | ICD-10-CM

## 2020-04-01 DIAGNOSIS — Z85.3 HISTORY OF LEFT BREAST CANCER: ICD-10-CM

## 2020-04-01 NOTE — PROGRESS NOTES
Dr Tomer Calderón appointment is at 04/08/20 at 1:00 PM  I have called and spoken with the patient. She was a little confused on why she had to go so soon to see another doctor.

## 2020-04-03 ENCOUNTER — TELEPHONE (OUTPATIENT)
Dept: SURGERY | Age: 65
End: 2020-04-03

## 2020-04-03 NOTE — TELEPHONE ENCOUNTER
Called and spoke with patient. Is comfortable having med onc appointment next week. Reports she is healing well.

## 2020-04-07 ENCOUNTER — TELEPHONE (OUTPATIENT)
Dept: ONCOLOGY | Age: 65
End: 2020-04-07

## 2020-04-07 NOTE — TELEPHONE ENCOUNTER
Voicemail left requesting a call back. Called patient to confirm appointment, complete COIVD-19 screening, and inform patient that the waiting room is closed and no visitors are able to come into the office.

## 2020-04-07 NOTE — TELEPHONE ENCOUNTER
Patient returned call. Patient questioned if she should come in for her appointment or not. Encouraged patient to keep her appointment as the waiting room has been closed and everyone coming into the office has to go through a screening. Informed patient that it is up to her is she does not feel comfortable coming in. Patient stated that she will keep the appointment and denied any symptoms and verbalized understanding.

## 2020-04-08 ENCOUNTER — DOCUMENTATION ONLY (OUTPATIENT)
Dept: ONCOLOGY | Age: 65
End: 2020-04-08

## 2020-04-08 ENCOUNTER — OFFICE VISIT (OUTPATIENT)
Dept: ONCOLOGY | Age: 65
End: 2020-04-08

## 2020-04-08 VITALS
SYSTOLIC BLOOD PRESSURE: 154 MMHG | WEIGHT: 213 LBS | TEMPERATURE: 97.9 F | OXYGEN SATURATION: 99 % | BODY MASS INDEX: 36.37 KG/M2 | DIASTOLIC BLOOD PRESSURE: 82 MMHG | HEART RATE: 83 BPM | HEIGHT: 64 IN

## 2020-04-08 DIAGNOSIS — E78.00 HIGH CHOLESTEROL: ICD-10-CM

## 2020-04-08 DIAGNOSIS — Z98.890 HISTORY OF LUMPECTOMY OF LEFT BREAST: ICD-10-CM

## 2020-04-08 DIAGNOSIS — E11.9 CONTROLLED TYPE 2 DIABETES MELLITUS WITHOUT COMPLICATION, WITHOUT LONG-TERM CURRENT USE OF INSULIN (HCC): ICD-10-CM

## 2020-04-08 DIAGNOSIS — I10 ESSENTIAL HYPERTENSION: ICD-10-CM

## 2020-04-08 DIAGNOSIS — D05.12 DUCTAL CARCINOMA IN SITU (DCIS) OF LEFT BREAST WITH COMEDONECROSIS: Primary | ICD-10-CM

## 2020-04-08 NOTE — PROGRESS NOTES
Cate Stanton is a 75 y.o. female  Chief Complaint   Patient presents with    New Patient    Breast Cancer     1. Have you been to the ER, urgent care clinic since your last visit? Hospitalized since your last visit? no    2. Have you seen or consulted any other health care providers outside of the [address] [place] [address] since your last visit? Include any pap smears or colon screening.  no

## 2020-04-08 NOTE — PROGRESS NOTES
This note will not be viewable in [address] Oncology Navigator  Psychosocial Assessment    Reason for Assessment:    []Depression  []Anxiety  []Caregiver [place]  []Maladaptive Coping with Serious Illness   [] Social Work Referral [x] Initial Assessment  [] Other     Sources of Information:    [x]Patient  []Family  []Staff  []Medical Record    Advance Care Planning:  Advance Care Planning 3/10/2020   Confirm Advance Directive None   Patient Would Like to Complete Advance Directive No   Patient does not have an advanced medical directive, and did not express interest in completing one today.     Mental Status:    [x]Alert  []Lethargic  []Unresponsive   [] Unable to assess   Oriented to:  [x]Person  [x]Place  [x]Time  [x]Situation      Barriers to Learning:    []Language  []Developmental  []Cognitive  []Altered Mental Status  []Visual/Hearing Impairment  []Unable to Read/Write  []Motivational   [x]No Barriers Identified  []Other:    Relationship Status:  []Single  []  []Significant Other/Life Partner  []  []  [x]      Living Circumstances:  [x]Lives Alone  []Family/Significant Other in Household  []Roommates  []Children in the Home  []Paid Caregivers  []Assisted Living Facility/Group Home  []Skilled [address]  []Homeless  []Incarcerated  []Environmental/Care Concerns  []other:    Employment Status:  []Employed Full-time []Employed Part-time []Homemaker [] Disabled  [x] Retired []Other:    Support System:    [x]Strong  []Fair  []Limited    Financial/Legal Concerns:    []Uninsured  []Limited Income/Resources  []Non-Citizen  [x]No Concerns Identified  []Financial POA:    []Other:    Anabaptism/Spiritual/Existential:  []Strong Sense of Spirituality  []Involved in [employer]  []Request  Visit  []Expressing Bebe Leghorn  [x]No Concerns Identified    Coping with Illness:         Patient: Family/Caregiver:   Understanding and Acceptance of Illness/Prognosis  [x] []   Strong Sense of Resilience [x] []   Self Reflection [x] []   Engaged Support System [x] []   Does not Readily Discuss Illness [] []   Denial of Terminal Status [] []   Anger [] []   Depression [] []   Anxiety/Fear [] []   Bargaining [] []   Recent Diagnosis/Prognosis [] []   Difficulties with Body Image [] []   Loss of Identity [] []   Excessive Substance Use [] []   Mental Health History [] []   Enmeshed Relationships [] []   History of Loss [] []   Anticipatory Grief [] []   Concern for Complicated Grief [] []   Suicidal Ideation or Plan [] []   Unable to assess [] [x]            Narrative:   Met with the patient during her initial office visit. The patient is being seen for breast DCIS. The patient is , and lives alone. She has a son who lives locally. She has 3 grandchildren ages 24, 23, and 1. She has a PCP - Dr. Kamlesh Inman. The patient is retired; she worked for [employer], specifically for [employer] and [employer]. Provided this 's contact information as well as information regarding the complementary services provided by the [facility]. Explained that the [facility] is temporarily closed due to COVID-19, but still offering virtual meditation and music therapy. Assessment/Action:   1. Introduced self and role of this  in the [address] [place] [address] Dr. 2.  Informed the patient of the [facility] and available resources there. 3.  Continue to meet with the patient when she returns to the clinic for ongoing assessment of the patients adjustment to her diagnosis and treatment. 4.  Ongoing psychosocial support as desired by patient.       Plan/Referral:   Complementary therapies referral  Pat Coronel LCSW

## 2020-04-08 NOTE — LETTER
4/8/2020 1:40 PM 
 
Patient:  Abelino Carlson YOB: 1955 Date of Visit: 4/8/2020 Dear No Recipients: Thank you for referring Ms. Kiara Ryan to me for evaluation/treatment. Below are the relevant portions of my assessment and plan of care. If you have questions, please do not hesitate to call me. I look forward to following MsMirtha Gayle Rasheed along with you. Sincerely, Ayala Fernandes, DO

## 2020-04-08 NOTE — PROGRESS NOTES
[facility] [place] [facility] [place] [address] [address], [address], [place]: [phone]  F: [phone]    Reason for Visit:   Minh Perez is a 75 y.o. female who is seen in consultation at the request of Dr. 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 Treatment History:   LEFT Lumpectomy 3/20  LEFT lumpectomy/ radiation 2009. No tamoxifen by choice. STAGE: 0 ER+    History of Present Illness:     Pt seen today for office consult for new LEFT breast DCIS dx 2/20 post lumpectomy  Abnormal screening mammo on LEFT 2/20.     02/18/20: LEFT breast stereo bx. PATH: DCIS, solid and comedo pattern with comedonecrosis and intraluminal coarse calcification, nuclear grade 2, ER+(99%), IL not performed. Clinical stage 0. MRI 3/5/20 no other abnormalities. Excision 3/20:   FINAL PATHOLOGIC DIAGNOSIS   Left breast, excision:   Ductal carcinoma in situ, intermediate grade (grade 2). Size: 12 mm. Necrosis: Present, Central comedo-type necrosis. Margins uninvolved by DCIS: Distance from closest margin: <1 mm, posterior. Regional lymph nodes: No lymph nodes submitted or found. Pathologic stage: PTis NX   Additional pathologic findings: Biopsy site changes with squamous metaplasia.      Pt has hx of ? LEFT breast cancer (probably DCIS) in 2009.  Had surgery by Dr. Feli Gomez and whole breast radiation by Dr. Michael Turk at [facility].    Did not want to take tamoxifen then. Pt is wearing a mask today due to COVID. Feels healthy overall. Has DM/HTN/ high cholesterol. Does not want to take tamoxifen or a pill for prevenation. FH - A paternal aunt had breast cancer and possible ovarian cancer.  A paternal first cousin was just diagnosed with breast cancer in her [address] son has skin cancer.     Past Medical History:   Diagnosis Date    Adverse effect of anesthesia     SLOW TO AWAKEN     Arthritis     Breast cancer ([address]     lumpectomy with radiation, 2009, left    Breast cancer, left [facility] 2020    DCIS     Diabetes ([facility]     Hyperlipidemia     Hypertension     Radiation therapy complication     7302    Thyroid disease     GOARDER ON THROAT       Past Surgical History:   Procedure Laterality Date    BREAST SURGERY PROCEDURE UNLISTED      lumpectomy    HX BREAST LUMPECTOMY Left     2009    HX BREAST LUMPECTOMY Left 3/12/2020    LEFT BREAST LUMPECTOMY WITH ULTRASOUND performed by Santo Alva MD at [address] [place] [address] AMBULATORY OR    HX ORTHOPAEDIC      Bunion Surgery BOTH FEET     HX TUBAL LIGATION        Social History     Tobacco Use    Smoking status: Never Smoker    Smokeless tobacco: Never Used   Substance Use Topics    Alcohol use: No      Family History   Problem Relation Age of Onset    Diabetes Father     Stroke Sister     Diabetes Brother     Heart Disease Mother     Hypertension Sister     Diabetes Brother     Diabetes Brother     Diabetes Brother     Cancer Son         LUMP ON NECK     Anesth Problems Neg Hx      Current Outpatient Medications   Medication Sig    indapamide (LOZOL) 1.25 mg tablet Take 1 Tab by mouth daily for 90 days.  metFORMIN (GLUCOPHAGE) 1,000 mg tablet TAKE 1 TABLET BY MOUTH TWICE DAILY    potassium chloride SA (MICRO-K) 10 mEq capsule TAKE 3 CAPSULES BY MOUTH EVERY DAY    pravastatin (PRAVACHOL) 20 mg tablet TAKE 1 TABLET BY MOUTH EVERY NIGHT    lisinopril (PRINIVIL, ZESTRIL) 10 mg tablet TAKE 1 TABLET BY MOUTH EVERY DAY    ACCU-CHEK SMARTVIEW TEST STRIP strip PLEASE USE TWICE DAILY AS NEEDED    glucose blood VI test strips (ACCU-CHEK SMARTVIEW TEST STRIP) strip Please use two times daily as needed for Dx code 250.02    diclofenac (VOLTAREN) 1 % gel Apply  to affected area four (4) times daily as needed.  cyanocobalamin 1,000 mcg tablet Take 1,000 mcg by mouth daily.  BETA-CAROTENE,A, W-C & E/ZN/CU (VISION-CHARANJIT PRESERVE PO) Take  by mouth two (2) times a day.     Cholecalciferol, Vitamin D3, (VITAMIN D3) 1,000 unit cap Take 5,000 Units by mouth daily. 4 tabs daily  Indications: vitamin D deficiency     No current facility-administered medications for this visit. Allergies   Allergen Reactions    Pcn [Penicillins] Hives        Review of Systems: A complete review of systems was obtained, negative except as described above. Physical Exam:     Visit Vitals  /82   Pulse 83   Temp 97.9 °F (36.6 °C)   Ht 5' 4\" (1.626 m)   Wt 213 lb (96.6 kg)   SpO2 99%   BMI 36.56 kg/m²     ECOG PS: 0  General: No distress  Eyes: PERRLA, anicteric sclerae  HENT: Atraumatic, OP clear  Neck: Supple  Lymphatic: No cervical, supraclavicular  Respiratory: CTAB, normal respiratory effort  CV: Normal rate, regular rhythm, no murmurs, no peripheral edema  GI: Soft, nontender, nondistended, no masses  MS: Normal gait and station. Digits without clubbing or cyanosis. Skin: No rashes, ecchymoses, or petechiae. Normal temperature, turgor, and texture. Psych: Alert, oriented, appropriate affect, normal judgment/insight  Breast LEFT lumpectomy healing well. Old lump scar with lesions. No masses palpable b/l     Results:     Lab Results   Component Value Date/Time    WBC 5.1 03/10/2020 09:38 AM    HGB 10.0 (L) 03/10/2020 09:38 AM    HCT 31.5 (L) 03/10/2020 09:38 AM    PLATELET 183 59/03/1279 09:38 AM    MCV 87 03/10/2020 09:38 AM    ABS.  NEUTROPHILS 2.6 03/30/2017 08:24 AM     Lab Results   Component Value Date/Time    Sodium 142 03/10/2020 09:38 AM    Potassium 3.9 03/10/2020 09:38 AM    Chloride 102 03/10/2020 09:38 AM    CO2 25 03/10/2020 09:38 AM    Glucose 93 03/10/2020 09:38 AM    BUN 19 03/10/2020 09:38 AM    Creatinine 0.93 03/10/2020 09:38 AM    GFR est AA 74 03/10/2020 09:38 AM    GFR est non-AA 64 03/10/2020 09:38 AM    Calcium 10.0 03/10/2020 09:38 AM    Glucose (POC) 141 (H) 03/12/2020 12:11 PM    Creatinine (POC) 0.9 03/05/2020 08:25 AM     Lab Results   Component Value Date/Time    Bilirubin, total 0.5 03/10/2020 09:38 AM    ALT (SGPT) 13 03/10/2020 09:38 AM    AST (SGOT) 14 03/10/2020 09:38 AM    Alk. phosphatase 70 03/10/2020 09:38 AM    Protein, total 6.9 03/10/2020 09:38 AM    Albumin 4.4 03/10/2020 09:38 AM         Records reviewed and summarized above. Pathology report(s) reviewed above. Radiology report(s) reviewed above. Assessment/PLAN:     1) stage 0 LEFT breast DCIS ER+ with hx of lumpectomy 3/20. Hx of ? LEFT Breast DCIS 2009 at [facility] with hx of lumpectomy/ radiation and no tamoxifen by choice. Records reviewed. Reviewed path and data today. Reviewed dx, stage and treatment of breast cancer. Pt did well with surgery. Feels healthy overall but has medical problems. Discussed preventative tamoxifen or AI today. Gave info on tamoxifen. Pt choose not to take any pill for prevention in past.   Pt is not interested in taking a pill for prevention now. Cannot have radiation as had this in past. Had [address] and low risk. Has genetic testing and reviewed VUS today. Pt given copies of all her info today. Pt will have a course of surveillance. Next mammo in 6 months. Routine care with PCP. Pt will f/u with surgery in 3 months and us in 6 months. 2) DM/HTN/high cholesterol. Per PCP. 3) psychosocial.  Mood good. Has family support. SW support today. Staying home due to [place]. F/u here in 6 months  Will see surgery in 3 months  Call if questions. I appreciate the opportunity to participate in Ms. Cheyenne Ross's care.     Signed By: Mady Yeh DO

## 2020-04-08 NOTE — PATIENT INSTRUCTIONS
Tamoxifen   This information is about a hormonal therapy used to treat breast cancer called tamoxifen. It should ideally be read with our general information about breast cancer or, if appropriate, secondary breast cancer. On this page     Tamoxifen   How it works   How it is taken   When it may be given   Preventative treatment   Possible side effects   Less common side effects   Long-term side effects   Additional information   Things to remember about tamoxifen tablets   References    Tamoxifen Back to top  Tamoxifen is an anti-oestrogen drug that was developed over 30 years ago. It is used widely to treat breast cancer and is occasionally used to treat some other cancers. Tamoxifen can also be used to treat or prevent side effects of breast tenderness and swelling in some men with prostate cancer . These side effects can happen as a result of other hormonal treatments. How it works Back to top  The way in which tamoxifen works is quite complicated and not yet fully understood. Its main function is as an anti-oestrogen drug. Many breast cancers rely on the female sex hormone oestrogen to grow. Hormone-sensitive breast cancer cells have proteins called receptors that the sex hormones attach to. They are known as 'oestrogen-receptor positive' (ER-positive) breast cancers. When oestrogen comes into contact with the receptors, it fits into them and stimulates the cancer cells to divide so that the tumour grows. Tamoxifen fits into the oestrogen receptor and blocks oestrogen from reaching the cancer cells. This means the cancer either grows more slowly or stops growing altogether. How it is taken Back to top  Tamoxifen is a tablet that should be swallowed whole with a glass of water. The tablets come in different strengths: 10mg, 20mg and 40mg. The drug is manufactured under several different brand names and these may appear on the tablets.  It's also available as a sugar free syrup for people who have difficulty swallowing tablets. Tamoxifen is usually prescribed as a single daily dose and this should be taken at the same time each day. You may prefer to take the tablet with food as it may make you feel sick and can leave a metallic taste in your mouth. It's best to find a convenient time for you and stick to it. When it may be given Back to top  Your doctor will take into account a number of different factors when planning your treatment. The breast cancer cells will be tested to see if they have have oestrogen receptors (ER) and progesterone receptors (NV). Tamoxifen is only effective for people who have hormone-sensitive breast cancers. Tamoxifen may be given after surgery for early breast cancer, to reduce the risk of the cancer coming back. This is known as adjuvant therapy. You may be given tamoxifen for five years. Some people may have tamoxifen for two or three years and then change to a different type of hormonal therapy known as an aromatase inhibitor. Aromatase inhibitors include anastrozole (Arimidex®), exemestane (Aromasin®) and letrozole (Femara®). Occasionally tamoxifen is used before surgery, to shrink a large breast cancer. This means that a lumpectomy (removal of the lump) may be possible, rather than removal of the whole breast (a mastectomy). Tamoxifen can also be used to control a cancer that has come back or spread to other parts of the body. Preventative treatment Back to top  Research has been carried out to see if tamoxifen can prevent breast cancer in women who have a high risk of developing the disease. 'High risk' is defined as having one or more close relatives (mother or sister) who had breast cancer before they were 48. The results from some research trials show that tamoxifen helps to prevent ER-positive breast cancers in high risk women, but not ER-negative breast cancers. Other trials are still ongoing.    If you think that a member of your family may be at high risk of breast cancer, they should discuss the possible monitoring and treatment options with their doctor. Possible side effects Back to top  Each person's reaction to any medicine is different. Some people have very few side effects while others may experience more. Very rarely, if the side effects are severe, you may have to stop taking tamoxifen and a different drug may be prescribed. You may have some of the following side effects, to varying degrees:   Hot flushes and sweats These are a common side effect of tamoxifen. They may gradually lessen over the first few months but some people continue to have them for as long as they take tamoxifen. There are a number of ways to help reduce or control hot flushes and sweats. Some people find it helps to avoid or cut down on tea, coffee, nicotine and alcohol. Research suggests that some types of antidepressants may be helpful in controlling this side effect. Your doctor or nurse can discuss this with you. Some people may find complementary therapies such as acupuncture helpful. Your GP may be able to give you details about having these on the NHS. If you find your own complementary therapist, make sure that they are properly qualified and registered. If the hot flushes are really troublesome, you can discuss possible treatments with your doctor. They may be able to prescribe a different hormonal therapy instead. Sometimes the hot flushes continue for a long time after you have stopped taking tamoxifen. You can read more about treatments for menopausal symptoms like hot flushes in our information about Breast cancer and menopausal symptoms. Nausea and indigestion Feeling sick (nausea) and indigestion are fairly common, but can often be relieved by taking your tablets with food or milk, or at night. Although nausea is quite common initially, it usually wears off after a few weeks.    Weight gain Some women notice that they put on weight while taking tamoxifen. This may be due to water retention. Change in periods Women who have not yet reached the menopause may notice that their monthly periods change - they may become irregular, lighter or sometimes stop altogether. Some women also notice an increase in vaginal discharge and itching of the area around the vagina (the vulva). Leg cramps Some women get leg cramps with tamoxifen. Walking may stretch the muscle and help with this. Let your doctor know if leg cramps are a problem. If your leg becomes red, hot or swollen (see blood clots, below), tell your doctor immediately. Less common side effects Back to top  Depression, tiredness and dizziness Some women feel depressed while taking tamoxifen, but this may be due to other causes. Headaches Some people affected by migraine notice a change in the pattern of their headaches. Blood clots (thrombosis) In post-menopausal women, tamoxifen can slightly increase the risk of blood clots and strokes. If you have any pain, warmth, swelling or tenderness in an arm or leg (or any chest pain), you must tell your doctor straight away. Vision problems Blurred or reduced vision is very rare, but any changes in your eyesight should be reported to your doctor. Voice changes This has been reported by some people. Professional singers may want to seek help and advice from their doctor. Tumour flare People who are prescribed tamoxifen for cancer that has spread to the bones, may experience something called tumour flare when they start taking tamoxifen. This can cause a raised level of calcium in the blood (hypercalcaemia), with symptoms of nausea, vomiting and thirst. Very occasionally, a short stay in hospital is needed until the calcium levels have been reduced. Rare side effects of tamoxifen These may include mild allergic reactions, such as skin rashes and hair loss (this will grow back later).    Always let your doctor or nurse know about any side effects you have. There are usually ways in which they can be controlled or improved. Long-term side effects Back to top  Studies have shown that post-menopausal women who take tamoxifen over a long period of time may have a very slightly increased risk of developing cancer of the lining of the womb (endometrial cancer). However, this small risk is generally outweighed by the benefits of taking tamoxifen. If detected early, treatment for endometrial cancer is usually very successful. An early warning sign is abnormal vaginal bleeding, although this is often caused by a non-cancerous condition such as polyps. If you have any abnormal vaginal bleeding you should let your doctor know. Some women may have regular gynaecological check-ups. Additional information Back to top  Contraception Tamoxifen should not be taken during pregnancy as it may harm the developing baby. Although it can affect a woman's periods, tamoxifen is not a contraceptive. It's important to use an effective, non-hormonal form of contraception during treatment. Fertility Women who haven't been through their menopause may still become pregnant when they've finished treatment with tamoxifen. Doctors usually advise you to wait for a few months after tamoxifen treatment is over before you try to get pregnant. Talk to your doctor first if you are thinking of getting pregnant. Risk of blood clots If you have a history of blood clots or deep vein thrombosis (DVT) let your doctor know, as tamoxifen may not be suitable for you. Interaction with warfarin Tamoxifen can increase the effect of the drug warfarin, which is used to thin the blood in people prone to blood clots. If you are taking warfarin, let your doctor know straight away.    CYP2D6 inhibitors There is some research that suggests some drugs - including the antidepressants paroxetine (Seroxat®) and fluoxetine (Prozac®) - may cause tamoxifen to be less effective, but this isn't certain. Tell your doctors about any other medicines you are taking. Bone loss Tamoxifen may help to prevent bone loss in women who have been through their menopause. In some women it may reduce the risk of thinning of the bone (osteoporosis). Heart disease Tamoxifen can lower the level of fat (lipids) in your blood (high levels of fat contribute to heart disease). But research doesn't show that tamoxifen reduces the risk of heart disease. Things to remember about tamoxifen tablets Back to top  Tamoxifen may interact with other medicines. Tell your doctor about any medicines you are taking, including non-prescribed drugs such as complementary therapies, vitamins and herbal drugs. Keep the tablets in a safe place, out of reach of children. Keep the tablets in the original packaging/container and store them at room temperature, away from moisture, direct heat and sunlight. Store the tablets in the original packaging/container. They should be kept at room temperature, away from moisture and direct heat. If your doctor decides to stop the treatment, return any remaining tablets to the pharmacist. Dorice Or not flush them down the toilet or throw them away. Don't worry if you forget to take your tablet. The levels of the drug in your blood will not change very much, but try not to miss more than one or two tablets in a row. Remember to get a new prescription a few weeks before you run out of tablets. Make sure you have plenty for holidays. References Back to top  This section has been compiled using information from a number of reliable sources including:   Óscar palmer al. Selwyn Spears: The Complete Drug Reference. 36th edition. 2009. [employer]. [employer]. 59th edition. 2010. [facility] and [address] [place] [address]. [place] [employer] www.[employer]/online/index. do (accessed September 2010)   electronic Medicines Compendium [facility]). www.medicines. org.uk (accessed September 2010)   Gracie SETHI, et al. Selective seratonin reuptake inhibitors and breast cancer mortality in women receiving tamoxifen: a population based cohort study. BMJ. 2010. 340: T160. For further references, please see the general bibliography.

## 2020-04-20 DIAGNOSIS — I10 ESSENTIAL HYPERTENSION: ICD-10-CM

## 2020-04-20 DIAGNOSIS — E78.2 MIXED HYPERLIPIDEMIA: ICD-10-CM

## 2020-04-20 RX ORDER — PRAVASTATIN SODIUM 20 MG/1
TABLET ORAL
Qty: 90 TAB | Refills: 0 | Status: SHIPPED | OUTPATIENT
Start: 2020-04-20 | End: 2020-07-16

## 2020-04-20 RX ORDER — INDAPAMIDE 2.5 MG/1
TABLET, FILM COATED ORAL
Qty: 90 TAB | Refills: 2 | Status: SHIPPED | OUTPATIENT
Start: 2020-04-20 | End: 2020-07-08 | Stop reason: ALTCHOICE

## 2020-05-05 RX ORDER — POTASSIUM CHLORIDE 750 MG/1
CAPSULE, EXTENDED RELEASE ORAL
Qty: 270 CAP | Refills: 0 | Status: SHIPPED | OUTPATIENT
Start: 2020-05-05 | End: 2020-08-02

## 2020-05-14 ENCOUNTER — DOCUMENTATION ONLY (OUTPATIENT)
Dept: SURGERY | Age: 65
End: 2020-05-14

## 2020-05-14 NOTE — PROGRESS NOTES
The patient called and left a message on the voicemail that she has a follow up appointment with Dr. Rosa Logan in August but recently her LEFT breast has become very firm. The patient is S/P LEFT lumpectomy in 3/2020 and LEFT lumpectomy and XRT in 2009. I tried to call her back and left a message that one of the nurses or NP Macrina Zheng would be calling her back.

## 2020-05-15 ENCOUNTER — TELEPHONE (OUTPATIENT)
Dept: SURGERY | Age: 65
End: 2020-05-15

## 2020-05-18 ENCOUNTER — TELEPHONE (OUTPATIENT)
Dept: SURGERY | Age: 65
End: 2020-05-18

## 2020-05-18 NOTE — TELEPHONE ENCOUNTER
Spoke with patient. Reports hardness to breast after surgery in 3/2020. No redness or drainage. Mildly tender with firm palpation. Reviewed post-op changes to the breast, s/sx of infection and worrisome changes to the breast.    Had virtual post-op visit on 3/30/20 due to COVID-19 pandemic. She has a follow-up appt scheduled for 8/2020 and will contact the office if she has concerns prior to that.

## 2020-05-26 DIAGNOSIS — I10 ESSENTIAL HYPERTENSION: ICD-10-CM

## 2020-05-26 RX ORDER — INDAPAMIDE 1.25 MG/1
TABLET, FILM COATED ORAL
Qty: 90 TAB | Refills: 0 | Status: SHIPPED | OUTPATIENT
Start: 2020-05-26 | End: 2020-08-23

## 2020-05-31 DIAGNOSIS — E11.9 CONTROLLED TYPE 2 DIABETES MELLITUS WITHOUT COMPLICATION, WITHOUT LONG-TERM CURRENT USE OF INSULIN (HCC): ICD-10-CM

## 2020-06-01 RX ORDER — METFORMIN HYDROCHLORIDE 1000 MG/1
TABLET ORAL
Qty: 180 TAB | Refills: 0 | Status: SHIPPED | OUTPATIENT
Start: 2020-06-01 | End: 2020-08-27

## 2020-06-11 DIAGNOSIS — I10 ESSENTIAL HYPERTENSION: ICD-10-CM

## 2020-06-11 RX ORDER — LISINOPRIL 10 MG/1
TABLET ORAL
Qty: 90 TAB | Refills: 1 | Status: SHIPPED | OUTPATIENT
Start: 2020-06-11 | End: 2020-12-30 | Stop reason: SDUPTHER

## 2020-07-08 ENCOUNTER — OFFICE VISIT (OUTPATIENT)
Dept: PRIMARY CARE CLINIC | Age: 65
End: 2020-07-08

## 2020-07-08 VITALS
OXYGEN SATURATION: 100 % | DIASTOLIC BLOOD PRESSURE: 74 MMHG | BODY MASS INDEX: 36.16 KG/M2 | RESPIRATION RATE: 18 BRPM | HEIGHT: 64 IN | WEIGHT: 211.8 LBS | HEART RATE: 57 BPM | SYSTOLIC BLOOD PRESSURE: 136 MMHG | TEMPERATURE: 97.7 F

## 2020-07-08 DIAGNOSIS — M1A.0720 IDIOPATHIC CHRONIC GOUT OF LEFT FOOT WITHOUT TOPHUS: ICD-10-CM

## 2020-07-08 DIAGNOSIS — E11.9 CONTROLLED TYPE 2 DIABETES MELLITUS WITHOUT COMPLICATION, WITHOUT LONG-TERM CURRENT USE OF INSULIN (HCC): ICD-10-CM

## 2020-07-08 DIAGNOSIS — I10 ESSENTIAL HYPERTENSION: Primary | ICD-10-CM

## 2020-07-08 DIAGNOSIS — Z23 NEED FOR VACCINATION AGAINST STREPTOCOCCUS PNEUMONIAE: ICD-10-CM

## 2020-07-08 NOTE — PROGRESS NOTES
Written by Gilberto Contreras, as dictated by Dr. Stevo Parry MD.    Abdiaziz Montiel is a 77 y.o. female. HPI  Pt presents today for routine care follow-up. BP is elevated in office today at 145/71, 136/74 on manual repeat in R arm while sitting down. Her BP was low at her last office visit (101/58 on 3/10/20) so we had decreased her indapamide dose. She has been monitoring her BP at home since then, and it has been closer to 136/74. She is compliant on lisinopril 10 mg, indapamide 1.25 mg,  potassium 10 mEq tablets, and pravastatin 20 mg. She checks her BG at home and it is usually 130 in the mornings. She is hoping to get it lower, and she is compliant on metformin 1000 mg BID. She has had some painful episodes with gout in her L foot, and she would like to have her uric acid levels checked. She would not like to take medication for it but inquired how to manage it with diet. She had her Prevnar-13 vaccine and the two Shingrix vaccines last year, and she is due for the streptococcus pneumoniae vaccine today. Patient Active Problem List   Diagnosis Code    HTN (hypertension) I10    History of left breast cancer Z85.3    Diabetes mellitus type 2, controlled ([facility] [address] E11.9    Multinodular goiter E04.2    Obesity (BMI 30-39. 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  ACCU-CHEK SMARTVIEW TEST STRIP strip PLEASE USE TWICE DAILY AS NEEDED 100 Strip 11    glucose blood VI test strips (ACCU-CHEK SMARTVIEW TEST STRIP) strip Please use two times daily as needed for Dx code 250.02 100 Strip 11    diclofenac (VOLTAREN) 1 % gel Apply  to affected area four (4) times daily as needed. 100 g 0    BETA-CAROTENE,A, W-C & E/ZN/CU (VISION-CHARANJIT PRESERVE PO) Take  by mouth two (2) times a day.  Cholecalciferol, Vitamin D3, (VITAMIN D3) 1,000 unit cap Take 5,000 Units by mouth daily. 4 tabs daily  Indications: vitamin D deficiency      [DISCONTINUED] indapamide (LOZOL) 2.5 mg tablet TAKE 1 TABLET BY MOUTH DAILY 90 Tab 2     No current facility-administered medications on file prior to visit.         Allergies   Allergen Reactions    Pcn [Penicillins] Hives       Past Medical History:   Diagnosis Date    Adverse effect of anesthesia     SLOW TO AWAKEN     Arthritis     Breast cancer ([address]     lumpectomy with radiation, 2009, left    Breast cancer, left ([address] 2020    DCIS     Diabetes ([address]     Hyperlipidemia     Hypertension     Radiation therapy complication     6922    Thyroid disease     GOARDER ON THROAT        Past Surgical History:   Procedure Laterality Date    BREAST SURGERY PROCEDURE UNLISTED      lumpectomy    HX BREAST LUMPECTOMY Left     2009    HX BREAST LUMPECTOMY Left 3/12/2020    LEFT BREAST LUMPECTOMY WITH ULTRASOUND performed [facility] Mar[facility]UL[facility]ORY OR    HX ORTHOPAEDIC      Bunion Surgery BOTH FEET     HX TUBAL LIGATION         Family History   Problem Relation Age of Onset   [address] Diabetes Father     Stroke Sister     Diabetes Brother     Heart Disease Mother     Hypertension Sister     Diabetes Brother     Diabetes Brother     Diabetes Brother     Cancer Son         LUMP ON NECK     Anesth Problems Neg Hx        Social History     Socioeconomic History    Marital status:      Spouse name: Not on file    Number of children: Not on file    Years of education: Not on file    Highest education level: Not on file   Occupational History    Not on file   Social Needs    Financial resource strain: Not on file    Food insecurity     Worry: Not on file     Inability: Not on file    Transportation needs     Medical: Not on file     Non-medical: Not on file   Tobacco Use    Smoking status: Never Smoker    Smokeless tobacco: Never Used   Substance and Sexual Activity    Alcohol use: No    Drug use: Never    Sexual activity: Never   Lifestyle    Physical activity     Days per week: Not on file     Minutes per session: Not on file    Stress: Not on file   Relationships    Social connections     Talks on phone: Not on file     Gets together: Not on file     Attends [employer] service: Not on file     Active member of club or organization: Not on file     Attends meetings of clubs or organizations: Not on file     Relationship status: Not on file    Intimate partner violence     Fear of current or ex partner: Not on file     Emotionally abused: Not on file     Physically abused: Not on file     Forced sexual activity: Not on file   Other Topics Concern    Not on file   Social History Narrative    Not on file       No visits with results within 3 Month(s) from this visit. Latest known visit with results is:   Admission on 03/12/2020, Discharged on 03/12/2020   Component Date Value Ref Range Status    Glucose (POC) 03/12/2020 141* 65 - 100 mg/dL Final    Comment: (NOTE)  The Accu-Chek Inform II glucometer is not FDA cleared for critically   ill patient use. A study was performed validating the equivalence of   glucometer and clinical laboratory results on this patient   population.  Despite the study, use of glucometers with capillary   specimens from critically ill patients, regardless of their location,   makes the test high complexity and requires the performing individual   to comply with CLIA requirements more stringent than those for waived   testing in the hospital setting. Critical thinking skills are   necessary to determine a potentially critically ill patients status   prior to using a glucometer.  Performed by 03/12/2020 CANDIDO FITZGERALD   Final     Review of Systems   Constitutional: Negative for malaise/fatigue and weight loss. HENT: Negative for congestion and sore throat. Eyes: Negative for blurred vision. Respiratory: Negative for cough and shortness of breath. Cardiovascular: Negative for chest pain and leg swelling. Gastrointestinal: Negative for constipation and heartburn. Genitourinary: Negative for frequency and urgency. Musculoskeletal: Positive for joint pain (L foot). Negative for back pain and myalgias. Neurological: Negative for dizziness, tingling and headaches. Psychiatric/Behavioral: Negative for depression. The patient is not nervous/anxious and does not have insomnia. Visit Vitals  /74 (BP 1 Location: Right arm, BP Patient Position: Sitting)   Pulse (!) 57   Temp 97.7 °F (36.5 °C) (Oral)   Resp 18   Ht 5' 4\" (1.626 m)   Wt 211 lb 12.8 oz (96.1 kg)   SpO2 100%   BMI 36.36 kg/m²       Physical Exam  Vitals signs and nursing note reviewed. Constitutional:       General: She is not in acute distress. Appearance: Normal appearance. She is well-developed and well-groomed. She is not diaphoretic. HENT:      Right Ear: External ear normal.      Left Ear: External ear normal.   Eyes:      General: No scleral icterus. Right eye: No discharge. Left eye: No discharge. Extraocular Movements: Extraocular movements intact. Neck:      Musculoskeletal: Normal range of motion and neck supple. Cardiovascular:      Rate and Rhythm: Normal rate and regular rhythm.    Pulmonary:      Effort: Pulmonary effort is normal. Breath sounds: Normal breath sounds. No wheezing. Musculoskeletal:      Right lower leg: No edema. Left lower leg: No edema. Feet:      Comments:     Lymphadenopathy:      Cervical: No cervical adenopathy. Neurological:      Mental Status: She is alert and oriented to person, place, and time. Psychiatric:         Mood and Affect: Mood and affect normal.         Behavior: Behavior normal.       ASSESSMENT and PLAN    ICD-10-CM ICD-9-CM    1. Essential hypertension I10 401.9 BP is well-controlled on current medication. No change to dosage at this time. 2. Controlled type 2 diabetes mellitus without complication, without long-term current use of insulin ([facility]) B17.9 931.36 METABOLIC PANEL, COMPREHENSIVE    BP is well-controlled on current medication. No change to dosage at this time. CBC W/O DIFF      HEMOGLOBIN A1C WITH EAG    Labs drawn in office today. 3. 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 We discussed that she can avoid red meat, cheese, and red wine to help prevent gout flare-ups. 4. Need for vaccination against Streptococcus pneumoniae Z23 V03.82 PNEUMOCOCCAL POLYSACCHARIDE VACCINE, 23-VALENT, ADULT OR IMMUNOSUPPRESSED PT DOSE,    Vaccine administered in office today. Potential side effects were discussed. Additional comments: We discussed completing her living will, but she is not interested at this time. This plan was reviewed with the patient and patient agrees. All questions were answered. This scribe documentation was reviewed by me and accurately reflects the examination and decisions made by me. This note will not be viewable in [address]

## 2020-07-09 LAB
ALBUMIN SERPL-MCNC: 4.4 G/DL (ref 3.8–4.8)
ALBUMIN/GLOB SERPL: 1.7 {RATIO} (ref 1.2–2.2)
ALP SERPL-CCNC: 79 IU/L (ref 39–117)
ALT SERPL-CCNC: 13 IU/L (ref 0–32)
AST SERPL-CCNC: 13 IU/L (ref 0–40)
BILIRUB SERPL-MCNC: 0.4 MG/DL (ref 0–1.2)
BUN SERPL-MCNC: 18 MG/DL (ref 8–27)
BUN/CREAT SERPL: 24 (ref 12–28)
CALCIUM SERPL-MCNC: 10.2 MG/DL (ref 8.7–10.3)
CHLORIDE SERPL-SCNC: 104 MMOL/L (ref 96–106)
CO2 SERPL-SCNC: 23 MMOL/L (ref 20–29)
CREAT SERPL-MCNC: 0.75 MG/DL (ref 0.57–1)
ERYTHROCYTE [DISTWIDTH] IN BLOOD BY AUTOMATED COUNT: 13.6 % (ref 11.7–15.4)
EST. AVERAGE GLUCOSE BLD GHB EST-MCNC: 148 MG/DL
GLOBULIN SER CALC-MCNC: 2.6 G/DL (ref 1.5–4.5)
GLUCOSE SERPL-MCNC: 91 MG/DL (ref 65–99)
HBA1C MFR BLD: 6.8 % (ref 4.8–5.6)
HCT VFR BLD AUTO: 32.2 % (ref 34–46.6)
HGB BLD-MCNC: 10.3 G/DL (ref 11.1–15.9)
MCH RBC QN AUTO: 27.7 PG (ref 26.6–33)
MCHC RBC AUTO-ENTMCNC: 32 G/DL (ref 31.5–35.7)
MCV RBC AUTO: 87 FL (ref 79–97)
PLATELET # BLD AUTO: 248 X10E3/UL (ref 150–450)
POTASSIUM SERPL-SCNC: 4.3 MMOL/L (ref 3.5–5.2)
PROT SERPL-MCNC: 7 G/DL (ref 6–8.5)
RBC # BLD AUTO: 3.72 X10E6/UL (ref 3.77–5.28)
SODIUM SERPL-SCNC: 143 MMOL/L (ref 134–144)
WBC # BLD AUTO: 5.3 X10E3/UL (ref 3.4–10.8)

## 2020-07-13 NOTE — PROGRESS NOTES
Result discussed with patient. She will work on diet & exercise for 3 months . Will repeat levels in 3 months.

## 2020-07-16 DIAGNOSIS — E78.2 MIXED HYPERLIPIDEMIA: ICD-10-CM

## 2020-07-16 RX ORDER — PRAVASTATIN SODIUM 20 MG/1
TABLET ORAL
Qty: 90 TAB | Refills: 0 | Status: SHIPPED | OUTPATIENT
Start: 2020-07-16 | End: 2020-10-14

## 2020-08-02 RX ORDER — POTASSIUM CHLORIDE 750 MG/1
CAPSULE, EXTENDED RELEASE ORAL
Qty: 270 CAP | Refills: 0 | Status: SHIPPED | OUTPATIENT
Start: 2020-08-02 | End: 2020-11-11 | Stop reason: SDUPTHER

## 2020-08-04 ENCOUNTER — OFFICE VISIT (OUTPATIENT)
Dept: SURGERY | Age: 65
End: 2020-08-04
Payer: MEDICARE

## 2020-08-04 VITALS
TEMPERATURE: 96.7 F | HEIGHT: 64 IN | BODY MASS INDEX: 35.34 KG/M2 | SYSTOLIC BLOOD PRESSURE: 133 MMHG | HEART RATE: 71 BPM | WEIGHT: 207 LBS | DIASTOLIC BLOOD PRESSURE: 53 MMHG

## 2020-08-04 DIAGNOSIS — D05.12 DUCTAL CARCINOMA IN SITU (DCIS) OF LEFT BREAST WITH COMEDONECROSIS: Primary | ICD-10-CM

## 2020-08-04 DIAGNOSIS — Z98.890 S/P LUMPECTOMY, LEFT BREAST: ICD-10-CM

## 2020-08-04 DIAGNOSIS — Z85.3 HISTORY OF BREAST CANCER IN FEMALE: ICD-10-CM

## 2020-08-04 PROCEDURE — G9899 SCRN MAM PERF RSLTS DOC: HCPCS | Performed by: NURSE PRACTITIONER

## 2020-08-04 PROCEDURE — 1100F PTFALLS ASSESS-DOCD GE2>/YR: CPT | Performed by: NURSE PRACTITIONER

## 2020-08-04 PROCEDURE — G8432 DEP SCR NOT DOC, RNG: HCPCS | Performed by: NURSE PRACTITIONER

## 2020-08-04 PROCEDURE — 99213 OFFICE O/P EST LOW 20 MIN: CPT | Performed by: NURSE PRACTITIONER

## 2020-08-04 PROCEDURE — G8417 CALC BMI ABV UP PARAM F/U: HCPCS | Performed by: NURSE PRACTITIONER

## 2020-08-04 PROCEDURE — G8536 NO DOC ELDER MAL SCRN: HCPCS | Performed by: NURSE PRACTITIONER

## 2020-08-04 PROCEDURE — 3017F COLORECTAL CA SCREEN DOC REV: CPT | Performed by: NURSE PRACTITIONER

## 2020-08-04 PROCEDURE — G8399 PT W/DXA RESULTS DOCUMENT: HCPCS | Performed by: NURSE PRACTITIONER

## 2020-08-04 PROCEDURE — G8754 DIAS BP LESS 90: HCPCS | Performed by: NURSE PRACTITIONER

## 2020-08-04 PROCEDURE — 1090F PRES/ABSN URINE INCON ASSESS: CPT | Performed by: NURSE PRACTITIONER

## 2020-08-04 PROCEDURE — G8427 DOCREV CUR MEDS BY ELIG CLIN: HCPCS | Performed by: NURSE PRACTITIONER

## 2020-08-04 PROCEDURE — G8752 SYS BP LESS 140: HCPCS | Performed by: NURSE PRACTITIONER

## 2020-08-04 PROCEDURE — 3288F FALL RISK ASSESSMENT DOCD: CPT | Performed by: NURSE PRACTITIONER

## 2020-08-04 NOTE — PATIENT INSTRUCTIONS

## 2020-08-04 NOTE — PROGRESS NOTES
HISTORY OF PRESENT ILLNESS  Nabila Christian is a 77 y.o. female. HPI ESTABLISHED patient presents for follow-up to LEFT breast cancer. No complaints at this time. Denies pain. Breast history -   Referring - Dr. Edna Negrete  2009 - LEFT breast cancer (probably DCIS) in 2009.  Had surgery by Dr. Cecy Ochoa and whole breast radiation by Dr. Cash Lemons at [address] [place] [address].    Did not want to take tamoxifen then. Abnormal screening mammo on LEFT 2/2020.    02/18/20: LEFT breast stereo bx. PATH: DCIS, solid and comedo pattern with comedonecrosis and intraluminal coarse calcification, nuclear grade 2, ER+(99%), NH not performed. Clinical stage 0. MRI 3/5/20 no other abnormalities. Excision 1/2049 - Dr. Sixto Campbell DIAGNOSIS   Left breast, excision:   Ductal carcinoma in situ, intermediate grade (grade 2). Size: 12 mm. Necrosis: Present, Central comedo-type necrosis. Margins uninvolved by DCIS: Distance from closest margin: <1 mm, posterior. Regional lymph nodes: No lymph nodes submitted or found. Pathologic stage: PTis NX   Additional pathologic findings: Biopsy site changes with squamous metaplasia. No XRT - Left breast previously radiated  Saw Dr. Magalis Gillespie for a consult - declined AI and tamoxifen    Family history -   A paternal aunt had breast cancer and possible ovarian cancer.    A paternal first cousin was just diagnosed with breast cancer in her mid-61s.    Her son has skin cancer. Patient - genetic testing negative, VUS NBN    OB History    No obstetric history on file.       Obstetric Comments   Menarche 13, LMP 2004, # of children 1, age of 4st delivery 23, Hysterectomy/oophorectomy No/No, Breast bx Yes, history of breast feeding No, BCP No, Hormone therapy No             Past Surgical History:   Procedure Laterality Date    BREAST SURGERY PROCEDURE UNLISTED      lumpectomy    HX BREAST LUMPECTOMY Left     2009    HX BREAST LUMPECTOMY Left 3/12/2020    LEFT BREAST LUMPECTOMY WITH ULTRASOUND performed by Javon Deng MD at [address] HX ORTHOPAEDIC      Bunion Surgery BOTH FEET     HX TUBAL LIGATION       ROS    Physical Exam  Constitutional:       Appearance: Normal appearance. Chest:      Breasts:         Right: No mass, nipple discharge, skin change or tenderness. Left: No mass, nipple discharge, skin change or tenderness. Musculoskeletal: Normal range of motion. Comments: UE x 2   Lymphadenopathy:      Upper Body:      Right upper body: No supraclavicular or axillary adenopathy. Left upper body: No supraclavicular or axillary adenopathy. Neurological:      Mental Status: She is alert. Psychiatric:         Attention and Perception: Attention normal.         Mood and Affect: Mood normal.         Speech: Speech normal.         Behavior: Behavior normal.       Visit Vitals  /53 (BP 1 Location: Right arm, BP Patient Position: Sitting)   Pulse 71   Temp (!) 96.7 °F (35.9 °C) (Skin)   Ht 5' 4\" (1.626 m)   Wt 207 lb (93.9 kg)   BMI 35.53 kg/m²     ASSESSMENT and PLAN  Encounter Diagnoses   Name Primary? 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 Discussed post treatment changes to LEFT breast including firmness and induration at surgery site. Saw med onc and declined endocrine therapy. Has follow-up with Dr. Jie Lynch. LDmammogram 3D in 9/2020. BDmammogram 3D in 2/2021. RTC in 6 months after bilateral mammogram. She is comfortable with this plan. All questions answered and she stated understanding.

## 2020-08-22 DIAGNOSIS — I10 ESSENTIAL HYPERTENSION: ICD-10-CM

## 2020-08-23 RX ORDER — INDAPAMIDE 1.25 MG/1
TABLET, FILM COATED ORAL
Qty: 90 TAB | Refills: 0 | Status: SHIPPED | OUTPATIENT
Start: 2020-08-23 | End: 2020-11-22

## 2020-08-27 DIAGNOSIS — E11.9 CONTROLLED TYPE 2 DIABETES MELLITUS WITHOUT COMPLICATION, WITHOUT LONG-TERM CURRENT USE OF INSULIN (HCC): ICD-10-CM

## 2020-08-27 RX ORDER — METFORMIN HYDROCHLORIDE 1000 MG/1
TABLET ORAL
Qty: 180 TAB | Refills: 0 | Status: SHIPPED | OUTPATIENT
Start: 2020-08-27 | End: 2020-11-22

## 2020-09-08 ENCOUNTER — HOSPITAL ENCOUNTER (OUTPATIENT)
Dept: MAMMOGRAPHY | Age: 65
Discharge: HOME | End: 2020-09-08
Attending: NURSE PRACTITIONER
Payer: MEDICARE

## 2020-09-08 DIAGNOSIS — D05.12 DUCTAL CARCINOMA IN SITU (DCIS) OF LEFT BREAST WITH COMEDONECROSIS: ICD-10-CM

## 2020-09-08 DIAGNOSIS — Z98.890 S/P LUMPECTOMY, LEFT BREAST: ICD-10-CM

## 2020-09-08 PROCEDURE — 77061 BREAST TOMOSYNTHESIS UNI: CPT

## 2020-09-29 ENCOUNTER — TELEPHONE (OUTPATIENT)
Dept: ONCOLOGY | Age: 65
End: 2020-09-29

## 2020-09-29 NOTE — TELEPHONE ENCOUNTER
HIPPA Verified. Patient agreed on getting pushed out 2-3 months regarding her appt on October 8th next Thursday but stated she will give the office a call back when she wants to reschedule.   Lulu Gerber

## 2020-10-14 DIAGNOSIS — E78.2 MIXED HYPERLIPIDEMIA: ICD-10-CM

## 2020-10-14 RX ORDER — PRAVASTATIN SODIUM 20 MG/1
TABLET ORAL
Qty: 90 TAB | Refills: 0 | Status: SHIPPED | OUTPATIENT
Start: 2020-10-14 | End: 2021-01-10

## 2020-11-11 ENCOUNTER — OFFICE VISIT (OUTPATIENT)
Dept: PRIMARY CARE CLINIC | Age: 65
End: 2020-11-11
Payer: MEDICARE

## 2020-11-11 VITALS
DIASTOLIC BLOOD PRESSURE: 76 MMHG | RESPIRATION RATE: 16 BRPM | HEART RATE: 70 BPM | TEMPERATURE: 98.9 F | HEIGHT: 64 IN | WEIGHT: 208 LBS | BODY MASS INDEX: 35.51 KG/M2 | OXYGEN SATURATION: 99 % | SYSTOLIC BLOOD PRESSURE: 134 MMHG

## 2020-11-11 DIAGNOSIS — E79.0 HYPERURICEMIA: ICD-10-CM

## 2020-11-11 DIAGNOSIS — I10 ESSENTIAL HYPERTENSION: Primary | ICD-10-CM

## 2020-11-11 DIAGNOSIS — F43.21 GRIEF REACTION: ICD-10-CM

## 2020-11-11 DIAGNOSIS — E11.9 CONTROLLED TYPE 2 DIABETES MELLITUS WITHOUT COMPLICATION, WITHOUT LONG-TERM CURRENT USE OF INSULIN (HCC): ICD-10-CM

## 2020-11-11 DIAGNOSIS — E66.01 SEVERE OBESITY (HCC): ICD-10-CM

## 2020-11-11 DIAGNOSIS — Z13.31 POSITIVE DEPRESSION SCREENING: ICD-10-CM

## 2020-11-11 LAB
ALBUMIN SERPL-MCNC: 4.1 G/DL (ref 3.5–5)
ALBUMIN/GLOB SERPL: 1.2 {RATIO} (ref 1.1–2.2)
ALP SERPL-CCNC: 72 U/L (ref 45–117)
ALT SERPL-CCNC: 24 U/L (ref 12–78)
ANION GAP SERPL CALC-SCNC: 8 MMOL/L (ref 5–15)
AST SERPL-CCNC: 8 U/L (ref 15–37)
BILIRUB SERPL-MCNC: 0.7 MG/DL (ref 0.2–1)
BUN SERPL-MCNC: 22 MG/DL (ref 6–20)
BUN/CREAT SERPL: 25 (ref 12–20)
CALCIUM SERPL-MCNC: 9.9 MG/DL (ref 8.5–10.1)
CHLORIDE SERPL-SCNC: 106 MMOL/L (ref 97–108)
CHOLEST SERPL-MCNC: 166 MG/DL
CO2 SERPL-SCNC: 28 MMOL/L (ref 21–32)
COMMENT, HOLDF: NORMAL
CREAT SERPL-MCNC: 0.88 MG/DL (ref 0.55–1.02)
CREAT UR-MCNC: 164 MG/DL
EST. AVERAGE GLUCOSE BLD GHB EST-MCNC: 137 MG/DL
GLOBULIN SER CALC-MCNC: 3.5 G/DL (ref 2–4)
GLUCOSE SERPL-MCNC: 103 MG/DL (ref 65–100)
HBA1C MFR BLD: 6.4 % (ref 4–5.6)
HDLC SERPL-MCNC: 63 MG/DL
HDLC SERPL: 2.6 {RATIO} (ref 0–5)
LDLC SERPL CALC-MCNC: 86.2 MG/DL (ref 0–100)
LIPID PROFILE,FLP: NORMAL
MICROALBUMIN UR-MCNC: 2.16 MG/DL
MICROALBUMIN/CREAT UR-RTO: 13 MG/G (ref 0–30)
POTASSIUM SERPL-SCNC: 4 MMOL/L (ref 3.5–5.1)
PROT SERPL-MCNC: 7.6 G/DL (ref 6.4–8.2)
SAMPLES BEING HELD,HOLD: NORMAL
SODIUM SERPL-SCNC: 142 MMOL/L (ref 136–145)
TRIGL SERPL-MCNC: 84 MG/DL (ref ?–150)
URATE SERPL-MCNC: 8.3 MG/DL (ref 2.6–6)
VLDLC SERPL CALC-MCNC: 16.8 MG/DL

## 2020-11-11 PROCEDURE — G8536 NO DOC ELDER MAL SCRN: HCPCS | Performed by: INTERNAL MEDICINE

## 2020-11-11 PROCEDURE — G8752 SYS BP LESS 140: HCPCS | Performed by: INTERNAL MEDICINE

## 2020-11-11 PROCEDURE — 1090F PRES/ABSN URINE INCON ASSESS: CPT | Performed by: INTERNAL MEDICINE

## 2020-11-11 PROCEDURE — 3044F HG A1C LEVEL LT 7.0%: CPT | Performed by: INTERNAL MEDICINE

## 2020-11-11 PROCEDURE — 99214 OFFICE O/P EST MOD 30 MIN: CPT | Performed by: INTERNAL MEDICINE

## 2020-11-11 PROCEDURE — 2022F DILAT RTA XM EVC RTNOPTHY: CPT | Performed by: INTERNAL MEDICINE

## 2020-11-11 PROCEDURE — G8399 PT W/DXA RESULTS DOCUMENT: HCPCS | Performed by: INTERNAL MEDICINE

## 2020-11-11 PROCEDURE — 1101F PT FALLS ASSESS-DOCD LE1/YR: CPT | Performed by: INTERNAL MEDICINE

## 2020-11-11 PROCEDURE — G8427 DOCREV CUR MEDS BY ELIG CLIN: HCPCS | Performed by: INTERNAL MEDICINE

## 2020-11-11 PROCEDURE — G8417 CALC BMI ABV UP PARAM F/U: HCPCS | Performed by: INTERNAL MEDICINE

## 2020-11-11 PROCEDURE — 3017F COLORECTAL CA SCREEN DOC REV: CPT | Performed by: INTERNAL MEDICINE

## 2020-11-11 PROCEDURE — G8754 DIAS BP LESS 90: HCPCS | Performed by: INTERNAL MEDICINE

## 2020-11-11 PROCEDURE — G8431 POS CLIN DEPRES SCRN F/U DOC: HCPCS | Performed by: INTERNAL MEDICINE

## 2020-11-11 PROCEDURE — G9899 SCRN MAM PERF RSLTS DOC: HCPCS | Performed by: INTERNAL MEDICINE

## 2020-11-11 RX ORDER — POTASSIUM CHLORIDE 750 MG/1
CAPSULE, EXTENDED RELEASE ORAL
Qty: 270 CAP | Refills: 0 | Status: SHIPPED | OUTPATIENT
Start: 2020-11-11 | End: 2021-02-07

## 2020-11-11 NOTE — PROGRESS NOTES
Chief Complaint   Patient presents with    Follow Up Chronic Condition     patient is here for follow up on labs for cbc and a1c last done in july  paitent son just got dx with terminal cancer he is 39

## 2020-11-11 NOTE — PROGRESS NOTES
Written by corey Cueva, as dictated by Dr. Oli Deleon MD.    HPI    Pt presents for a follow up for chronic condition. She is compliant with her medication. She takes metformin 1,000 mg BID for type 2 DM; tolerating well. Patient denies fatigue, headache, dizziness, blurry vision, increased thirst, frequent urination, increased appetite. No recurrence of gout flare ups. Uric Acid was 8.7 on 3/11/2020. Stressors: Pt's 39year old son been diagnosed with terminal ca with liver metastases. He completed treatment in 7/2020 but has continued to decline. He has 3 children. She denies medication for support. She thinks she has support system around her. Does not think needs therapist at this time. Patient Active Problem List   Diagnosis Code    HTN (hypertension) I10    History of left breast cancer Z85.3    Diabetes mellitus type 2, controlled ([facility] [address] E11.9    Multinodular goiter E04.2    Obesity (BMI 30-39. 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       ACCU-CHEK SMARTVIEW TEST STRIP strip PLEASE USE TWICE DAILY AS NEEDED 100 Strip 11    glucose blood VI test strips (ACCU-CHEK SMARTVIEW TEST STRIP) strip Please use two times daily as needed for Dx code 250.02 100 Strip 11  diclofenac (VOLTAREN) 1 % gel Apply  to affected area four (4) times daily as needed. 100 g 0    BETA-CAROTENE,A, W-C & E/ZN/CU (VISION-CHARANJTI PRESERVE PO) Take  by mouth two (2) times a day.  Cholecalciferol, Vitamin D3, (VITAMIN D3) 1,000 unit cap Take 5,000 Units by mouth daily. 4 tabs daily  Indications: vitamin D deficiency       No current facility-administered medications on file prior to visit.         Allergies   Allergen Reactions    Pcn [Penicillins] Hives       Past Medical History:   Diagnosis Date    Adverse effect of anesthesia     SLOW TO AWAKEN     Arthritis     Breast cancer ([address]     lumpectomy with radiation, 2009, left    Breast cancer, left ([address] 2020    DCIS     Diabetes ([address]     Hyperlipidemia     Hypertension     Radiation therapy complication     5475    Thyroid disease     GOARDER ON THROAT        Past Surgical History:   Procedure Laterality Date    BREAST SURGERY PROCEDURE UNLISTED      lumpectomy    HX BREAST LUMPECTOMY Left     2009    HX BREAST LUMPECTOMY Left 3/12/2020    LEFT BREAST LUMPECTOMY WITH ULTRASOUND performed [facility] Dov [facility] AM[facility]ULATORY OR    HX ORTHOPAEDIC      Bunion Surgery BOTH FEET     HX TUBAL LIGATION         Family History   Problem Relation Age of Onset    Diabetes Father     Stroke Sister     Diabetes Brother     Heart Disease Mother     Hypertension Sister     Diabetes Brother     Diabetes Brother     Diabetes Brother     Cancer Son         LUMP ON NECK     Anesth Problems Neg Hx        Social History     Socioeconomic History    Marital status:      Spouse name: Not on file    Number of children: Not on file    Years of education: Not on file    Highest education level: Not on file   Occupational History    Not on file   Social Needs    Financial resource strain: Not on file    Food insecurity     Worry: Not on file     Inability: Not on file    Transportation needs     Medical: Not on file Non-medical: Not on file   Tobacco Use    Smoking status: Never Smoker    Smokeless tobacco: Never Used   Substance and Sexual Activity    Alcohol use: No    Drug use: Never    Sexual activity: Never   Lifestyle    Physical activity     Days per week: Not on file     Minutes per session: Not on file    Stress: Not on file   Relationships    Social connections     Talks on phone: Not on file     Gets together: Not on file     Attends [employer] service: Not on file     Active member of club or organization: Not on file     Attends meetings of clubs or organizations: Not on file     Relationship status: Not on file    Intimate partner violence     Fear of current or ex partner: Not on file     Emotionally abused: Not on file     Physically abused: Not on file     Forced sexual activity: Not on file   Other Topics Concern    Not on file   Social History Narrative    Not on file       No visits with results within 3 Month(s) from this visit. Latest known visit with results is:   Office Visit on 07/08/2020   Component Date Value Ref Range Status    Glucose 07/08/2020 91  65 - 99 mg/dL Final    BUN 07/08/2020 18  8 - 27 mg/dL Final    Creatinine 07/08/2020 0.75  0.57 - 1.00 mg/dL Final    GFR est non-AA 07/08/2020 83  >59 mL/min/1.73 Final    GFR est AA 07/08/2020 95  >59 mL/min/1.73 Final    BUN/Creatinine ratio 07/08/2020 24  12 - 28 Final    Sodium 07/08/2020 143  134 - 144 mmol/L Final    Potassium 07/08/2020 4.3  3.5 - 5.2 mmol/L Final    Chloride 07/08/2020 104  96 - 106 mmol/L Final    CO2 07/08/2020 23  20 - 29 mmol/L Final    Calcium 07/08/2020 10.2  8.7 - 10.3 mg/dL Final    Protein, total 07/08/2020 7.0  6.0 - 8.5 g/dL Final    Albumin 07/08/2020 4.4  3.8 - 4.8 g/dL Final    GLOBULIN, TOTAL 07/08/2020 2.6  1.5 - 4.5 g/dL Final    A-G Ratio 07/08/2020 1.7  1.2 - 2.2 Final    Bilirubin, total 07/08/2020 0.4  0.0 - 1.2 mg/dL Final    Alk.  phosphatase 07/08/2020 79  39 - 117 IU/L Final  AST (SGOT) 07/08/2020 13  0 - 40 IU/L Final    ALT (SGPT) 07/08/2020 13  0 - 32 IU/L Final    WBC 07/08/2020 5.3  3.4 - 10.8 x10E3/uL Final    RBC 07/08/2020 3.72* 3.77 - 5.28 x10E6/uL Final    HGB 07/08/2020 10.3* 11.1 - 15.9 g/dL Final    HCT 07/08/2020 32.2* 34.0 - 46.6 % Final    MCV 07/08/2020 87  79 - 97 fL Final    MCH 07/08/2020 27.7  26.6 - 33.0 pg Final    MCHC 07/08/2020 32.0  31.5 - 35.7 g/dL Final    RDW 07/08/2020 13.6  11.7 - 15.4 % Final    PLATELET 37/73/4114 663  150 - 450 x10E3/uL Final    Hemoglobin A1c 07/08/2020 6.8* 4.8 - 5.6 % Final    Comment:          Prediabetes: 5.7 - 6.4           Diabetes: >6.4           Glycemic control for adults with diabetes: <7.0      Estimated average glucose 07/08/2020 148  mg/dL Final       ROS  Visit Vitals  /76 (BP 1 Location: Left arm, BP Patient Position: Sitting)   Pulse 70   Temp 98.9 °F (37.2 °C) (Oral)   Resp 16   Ht 5' 4\" (1.626 m)   Wt 208 lb (94.3 kg)   SpO2 99%   BMI 35.70 kg/m²   Review of Systems   Constitutional: Negative for malaise/fatigue and weight loss. HENT: Negative for congestion and hearing loss. Eyes: Negative for blurred vision and double vision. Respiratory: Negative for cough and shortness of breath. Cardiovascular: Negative for chest pain and palpitations. Gastrointestinal: Negative for abdominal pain and blood in stool. Musculoskeletal: Negative for back pain and myalgias. Neurological: Negative for sensory change and focal weakness. Psychiatric/Behavioral: Positive for depression. Negative for hallucinations and suicidal ideas. Physical Exam  Vitals signs and nursing note reviewed. Constitutional:       Appearance: Normal appearance. She is obese. Neck:      Musculoskeletal: Neck supple. No neck rigidity. Cardiovascular:      Rate and Rhythm: Normal rate and regular rhythm. Pulmonary:      Breath sounds: Normal breath sounds. No stridor.    Abdominal:      General: Bowel sounds are normal.      Palpations: Abdomen is soft. Musculoskeletal:      Right lower leg: No edema. Left lower leg: No edema. Neurological:      General: No focal deficit present. Mental Status: She is oriented to person, place, and time. Cranial Nerves: No cranial nerve deficit. Psychiatric:         Mood and Affect: Mood normal.         Behavior: Behavior normal.       ASSESSMENT AND PLAN     Diagnoses and all orders for this visit:    1. Essential hypertension  -     potassium chloride SA (MICRO-K) 10 mEq capsule; TAKE 3 CAPSULES BY MOUTH EVERY DAY sent to pharmacy. 2. Severe obesity ([address]  Going through a stressful time lately. Can`t do any structural exercise at this time. 3. Positive depression screening  She is refusing to take any medication and go for any therapy at this time. 4. Controlled type 2 diabetes mellitus without complication, without long-term current use of insulin (HCC)  -     METABOLIC PANEL, COMPREHENSIVE; Future  -     LIPID PANEL; Future  -     HEMOGLOBIN A1C WITH EAG; Future  -     MICROALBUMIN, UR, RAND W/ MICROALB/CREAT RATIO; Future    5. Hyperuricemia  -     URIC ACID; Future    6. Grief reaction   it`s normal grief reaction , thinks has enough support at home. Ordered fasting labs for pt to complete today in office. Waiting on results. This plan was reviewed with the patient and patient agrees. All questions were answered. This scribe documentation was reviewed by me and accurately reflects the examination and decisions made by me.

## 2020-11-15 NOTE — PROGRESS NOTES
[place], it was nice seeing you in the office. Your blood report is back and I am happy to see diabetes numbers have improved. Cholesterol is in normal range. Uric acid number is still elevated. We can discuss starting medication on your next visit. Stay safe!

## 2020-11-21 DIAGNOSIS — I10 ESSENTIAL HYPERTENSION: ICD-10-CM

## 2020-11-21 DIAGNOSIS — E11.9 CONTROLLED TYPE 2 DIABETES MELLITUS WITHOUT COMPLICATION, WITHOUT LONG-TERM CURRENT USE OF INSULIN (HCC): ICD-10-CM

## 2020-11-22 RX ORDER — INDAPAMIDE 1.25 MG/1
TABLET, FILM COATED ORAL
Qty: 90 TAB | Refills: 0 | Status: SHIPPED | OUTPATIENT
Start: 2020-11-22 | End: 2021-02-23

## 2020-11-22 RX ORDER — METFORMIN HYDROCHLORIDE 1000 MG/1
TABLET ORAL
Qty: 180 TAB | Refills: 0 | Status: SHIPPED | OUTPATIENT
Start: 2020-11-22 | End: 2021-02-24

## 2020-12-21 ENCOUNTER — DOCUMENTATION ONLY (OUTPATIENT)
Dept: ONCOLOGY | Age: 65
End: 2020-12-21

## 2020-12-21 NOTE — PROGRESS NOTES
This Survivorship Care Plan is a cancer treatment summary and follow-up plan and is provided to you to keep with your health care records and to share with your primary care provider or any of your doctors and nurses. This summary is a brief record of major aspects of your cancer treatment and not a detailed or comprehensive record of your care. You should review this with your cancer provider. General Information   Patient Name: Goldie Avitia Providers (Including Names, Institution)   Primary Care Provider:  Dr. Jacqui Ortiz   Surgeon:  Dr. Sheryle Medina Oncologist:  N/A   Medical Oncologist:  Dr. Amanda Roman at [facility]    Other Providers:   Treatment Summary   Diagnosis   Cancer Type/Histology Subtype:  Left  breast ductal carcinoma in situ     Receptors:  Estrogen Receptor:  Positive  Progesterone Receptor:  N/A  Her-2:  N/A   Diagnosis Date (year):  2020   Stage: 0        Treatment Completed   Surgery:  Yes Surgery Date(s) (year):  2020    Surgical procedure/findings:    1. Left lumpectomy with intraoperative ultrasound    Lymph node removal:  No      Radiation:  No    Body area treated:  N/A  End Date (year):  N/A    Systemic Therapy (chemotherapy, hormonal therapy, other):  No      Names of Agents Used End Dates (year)   N/A N/A       Treatment Ongoing   Additional treatment name Planned duration Possible Side effects   None       N/A N/A                   Familial Cancer Risk Assessment   Breast and/or ovarian cancer in 1st or 2nd degree relatives:  Yes     Received Genetic counseling:  No    Genetic testing:  Yes, with [employer].                        Genetic testing results:  Variant of unknown significance     Follow-up Care Plan   Your follow-up care plan is designed to inform you and primary care providers regarding the recommended and required follow-up, cancer screening, and routine health maintenance that is needed to maintain optimal health. Possible late- and long-term effects that someone with this type of cancer and treatment may experience if received certain chemotherapy agents:  Weakening of the heart presenting as shortness of breath and swelling of legs (rare < 5%); and bones become weak and at risk for fracture (osteoporosis). It is important to remember that these symptoms can be due to other causes like diabetes or with normal aging. If these or any other new symptoms occur, bring these to the attention of your health care provider. These symptoms should be brought to the attention of your provider:   1. Anything that represents a brand new symptom. 2. Anything that represents a persistent symptom. 3.   Anything you are worried about that might be related to the cancer coming back. Please continue to see your primary care provider for all general health care recommended for a woman your age, such as routine immunizations and routine non-breast cancer screening, such as a colonoscopy or bone density exams. Consult with your health care provider about prevention and screening for bone loss using bone density tests. Schedule for Clinical Visits   Coordinating Provider When/How often   Dr. Thomas Lopez, NP 2/15/21 and as indicated   Dr. Betty Rivers As indicated by provider       [facility] ([facility]) guidelines recommend patients  have a history and physical exam 1-4 times a year, as clinically appropriate, for   5 years, then annually.      Coordinating Provider TEST How often   Dr. Malcolm Dus Annually   Dr. Nikki Butt MRI breast As indicated by provider   PCP/Gyn Pap/pelvic exam As indicated by provider   PCP/GI Colonoscopy As indicated by provider   Dr. Mehnaz Lion Density Every 2 years, if on an aromatase inhibitor, or as indicated by your provider   Breast cancer survivors may experience issues with the areas listed below. If you have any concerns in these or other areas, please speak with your doctors or nurses to find out how you can get help with them. Anxiety or depression                          Insurance     Emotional and mental health                         Parenting  Memory or concentration loss                                   Fertility  Stopping Smoking                                                     Fatigue                            Weight changes                                                        School/work  Financial advice or assistance                         Sexual functioning   Physical functioning     A number of lifestyle/behaviors can affect your ongoing health, including the risk for the cancer coming back, or developing another cancer. Discuss these recommendations with your doctor or nurse:    Alcohol use                          Physical activity                Diet                           Sun screen use      Management of my medications                        Tobacco use/cessation       Management of my other illnesses                        Weight management (loss/gain)                                Resources you may be interested in:        www.cancer. net                    Offers educational support and guidelines for treatment and follow up care.  Cancercare. org                    Cancer Care offers financial, emotional, and educational support.  www.nccn.org/patients                  Offers educational support and guidelines for treatment and follow up care.  www.lbbc.org                   [facility] offers educational support and                  provides information on many topics including intimacy and sexuality.   [address] offers complimentary services including massages, yoga, meditation, acupuncture, counseling, art therapy, and music therapy.   [facility] is located at:                  [facility] [address], [address]                  [place] [phone]                        Other comments:   Prepared by:  Barbra Guevara RN                                                                                     Delivered on:  12/21/20

## 2020-12-30 ENCOUNTER — OFFICE VISIT (OUTPATIENT)
Dept: PRIMARY CARE CLINIC | Age: 65
End: 2020-12-30
Payer: MEDICARE

## 2020-12-30 VITALS
WEIGHT: 206.8 LBS | HEIGHT: 64 IN | SYSTOLIC BLOOD PRESSURE: 140 MMHG | DIASTOLIC BLOOD PRESSURE: 80 MMHG | OXYGEN SATURATION: 99 % | RESPIRATION RATE: 16 BRPM | TEMPERATURE: 97.5 F | BODY MASS INDEX: 35.3 KG/M2 | HEART RATE: 70 BPM

## 2020-12-30 DIAGNOSIS — E11.9 CONTROLLED TYPE 2 DIABETES MELLITUS WITHOUT COMPLICATION, WITHOUT LONG-TERM CURRENT USE OF INSULIN (HCC): ICD-10-CM

## 2020-12-30 DIAGNOSIS — E79.0 HYPERURICEMIA: ICD-10-CM

## 2020-12-30 DIAGNOSIS — F43.21 GRIEF REACTION: ICD-10-CM

## 2020-12-30 DIAGNOSIS — Z00.00 MEDICARE ANNUAL WELLNESS VISIT, SUBSEQUENT: Primary | ICD-10-CM

## 2020-12-30 DIAGNOSIS — I10 ESSENTIAL HYPERTENSION: ICD-10-CM

## 2020-12-30 DIAGNOSIS — Z71.89 ACP (ADVANCE CARE PLANNING): ICD-10-CM

## 2020-12-30 PROCEDURE — G0439 PPPS, SUBSEQ VISIT: HCPCS | Performed by: INTERNAL MEDICINE

## 2020-12-30 PROCEDURE — 99213 OFFICE O/P EST LOW 20 MIN: CPT | Performed by: INTERNAL MEDICINE

## 2020-12-30 PROCEDURE — G8417 CALC BMI ABV UP PARAM F/U: HCPCS | Performed by: INTERNAL MEDICINE

## 2020-12-30 PROCEDURE — 3044F HG A1C LEVEL LT 7.0%: CPT | Performed by: INTERNAL MEDICINE

## 2020-12-30 PROCEDURE — 1101F PT FALLS ASSESS-DOCD LE1/YR: CPT | Performed by: INTERNAL MEDICINE

## 2020-12-30 PROCEDURE — G8536 NO DOC ELDER MAL SCRN: HCPCS | Performed by: INTERNAL MEDICINE

## 2020-12-30 PROCEDURE — G9899 SCRN MAM PERF RSLTS DOC: HCPCS | Performed by: INTERNAL MEDICINE

## 2020-12-30 PROCEDURE — G8510 SCR DEP NEG, NO PLAN REQD: HCPCS | Performed by: INTERNAL MEDICINE

## 2020-12-30 PROCEDURE — 3017F COLORECTAL CA SCREEN DOC REV: CPT | Performed by: INTERNAL MEDICINE

## 2020-12-30 PROCEDURE — G8399 PT W/DXA RESULTS DOCUMENT: HCPCS | Performed by: INTERNAL MEDICINE

## 2020-12-30 PROCEDURE — G8427 DOCREV CUR MEDS BY ELIG CLIN: HCPCS | Performed by: INTERNAL MEDICINE

## 2020-12-30 PROCEDURE — 1090F PRES/ABSN URINE INCON ASSESS: CPT | Performed by: INTERNAL MEDICINE

## 2020-12-30 PROCEDURE — 2022F DILAT RTA XM EVC RTNOPTHY: CPT | Performed by: INTERNAL MEDICINE

## 2020-12-30 PROCEDURE — G8754 DIAS BP LESS 90: HCPCS | Performed by: INTERNAL MEDICINE

## 2020-12-30 PROCEDURE — G8753 SYS BP > OR = 140: HCPCS | Performed by: INTERNAL MEDICINE

## 2020-12-30 RX ORDER — LISINOPRIL 10 MG/1
TABLET ORAL
Qty: 90 TAB | Refills: 1 | Status: SHIPPED | OUTPATIENT
Start: 2020-12-30 | End: 2021-06-23

## 2020-12-30 NOTE — PROGRESS NOTES
Wilian Chambers is a 77 y.o. female and presents for Annual Medicare Wellness Visit. Assessment of cognitive impairment: Alert and oriented x 3. Depression Screen:   3 most recent PHQ Screens 12/30/2020   Little interest or pleasure in doing things Not at all   Feeling down, depressed, irritable, or hopeless Not at all   Total Score PHQ 2 0   Trouble falling or staying asleep, or sleeping too much -   Feeling tired or having little energy -   Poor appetite, weight loss, or overeating -   Feeling bad about yourself - or that you are a failure or have let yourself or your family down -   Trouble concentrating on things such as school, work, reading, or watching TV -   Moving or speaking so slowly that other people could have noticed; or the opposite being so fidgety that others notice -   Thoughts of being better off dead, or hurting yourself in some way -   PHQ 9 Score -   How difficult have these problems made it for you to do your work, take care of your home and get along with others -       Fall Risk Assessment:    Fall Risk Assessment, last 12 mths 12/30/2020   Able to walk? Yes   Fall in past 12 months? 0   Do you feel unsteady? 0   Are you worried about falling 0   Number of falls in past 12 months -   Fall with injury? -       Abuse Screen:   Abuse Screening Questionnaire 3/10/2020   Do you ever feel afraid of your partner? N   Are you in a relationship with someone who physically or mentally threatens you? N   Is it safe for you to go home? Y       Activities of Daily Living:  Self-care.    Requires assistance with: no ADLs  Patient handle his/her own medications  yes Use of pill box  yes  Activities of Daily Living:   ADL Assessment 12/30/2020   Feeding yourself No Help Needed   Getting from bed to chair No Help Needed   Getting dressed No Help Needed   Bathing or showering No Help Needed   Walk across the room (includes cane/walker) No Help Needed   Using the telphone No Help Needed Taking your medications No Help Needed   Preparing meals No Help Needed   Managing money (expenses/bills) No Help Needed   Moderately strenuous housework (laundry) No Help Needed   Shopping for personal items (toiletries/medicines) No Help Needed   Shopping for groceries No Help Needed   Driving No Help Needed   Climbing a flight of stairs No Help Needed   Getting to places beyond walking distances No Help Needed       Health Maintenance:  Daily Aspirin: no   Bone Density: up to date 8/17/2017  Glaucoma Screening: yes  Next due 10/23/2021  Immunizations:    Tetanus: up to date. 12/2/2029Influenza: up to date. Shingles: up to date. PPSV-23: up to date. 7/8/2020 Prevnar-13: up to date. 4/10/2019    Cancer screening:    Cervical: up to date. Breast: scheduled for 2/8/2021. Colon: up to date. Next due 6/13/2023     Alcohol Risk Screen:   On any occasion during the past 3 months, have you had more than 3 drinks(female) or 4 drinks (male) containing alcohol in one? No  Do you average more than 7 drinks (female) or 14 drinks (male) per week? No  Type and amount:none    Hearing Loss:  Hearing is good. denies any hearing loss wears hearing aides    Vision Loss:   Wears glasses, contact lenses, or have any other visual impairment  yes    Adult Nutrition Screen:  No risk factors noted. Advance Care Planning:   End of Life Planning: has NO advanced directive  - add't info provided,   [address] ACP-Facilitator appointment  Yes       Medications/Allergies: Reviewed with patient  Prior to Admission medications    Medication Sig Start Date End Date Taking?  Authorizing Provider   metFORMIN (GLUCOPHAGE) 1,000 mg tablet TAKE 1 TABLET BY MOUTH TWICE DAILY 11/22/20   Narcisa Che MD   indapamide (LOZOL) 1.25 mg tablet TAKE 1 TABLET BY MOUTH DAILY 11/22/20   Narcisa Che MD   potassium chloride SA (MICRO-K) 10 mEq capsule TAKE 3 CAPSULES BY MOUTH EVERY DAY 11/11/20   Narcisa Che MD   pravastatin (PRAVACHOL) 20 mg tablet TAKE 1 TABLET BY MOUTH EVERY NIGHT 10/14/20   Jg Hilario MD   lisinopriL (PRINIVIL, ZESTRIL) 10 mg tablet TAKE 1 TABLET BY MOUTH EVERY DAY 6/11/20   Jg Hilario MD   cyanocobalamin 1,000 mcg tablet Take 1,000 mcg by mouth daily. Provider, Historical   ACCU-CHEK SMARTVIEW TEST STRIP strip PLEASE USE TWICE DAILY AS NEEDED 9/20/18   Jenny Richard MD   glucose blood VI test strips (ACCU-CHEK SMARTVIEW TEST STRIP) strip Please use two times daily as needed for Dx code 250.02 4/17/17   Jenny Richard MD   diclofenac (VOLTAREN) 1 % gel Apply  to affected area four (4) times daily as needed. 10/31/16   Jenny Richard MD   BETA-CAROTENE,A, W-C & E/ZN/CU (VISION-CHARANJIT PRESERVE PO) Take  by mouth two (2) times a day. Provider, Historical   Cholecalciferol, Vitamin D3, (VITAMIN D3) 1,000 unit cap Take 5,000 Units by mouth daily. 4 tabs daily  Indications: vitamin D deficiency    Provider, Historical       Allergies   Allergen Reactions    Pcn [Penicillins] Hives       Objective:  Visit Vitals  BP (!) 145/81 (BP 1 Location: Left arm, BP Patient Position: Sitting)   Pulse 70   Temp 97.5 °F (36.4 °C) (Oral)   Resp 16   Ht 5' 4\" (1.626 m)   Wt 206 lb 12.8 oz (93.8 kg)   SpO2 99%   BMI 35.50 kg/m²    Body mass index is 35.5 kg/m². Problem List: Reviewed with patient and discussed risk factors. Patient Active Problem List   Diagnosis Code    HTN (hypertension) I10    History of left breast cancer Z85.3    Diabetes mellitus type 2, controlled ([facility] [address] E11.9    Multinodular goiter E04.2    Obesity (BMI 30-39. 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 OR    HX ORTHOPAEDIC      Bunion Surgery BOTH FEET     HX TUBAL LIGATION          SH: Reviewed with patient  Social History     Tobacco Use    Smoking status: Never Smoker    Smokeless tobacco: Never Used   Substance Use Topics    Alcohol use: No    Drug use: Never       FH: Reviewed with patient  Family History   Problem Relation Age of Onset    Diabetes Father     Stroke Sister     Diabetes Brother     Heart Disease Mother     Hypertension Sister     Diabetes Brother     Diabetes Brother     Diabetes Brother     Cancer Son         LUMP ON NECK     Anesth Problems Neg Hx        Current medical providers:    Patient Care Team:  Isela Reyes MD as PCP - General (Internal Medicine)  Isela Reyes MD as PCP - [facility] Provider  Kayla Gregory MD (Breast Surgery)  Edgar Dupree DO as Physician (Hematology and Oncology)    Plan:    Diagnoses and all orders for this visit:    Medicare annual wellness visit, subsequent  Immunization and Health screening discussed with her .      ACP (advance care planning)  -     REFERRAL TO [address] [place] [address]   Topic Date Due    Foot Exam Q1  07/08/2021    Breast Cancer Screen Mammogram  09/08/2021    GLAUCOMA SCREENING Q2Y  10/23/2021    Eye Exam Retinal or Dilated  10/23/2021    A1C test (Diabetic or Prediabetic)  11/11/2021    MICROALBUMIN Q1  11/11/2021    Lipid Screen  11/11/2021    Medicare Yearly Exam  12/31/2021    Colorectal Cancer Screening Combo  06/13/2023    DTaP/Tdap/Td series (3 - Td) 12/02/2029    Hepatitis C Screening  Completed    Bone Densitometry (Dexa) Screening  Completed    Shingrix Vaccine Age 50>  Completed    Flu Vaccine  Completed    Pneumococcal 65+ years  Completed    Pneumococcal 65+ yrs at Risk Vaccine Completed          Urinary/ Fecal Incontinence: no    Regular physical exercise: stays active at home. Doesn`t go out lately. Patient verbalized understanding of information presented. AVS and Medicare Part B Preventive Services Table printed and given to pt and reviewed. See table for findings under Recommendation and Scheduled. All of the patient's questions were answered. Written by Meir Kaye, as dictated by Dr. Milka Bhagat MD.    Luz Whitney is a 77 y.o. female. HPI  Pt presents today for routine care follow-up. She is taking lisinopril 10 mg and indapamide 1.25 mg every day and her BP is slightly elevated today in office at 145/81, 140/80 on manual repeat in R arm while sitting down. She attributes some of her high BP to stress from her son who is being treated for cancer. She continues on metformin 1,000 mg BID, and her labs drawn on 11/11/20 showed an HbA1c of 6.4. Her lipid panel came back normal. She has lost weight from 208 lb on 11/11/20 to 206 lb today. Her uric acid was elevated on her labs from 11/11/20. She is not interested in starting medication for this but would be interested in learning about lifestyle changes she can make. She saw Dr. Roman Silveira in November and had a vaginal US done to evaluate her pelvic pain. Everything returned normal.     Patient Active Problem List   Diagnosis Code    HTN (hypertension) I10    History of left breast cancer Z85.3    Diabetes mellitus type 2, controlled ([facility] [address] E11.9    Multinodular goiter E04.2    Obesity (BMI 30-39. 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    Severe obesity (HCC) E66.01        Current Outpatient Medications on File Prior to Visit   Medication Sig Dispense Refill    metFORMIN (GLUCOPHAGE) 1,000 mg tablet TAKE 1 TABLET BY MOUTH TWICE DAILY 180 Tab 0    indapamide (LOZOL) 1.25 mg tablet TAKE 1 TABLET BY MOUTH DAILY 90 Tab 0    potassium chloride SA (MICRO-K) 10 mEq capsule TAKE 3 CAPSULES BY MOUTH EVERY  Cap 0    pravastatin (PRAVACHOL) 20 mg tablet TAKE 1 TABLET BY MOUTH EVERY NIGHT 90 Tab 0    [DISCONTINUED] lisinopriL (PRINIVIL, ZESTRIL) 10 mg tablet TAKE 1 TABLET BY MOUTH EVERY DAY 90 Tab 1    cyanocobalamin 1,000 mcg tablet Take 1,000 mcg by mouth daily.  ACCU-CHEK SMARTVIEW TEST STRIP strip PLEASE USE TWICE DAILY AS NEEDED 100 Strip 11    glucose blood VI test strips (ACCU-CHEK SMARTVIEW TEST STRIP) strip Please use two times daily as needed for Dx code 250.02 100 Strip 11    diclofenac (VOLTAREN) 1 % gel Apply  to affected area four (4) times daily as needed. 100 g 0    BETA-CAROTENE,A, W-C & E/ZN/CU (VISION-CHARANJIT PRESERVE PO) Take  by mouth two (2) times a day.  Cholecalciferol, Vitamin D3, (VITAMIN D3) 1,000 unit cap Take 5,000 Units by mouth daily. 4 tabs daily  Indications: vitamin D deficiency       No current facility-administered medications on file prior to visit.         Allergies   Allergen Reactions    Pcn [Penicillins] Hives       Past Medical History:   Diagnosis Date    Adverse effect of anesthesia     SLOW TO AWAKEN     Arthritis     Breast cancer ([address]     lumpectomy with radiation, 2009, left    Breast cancer, left ([address] 2020    DCIS     Diabetes ([address]     Hyperlipidemia     Hypertension     Radiation therapy complication     0895    Thyroid disease     GOARDER ON THROAT        Past Surgical History:   Procedure Laterality Date    BREAST SURGERY PROCEDURE UNLISTED      lumpectomy    HX BREAST LUMPECTOMY Left     2009    HX BREAST LUMPECTOMY Left 3/12/2020    LEFT BREAST LUMPECTOMY WITH ULTRASOUND performed [facility] Danish [facility]alobos [facility] at [facility]    HX ORTHOPAEDIC      Bunion Surgery BOTH FEET     HX TUBAL LIGATION         Family History   Problem Relation Age of Onset  Diabetes Father     Stroke Sister     Diabetes Brother     Heart Disease Mother     Hypertension Sister     Diabetes Brother     Diabetes Brother     Diabetes Brother     Cancer Son         LUMP ON NECK     Anesth Problems Neg Hx        Social History     Socioeconomic History    Marital status:      Spouse name: Not on file    Number of children: Not on file    Years of education: Not on file    Highest education level: Not on file   Occupational History    Not on file   Social Needs    Financial resource strain: Not on file    Food insecurity     Worry: Not on file     Inability: Not on file   [employer] needs     Medical: Not on file     Non-medical: Not on file   Tobacco Use    Smoking status: Never Smoker    Smokeless tobacco: Never Used   Substance and Sexual Activity    Alcohol use: No    Drug use: Never    Sexual activity: Never   Lifestyle    Physical activity     Days per week: Not on file     Minutes per session: Not on file    Stress: Not on file   Relationships    Social connections     Talks on phone: Not on file     Gets together: Not on file     Attends [employer] service: Not on file     Active member of club or organization: Not on file     Attends meetings of clubs or organizations: Not on file     Relationship status: Not on file    Intimate partner violence     Fear of current or ex partner: Not on file     Emotionally abused: Not on file     Physically abused: Not on file     Forced sexual activity: Not on file   Other Topics Concern    Not on file   Social History Narrative    Not on file       Orders Only on 11/11/2020   Component Date Value Ref Range Status    Uric acid 11/11/2020 8.3* 2.6 - 6.0 MG/DL Final    Microalbumin,urine random 11/11/2020 2.16  MG/DL Final    No reference range has been established.  Creatinine, urine 11/11/2020 164.00  mg/dL Final    No reference range has been established.     Microalbumin/Creat ratio (mg/g cre* 11/11/2020 13  0 - 30 mg/g Final    Hemoglobin A1c 11/11/2020 6.4* 4.0 - 5.6 % Final    Comment: NEW METHOD PLEASE NOTE NEW REFERENCE RANGE  (NOTE)  HbA1C Interpretive Ranges  <5.7              Normal  5.7 - 6.4         Consider Prediabetes  >6.5              Consider Diabetes      Est. average glucose 11/11/2020 137  mg/dL Final    LIPID PROFILE 11/11/2020        Final    Cholesterol, total 11/11/2020 166  <200 MG/DL Final    Triglyceride 11/11/2020 84  <150 MG/DL Final    Comment: Based on NCEP-ATP III:  Triglycerides <150 mg/dL  is considered normal, 150-199  mg/dL  borderline high,  200-499 mg/dL high and  greater than or equal to 500  mg/dL very high.  HDL Cholesterol 11/11/2020 63  MG/DL Final    Comment: Based on NCEP ATP III, HDL Cholesterol <40 mg/dL is considered low and >60  mg/dL is elevated.       LDL, calculated 11/11/2020 86.2  0 - 100 MG/DL Final    Comment: Based on the NCEP-ATP: LDL-C concentrations are considered  optimal <100 mg/dL,  near optimal/above Normal 100-129 mg/dL Borderline High: 130-159, High: 160-189  mg/dL Very High: Greater than or equal to 190 mg/dL      VLDL, calculated 11/11/2020 16.8  MG/DL Final    CHOL/HDL Ratio 11/11/2020 2.6  0.0 - 5.0   Final    Sodium 11/11/2020 142  136 - 145 mmol/L Final    Potassium 11/11/2020 4.0  3.5 - 5.1 mmol/L Final    Chloride 11/11/2020 106  97 - 108 mmol/L Final    CO2 11/11/2020 28  21 - 32 mmol/L Final    Anion gap 11/11/2020 8  5 - 15 mmol/L Final    Glucose 11/11/2020 103* 65 - 100 mg/dL Final    BUN 11/11/2020 22* 6 - 20 MG/DL Final    Creatinine 11/11/2020 0.88  0.55 - 1.02 MG/DL Final    BUN/Creatinine ratio 11/11/2020 25* 12 - 20   Final    GFR est AA 11/11/2020 >60  >60 ml/min/1.73m2 Final    GFR est non-AA 11/11/2020 >60  >60 ml/min/1.73m2 Final    Comment: Estimated GFR is calculated using the IDMS-traceable Modification of Diet in  Renal Disease (MDRD) Study equation, reported for both  Americans  (GFRAA) and non- Americans (GFRNA), and normalized to 1.73m2 body  surface area. The physician must decide which value applies to the patient.  Calcium 11/11/2020 9.9  8.5 - 10.1 MG/DL Final    Bilirubin, total 11/11/2020 0.7  0.2 - 1.0 MG/DL Final    ALT (SGPT) 11/11/2020 24  12 - 78 U/L Final    AST (SGOT) 11/11/2020 8* 15 - 37 U/L Final    Alk. phosphatase 11/11/2020 72  45 - 117 U/L Final    Protein, total 11/11/2020 7.6  6.4 - 8.2 g/dL Final    Albumin 11/11/2020 4.1  3.5 - 5.0 g/dL Final    Globulin 11/11/2020 3.5  2.0 - 4.0 g/dL Final    A-G Ratio 11/11/2020 1.2  1.1 - 2.2   Final    SAMPLES BEING HELD 11/11/2020 1SST   Final    COMMENT 11/11/2020 Add-on orders for these samples will be processed based on acceptable specimen integrity and analyte stability, which may vary by analyte. Final     Review of Systems   Constitutional: Negative for malaise/fatigue and weight loss. HENT: Negative for congestion and sore throat. Eyes: Negative for blurred vision. Respiratory: Negative for cough and shortness of breath. Cardiovascular: Negative for chest pain and leg swelling. Gastrointestinal: Negative for constipation and heartburn. Genitourinary: Negative for frequency and urgency. Musculoskeletal: Negative for back pain, joint pain and myalgias. Neurological: Negative for dizziness and headaches. Psychiatric/Behavioral: Negative for depression. The patient is not nervous/anxious and does not have insomnia.         +grief reaction     Visit Vitals  BP (!) 140/80 (BP 1 Location: Right arm, BP Patient Position: Sitting)   Pulse 70   Temp 97.5 °F (36.4 °C) (Oral)   Resp 16   Ht 5' 4\" (1.626 m)   Wt 206 lb 12.8 oz (93.8 kg)   SpO2 99%   BMI 35.50 kg/m²     Physical Exam  Vitals signs and nursing note reviewed. Constitutional:       General: She is not in acute distress. Appearance: Normal appearance. She is well-developed and well-groomed.  She is not diaphoretic. HENT:      Right Ear: External ear normal.      Left Ear: External ear normal.   Eyes:      General: No scleral icterus. Right eye: No discharge. Left eye: No discharge. Extraocular Movements: Extraocular movements intact. Neck:      Musculoskeletal: Normal range of motion and neck supple. Cardiovascular:      Rate and Rhythm: Normal rate and regular rhythm. Pulmonary:      Effort: Pulmonary effort is normal.      Breath sounds: Normal breath sounds. No wheezing. Musculoskeletal:      Right lower leg: No edema. Left lower leg: No edema. Lymphadenopathy:      Cervical: No cervical adenopathy. Neurological:      Mental Status: She is alert and oriented to person, place, and time. Psychiatric:         Mood and Affect: Mood and affect normal.         Behavior: Behavior normal.       ASSESSMENT and PLAN    ICD-10-CM ICD-9-CM    1. Essential hypertension  I10 401.9 lisinopriL (PRINIVIL, ZESTRIL) 10 mg tablet sent to pharmacy. I refilled her lisinopril. BP is well-controlled on current medication. No change to dosage at this time. 2. Hyperuricemia  E79.0 790.6 I advised her on a diet to lower her uric acid. 3. Controlled type 2 diabetes mellitus without complication, without long-term current use of insulin (HCC)  E11.9 250.00 Diabetes is well-controlled on current medication. No change to dosage at this time. 4. Grief reaction  F43.21 309.0 She is not interested in medication at this time. This plan was reviewed with the patient and patient agrees. All questions were answered. This scribe documentation was reviewed by me and accurately reflects the examination and decisions made by me.

## 2021-01-09 DIAGNOSIS — E78.2 MIXED HYPERLIPIDEMIA: ICD-10-CM

## 2021-01-10 RX ORDER — PRAVASTATIN SODIUM 20 MG/1
TABLET ORAL
Qty: 90 TAB | Refills: 0 | Status: SHIPPED | OUTPATIENT
Start: 2021-01-10 | End: 2021-04-08

## 2021-01-11 ENCOUNTER — TELEPHONE (OUTPATIENT)
Dept: CASE MANAGEMENT | Age: 66
End: 2021-01-11

## 2021-01-11 NOTE — TELEPHONE ENCOUNTER
ACP referral received. Post reviewing chart, I attempted to reach pt unsuccessfully. I left message on voice mail re: purpose of call. I also emailed ACP materials for her review to the email address she provided in her demographics. Will try to reach again 1-2 weeks.   Carolina Bejarano LCSW

## 2021-01-25 ENCOUNTER — TELEPHONE (OUTPATIENT)
Dept: CASE MANAGEMENT | Age: 66
End: 2021-01-25

## 2021-02-07 DIAGNOSIS — I10 ESSENTIAL HYPERTENSION: ICD-10-CM

## 2021-02-07 RX ORDER — POTASSIUM CHLORIDE 750 MG/1
CAPSULE, EXTENDED RELEASE ORAL
Qty: 270 CAP | Refills: 0 | Status: SHIPPED | OUTPATIENT
Start: 2021-02-07 | End: 2021-05-06

## 2021-02-08 ENCOUNTER — HOSPITAL ENCOUNTER (OUTPATIENT)
Dept: MAMMOGRAPHY | Age: 66
Discharge: HOME | End: 2021-02-08
Attending: NURSE PRACTITIONER
Payer: MEDICARE

## 2021-02-08 DIAGNOSIS — Z85.3 HISTORY OF BREAST CANCER IN FEMALE: ICD-10-CM

## 2021-02-08 DIAGNOSIS — Z98.890 S/P LUMPECTOMY, LEFT BREAST: ICD-10-CM

## 2021-02-08 DIAGNOSIS — D05.12 DUCTAL CARCINOMA IN SITU (DCIS) OF LEFT BREAST WITH COMEDONECROSIS: ICD-10-CM

## 2021-02-08 PROCEDURE — 77062 BREAST TOMOSYNTHESIS BI: CPT

## 2021-02-12 ENCOUNTER — TELEPHONE (OUTPATIENT)
Dept: CASE MANAGEMENT | Age: 66
End: 2021-02-12

## 2021-02-12 NOTE — TELEPHONE ENCOUNTER
Attempted to reach pt today re: ACP opportunities. Left message on voice mail with my contact info. Will close referral but be available upon request for a virtual appt.   Tracy Rowe, [facility] IslandMARQUITA

## 2021-02-15 ENCOUNTER — OFFICE VISIT (OUTPATIENT)
Dept: SURGERY | Age: 66
End: 2021-02-15
Payer: MEDICARE

## 2021-02-15 VITALS
HEIGHT: 64 IN | WEIGHT: 206 LBS | SYSTOLIC BLOOD PRESSURE: 136 MMHG | HEART RATE: 69 BPM | BODY MASS INDEX: 35.17 KG/M2 | TEMPERATURE: 96 F | DIASTOLIC BLOOD PRESSURE: 56 MMHG

## 2021-02-15 DIAGNOSIS — Z85.3 HISTORY OF BREAST CANCER IN FEMALE: ICD-10-CM

## 2021-02-15 DIAGNOSIS — Z98.890 S/P LUMPECTOMY, LEFT BREAST: ICD-10-CM

## 2021-02-15 DIAGNOSIS — D05.12 DUCTAL CARCINOMA IN SITU (DCIS) OF LEFT BREAST WITH COMEDONECROSIS: Primary | ICD-10-CM

## 2021-02-15 PROCEDURE — 1101F PT FALLS ASSESS-DOCD LE1/YR: CPT | Performed by: NURSE PRACTITIONER

## 2021-02-15 PROCEDURE — 99213 OFFICE O/P EST LOW 20 MIN: CPT | Performed by: NURSE PRACTITIONER

## 2021-02-15 PROCEDURE — G8427 DOCREV CUR MEDS BY ELIG CLIN: HCPCS | Performed by: NURSE PRACTITIONER

## 2021-02-15 PROCEDURE — G8432 DEP SCR NOT DOC, RNG: HCPCS | Performed by: NURSE PRACTITIONER

## 2021-02-15 PROCEDURE — G8399 PT W/DXA RESULTS DOCUMENT: HCPCS | Performed by: NURSE PRACTITIONER

## 2021-02-15 PROCEDURE — 1090F PRES/ABSN URINE INCON ASSESS: CPT | Performed by: NURSE PRACTITIONER

## 2021-02-15 PROCEDURE — G8752 SYS BP LESS 140: HCPCS | Performed by: NURSE PRACTITIONER

## 2021-02-15 PROCEDURE — G9899 SCRN MAM PERF RSLTS DOC: HCPCS | Performed by: NURSE PRACTITIONER

## 2021-02-15 PROCEDURE — 3017F COLORECTAL CA SCREEN DOC REV: CPT | Performed by: NURSE PRACTITIONER

## 2021-02-15 PROCEDURE — G8417 CALC BMI ABV UP PARAM F/U: HCPCS | Performed by: NURSE PRACTITIONER

## 2021-02-15 PROCEDURE — G8754 DIAS BP LESS 90: HCPCS | Performed by: NURSE PRACTITIONER

## 2021-02-15 PROCEDURE — G8536 NO DOC ELDER MAL SCRN: HCPCS | Performed by: NURSE PRACTITIONER

## 2021-02-15 NOTE — PATIENT INSTRUCTIONS

## 2021-02-15 NOTE — PROGRESS NOTES
HISTORY OF PRESENT ILLNESS  Jose Tillman is a 77 y.o. female. HPI ESTABLISHED patient presents for follow-up to LEFT breast cancer. No complaints at this time. Denies pain.         Breast history -   Referring - Dr. Bryce Trimble  2009 - LEFT breast cancer (probably DCIS) in 2009.  Had surgery by Dr. Consuelo Navarro and whole breast radiation by Dr. Luz Cortes at [facility].    Did not want to take tamoxifen then.      Abnormal screening mammo on LEFT 2/2020.    02/18/20: LEFT breast stereo bx. PATH: DCIS, solid and comedo pattern with comedonecrosis and intraluminal coarse calcification, nuclear grade 2, ER+(99%), IL not performed. Clinical stage 0. MRI 3/5/20 no other abnormalities. Excision [id number] - Dr. Laverne Guzman DIAGNOSIS   Left breast, excision:   Ductal carcinoma in situ, intermediate grade (grade 2). Size: 12 mm. Necrosis: Present, Central comedo-type necrosis. Margins uninvolved by DCIS: Distance from closest margin: <1 mm, posterior. Regional lymph nodes: No lymph nodes submitted or found. Pathologic stage: PTis NX   Additional pathologic findings: Biopsy site changes with squamous metaplasia.   No XRT - Left breast previously radiated  Saw Dr. Kathi Whitaker for a consult - declined AI and tamoxifen     Family history -   A paternal aunt had breast cancer and possible ovarian cancer.    A paternal first cousin was just diagnosed with breast cancer in her mid-61s.    Her son has skin cancer. Patient - genetic testing negative, VUS NBN      OB History    No obstetric history on file.       Obstetric Comments   Menarche 13, LMP 2004, # of children 1, age of 4st delivery 23, Hysterectomy/oophorectomy No/No, Breast bx Yes, history of breast feeding No, BCP No, Hormone therapy No                 Past Surgical History:   Procedure Laterality Date    HX BREAST BIOPSY      HX BREAST LUMPECTOMY Left     2009    HX BREAST LUMPECTOMY Left 3/12/2020    LEFT BREAST LUMPECTOMY WITH ULTRASOUND performed by Boyd Baez MD at [address] [facility]TH[facility]EDIC      Bunion Surgery BOTH FEET     HX TUBAL LIGATION      [place] BREAST SURGERY PROCEDURE UNLISTED      lumpectomy       [facility] Results (most recent):  Results from Hospital Encounter encounter on 02/08/21   [facility] 3D MARIA INES W MAMMO BI DX INCL CAD    Narrative INDICATION: 2020 recurrent left breast cancer. COMPARISON: Left 9/8/2020. Bilateral 2/5/2020. FINDINGS:  Bilateral digital diagnostic mammography, including 3-D tomosynthesis images,  interpreted in conjunction with CAD applied to the 2-D images, demonstrates no  suspicious calcifications or new masses. There is expected left breast treatment  appearance. Breast Composition: Scattered fibroglandular densities. Impression 1. BIRADS: 2, BENIGN. 2. No mammographic evidence for malignancy. 3. Annual bilateral diagnostic mammogram followup is advised. 4. The patient is informed. ROS    Physical Exam  Constitutional:       Appearance: Normal appearance. Chest:      Breasts:         Right: No mass, nipple discharge, skin change or tenderness. Left: No mass, nipple discharge, skin change or tenderness. Musculoskeletal: Normal range of motion. Comments: UE x 2   Lymphadenopathy:      Upper Body:      Right upper body: No supraclavicular or axillary adenopathy. Left upper body: No supraclavicular or axillary adenopathy. Neurological:      Mental Status: She is alert. Psychiatric:         Attention and Perception: Attention normal.         Mood and Affect: Mood normal.         Speech: Speech normal.         Behavior: Behavior normal.       Visit Vitals  BP (!) 136/56 (BP 1 Location: Right upper arm, BP Patient Position: Sitting)   Pulse 69   Temp (!) 96 °F (35.6 °C) (Temporal)   Ht 5' 4\" (1.626 m)   Wt 206 lb (93.4 kg)   BMI 35.36 kg/m²         ASSESSMENT and PLAN  Encounter Diagnoses   Name Primary?    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 Continues to have zingers in LEFT breast.    BDmammogram 3D in 1 year. RTC in 6 months or sooner PRN. She is comfortable with this plan. All questions answered and she stated understanding. Total time spent for this patient - 20 minutes.

## 2021-02-23 DIAGNOSIS — I10 ESSENTIAL HYPERTENSION: ICD-10-CM

## 2021-02-23 RX ORDER — INDAPAMIDE 1.25 MG/1
TABLET, FILM COATED ORAL
Qty: 90 TAB | Refills: 0 | Status: SHIPPED | OUTPATIENT
Start: 2021-02-23 | End: 2021-05-22

## 2021-02-24 DIAGNOSIS — E11.9 CONTROLLED TYPE 2 DIABETES MELLITUS WITHOUT COMPLICATION, WITHOUT LONG-TERM CURRENT USE OF INSULIN (HCC): ICD-10-CM

## 2021-02-24 RX ORDER — METFORMIN HYDROCHLORIDE 1000 MG/1
TABLET ORAL
Qty: 180 TAB | Refills: 0 | Status: SHIPPED | OUTPATIENT
Start: 2021-02-24 | End: 2021-05-22

## 2021-04-08 DIAGNOSIS — E78.2 MIXED HYPERLIPIDEMIA: ICD-10-CM

## 2021-04-08 RX ORDER — PRAVASTATIN SODIUM 20 MG/1
TABLET ORAL
Qty: 90 TAB | Refills: 0 | Status: SHIPPED | OUTPATIENT
Start: 2021-04-08 | End: 2021-07-06

## 2021-05-06 DIAGNOSIS — I10 ESSENTIAL HYPERTENSION: ICD-10-CM

## 2021-05-06 RX ORDER — POTASSIUM CHLORIDE 750 MG/1
CAPSULE, EXTENDED RELEASE ORAL
Qty: 270 CAP | Refills: 0 | Status: SHIPPED | OUTPATIENT
Start: 2021-05-06 | End: 2021-08-02

## 2021-05-22 DIAGNOSIS — I10 ESSENTIAL HYPERTENSION: ICD-10-CM

## 2021-05-22 DIAGNOSIS — E11.9 CONTROLLED TYPE 2 DIABETES MELLITUS WITHOUT COMPLICATION, WITHOUT LONG-TERM CURRENT USE OF INSULIN (HCC): ICD-10-CM

## 2021-05-22 RX ORDER — METFORMIN HYDROCHLORIDE 1000 MG/1
TABLET ORAL
Qty: 180 TABLET | Refills: 0 | Status: SHIPPED | OUTPATIENT
Start: 2021-05-22 | End: 2021-08-18

## 2021-05-22 RX ORDER — INDAPAMIDE 1.25 MG/1
TABLET, FILM COATED ORAL
Qty: 90 TABLET | Refills: 0 | Status: SHIPPED | OUTPATIENT
Start: 2021-05-22 | End: 2021-08-18

## 2021-05-25 ENCOUNTER — OFFICE VISIT (OUTPATIENT)
Dept: PRIMARY CARE CLINIC | Age: 66
End: 2021-05-25
Payer: MEDICARE

## 2021-05-25 VITALS
HEART RATE: 63 BPM | DIASTOLIC BLOOD PRESSURE: 74 MMHG | WEIGHT: 203.2 LBS | RESPIRATION RATE: 16 BRPM | BODY MASS INDEX: 34.69 KG/M2 | HEIGHT: 64 IN | SYSTOLIC BLOOD PRESSURE: 128 MMHG | OXYGEN SATURATION: 100 % | TEMPERATURE: 97.3 F

## 2021-05-25 DIAGNOSIS — E11.9 CONTROLLED TYPE 2 DIABETES MELLITUS WITHOUT COMPLICATION, WITHOUT LONG-TERM CURRENT USE OF INSULIN (HCC): Primary | ICD-10-CM

## 2021-05-25 DIAGNOSIS — I10 ESSENTIAL HYPERTENSION: ICD-10-CM

## 2021-05-25 DIAGNOSIS — M21.962 DEFORMITY OF BOTH FEET: ICD-10-CM

## 2021-05-25 DIAGNOSIS — M1A.0720 IDIOPATHIC CHRONIC GOUT OF LEFT FOOT WITHOUT TOPHUS: ICD-10-CM

## 2021-05-25 DIAGNOSIS — M21.961 DEFORMITY OF BOTH FEET: ICD-10-CM

## 2021-05-25 DIAGNOSIS — E66.9 OBESITY (BMI 30.0-34.9): ICD-10-CM

## 2021-05-25 DIAGNOSIS — Z85.3 HISTORY OF BREAST CANCER: ICD-10-CM

## 2021-05-25 DIAGNOSIS — E78.2 MIXED HYPERLIPIDEMIA: ICD-10-CM

## 2021-05-25 DIAGNOSIS — F43.21 GRIEF REACTION: ICD-10-CM

## 2021-05-25 PROBLEM — E66.01 SEVERE OBESITY (HCC): Status: RESOLVED | Noted: 2020-11-11 | Resolved: 2021-05-25

## 2021-05-25 PROBLEM — D05.12 DUCTAL CARCINOMA IN SITU (DCIS) OF LEFT BREAST WITH COMEDONECROSIS: Status: RESOLVED | Noted: 2020-02-18 | Resolved: 2021-05-25

## 2021-05-25 LAB
ALBUMIN SERPL-MCNC: 4 G/DL (ref 3.5–5)
ALBUMIN/GLOB SERPL: 1.2 {RATIO} (ref 1.1–2.2)
ALP SERPL-CCNC: 67 U/L (ref 45–117)
ALT SERPL-CCNC: 23 U/L (ref 12–78)
ANION GAP SERPL CALC-SCNC: 8 MMOL/L (ref 5–15)
AST SERPL-CCNC: 9 U/L (ref 15–37)
BILIRUB SERPL-MCNC: 0.5 MG/DL (ref 0.2–1)
BUN SERPL-MCNC: 20 MG/DL (ref 6–20)
BUN/CREAT SERPL: 25 (ref 12–20)
CALCIUM SERPL-MCNC: 9.6 MG/DL (ref 8.5–10.1)
CHLORIDE SERPL-SCNC: 109 MMOL/L (ref 97–108)
CO2 SERPL-SCNC: 25 MMOL/L (ref 21–32)
CREAT SERPL-MCNC: 0.81 MG/DL (ref 0.55–1.02)
CREAT UR-MCNC: 116 MG/DL
ERYTHROCYTE [DISTWIDTH] IN BLOOD BY AUTOMATED COUNT: 14.1 % (ref 11.5–14.5)
EST. AVERAGE GLUCOSE BLD GHB EST-MCNC: 137 MG/DL
GLOBULIN SER CALC-MCNC: 3.4 G/DL (ref 2–4)
GLUCOSE SERPL-MCNC: 92 MG/DL (ref 65–100)
HBA1C MFR BLD: 6.4 % (ref 4–5.6)
HCT VFR BLD AUTO: 32.2 % (ref 35–47)
HGB BLD-MCNC: 10.4 G/DL (ref 11.5–16)
MCH RBC QN AUTO: 28.7 PG (ref 26–34)
MCHC RBC AUTO-ENTMCNC: 32.3 G/DL (ref 30–36.5)
MCV RBC AUTO: 88.7 FL (ref 80–99)
MICROALBUMIN UR-MCNC: 0.8 MG/DL
MICROALBUMIN/CREAT UR-RTO: 7 MG/G (ref 0–30)
NRBC # BLD: 0 K/UL (ref 0–0.01)
NRBC BLD-RTO: 0 PER 100 WBC
PLATELET # BLD AUTO: 203 K/UL (ref 150–400)
PMV BLD AUTO: 12.4 FL (ref 8.9–12.9)
POTASSIUM SERPL-SCNC: 4.3 MMOL/L (ref 3.5–5.1)
PROT SERPL-MCNC: 7.4 G/DL (ref 6.4–8.2)
RBC # BLD AUTO: 3.63 M/UL (ref 3.8–5.2)
SODIUM SERPL-SCNC: 142 MMOL/L (ref 136–145)
URATE SERPL-MCNC: 7.3 MG/DL (ref 2.6–6)
WBC # BLD AUTO: 5 K/UL (ref 3.6–11)

## 2021-05-25 PROCEDURE — 1090F PRES/ABSN URINE INCON ASSESS: CPT | Performed by: INTERNAL MEDICINE

## 2021-05-25 PROCEDURE — 99214 OFFICE O/P EST MOD 30 MIN: CPT | Performed by: INTERNAL MEDICINE

## 2021-05-25 PROCEDURE — G8427 DOCREV CUR MEDS BY ELIG CLIN: HCPCS | Performed by: INTERNAL MEDICINE

## 2021-05-25 PROCEDURE — G8417 CALC BMI ABV UP PARAM F/U: HCPCS | Performed by: INTERNAL MEDICINE

## 2021-05-25 PROCEDURE — G8536 NO DOC ELDER MAL SCRN: HCPCS | Performed by: INTERNAL MEDICINE

## 2021-05-25 PROCEDURE — G8510 SCR DEP NEG, NO PLAN REQD: HCPCS | Performed by: INTERNAL MEDICINE

## 2021-05-25 PROCEDURE — 2022F DILAT RTA XM EVC RTNOPTHY: CPT | Performed by: INTERNAL MEDICINE

## 2021-05-25 PROCEDURE — G8752 SYS BP LESS 140: HCPCS | Performed by: INTERNAL MEDICINE

## 2021-05-25 PROCEDURE — 3017F COLORECTAL CA SCREEN DOC REV: CPT | Performed by: INTERNAL MEDICINE

## 2021-05-25 PROCEDURE — G8399 PT W/DXA RESULTS DOCUMENT: HCPCS | Performed by: INTERNAL MEDICINE

## 2021-05-25 PROCEDURE — G8754 DIAS BP LESS 90: HCPCS | Performed by: INTERNAL MEDICINE

## 2021-05-25 PROCEDURE — 3044F HG A1C LEVEL LT 7.0%: CPT | Performed by: INTERNAL MEDICINE

## 2021-05-25 PROCEDURE — G9899 SCRN MAM PERF RSLTS DOC: HCPCS | Performed by: INTERNAL MEDICINE

## 2021-05-25 PROCEDURE — 1101F PT FALLS ASSESS-DOCD LE1/YR: CPT | Performed by: INTERNAL MEDICINE

## 2021-05-25 NOTE — PROGRESS NOTES
Eduarda Kearns (: 1955) is a 77 y.o. female, established patient, here for evaluation of the following chief complaint(s):  Follow-up     Written by Alayna Knowles, as dictated by Dr. Bleinda Gilman MD.      ASSESSMENT/PLAN:  Below is the assessment and plan developed based on review of pertinent history, physical exam, labs, studies, and medications. 1. Controlled type 2 diabetes mellitus without complication, without long-term current use of insulin (HCC)  Well controlled on current regimen of 1000mg BID. Will check hgb A1c. Provided the patient with a written referral to podiatry. -     METABOLIC PANEL, COMPREHENSIVE; Future  -     CBC W/O DIFF; Future  -     HEMOGLOBIN A1C WITH EAG; Future  -     MICROALBUMIN, UR, RAND W/ MICROALB/CREAT RATIO; Future  -     HM DIABETES FOOT EXAM  -     REFERRAL TO PODIATRY    2. Grief reaction  She has been does not feel depressed since her son's death and has been accepting of it since her son is no longer in pain. Told her to contact me if she feels like she needs a referral to psychiatry or medication for her grief. 3. Essential hypertension  BP is well controlled on current regimen of indapamide 1.25mg daily and lisinopril 10mg daily. Continue current medication. 4. History of breast cancer  Followed by Darryle Gold, NP.     5. Obesity (BMI 30.0-34. 9)  Explained that she should be walking 5,000 steps daily to maintain conditioning and weight and increase to at least 10,000 steps to work on losing weight. 6. Idiopathic chronic gout of left foot without tophus  Stable at this time. Will check uric acid. -     URIC ACID; Future    7. Mixed hyperlipidemia  Well controlled on current regimen of pravastatin 20mg daily. 8. Deformity of both feet  Provided the patient with a written referral to podiatry.   -     REFERRAL TO PODIATRY      SUBJECTIVE/OBJECTIVE:  HPI  The patient presents today for a routine follow-up.  Her son  in 2021 due to metastatic cancer. She does not feel depressed and has been accepting her son's death since he is no longer suffering. She is on metformin 1000mg BID for diabetes. She denies any numbness or tingling in her feet, but is requesting a referral to podiatry closer to her house. Her hgb A1c from 11/11/20 improved to 6.4% from 6.8% in 07/08/21. Her BP today is good at 128/74. She is on indapamide 1.25mg daily and lisinopril 10mg daily for HTN. She is on pravastatin 20mg daily for hyperlipidemia. Her lipid panel from 11/11/20 was normal.     She follows with Darryle Gold, NP for hx of breast cancer. She denies any recent gout attacks. Patient Active Problem List   Diagnosis Code    HTN (hypertension) I10    History of left breast cancer Z85.3    Diabetes mellitus type 2, controlled ([facility] [address] E11.9    Multinodular goiter E04.2    Obesity (BMI 30-39. 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        Current Outpatient Medications on File Prior to Visit   Medication Sig Dispense Refill    metFORMIN (GLUCOPHAGE) 1,000 mg tablet TAKE 1 TABLET BY MOUTH TWICE DAILY 180 Tablet 0    indapamide (LOZOL) 1.25 mg tablet TAKE 1 TABLET BY MOUTH DAILY 90 Tablet 0    potassium chloride SA (MICRO-K) 10 mEq capsule TAKE 3 CAPSULES BY MOUTH EVERY  Cap 0    pravastatin (PRAVACHOL) 20 mg tablet TAKE 1 TABLET BY MOUTH EVERY NIGHT 90 Tab 0    lisinopriL (PRINIVIL, ZESTRIL) 10 mg tablet TAKE 1 TABLET BY MOUTH EVERY DAY 90 Tab 1    cyanocobalamin 1,000 mcg tablet Take 1,000 mcg by mouth daily.  ACCU-CHEK SMARTVIEW TEST STRIP strip PLEASE USE TWICE DAILY AS NEEDED 100 Strip 11    glucose blood VI test strips (ACCU-CHEK SMARTVIEW TEST STRIP) strip Please use two times daily as needed for Dx code 250.02 100 Strip 11    BETA-CAROTENE,A, W-C & E/ZN/CU (VISION-CHARANJIT PRESERVE PO) Take  by mouth two (2) times a day.       Cholecalciferol, Vitamin D3, (VITAMIN D3) 1,000 unit cap Take 5,000 Units by mouth daily. 4 tabs daily  Indications: vitamin D deficiency       No current facility-administered medications on file prior to visit.        Allergies   Allergen Reactions    Pcn [Penicillins] Hives       Past Medical History:   Diagnosis Date    Adverse effect of anesthesia     SLOW TO AWAKEN     Arthritis     Breast cancer ([address]     lumpectomy with radiation, 2009, left    Breast cancer, left ([address] 2020    DCIS     Diabetes ([address]     Hyperlipidemia     Hypertension     Radiation therapy complication     7114    Thyroid disease     GOARDER ON THROAT        Past Surgical History:   Procedure Laterality Date    HX BREAST BIOPSY      HX BREAST LUMPECTOMY Left     2009    HX BREAST LUMPECTOMY Left 3/12/2020    LEFT BREAST LUMPECTOMY WITH ULTRASOUND performed by Griselda [facility]aud [facility] at [facility] AM[facility]ULATORY [facility]    HX ORTHOPAEDIC      Bunion Surgery BOTH FEET     HX TUBAL LIGATION      [place] BREAST SURGERY PROCEDURE UNLISTED      lumpectomy       Family History   Problem Relation Age of Onset    Diabetes Father     Stroke Sister     Diabetes Brother     Heart Disease Mother     Hypertension Sister     Diabetes Brother     Diabetes Brother     Diabetes Brother     Cancer Son         LUMP ON NECK     Anesth Problems Neg Hx        Social History     Socioeconomic History    Marital status:      Spouse name: Not on file    Number of children: Not on file    Years of education: Not on file    Highest education level: Not on file   Occupational History    Not on file   Tobacco Use    Smoking status: Never Smoker    Smokeless tobacco: Never Used   Vaping Use    Vaping Use: Never used   Substance and Sexual Activity    Alcohol use: No    Drug use: Never    Sexual activity: Never   Other Topics Concern    Not on file   Social History Narrative    Not on file     Social Determinants of Health     Financial Resource Strain:     Difficulty of Paying Living Expenses:    Food Insecurity:     Worried About Running Out of Food in the Last Year:     [address] in the Last Year:    Transportation Needs:     Lack of Transportation (Medical):  Lack of Transportation (Non-Medical):    Physical Activity:     Days of Exercise per Week:     Minutes of Exercise per Session:    Stress:     Feeling of Stress :    Social Connections:     Frequency of Communication with Friends and Family:     Frequency of Social Gatherings with Friends and Family:     Attends Muslim Services:     Active Member of Clubs or Organizations:     Attends Club or Organization Meetings:     Marital Status:    Intimate Partner Violence:     Fear of Current or Ex-Partner:     Emotionally Abused:     Physically Abused:     Sexually Abused:        No visits with results within 3 Month(s) from this visit. Latest known visit with results is:   Orders Only on 11/11/2020   Component Date Value Ref Range Status    Uric acid 11/11/2020 8.3* 2.6 - 6.0 MG/DL Final    Microalbumin,urine random 11/11/2020 2.16  MG/DL Final    No reference range has been established.  Creatinine, urine 11/11/2020 164.00  mg/dL Final    No reference range has been established.  Microalbumin/Creat ratio (mg/g cre* 11/11/2020 13  0 - 30 mg/g Final    Hemoglobin A1c 11/11/2020 6.4* 4.0 - 5.6 % Final    Comment: NEW METHOD PLEASE NOTE NEW REFERENCE RANGE  (NOTE)  HbA1C Interpretive Ranges  <5.7              Normal  5.7 - 6.4         Consider Prediabetes  >6.5              Consider Diabetes      Est. average glucose 11/11/2020 137  mg/dL Final    LIPID PROFILE 11/11/2020        Final    Cholesterol, total 11/11/2020 166  <200 MG/DL Final    Triglyceride 11/11/2020 84  <150 MG/DL Final    Comment: Based on NCEP-ATP III:  Triglycerides <150 mg/dL  is considered normal, 150-199  mg/dL  borderline high,  200-499 mg/dL high and  greater than or equal to 500  mg/dL very high.       HDL Cholesterol 11/11/2020 63  MG/DL Final    Comment: Based on NCEP ATP III, HDL Cholesterol <40 mg/dL is considered low and >60  mg/dL is elevated.  LDL, calculated 11/11/2020 86.2  0 - 100 MG/DL Final    Comment: Based on the NCEP-ATP: LDL-C concentrations are considered  optimal <100 mg/dL,  near optimal/above Normal 100-129 mg/dL Borderline High: 130-159, High: 160-189  mg/dL Very High: Greater than or equal to 190 mg/dL      VLDL, calculated 11/11/2020 16.8  MG/DL Final    CHOL/HDL Ratio 11/11/2020 2.6  0.0 - 5.0   Final    Sodium 11/11/2020 142  136 - 145 mmol/L Final    Potassium 11/11/2020 4.0  3.5 - 5.1 mmol/L Final    Chloride 11/11/2020 106  97 - 108 mmol/L Final    CO2 11/11/2020 28  21 - 32 mmol/L Final    Anion gap 11/11/2020 8  5 - 15 mmol/L Final    Glucose 11/11/2020 103* 65 - 100 mg/dL Final    BUN 11/11/2020 22* 6 - 20 MG/DL Final    Creatinine 11/11/2020 0.88  0.55 - 1.02 MG/DL Final    BUN/Creatinine ratio 11/11/2020 25* 12 - 20   Final    GFR est AA 11/11/2020 >60  >60 ml/min/1.73m2 Final    GFR est non-AA 11/11/2020 >60  >60 ml/min/1.73m2 Final    Comment: Estimated GFR is calculated using the IDMS-traceable Modification of Diet in  Renal Disease (MDRD) Study equation, reported for both  Americans  (GFRAA) and non- Americans (GFRNA), and normalized to 1.73m2 body  surface area. The physician must decide which value applies to the patient.  Calcium 11/11/2020 9.9  8.5 - 10.1 MG/DL Final    Bilirubin, total 11/11/2020 0.7  0.2 - 1.0 MG/DL Final    ALT (SGPT) 11/11/2020 24  12 - 78 U/L Final    AST (SGOT) 11/11/2020 8* 15 - 37 U/L Final    Alk.  phosphatase 11/11/2020 72  45 - 117 U/L Final    Protein, total 11/11/2020 7.6  6.4 - 8.2 g/dL Final    Albumin 11/11/2020 4.1  3.5 - 5.0 g/dL Final    Globulin 11/11/2020 3.5  2.0 - 4.0 g/dL Final    A-G Ratio 11/11/2020 1.2  1.1 - 2.2   Final    SAMPLES BEING HELD 11/11/2020 1SST   Final    COMMENT 11/11/2020 Add-on orders for these samples will be processed based on acceptable specimen integrity and analyte stability, which may vary by analyte. Final      Review of Systems   Constitutional: Negative for activity change, fatigue and unexpected weight change. HENT: Negative for congestion, hearing loss, rhinorrhea and sore throat. Eyes: Negative for discharge. Respiratory: Negative for cough, chest tightness and shortness of breath. Cardiovascular: Negative for leg swelling. Gastrointestinal: Negative for abdominal pain, constipation and diarrhea. Genitourinary: Negative for dysuria, flank pain, frequency and urgency. Musculoskeletal: Negative for arthralgias, back pain and myalgias. Skin: Negative for color change and rash. Neurological: Negative for dizziness, light-headedness and headaches. Psychiatric/Behavioral: Negative for dysphoric mood and sleep disturbance. The patient is not nervous/anxious. Visit Vitals  /74 (BP 1 Location: Right arm)   Pulse 63   Temp 97.3 °F (36.3 °C)   Resp 16   Ht 5' 4\" (1.626 m)   Wt 203 lb 3.2 oz (92.2 kg)   SpO2 100%   BMI 34.88 kg/m²      Physical Exam  Vitals and nursing note reviewed. Constitutional:       General: She is not in acute distress. Appearance: Normal appearance. She is well-developed. She is not diaphoretic. HENT:      Right Ear: External ear normal.      Left Ear: External ear normal.   Eyes:      General: No scleral icterus. Right eye: No discharge. Left eye: No discharge. Extraocular Movements: Extraocular movements intact. Conjunctiva/sclera: Conjunctivae normal.   Cardiovascular:      Rate and Rhythm: Normal rate and regular rhythm. Pulmonary:      Effort: Pulmonary effort is normal.      Breath sounds: Normal breath sounds. No wheezing. Abdominal:      General: Bowel sounds are normal.      Palpations: Abdomen is soft. Tenderness: There is no abdominal tenderness.    Musculoskeletal: Cervical back: Normal range of motion and neck supple. Feet:      Comments: Diabetic foot exam:     Left: Vibratory sensation normal    Sharp/dull discrimination normal    Filament test normal sensation with micro filament   Pulse DP: 2+ (normal)   Deformities: None  Right: Vibratory sensation normal   Sharp/dull discrimination normal   Filament test normal sensation with micro filament   Pulse DP: 2+ (normal)   Deformities: None   Lymphadenopathy:      Cervical: No cervical adenopathy. Neurological:      Mental Status: She is alert and oriented to person, place, and time. Psychiatric:         Mood and Affect: Mood and affect normal.       An electronic signature was used to authenticate this note.   -- Irma Bess

## 2021-05-25 NOTE — PROGRESS NOTES
Chief Complaint   Patient presents with    Follow-up       Visit Vitals  /74 (BP 1 Location: Right arm)   Pulse 63   Temp 97.3 °F (36.3 °C)   Resp 16   Ht 5' 4\" (1.626 m)   Wt 203 lb 3.2 oz (92.2 kg)   SpO2 100%   BMI 34.88 kg/m²       1. Have you been to the ER, urgent care clinic since your last visit? Hospitalized since your last visit? No    2. Have you seen or consulted any other health care providers outside of the [address] [place] [address] since your last visit? Include any pap smears or colon screening.  Yes Podiatrist

## 2021-05-30 DIAGNOSIS — D64.9 ANEMIA, UNSPECIFIED TYPE: Primary | ICD-10-CM

## 2021-05-30 RX ORDER — LANOLIN ALCOHOL/MO/W.PET/CERES
325 CREAM (GRAM) TOPICAL
Qty: 24 TABLET | Refills: 1 | Status: SHIPPED | OUTPATIENT
Start: 2021-06-01 | End: 2021-09-11

## 2021-06-23 DIAGNOSIS — I10 ESSENTIAL HYPERTENSION: ICD-10-CM

## 2021-06-23 RX ORDER — LISINOPRIL 10 MG/1
TABLET ORAL
Qty: 90 TABLET | Refills: 1 | Status: SHIPPED | OUTPATIENT
Start: 2021-06-23 | End: 2021-12-17

## 2021-07-06 DIAGNOSIS — E78.2 MIXED HYPERLIPIDEMIA: ICD-10-CM

## 2021-07-06 RX ORDER — PRAVASTATIN SODIUM 20 MG/1
TABLET ORAL
Qty: 90 TABLET | Refills: 0 | Status: SHIPPED | OUTPATIENT
Start: 2021-07-06 | End: 2021-10-02

## 2021-08-02 DIAGNOSIS — I10 ESSENTIAL HYPERTENSION: ICD-10-CM

## 2021-08-02 RX ORDER — POTASSIUM CHLORIDE 750 MG/1
CAPSULE, EXTENDED RELEASE ORAL
Qty: 270 CAPSULE | Refills: 0 | Status: SHIPPED | OUTPATIENT
Start: 2021-08-02 | End: 2021-10-29

## 2021-08-16 ENCOUNTER — OFFICE VISIT (OUTPATIENT)
Dept: SURGERY | Age: 66
End: 2021-08-16
Payer: MEDICARE

## 2021-08-16 DIAGNOSIS — Z98.890 S/P LUMPECTOMY, LEFT BREAST: ICD-10-CM

## 2021-08-16 DIAGNOSIS — Z85.3 HISTORY OF BREAST CANCER IN FEMALE: ICD-10-CM

## 2021-08-16 DIAGNOSIS — D05.12 DUCTAL CARCINOMA IN SITU (DCIS) OF LEFT BREAST WITH COMEDONECROSIS: Primary | ICD-10-CM

## 2021-08-16 PROCEDURE — G8536 NO DOC ELDER MAL SCRN: HCPCS | Performed by: NURSE PRACTITIONER

## 2021-08-16 PROCEDURE — 99213 OFFICE O/P EST LOW 20 MIN: CPT | Performed by: NURSE PRACTITIONER

## 2021-08-16 PROCEDURE — G8399 PT W/DXA RESULTS DOCUMENT: HCPCS | Performed by: NURSE PRACTITIONER

## 2021-08-16 PROCEDURE — 3017F COLORECTAL CA SCREEN DOC REV: CPT | Performed by: NURSE PRACTITIONER

## 2021-08-16 PROCEDURE — 1090F PRES/ABSN URINE INCON ASSESS: CPT | Performed by: NURSE PRACTITIONER

## 2021-08-16 PROCEDURE — G8756 NO BP MEASURE DOC: HCPCS | Performed by: NURSE PRACTITIONER

## 2021-08-16 PROCEDURE — G9899 SCRN MAM PERF RSLTS DOC: HCPCS | Performed by: NURSE PRACTITIONER

## 2021-08-16 PROCEDURE — G8432 DEP SCR NOT DOC, RNG: HCPCS | Performed by: NURSE PRACTITIONER

## 2021-08-16 PROCEDURE — G8427 DOCREV CUR MEDS BY ELIG CLIN: HCPCS | Performed by: NURSE PRACTITIONER

## 2021-08-16 PROCEDURE — 1101F PT FALLS ASSESS-DOCD LE1/YR: CPT | Performed by: NURSE PRACTITIONER

## 2021-08-16 PROCEDURE — G8417 CALC BMI ABV UP PARAM F/U: HCPCS | Performed by: NURSE PRACTITIONER

## 2021-08-16 NOTE — PROGRESS NOTES
HISTORY OF PRESENT ILLNESS  Jennifer Merrill is a 74 y.o. female. HPI ESTABLISHED patient presents for follow-up to LEFT breast cancer. Denies breast mass, skin changes, nipple discharge and pain.          Breast history -   Referring - Dr. Tatiana Ragsdale  2009 - LEFT breast cancer (probably DCIS) in 2009.  Had surgery by Dr. Dez Schwartz and whole breast radiation by Dr. Sylwia Rodriguez at [facility].    Did not want to take tamoxifen then. Abnormal screening mammo on LEFT 2/2020.    02/18/20: LEFT breast stereo bx. PATH: DCIS, solid and comedo pattern with comedonecrosis and intraluminal coarse calcification, nuclear grade 2, ER+(99%), NH not performed. Clinical stage 0. MRI 3/5/20 no other abnormalities. Excision [id number] - Dr. Ivette Houser DIAGNOSIS   Left breast, excision:   Ductal carcinoma in situ, intermediate grade (grade 2). Size: 12 mm. Necrosis: Present, Central comedo-type necrosis. Margins uninvolved by DCIS: Distance from closest margin: <1 mm, posterior. Regional lymph nodes: No lymph nodes submitted or found. Pathologic stage: PTis NX   Additional pathologic findings: Biopsy site changes with squamous metaplasia.   No XRT - Left breast previously radiated  Saw Dr. Blaine Estevez for a consult - declined AI and tamoxifen     Family history -   A paternal aunt had breast cancer and possible ovarian cancer.    A paternal first cousin was just diagnosed with breast cancer in her mid-61s.    Her son has skin cancer. Patient - genetic testing negative, VUS NBN      OB History    No obstetric history on file.       Obstetric Comments   Menarche 13, LMP 2004, # of children 1, age of 4st delivery 23, Hysterectomy/oophorectomy No/No, Breast bx Yes, history of breast feeding No, BCP No, Hormone therapy No                 Past Surgical History:   Procedure Laterality Date    HX BREAST BIOPSY      HX BREAST LUMPECTOMY Left     2009    HX BREAST LUMPECTOMY Left 3/12/2020    LEFT BREAST LUMPECTOMY WITH ULTRASOUND performed by Clive Liang MD at [address] [facility]      Bunion Surgery BOTH FEET     HX TUBAL LIGATION      [place] BREAST SURGERY PROCEDURE UNLISTED      lumpectomy         [facility] Results (most recent):  Results from Hospital Encounter encounter on 02/08/21    [facility] 3D MARIA INES W MAMMO BI DX INCL CAD    Narrative  INDICATION: 2020 recurrent left breast cancer. COMPARISON: Left 9/8/2020. Bilateral 2/5/2020. FINDINGS:  Bilateral digital diagnostic mammography, including 3-D tomosynthesis images,  interpreted in conjunction with CAD applied to the 2-D images, demonstrates no  suspicious calcifications or new masses. There is expected left breast treatment  appearance. Breast Composition: Scattered fibroglandular densities. Impression  1. BIRADS: 2, BENIGN. 2. No mammographic evidence for malignancy. 3. Annual bilateral diagnostic mammogram followup is advised. 4. The patient is informed. ROS    Physical Exam  Constitutional:       Appearance: Normal appearance. Chest:      Breasts:         Right: No mass, nipple discharge, skin change or tenderness. Left: No mass, nipple discharge, skin change or tenderness. Musculoskeletal:      Comments: FROM - UE x 2   Lymphadenopathy:      Upper Body:      Right upper body: No supraclavicular or axillary adenopathy. Left upper body: No supraclavicular or axillary adenopathy. Neurological:      Mental Status: She is alert. Psychiatric:         Attention and Perception: Attention normal.         Mood and Affect: Mood normal.         Speech: Speech normal.         Behavior: Behavior normal.           ASSESSMENT and PLAN  Encounter Diagnoses   Name Primary? 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 Reviewed normal post treatment changes. BDmammogram 3D in 2/2022 - will schedule  RTC in 6 months or sooner PRN. She is comfortable with this plan. All questions answered and she stated understanding.           Total time spent for this patient - 20 minutes.

## 2021-08-16 NOTE — PATIENT INSTRUCTIONS

## 2021-08-18 DIAGNOSIS — I10 ESSENTIAL HYPERTENSION: ICD-10-CM

## 2021-08-18 DIAGNOSIS — E11.9 CONTROLLED TYPE 2 DIABETES MELLITUS WITHOUT COMPLICATION, WITHOUT LONG-TERM CURRENT USE OF INSULIN (HCC): ICD-10-CM

## 2021-08-18 RX ORDER — METFORMIN HYDROCHLORIDE 1000 MG/1
TABLET ORAL
Qty: 180 TABLET | Refills: 0 | Status: SHIPPED | OUTPATIENT
Start: 2021-08-18 | End: 2021-11-13

## 2021-08-18 RX ORDER — INDAPAMIDE 1.25 MG/1
TABLET, FILM COATED ORAL
Qty: 90 TABLET | Refills: 0 | Status: SHIPPED | OUTPATIENT
Start: 2021-08-18 | End: 2021-11-13

## 2021-09-08 ENCOUNTER — OFFICE VISIT (OUTPATIENT)
Dept: ENDOCRINOLOGY | Age: 66
End: 2021-09-08
Payer: MEDICARE

## 2021-09-08 VITALS
HEART RATE: 82 BPM | HEIGHT: 64 IN | DIASTOLIC BLOOD PRESSURE: 70 MMHG | SYSTOLIC BLOOD PRESSURE: 122 MMHG | BODY MASS INDEX: 35.34 KG/M2 | WEIGHT: 207 LBS

## 2021-09-08 DIAGNOSIS — E04.2 MULTINODULAR GOITER: Primary | ICD-10-CM

## 2021-09-08 DIAGNOSIS — E66.01 SEVERE OBESITY (BMI 35.0-35.9 WITH COMORBIDITY) (HCC): ICD-10-CM

## 2021-09-08 PROCEDURE — G8417 CALC BMI ABV UP PARAM F/U: HCPCS | Performed by: INTERNAL MEDICINE

## 2021-09-08 PROCEDURE — 1090F PRES/ABSN URINE INCON ASSESS: CPT | Performed by: INTERNAL MEDICINE

## 2021-09-08 PROCEDURE — G8752 SYS BP LESS 140: HCPCS | Performed by: INTERNAL MEDICINE

## 2021-09-08 PROCEDURE — 3017F COLORECTAL CA SCREEN DOC REV: CPT | Performed by: INTERNAL MEDICINE

## 2021-09-08 PROCEDURE — G8536 NO DOC ELDER MAL SCRN: HCPCS | Performed by: INTERNAL MEDICINE

## 2021-09-08 PROCEDURE — G9899 SCRN MAM PERF RSLTS DOC: HCPCS | Performed by: INTERNAL MEDICINE

## 2021-09-08 PROCEDURE — G8754 DIAS BP LESS 90: HCPCS | Performed by: INTERNAL MEDICINE

## 2021-09-08 PROCEDURE — G8399 PT W/DXA RESULTS DOCUMENT: HCPCS | Performed by: INTERNAL MEDICINE

## 2021-09-08 PROCEDURE — G8427 DOCREV CUR MEDS BY ELIG CLIN: HCPCS | Performed by: INTERNAL MEDICINE

## 2021-09-08 PROCEDURE — 1101F PT FALLS ASSESS-DOCD LE1/YR: CPT | Performed by: INTERNAL MEDICINE

## 2021-09-08 PROCEDURE — 99214 OFFICE O/P EST MOD 30 MIN: CPT | Performed by: INTERNAL MEDICINE

## 2021-09-08 PROCEDURE — G8432 DEP SCR NOT DOC, RNG: HCPCS | Performed by: INTERNAL MEDICINE

## 2021-09-08 RX ORDER — INDAPAMIDE 2.5 MG/1
TABLET, FILM COATED ORAL
COMMUNITY
End: 2022-02-16 | Stop reason: ALTCHOICE

## 2021-09-08 NOTE — PROGRESS NOTES
Lavern Goodwin OP 50 y. o. female with history of DM2, breast cancer, HTN who returns for f/u of a multinodular goiter. Dr Caterina Huynh saw her in initial consultation in February 2017 at which time she recommended FNA of the two dominant solid nodules: the 4.4cm one at the isthmus and the 2.6cm one in the right. This was done in March 2017 and pathology of both nodules returned benign.      The patient endorses dysphagia but notes she did not start noticing this until she found out she had nodules. She denies supine compression or changes to her voice. She has no family history of thyroid cancer. She has no history of thyroid dysfunction and TFTs have been normal in the past. TPO antibody was just slightly elevated. She denies racing heart, tremors, palpitations, heat or cold intolerance, or changes to her energy level. Weight is increasing, up about 12 lbs since Dec. Other PMH is notable for type 2 diabetes which is well-controlled (A1c 6.6%) on metformin and glipizide. She tries to limit the carbs in her diet, asks about how she can eliminate sugar.      She returns today in follow-up of her multinodular goiter. She has absolutely no symptoms related to the goiter. There is no dysphagia and no shortness of breath. She has not noted any change in size and her friends have been not told her that the goiter appears to be increasing in size. She had a recent TSH of 1.5 and she has no symptoms of hyper or hypothyroidism. I repeated her thyroid ultrasound in 2019:    Multinodular enlargement of the thyroid gland is again noted. The representative  solid right thyroid nodule measures 2.7 x 2.0 x 2.7 cm, previously 2.6 x 2.1 x  2.5 cm. The representative mixed cystic and solid nodule in the left gland has  increased in size and now measures 3.9 x 2.5 x 3.2 cm. Large isthmus nodule is  stable. The right lobe measures 9.1 x 3.9 x 3.3 cm and the left lobe measures 10.2 x 4.5  x 3.6 cm.  The isthmus measures 14 mm.    Multinodular enlargement of the thyroid gland again demonstrated. There has been an increase in the size of the previously described complex  cystic lesion in the left gland. Other nodules are stable       Examination  Blood pressure 120/53  Pulse 72 and regular  Weight 215 pounds  HEENT unremarkable. There was no lid lag, proptosis, or stare, examination of the thyroid reveals an easily palpable multinodular goiter with multiple nodules in the 2 to 4 cm range. Based on my examination, they are no different from the previous thyroid ultrasound obtained in March 2017. The nodules moves easily with swallowing and they are soft in texture. Impression  1. Multinodular goiter which by history appears stable  2. Type 2 diabetes mellitus well controlled    Plan:  1. I have ordered a repeat thyroid ultrasound to assess change in size. 2.  We had a long discussion about options given the possible results from the thyroid ultrasound. She would prefer to not have surgery unless absolutely necessary and currently she has no signs or symptoms that would warrant surgery. We did discuss the possibility of repeat FNA if one of the lesions is worrisome in terms of thyroid ultrasound characteristics.   3.  I will see her back in 2 years

## 2021-09-09 ENCOUNTER — TRANSCRIBE ORDER (OUTPATIENT)
Dept: SCHEDULING | Age: 66
End: 2021-09-09

## 2021-09-09 DIAGNOSIS — R92.8 ABNORMAL MAMMOGRAM: Primary | ICD-10-CM

## 2021-09-14 ENCOUNTER — HOSPITAL ENCOUNTER (OUTPATIENT)
Dept: ULTRASOUND IMAGING | Age: 66
Discharge: HOME | End: 2021-09-14
Attending: INTERNAL MEDICINE
Payer: MEDICARE

## 2021-09-14 DIAGNOSIS — E04.2 MULTINODULAR GOITER: ICD-10-CM

## 2021-09-14 PROCEDURE — 76536 US EXAM OF HEAD AND NECK: CPT

## 2021-10-02 DIAGNOSIS — E78.2 MIXED HYPERLIPIDEMIA: ICD-10-CM

## 2021-10-02 RX ORDER — PRAVASTATIN SODIUM 20 MG/1
TABLET ORAL
Qty: 90 TABLET | Refills: 0 | Status: SHIPPED | OUTPATIENT
Start: 2021-10-02 | End: 2021-12-30

## 2021-10-29 DIAGNOSIS — I10 ESSENTIAL HYPERTENSION: ICD-10-CM

## 2021-10-29 RX ORDER — POTASSIUM CHLORIDE 750 MG/1
CAPSULE, EXTENDED RELEASE ORAL
Qty: 270 CAPSULE | Refills: 0 | Status: SHIPPED | OUTPATIENT
Start: 2021-10-29 | End: 2022-01-25

## 2021-11-13 DIAGNOSIS — E11.9 CONTROLLED TYPE 2 DIABETES MELLITUS WITHOUT COMPLICATION, WITHOUT LONG-TERM CURRENT USE OF INSULIN (HCC): ICD-10-CM

## 2021-11-13 DIAGNOSIS — I10 ESSENTIAL HYPERTENSION: ICD-10-CM

## 2021-11-13 RX ORDER — INDAPAMIDE 1.25 MG/1
TABLET, FILM COATED ORAL
Qty: 90 TABLET | Refills: 0 | Status: SHIPPED | OUTPATIENT
Start: 2021-11-13 | End: 2022-02-01

## 2021-11-13 RX ORDER — METFORMIN HYDROCHLORIDE 1000 MG/1
TABLET ORAL
Qty: 180 TABLET | Refills: 0 | Status: SHIPPED | OUTPATIENT
Start: 2021-11-13 | End: 2022-02-01 | Stop reason: ALTCHOICE

## 2021-12-08 ENCOUNTER — OFFICE VISIT (OUTPATIENT)
Dept: PRIMARY CARE CLINIC | Age: 66
End: 2021-12-08
Payer: MEDICARE

## 2021-12-08 VITALS
HEIGHT: 64 IN | WEIGHT: 208.4 LBS | HEART RATE: 63 BPM | BODY MASS INDEX: 35.58 KG/M2 | OXYGEN SATURATION: 98 % | DIASTOLIC BLOOD PRESSURE: 78 MMHG | SYSTOLIC BLOOD PRESSURE: 131 MMHG | RESPIRATION RATE: 15 BRPM | TEMPERATURE: 97.5 F

## 2021-12-08 DIAGNOSIS — E04.2 MULTINODULAR GOITER: ICD-10-CM

## 2021-12-08 DIAGNOSIS — M1A.0720 IDIOPATHIC CHRONIC GOUT OF LEFT FOOT WITHOUT TOPHUS: ICD-10-CM

## 2021-12-08 DIAGNOSIS — Z85.3 HISTORY OF BREAST CANCER: ICD-10-CM

## 2021-12-08 DIAGNOSIS — I10 PRIMARY HYPERTENSION: ICD-10-CM

## 2021-12-08 DIAGNOSIS — E78.2 MIXED HYPERLIPIDEMIA: ICD-10-CM

## 2021-12-08 DIAGNOSIS — E11.9 CONTROLLED TYPE 2 DIABETES MELLITUS WITHOUT COMPLICATION, WITHOUT LONG-TERM CURRENT USE OF INSULIN (HCC): Primary | ICD-10-CM

## 2021-12-08 PROCEDURE — 3017F COLORECTAL CA SCREEN DOC REV: CPT | Performed by: INTERNAL MEDICINE

## 2021-12-08 PROCEDURE — G8754 DIAS BP LESS 90: HCPCS | Performed by: INTERNAL MEDICINE

## 2021-12-08 PROCEDURE — 1101F PT FALLS ASSESS-DOCD LE1/YR: CPT | Performed by: INTERNAL MEDICINE

## 2021-12-08 PROCEDURE — G8399 PT W/DXA RESULTS DOCUMENT: HCPCS | Performed by: INTERNAL MEDICINE

## 2021-12-08 PROCEDURE — G8536 NO DOC ELDER MAL SCRN: HCPCS | Performed by: INTERNAL MEDICINE

## 2021-12-08 PROCEDURE — G8417 CALC BMI ABV UP PARAM F/U: HCPCS | Performed by: INTERNAL MEDICINE

## 2021-12-08 PROCEDURE — 3044F HG A1C LEVEL LT 7.0%: CPT | Performed by: INTERNAL MEDICINE

## 2021-12-08 PROCEDURE — G8427 DOCREV CUR MEDS BY ELIG CLIN: HCPCS | Performed by: INTERNAL MEDICINE

## 2021-12-08 PROCEDURE — 99214 OFFICE O/P EST MOD 30 MIN: CPT | Performed by: INTERNAL MEDICINE

## 2021-12-08 PROCEDURE — G8510 SCR DEP NEG, NO PLAN REQD: HCPCS | Performed by: INTERNAL MEDICINE

## 2021-12-08 PROCEDURE — 2022F DILAT RTA XM EVC RTNOPTHY: CPT | Performed by: INTERNAL MEDICINE

## 2021-12-08 PROCEDURE — G8752 SYS BP LESS 140: HCPCS | Performed by: INTERNAL MEDICINE

## 2021-12-08 PROCEDURE — 1090F PRES/ABSN URINE INCON ASSESS: CPT | Performed by: INTERNAL MEDICINE

## 2021-12-08 PROCEDURE — G9899 SCRN MAM PERF RSLTS DOC: HCPCS | Performed by: INTERNAL MEDICINE

## 2021-12-08 NOTE — PROGRESS NOTES
Sonny Gottlieb (: 1955) is a 74 y.o. female, established patient, here for evaluation of the following chief complaint(s):  Diabetes     Written by Mercedes Krueger, as dictated by Dr. Tab Carias MD.      ASSESSMENT/PLAN:  Below is the assessment and plan developed based on review of pertinent history, physical exam, labs, studies, and medications. 1. Controlled type 2 diabetes mellitus without complication, without long-term current use of insulin ([address]  Continue taking metformin 1,000 mg as prescribed. Ordered labs to check metabolic panel, CBC levels, and A1c level. Waiting for results. -     METABOLIC PANEL, COMPREHENSIVE; Future  -     CBC W/O DIFF; Future  -     HEMOGLOBIN A1C WITH EAG; Future    2. Primary hypertension  Well controlled on current medication. Continue taking Lozol 1.25 mg and lisinopril 10 mg as prescribed. 3. Multinodular goiter  Followed by Dr. Vianey Carlos, Endocrinology. 4. Mixed hyperlipidemia  Continue taking pravastatin 20 mg as prescribed. Ordered labs to check lipid panel. Waiting for results. -     LIPID PANEL; Future    5. Idiopathic chronic gout of left foot without tophus  Ordered labs to check uric acid level. Waiting for results. -     URIC ACID; Future    6. History of breast cancer  She has recovered well. Followed by Best Albrecht NP.     SUBJECTIVE/OBJECTIVE:  HPI    Patient presents today for a follow up visit. She takes metformin 1,000 mg for diabetes. She c/o not sleeping well but doesn`t want to take any medication. She is followed by Best Albrecht NP, for L breast cancer. She had a lumpectomy in 2020 and Pathology results revealed ductal carcinoma. She has a hx of multinodular goiter and is followed by Dr. Vianey Carlos, Endocrinology. She takes Lozol 1.25 mg and lisinopril 10 mg for HTN. Her BP today is 131/78. She takes pravastatin 20 mg for hyperlipidemia.      Patient Active Problem List   Diagnosis Code    HTN (hypertension) I10    History of left breast cancer Z85.3    Diabetes mellitus type 2, controlled ([facility] [address] [facility] E11.9    Multinodular goiter E04.2    Obesity (BMI 30-39. 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        Current Outpatient Medications on File Prior to Visit   Medication Sig Dispense Refill    metFORMIN (GLUCOPHAGE) 1,000 mg tablet TAKE 1 TABLET BY MOUTH TWICE DAILY 180 Tablet 0    potassium chloride SA (MICRO-K) 10 mEq capsule TAKE 3 CAPSULES BY MOUTH EVERY  Capsule 0    pravastatin (PRAVACHOL) 20 mg tablet TAKE 1 TABLET BY MOUTH EVERY NIGHT 90 Tablet 0    indapamide (LOZOL) 2.5 mg tablet indapamide 2.5 mg tablet   Take 1 tablet every day by oral route.  lisinopriL (PRINIVIL, ZESTRIL) 10 mg tablet TAKE 1 TABLET BY MOUTH EVERY DAY 90 Tablet 1    cyanocobalamin 1,000 mcg tablet Take 1,000 mcg by mouth daily.  ACCU-CHEK SMARTVIEW TEST STRIP strip PLEASE USE TWICE DAILY AS NEEDED 100 Strip 11    glucose blood VI test strips (ACCU-CHEK SMARTVIEW TEST STRIP) strip Please use two times daily as needed for Dx code 250.02 100 Strip 11    BETA-CAROTENE,A, W-C & E/ZN/CU (VISION-CHARANJIT PRESERVE PO) Take  by mouth two (2) times a day.  Cholecalciferol, Vitamin D3, (VITAMIN D3) 1,000 unit cap Take 5,000 Units by mouth daily. 4 tabs daily  Indications: vitamin D deficiency      indapamide (LOZOL) 1.25 mg tablet TAKE 1 TABLET BY MOUTH DAILY (Patient not taking: Reported on 12/8/2021) 90 Tablet 0     No current facility-administered medications on file prior to visit.        Allergies   Allergen Reactions    Pcn [Penicillins] Hives       Past Medical History:   Diagnosis Date    Adverse effect of anesthesia     SLOW TO AWAKEN     Arthritis     Breast cancer ([facility]     lumpectomy with radiation, 2009, left    Breast cancer, left ([facility] [address] 2020    DCIS     Diabetes ([facility] [address]     Hyperlipidemia     Hypertension     Radiation therapy complication     3882    Thyroid disease     GOARDER ON THROAT        Past Surgical History:   Procedure Laterality Date    HX BREAST BIOPSY      HX BREAST LUMPECTOMY Left     2009    HX BREAST LUMPECTOMY Left 3/12/2020    LEFT BREAST LUMPECTOMY WITH ULTRASOUND performed by Boyd Baez MD at [facility]    HX ORTHOPAEDIC      Bunion Surgery BOTH FEET     HX TUBAL LIGATION      MT BREAST SURGERY PROCEDURE UNLISTED      lumpectomy       Family History   Problem Relation Age of Onset    Diabetes Father     Stroke Sister     Diabetes Brother     Heart Disease Mother     Hypertension Sister     Diabetes Brother     Diabetes Brother     Diabetes Brother     Cancer Son         LUMP ON NECK     Anesth Problems Neg Hx        Social History     Socioeconomic History    Marital status:      Spouse name: Not on file    Number of children: Not on file    Years of education: Not on file    Highest education level: Not on file   Occupational History    Not on file   Tobacco Use    Smoking status: Never Smoker    Smokeless tobacco: Never Used   Vaping Use    Vaping Use: Never used   Substance and Sexual Activity    Alcohol use: No    Drug use: Never    Sexual activity: Never   Other Topics Concern    Not on file   Social History Narrative    Not on file     Social Determinants of Health     Financial Resource Strain:     Difficulty of Paying Living Expenses: Not on file   Food Insecurity:     Worried About [address] in the Last Year: Not on file    [address] in the Last Year: Not on file   Transportation Needs:     Lack of Transportation (Medical): Not on file    Lack of Transportation (Non-Medical):  Not on file   Physical Activity:     Days of Exercise per Week: Not on file    Minutes of Exercise per Session: Not on file   Stress:     Feeling of Stress : Not on file   Social Connections:     Frequency of Communication with Friends and Family: Not on file    Frequency of Social Gatherings with Friends and Family: Not on file    Attends [employer] Services: Not on file    Active Member of Clubs or Organizations: Not on file    Attends Club or Organization Meetings: Not on file    Marital Status: Not on file   Intimate Partner Violence:     Fear of Current or Ex-Partner: Not on file    Emotionally Abused: Not on file    Physically Abused: Not on file    Sexually Abused: Not on file   Housing Stability:     Unable to Pay for Housing in the Last Year: Not on file    Number of [place] in the Last Year: Not on file    Unstable Housing in the Last Year: Not on file       No visits with results within 3 Month(s) from this visit. Latest known visit with results is:   Office Visit on 05/25/2021   Component Date Value Ref Range Status    Microalbumin,urine random 05/25/2021 0.80  MG/DL Final    No reference range has been established.  Creatinine, urine 05/25/2021 116.00  mg/dL Final    No reference range has been established.     Microalbumin/Creat ratio (mg/g cre* 05/25/2021 7  0 - 30 mg/g Final    Hemoglobin A1c 05/25/2021 6.4* 4.0 - 5.6 % Final    Comment: NEW METHOD PLEASE NOTE NEW REFERENCE RANGE  (NOTE)  HbA1C Interpretive Ranges  <5.7              Normal  5.7 - 6.4         Consider Prediabetes  >6.5              Consider Diabetes      Est. average glucose 05/25/2021 137  mg/dL Final    WBC 05/25/2021 5.0  3.6 - 11.0 K/uL Final    RBC 05/25/2021 3.63* 3.80 - 5.20 M/uL Final    HGB 05/25/2021 10.4* 11.5 - 16.0 g/dL Final    HCT 05/25/2021 32.2* 35.0 - 47.0 % Final    MCV 05/25/2021 88.7  80.0 - 99.0 FL Final    MCH 05/25/2021 28.7  26.0 - 34.0 PG Final    MCHC 05/25/2021 32.3  30.0 - 36.5 g/dL Final    RDW 05/25/2021 14.1  11.5 - 14.5 % Final    PLATELET 84/42/3119 114  150 - 400 K/uL Final    MPV 05/25/2021 12.4  8.9 - 12.9 FL Final    NRBC 05/25/2021 0.0  0  WBC Final    ABSOLUTE NRBC 05/25/2021 0.00  0.00 - 0.01 K/uL Final    Sodium 05/25/2021 142  136 - 145 mmol/L Final    Potassium 05/25/2021 4.3  3.5 - 5.1 mmol/L Final    Chloride 05/25/2021 109* 97 - 108 mmol/L Final    CO2 05/25/2021 25  21 - 32 mmol/L Final    Anion gap 05/25/2021 8  5 - 15 mmol/L Final    Glucose 05/25/2021 92  65 - 100 mg/dL Final    BUN 05/25/2021 20  6 - 20 MG/DL Final    Creatinine 05/25/2021 0.81  0.55 - 1.02 MG/DL Final    BUN/Creatinine ratio 05/25/2021 25* 12 - 20   Final    GFR est AA 05/25/2021 >60  >60 ml/min/1.73m2 Final    GFR est non-AA 05/25/2021 >60  >60 ml/min/1.73m2 Final    Comment: Estimated GFR is calculated using the IDMS-traceable Modification of Diet in  Renal Disease (MDRD) Study equation, reported for both  Americans  (GFRAA) and non- Americans (GFRNA), and normalized to 1.73m2 body  surface area. The physician must decide which value applies to the patient.  Calcium 05/25/2021 9.6  8.5 - 10.1 MG/DL Final    Bilirubin, total 05/25/2021 0.5  0.2 - 1.0 MG/DL Final    ALT (SGPT) 05/25/2021 23  12 - 78 U/L Final    AST (SGOT) 05/25/2021 9* 15 - 37 U/L Final    Alk. phosphatase 05/25/2021 67  45 - 117 U/L Final    Protein, total 05/25/2021 7.4  6.4 - 8.2 g/dL Final    Albumin 05/25/2021 4.0  3.5 - 5.0 g/dL Final    Globulin 05/25/2021 3.4  2.0 - 4.0 g/dL Final    A-G Ratio 05/25/2021 1.2  1.1 - 2.2   Final    Uric acid 05/25/2021 7.3* 2.6 - 6.0 MG/DL Final       Review of Systems   Constitutional: Negative for activity change, fatigue and unexpected weight change. HENT: Negative for congestion, hearing loss, rhinorrhea and sore throat. Eyes: Negative for discharge. Respiratory: Negative for cough, chest tightness and shortness of breath. Cardiovascular: Negative for leg swelling. Gastrointestinal: Negative for abdominal pain, constipation and diarrhea. Genitourinary: Negative for dysuria, flank pain, frequency and urgency.    Musculoskeletal: Negative for arthralgias, back pain and myalgias. Skin: Negative for color change and rash. Neurological: Negative for dizziness, light-headedness and headaches. Psychiatric/Behavioral: Positive for sleep disturbance. Negative for dysphoric mood. The patient is not nervous/anxious. Visit Vitals  /78 (BP 1 Location: Right arm)   Pulse 63   Temp 97.5 °F (36.4 °C)   Resp 15   Ht 5' 4\" (1.626 m)   Wt 208 lb 6.4 oz (94.5 kg)   SpO2 98%   BMI 35.77 kg/m²       Physical Exam  Vitals and nursing note reviewed. Constitutional:       General: She is not in acute distress. Appearance: Normal appearance. She is well-developed. She is not diaphoretic. HENT:      Right Ear: External ear normal.      Left Ear: External ear normal.   Eyes:      General: No scleral icterus. Right eye: No discharge. Left eye: No discharge. Extraocular Movements: Extraocular movements intact. Conjunctiva/sclera: Conjunctivae normal.   Cardiovascular:      Rate and Rhythm: Normal rate and regular rhythm. Pulmonary:      Effort: Pulmonary effort is normal.      Breath sounds: Normal breath sounds. No wheezing. Abdominal:      General: Bowel sounds are normal.      Palpations: Abdomen is soft. Tenderness: There is no abdominal tenderness. Musculoskeletal:      Cervical back: Normal range of motion and neck supple. Lymphadenopathy:      Cervical: No cervical adenopathy. Neurological:      Mental Status: She is alert and oriented to person, place, and time. Psychiatric:         Mood and Affect: Mood and affect normal.           An electronic signature was used to authenticate this note.   -- Lanie Byrne

## 2021-12-08 NOTE — PROGRESS NOTES
Chief Complaint   Patient presents with    Diabetes       Visit Vitals  /78 (BP 1 Location: Right arm)   Pulse 63   Temp 97.5 °F (36.4 °C)   Resp 15   Ht 5' 4\" (1.626 m)   Wt 208 lb 6.4 oz (94.5 kg)   SpO2 98%   BMI 35.77 kg/m²       1. Have you been to the ER, urgent care clinic since your last visit? Hospitalized since your last visit? Yes [facility]    2. Have you seen or consulted any other health care providers outside of the [address] [place] [address] since your last visit? Include any pap smears or colon screening.  Yes eye doctor  Endocrine

## 2021-12-17 DIAGNOSIS — I10 ESSENTIAL HYPERTENSION: ICD-10-CM

## 2021-12-17 RX ORDER — LISINOPRIL 10 MG/1
TABLET ORAL
Qty: 90 TABLET | Refills: 1 | Status: SHIPPED | OUTPATIENT
Start: 2021-12-17 | End: 2022-06-11

## 2021-12-30 DIAGNOSIS — E78.2 MIXED HYPERLIPIDEMIA: ICD-10-CM

## 2021-12-30 RX ORDER — PRAVASTATIN SODIUM 20 MG/1
TABLET ORAL
Qty: 90 TABLET | Refills: 0 | Status: SHIPPED | OUTPATIENT
Start: 2021-12-30 | End: 2022-03-28

## 2022-01-25 DIAGNOSIS — I10 ESSENTIAL HYPERTENSION: ICD-10-CM

## 2022-01-25 RX ORDER — POTASSIUM CHLORIDE 750 MG/1
CAPSULE, EXTENDED RELEASE ORAL
Qty: 270 CAPSULE | Refills: 0 | Status: SHIPPED | OUTPATIENT
Start: 2022-01-25 | End: 2022-04-23

## 2022-02-01 ENCOUNTER — OFFICE VISIT (OUTPATIENT)
Dept: PRIMARY CARE CLINIC | Age: 67
End: 2022-02-01
Payer: MEDICARE

## 2022-02-01 VITALS
WEIGHT: 209 LBS | BODY MASS INDEX: 35.68 KG/M2 | OXYGEN SATURATION: 98 % | SYSTOLIC BLOOD PRESSURE: 129 MMHG | HEART RATE: 75 BPM | DIASTOLIC BLOOD PRESSURE: 74 MMHG | HEIGHT: 64 IN | TEMPERATURE: 97.3 F | RESPIRATION RATE: 15 BRPM

## 2022-02-01 DIAGNOSIS — Z00.00 MEDICARE ANNUAL WELLNESS VISIT, SUBSEQUENT: Primary | ICD-10-CM

## 2022-02-01 DIAGNOSIS — E78.2 MIXED HYPERLIPIDEMIA: ICD-10-CM

## 2022-02-01 DIAGNOSIS — E11.9 TYPE II DIABETES MELLITUS, WELL CONTROLLED (HCC): ICD-10-CM

## 2022-02-01 DIAGNOSIS — M1A.0720 IDIOPATHIC CHRONIC GOUT OF LEFT FOOT WITHOUT TOPHUS: ICD-10-CM

## 2022-02-01 DIAGNOSIS — E66.9 OBESITY (BMI 30-39.9): ICD-10-CM

## 2022-02-01 DIAGNOSIS — Z71.89 ACP (ADVANCE CARE PLANNING): ICD-10-CM

## 2022-02-01 DIAGNOSIS — F51.01 PRIMARY INSOMNIA: ICD-10-CM

## 2022-02-01 PROCEDURE — 3044F HG A1C LEVEL LT 7.0%: CPT | Performed by: INTERNAL MEDICINE

## 2022-02-01 PROCEDURE — G8754 DIAS BP LESS 90: HCPCS | Performed by: INTERNAL MEDICINE

## 2022-02-01 PROCEDURE — 2022F DILAT RTA XM EVC RTNOPTHY: CPT | Performed by: INTERNAL MEDICINE

## 2022-02-01 PROCEDURE — G8427 DOCREV CUR MEDS BY ELIG CLIN: HCPCS | Performed by: INTERNAL MEDICINE

## 2022-02-01 PROCEDURE — 99214 OFFICE O/P EST MOD 30 MIN: CPT | Performed by: INTERNAL MEDICINE

## 2022-02-01 PROCEDURE — G8510 SCR DEP NEG, NO PLAN REQD: HCPCS | Performed by: INTERNAL MEDICINE

## 2022-02-01 PROCEDURE — G8399 PT W/DXA RESULTS DOCUMENT: HCPCS | Performed by: INTERNAL MEDICINE

## 2022-02-01 PROCEDURE — G9899 SCRN MAM PERF RSLTS DOC: HCPCS | Performed by: INTERNAL MEDICINE

## 2022-02-01 PROCEDURE — 1090F PRES/ABSN URINE INCON ASSESS: CPT | Performed by: INTERNAL MEDICINE

## 2022-02-01 PROCEDURE — G0439 PPPS, SUBSEQ VISIT: HCPCS | Performed by: INTERNAL MEDICINE

## 2022-02-01 PROCEDURE — 3017F COLORECTAL CA SCREEN DOC REV: CPT | Performed by: INTERNAL MEDICINE

## 2022-02-01 PROCEDURE — 1101F PT FALLS ASSESS-DOCD LE1/YR: CPT | Performed by: INTERNAL MEDICINE

## 2022-02-01 PROCEDURE — G8752 SYS BP LESS 140: HCPCS | Performed by: INTERNAL MEDICINE

## 2022-02-01 PROCEDURE — G8536 NO DOC ELDER MAL SCRN: HCPCS | Performed by: INTERNAL MEDICINE

## 2022-02-01 PROCEDURE — G8417 CALC BMI ABV UP PARAM F/U: HCPCS | Performed by: INTERNAL MEDICINE

## 2022-02-01 RX ORDER — METFORMIN HYDROCHLORIDE 850 MG/1
850 TABLET ORAL 2 TIMES DAILY WITH MEALS
Qty: 180 TABLET | Refills: 0 | Status: SHIPPED | OUTPATIENT
Start: 2022-02-01 | End: 2022-05-01

## 2022-02-01 RX ORDER — BLOOD SUGAR DIAGNOSTIC
STRIP MISCELLANEOUS
Qty: 100 STRIP | Refills: 11 | Status: CANCELLED | OUTPATIENT
Start: 2022-02-01

## 2022-02-01 NOTE — PROGRESS NOTES
Chief Complaint   Patient presents with   Aaron Boone Annual Wellness Visit       Visit Vitals  /74 (BP 1 Location: Right arm)   Pulse 75   Temp 97.3 °F (36.3 °C)   Resp 15   Ht 5' 4\" (1.626 m)   Wt 209 lb (94.8 kg)   SpO2 98%   BMI 35.87 kg/m²       1. Have you been to the ER, urgent care clinic since your last visit? Hospitalized since your last visit? No    2. Have you seen or consulted any other health care providers outside of the [address] [place] [address] since your last visit? Include any pap smears or colon screening. No          Manish Bernabe is a 74 y.o. female and presents for Annual Medicare Wellness Visit. Assessment of cognitive impairment: Alert and oriented x 4. Depression Screen:   3 most recent PHQ Screens 2/1/2022   Little interest or pleasure in doing things Not at all   Feeling down, depressed, irritable, or hopeless Not at all   Total Score PHQ 2 0   Trouble falling or staying asleep, or sleeping too much -   Feeling tired or having little energy -   Poor appetite, weight loss, or overeating -   Feeling bad about yourself - or that you are a failure or have let yourself or your family down -   Trouble concentrating on things such as school, work, reading, or watching TV -   Moving or speaking so slowly that other people could have noticed; or the opposite being so fidgety that others notice -   Thoughts of being better off dead, or hurting yourself in some way -   PHQ 9 Score -   How difficult have these problems made it for you to do your work, take care of your home and get along with others -       Fall Risk Assessment:    Fall Risk Assessment, last 12 mths 2/1/2022   Able to walk? Yes   Fall in past 12 months? 0   Do you feel unsteady? 0   Are you worried about falling 0   Number of falls in past 12 months -   Fall with injury? -       Abuse Screen:   Abuse Screening Questionnaire 2/1/2022   Do you ever feel afraid of your partner?  N   Are you in a relationship with someone who physically or mentally threatens you? N   Is it safe for you to go home? Y       Activities of Daily Living:  Self-care. Requires assistance with: no ADLs  Patient handle his/her own medications  yes Use of pill box  yes  Activities of Daily Living:   ADL Assessment 2/1/2022   Feeding yourself No Help Needed   Getting from bed to chair No Help Needed   Getting dressed No Help Needed   Bathing or showering No Help Needed   Walk across the room (includes cane/walker) No Help Needed   Using the telphone No Help Needed   Taking your medications No Help Needed   Preparing meals No Help Needed   Managing money (expenses/bills) No Help Needed   Moderately strenuous housework (laundry) No Help Needed   Shopping for personal items (toiletries/medicines) No Help Needed   Shopping for groceries No Help Needed   Driving No Help Needed   Climbing a flight of stairs No Help Needed   Getting to places beyond walking distances No Help Needed       Health Maintenance:  Daily Aspirin: no  Bone Density: was done by Gyn  Glaucoma Screening: yes in 09/21  Immunizations:    Tetanus: up to date. Influenza: up to date. Shingles: up to date. PPSV-23: up to date. Prevnar-13: up to date. Covid- Up to date    Cancer screening:    Cervical: done by Osorio W Laureen Galdamez. Breast: up to date. Colon:  records requested- states it's coming up. 10 years      Alcohol Risk Screen:   On any occasion during the past 3 months, have you had more than 3 drinks(female) or 4 drinks (male) containing alcohol in one? No  Do you average more than 7 drinks (female) or 14 drinks (male) per week? No  Type and amount:No    Hearing Loss:  denies any hearing loss    Vision Loss:   Wears glasses, contact lenses, or have any other visual impairment  yes    Adult Nutrition Screen:  No risk factors noted.     Advance Care Planning:   End of Life Planning: has NO advanced directive - additional information provided   [employer] Painting ACP-Facilitator appointment yes       Medications/Allergies: Reviewed with patient  Prior to Admission medications    Medication Sig Start Date End Date Taking? Authorizing Provider   potassium chloride SA (MICRO-K) 10 mEq capsule TAKE 3 CAPSULES BY MOUTH EVERY DAY 1/25/22  Yes Gary English MD   pravastatin (PRAVACHOL) 20 mg tablet TAKE 1 TABLET BY MOUTH EVERY NIGHT 12/30/21  Yes Gary English MD   lisinopriL (PRINIVIL, ZESTRIL) 10 mg tablet TAKE 1 TABLET BY MOUTH EVERY DAY 12/17/21  Yes Gary English MD   metFORMIN (GLUCOPHAGE) 1,000 mg tablet TAKE 1 TABLET BY MOUTH TWICE DAILY 11/13/21  Yes Gary English MD   indapamide (LOZOL) 2.5 mg tablet indapamide 2.5 mg tablet   Take 1 tablet every day by oral route. Yes Provider, Historical   cyanocobalamin 1,000 mcg tablet Take 1,000 mcg by mouth daily. Yes Provider, Historical   ACCU-CHEK SMARTVIEW TEST STRIP strip PLEASE USE TWICE DAILY AS NEEDED 9/20/18  Yes Daryle Staple, MD   glucose blood VI test strips (ACCU-CHEK SMARTVIEW TEST STRIP) strip Please use two times daily as needed for Dx code 250.02 4/17/17  Yes Daryle Staple, MD   BETA-CAROTENE,A, W-C & E/ZN/CU (VISION-CHARANJIT PRESERVE PO) Take  by mouth two (2) times a day. Yes Provider, Historical   Cholecalciferol, Vitamin D3, (VITAMIN D3) 1,000 unit cap Take 5,000 Units by mouth daily. 4 tabs daily  Indications: vitamin D deficiency   Yes Provider, Historical   indapamide (LOZOL) 1.25 mg tablet TAKE 1 TABLET BY MOUTH DAILY  Patient not taking: Reported on 12/8/2021 11/13/21   Gary English MD       Allergies   Allergen Reactions    Pcn [Penicillins] Hives       Objective:  Visit Vitals  /74 (BP 1 Location: Right arm)   Pulse 75   Temp 97.3 °F (36.3 °C)   Resp 15   Ht 5' 4\" (1.626 m)   Wt 209 lb (94.8 kg)   SpO2 98%   BMI 35.87 kg/m²    Body mass index is 35.87 kg/m². Problem List: Reviewed with patient and discussed risk factors.     Patient Active Problem List   Diagnosis Code    HTN (hypertension) I10    History of left breast cancer Z85.3    Diabetes mellitus type 2, controlled ([facility] [address] E11.9    Multinodular goiter E04.2    Obesity (BMI 30-39. 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.0720       PSH: Reviewed with patient  Past Surgical History:   Procedure Laterality Date    HX BREAST BIOPSY      HX BREAST LUMPECTOMY Left     2009    HX BREAST LUMPECTOMY Left 3/12/2020    LEFT BREAST LUMPECTOMY WITH ULTRASOUND performed by Oneil Morgan MD at [address] [facility]      Bunion Surgery BOTH FEET     HX TUBAL LIGATION      MD BREAST SURGERY PROCEDURE UNLISTED      lumpectomy        SH: Reviewed with patient  Social History     Tobacco Use    Smoking status: Never Smoker    Smokeless tobacco: Never Used   Vaping Use    Vaping Use: Never used   Substance Use Topics    Alcohol use: No    Drug use: Never       FH: Reviewed with patient  Family History   Problem Relation Age of Onset    Diabetes Father     Stroke Sister     Diabetes Brother     Heart Disease Mother     Hypertension Sister     Diabetes Brother     Diabetes Brother     Diabetes Brother     Cancer Son         LUMP ON NECK     Anesth Problems Neg Hx        Current medical providers:    Patient Care Team:  Fantasma Tovar MD as PCP - General (Internal Medicine)  Fantasma Tovar MD as PCP - [facility] Empaneled Provider  Skyla Ovalles MD as Consulting Provider (Endocrinology)  Oneil Morgan MD (Breast Surgery)  Maritza Melgar DO as Physician (Hematology and Oncology)    Plan:    Diagnoses and all orders for this visit:    Medicare annual wellness visit, subsequent  . Age appropriate Health screening and immunization discussed with patient.      ACP (advance care planning)  -     REFERRAL TO ACP CLINICAL SPECIALIST          Health Maintenance   Topic Date Due    Eye Exam Retinal or Dilated  10/23/2021    Medicare Yearly Exam  2021    Breast Cancer Screen Mammogram  2022    Foot Exam Q1  2022    MICROALBUMIN Q1  2022    Depression Screen  2022    Depression Monitoring  2022    A1C test (Diabetic or Prediabetic)  2023    Lipid Screen  2023    Colorectal Cancer Screening Combo  2023    DTaP/Tdap/Td series (3 - Td or Tdap) 2029    Hepatitis C Screening  Completed    Bone Densitometry (Dexa) Screening  Completed    Shingrix Vaccine Age 50>  Completed    Flu Vaccine  Completed    COVID-19 Vaccine  Completed    Pneumococcal 65+ years  Completed          Urinary/ Fecal Incontinence: No    Regular physical exercise: Not lately due to the cold weather     Patient verbalized understanding of information presented. AVS and Medicare Part B Preventive Services Table printed and given to pt and reviewed. See table for findings under Recommendation and Scheduled. All of the patient's questions were answered. Bonnee Siemens (: 1955) is a 74 y.o. female, established patient, here for evaluation of the following chief complaint(s): Annual Wellness Visit and Follow-up     Written by Nitza Morris, as dictated by Dr. Nella Altamirano MD.      ASSESSMENT/PLAN:  Below is the assessment and plan developed based on review of pertinent history, physical exam, labs, studies, and medications. 1. Type II diabetes mellitus, well controlled ([facility] [address]  We reviewed recent hgb A1c which improved from 6.4% to 6.2%. We will decrease her dose of metformin to 850 mg BID. -     metFORMIN (GLUCOPHAGE) 850 mg tablet; Take 1 Tablet by mouth two (2) times daily (with meals) for 90 days. , Normal, Disp-180 Tablet, R-0 sent to pharmacy. 2. Obesity (BMI 30-39. 9)  Recommend the patient buy a FitBit to monitor steps. Explained that she should be walking 5,000 steps daily to maintain conditioning and weight and increase to at least 10,000 steps to work on losing weight.       3. Idiopathic chronic gout of left foot without tophus  We reviewed lab work showing elevated uric acid level, but she does not want to be started on medication for gout. We discussed the risk of gout attack and I recommended dried celery seeds and avoiding foods that can trigger gout attack. 4. Mixed hyperlipidemia  We reviewed lipid panel showing good cholesterol levels. Continue on pravastatin 20 mg daily. 5. Primary insomnia  I recommend starting with 2 mg of melatonin at night and increasing to 5 mg if needed. SUBJECTIVE/OBJECTIVE:  HPI   The patient presents today for a routine follow-up. Her BP today is good at 129/74. She is on indapamide 1.25 mg daily and lisinopril 10 mg daily for HTN. She is on metformin 1000 mg BID for diabetes. Her hgb A1c from 01/17/22 was improved from 6.4% to 6.2%. She is on pravastatin 20 mg daily for HLD. Her lipid panel from 01/17/22 showed good cholesterol levels. Her uric acid from 01/17/22 was elevated at 7.7. She denies any recent gout flare ups. She has been eating a lot of processed meats because she likes making sandwiches. She does not want to be started on any medication for gout. She has not been sleeping well at night. She bought melatonin, but has not tried this. Her son passed away 1 year ago (01/2021). She has been dealing with the grief well overall, but still thinks about him every day. She has a good support system and does not think she needs to be on medication for depression. Patient Active Problem List   Diagnosis Code    HTN (hypertension) I10    History of left breast cancer Z85.3    Multinodular goiter E04.2    Obesity (BMI 30-39. 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.0720    Type II diabetes mellitus, well controlled ([address] E11.9        Current Outpatient Medications on File Prior to Visit   Medication Sig Dispense Refill    potassium chloride SA (MICRO-K) 10 mEq capsule TAKE 3 CAPSULES BY MOUTH EVERY  Capsule 0    pravastatin (PRAVACHOL) 20 mg tablet TAKE 1 TABLET BY MOUTH EVERY NIGHT 90 Tablet 0    lisinopriL (PRINIVIL, ZESTRIL) 10 mg tablet TAKE 1 TABLET BY MOUTH EVERY DAY 90 Tablet 1    indapamide (LOZOL) 2.5 mg tablet indapamide 2.5 mg tablet   Take 1 tablet every day by oral route.  cyanocobalamin 1,000 mcg tablet Take 1,000 mcg by mouth daily.  ACCU-CHEK SMARTVIEW TEST STRIP strip PLEASE USE TWICE DAILY AS NEEDED 100 Strip 11    glucose blood VI test strips (ACCU-CHEK SMARTVIEW TEST STRIP) strip Please use two times daily as needed for Dx code 250.02 100 Strip 11    BETA-CAROTENE,A, W-C & E/ZN/CU (VISION-CHARANJIT PRESERVE PO) Take  by mouth two (2) times a day.  Cholecalciferol, Vitamin D3, (VITAMIN D3) 1,000 unit cap Take 5,000 Units by mouth daily. 4 tabs daily  Indications: vitamin D deficiency      [DISCONTINUED] indapamide (LOZOL) 1.25 mg tablet TAKE 1 TABLET BY MOUTH DAILY (Patient not taking: Reported on 12/8/2021) 90 Tablet 0    [DISCONTINUED] metFORMIN (GLUCOPHAGE) 1,000 mg tablet TAKE 1 TABLET BY MOUTH TWICE DAILY 180 Tablet 0     No current facility-administered medications on file prior to visit.        Allergies   Allergen Reactions    Pcn [Penicillins] Hives       Past Medical History:   Diagnosis Date    Adverse effect of anesthesia     SLOW TO AWAKEN     Arthritis     Breast cancer ([address]     lumpectomy with radiation, 2009, left    Breast cancer, left ([address] 2020    DCIS     Diabetes ([address]     Hyperlipidemia     Hypertension     Radiation therapy complication     4898    Thyroid disease     GOARDER ON THROAT        Past Surgical History:   Procedure Laterality Date    HX BREAST BIOPSY      HX BREAST LUMPECTOMY Left     2009    HX BREAST LUMPECTOMY Left 3/12/2020    LEFT BREAST LUMPECTOMY WITH ULTRASOUND performed by Santo Conklin MD at [address] [facility]X ORTH[facility]EDIC      Bunion Surgery BOTH FEET     HX TUBAL LIGATION      IA BREAST SURGERY PROCEDURE UNLISTED      lumpectomy       Family History   Problem Relation Age of Onset    Diabetes Father     Stroke Sister     Diabetes Brother     Heart Disease Mother     Hypertension Sister     Diabetes Brother     Diabetes Brother     Diabetes Brother     Cancer Son         LUMP ON NECK     Anesth Problems Neg Hx        Social History     Socioeconomic History    Marital status:      Spouse name: Not on file    Number of children: Not on file    Years of education: Not on file    Highest education level: Not on file   Occupational History    Not on file   Tobacco Use    Smoking status: Never Smoker    Smokeless tobacco: Never Used   Vaping Use    Vaping Use: Never used   Substance and Sexual Activity    Alcohol use: No    Drug use: Never    Sexual activity: Never   Other Topics Concern    Not on file   Social History Narrative    Not on file       Orders Only on 01/17/2022   Component Date Value Ref Range Status    Uric acid 01/17/2022 7.7* 2.6 - 6.0 MG/DL Final    Hemoglobin A1c 01/17/2022 6.2* 4.0 - 5.6 % Final    Comment: NEW METHOD PLEASE NOTE NEW REFERENCE RANGE  (NOTE)  HbA1C Interpretive Ranges  <5.7              Normal  5.7 - 6.4         Consider Prediabetes  >6.5              Consider Diabetes      Est. average glucose 01/17/2022 131  mg/dL Final    Cholesterol, total 01/17/2022 192  <200 MG/DL Final    Triglyceride 01/17/2022 134  <150 MG/DL Final    Comment: Based on NCEP-ATP III:  Triglycerides <150 mg/dL  is considered normal, 150-199  mg/dL  borderline high,  200-499 mg/dL high and  greater than or equal to 500  mg/dL very high.  HDL Cholesterol 01/17/2022 73  MG/DL Final    Comment: Based on NCEP ATP III, HDL Cholesterol <40 mg/dL is considered low and >60  mg/dL is elevated.       LDL, calculated 01/17/2022 92.2  0 - 100 MG/DL Final    Comment: Based on the NCEP-ATP: LDL-C concentrations are considered  optimal <100 mg/dL,  near optimal/above Normal 100-129 mg/dL Borderline High: 130-159, High: 160-189  mg/dL Very High: Greater than or equal to 190 mg/dL      VLDL, calculated 01/17/2022 26.8  MG/DL Final    CHOL/HDL Ratio 01/17/2022 2.6  0.0 - 5.0   Final    WBC 01/17/2022 5.0  3.6 - 11.0 K/uL Final    RBC 01/17/2022 3.81  3.80 - 5.20 M/uL Final    HGB 01/17/2022 10.7* 11.5 - 16.0 g/dL Final    HCT 01/17/2022 34.1* 35.0 - 47.0 % Final    MCV 01/17/2022 89.5  80.0 - 99.0 FL Final    MCH 01/17/2022 28.1  26.0 - 34.0 PG Final    MCHC 01/17/2022 31.4  30.0 - 36.5 g/dL Final    RDW 01/17/2022 14.3  11.5 - 14.5 % Final    PLATELET 75/17/4749 003  150 - 400 K/uL Final    MPV 01/17/2022 12.4  8.9 - 12.9 FL Final    NRBC 01/17/2022 0.0  0  WBC Final    ABSOLUTE NRBC 01/17/2022 0.00  0.00 - 0.01 K/uL Final    Sodium 01/17/2022 142  136 - 145 mmol/L Final    Potassium 01/17/2022 4.0  3.5 - 5.1 mmol/L Final    Chloride 01/17/2022 109* 97 - 108 mmol/L Final    CO2 01/17/2022 26  21 - 32 mmol/L Final    Anion gap 01/17/2022 7  5 - 15 mmol/L Final    Glucose 01/17/2022 102* 65 - 100 mg/dL Final    BUN 01/17/2022 20  6 - 20 MG/DL Final    Creatinine 01/17/2022 0.89  0.55 - 1.02 MG/DL Final    BUN/Creatinine ratio 01/17/2022 22* 12 - 20   Final    GFR est AA 01/17/2022 >60  >60 ml/min/1.73m2 Final    GFR est non-AA 01/17/2022 >60  >60 ml/min/1.73m2 Final    Comment: Estimated GFR is calculated using the IDMS-traceable Modification of Diet in  Renal Disease (MDRD) Study equation, reported for both  Americans  (GFRAA) and non- Americans (GFRNA), and normalized to 1.73m2 body  surface area. The physician must decide which value applies to the patient.  Calcium 01/17/2022 10.0  8.5 - 10.1 MG/DL Final    Bilirubin, total 01/17/2022 0.6  0.2 - 1.0 MG/DL Final    ALT (SGPT) 01/17/2022 22  12 - 78 U/L Final    AST (SGOT) 01/17/2022 10* 15 - 37 U/L Final    Alk.  phosphatase 01/17/2022 69  45 - 117 U/L Final    Protein, total 01/17/2022 7.5  6.4 - 8.2 g/dL Final    Albumin 01/17/2022 4.1  3.5 - 5.0 g/dL Final    Globulin 01/17/2022 3.4  2.0 - 4.0 g/dL Final    A-G Ratio 01/17/2022 1.2  1.1 - 2.2   Final    SAMPLES BEING HELD 01/17/2022 1sst   Final    COMMENT 01/17/2022 Add-on orders for these samples will be processed based on acceptable specimen integrity and analyte stability, which may vary by analyte. Final      Review of Systems   Constitutional: Negative for activity change, fatigue and unexpected weight change. HENT: Negative for congestion, hearing loss, rhinorrhea and sore throat. Eyes: Negative for discharge. Respiratory: Negative for cough, chest tightness and shortness of breath. Cardiovascular: Negative for leg swelling. Gastrointestinal: Negative for abdominal pain, constipation and diarrhea. Genitourinary: Negative for dysuria, flank pain, frequency and urgency. Musculoskeletal: Negative for arthralgias, back pain and myalgias. Skin: Negative for color change and rash. Neurological: Negative for dizziness, light-headedness and headaches. Psychiatric/Behavioral: Negative for dysphoric mood and sleep disturbance. The patient is not nervous/anxious. Visit Vitals  /74 (BP 1 Location: Right arm)   Pulse 75   Temp 97.3 °F (36.3 °C)   Resp 15   Ht 5' 4\" (1.626 m)   Wt 209 lb (94.8 kg)   SpO2 98%   BMI 35.87 kg/m²      Physical Exam  Vitals and nursing note reviewed. Constitutional:       General: She is not in acute distress. Appearance: Normal appearance. She is well-developed. She is not diaphoretic. HENT:      Right Ear: External ear normal.      Left Ear: External ear normal.   Eyes:      General: No scleral icterus. Right eye: No discharge. Left eye: No discharge. Extraocular Movements: Extraocular movements intact.       Conjunctiva/sclera: Conjunctivae normal.   Cardiovascular:      Rate and Rhythm: Normal rate and regular rhythm. Pulmonary:      Effort: Pulmonary effort is normal.      Breath sounds: Normal breath sounds. No wheezing. Abdominal:      General: Bowel sounds are normal.      Palpations: Abdomen is soft. Tenderness: There is no abdominal tenderness. Musculoskeletal:      Cervical back: Normal range of motion and neck supple. Lymphadenopathy:      Cervical: No cervical adenopathy. Neurological:      Mental Status: She is alert and oriented to person, place, and time. Psychiatric:         Mood and Affect: Mood and affect normal.         An electronic signature was used to authenticate this note.   -- David Rho

## 2022-02-02 ENCOUNTER — PATIENT OUTREACH (OUTPATIENT)
Dept: CASE MANAGEMENT | Age: 67
End: 2022-02-02

## 2022-02-02 NOTE — ACP (ADVANCE CARE PLANNING)
Advance Care Planning   Ambulatory ACP Specialist Patient Outreach    Date:  2/2/2022    ACP Specialist:  Lissette Butt LPN    Outreach call to patient in follow-up to ACP Specialist referral from:    [x] PCP  [] Provider   [] Ambulatory Care Management [] Other     For:                  [x] Advance Directive Assistance              [] Complete Portable DNR order              [] Complete POST/MOST              [] Code Status Discussion             [] Discuss Goals of Care             [] Early ACP Decision-Making              [] Other (Specify)    Date Referral Received: 2/2/22    Today's Outreach:  [x] First   [] Second  [] Third       Third outreach made by: [] Phone  [] Email / mail    [] [facility]     Intervention:  [x] Spoke with Patient   [] Left VM requesting return call      Outcome:  Spoke with patient who wished to review ACP documents before scheduling appointment. Will mail documents to patient and close referral in 30 days if no response is heard from the pt. Follow up call offered, pt declined. Next Step:   [] ACP scheduled conversation  [] Outreach again in one week               [x] Email / Mail ACP Info Sheets  [] Email / Mail Advance Directive   [] Closing referral.  Routing closure to referring provider/staff and to ACP Specialist . [] Closure letter mailed to patient with invitation to contact ACP Specialist if / when ready.   Thank you for this referral.

## 2022-02-09 ENCOUNTER — HOSPITAL ENCOUNTER (OUTPATIENT)
Dept: MAMMOGRAPHY | Age: 67
Discharge: HOME | End: 2022-02-09
Attending: NURSE PRACTITIONER
Payer: MEDICARE

## 2022-02-09 ENCOUNTER — HOSPITAL ENCOUNTER (OUTPATIENT)
Dept: ULTRASOUND IMAGING | Age: 67
Discharge: HOME | End: 2022-02-09
Attending: NURSE PRACTITIONER
Payer: MEDICARE

## 2022-02-09 DIAGNOSIS — R92.8 ABNORMAL MAMMOGRAM: ICD-10-CM

## 2022-02-09 DIAGNOSIS — Z85.3 HISTORY OF BREAST CANCER IN FEMALE: ICD-10-CM

## 2022-02-09 PROCEDURE — 77062 BREAST TOMOSYNTHESIS BI: CPT

## 2022-02-11 DIAGNOSIS — I10 ESSENTIAL HYPERTENSION: ICD-10-CM

## 2022-02-11 RX ORDER — INDAPAMIDE 1.25 MG/1
TABLET, FILM COATED ORAL
Qty: 90 TABLET | Refills: 0 | OUTPATIENT
Start: 2022-02-11

## 2022-02-16 DIAGNOSIS — I10 ESSENTIAL HYPERTENSION: ICD-10-CM

## 2022-02-16 RX ORDER — INDAPAMIDE 1.25 MG/1
1.25 TABLET, FILM COATED ORAL DAILY
Qty: 90 TABLET | Refills: 0 | Status: SHIPPED | OUTPATIENT
Start: 2022-02-16 | End: 2022-05-15

## 2022-02-16 NOTE — TELEPHONE ENCOUNTER
Patient called and transferred back stating that he refill for indapamide was denied. I told her I didn't see a recent request. Rasheed Button the patient I will send a refill to Dr Maryann Pappas, if approved then it will go to the pharmacy    Requested Prescriptions     Pending Prescriptions Disp Refills    indapamide (LOZOL) 1.25 mg tablet 90 Tablet 0     Sig: Take 1 Tablet by mouth daily.         Last Visit 2/1/22  Last Refill 11/13/21

## 2022-02-22 ENCOUNTER — HOSPITAL ENCOUNTER (OUTPATIENT)
Dept: MAMMOGRAPHY | Age: 67
Discharge: HOME | End: 2022-02-22
Attending: NURSE PRACTITIONER
Payer: MEDICARE

## 2022-02-22 DIAGNOSIS — R92.8 ABNORMAL MAMMOGRAM OF LEFT BREAST: ICD-10-CM

## 2022-02-22 PROCEDURE — 77030013689 HC GD NDL EVIVA HOLO -A

## 2022-02-22 PROCEDURE — 88360 TUMOR IMMUNOHISTOCHEM/MANUAL: CPT

## 2022-02-22 PROCEDURE — 88342 IMHCHEM/IMCYTCHM 1ST ANTB: CPT

## 2022-02-22 PROCEDURE — 74011000250 HC RX REV CODE- 250: Performed by: RADIOLOGY

## 2022-02-22 PROCEDURE — 2709999900 HC NON-CHARGEABLE SUPPLY

## 2022-02-22 PROCEDURE — 77065 DX MAMMO INCL CAD UNI: CPT

## 2022-02-22 PROCEDURE — A4648 IMPLANTABLE TISSUE MARKER: HCPCS

## 2022-02-22 PROCEDURE — 19082 BX BREAST ADD LESION STRTCTC: CPT

## 2022-02-22 PROCEDURE — 88305 TISSUE EXAM BY PATHOLOGIST: CPT

## 2022-02-22 PROCEDURE — C1819 TISSUE LOCALIZATION-EXCISION: HCPCS

## 2022-02-22 RX ORDER — LIDOCAINE HYDROCHLORIDE AND EPINEPHRINE 10; 10 MG/ML; UG/ML
8 INJECTION, SOLUTION INFILTRATION; PERINEURAL ONCE
Status: COMPLETED | OUTPATIENT
Start: 2022-02-22 | End: 2022-02-22

## 2022-02-22 RX ORDER — LIDOCAINE HYDROCHLORIDE 10 MG/ML
12 INJECTION, SOLUTION EPIDURAL; INFILTRATION; INTRACAUDAL; PERINEURAL
Status: COMPLETED | OUTPATIENT
Start: 2022-02-22 | End: 2022-02-22

## 2022-02-22 RX ADMIN — LIDOCAINE HYDROCHLORIDE AND EPINEPHRINE 80 MG: 10; 10 INJECTION, SOLUTION INFILTRATION; PERINEURAL at 11:06

## 2022-02-22 RX ADMIN — LIDOCAINE HYDROCHLORIDE 12 ML: 10 INJECTION, SOLUTION EPIDURAL; INFILTRATION; INTRACAUDAL; PERINEURAL at 11:05

## 2022-02-22 NOTE — PROGRESS NOTES
Discharge instructions given to patient by RN. Pt verbalized understanding of discharge instructions. Pt discharged without difficulty. Pt. Discharged in stable condition via ambulation , accompanied by self. Post procedure Mammo completed and reviewed by MD. Pt cleared for discharge. Biopsy site clean and dry, hemostasis achieved. Steri strips with DSD placed. Ice pack held by patient. Pt verbalized she would like to be notified by phone of any results. Pt encouraged to call department if she has not received her results niko 3-5 business days. Pt advised not answer cell phone while driving. Biopsy Specimen verified with Didier Ann prior to RN carrying specimen to gross room.

## 2022-02-22 NOTE — DISCHARGE INSTRUCTIONS
[address]  [phone]      Breast Biopsy Discharge Instructions          1. After the biopsy, we will place a clean covered ice pack over the biopsy site, within the bra - you should leave the ice pack on 30 minutes and then remove the ice pack for 1-2 hours until bedtime. If needed you can continue applying ice the following day. It is a good idea to wear your bra for support, both day and night unless this causes you discomfort. 2. You may take Tylenol (two tablets) every 4 to 6 hours as needed for pain. Do not take aspirin or aspirin products (e.g. ibuprofen, Advil, Motrin) as these may cause more bleeding. 3. You may expect some bruising and skin discoloration in the biopsy area. This is normal and generally should resolve in 5 to 7 days. 4. Most women do not find it necessary to restrict their activities after the procedure. You should rest as needed on the day of your biopsy. The next day, if you are feeling okay, you may resume your regular work/activity schedule. Avoid strenuous activity and heavy lifting, jogging, aerobics, or vacuuming for 48 hours after the procedure. 5. 48 hours after your biopsy, remove the large outer dressing and leave the steri-strips (tiny pieces of tape) in place. The steri-strips will usually fall off in a few days. You may shower 48 hours after your biopsy and you may get the steri-strips wet. If still present after 4 days, you may gently peel the strips off. Keep the area clean and dry and shower daily. 6. If you have bleeding from the incision area, hold firm pressure on the area for 20 minutes. This should control any slight oozing that might occur.   If you develop persistent bleeding or pain which does not respond to the above measures or if you develop a fever, excessive swelling, redness, heat or drainage, please call the Breast Health Navigator at [phone] during normal business hours (7 a.m. - 5 p.m.). After business hours, call [phone] and ask for the on-call Radiology Nurse to be paged, or your referring physician. 7. You will receive your biopsy results (pathology report) in 3-5 business days - the radiologist will review your results and you will receive a call from the radiology department and/or from your doctor.           Physician : Anushka Ko     Nurse: Marya Moorhead: ______________

## 2022-02-24 ENCOUNTER — TELEPHONE (OUTPATIENT)
Dept: SURGERY | Age: 67
End: 2022-02-24

## 2022-02-24 NOTE — TELEPHONE ENCOUNTER
Called patient. Biopsy showed DCIS with focal microinvasion. Reviewed pathology. Will have PSR reach out to patient to schedule a breast talk with Dr. Arelis Baron.

## 2022-03-02 ENCOUNTER — PATIENT OUTREACH (OUTPATIENT)
Dept: CASE MANAGEMENT | Age: 67
End: 2022-03-02

## 2022-03-02 ENCOUNTER — NURSE NAVIGATOR (OUTPATIENT)
Dept: CASE MANAGEMENT | Age: 67
End: 2022-03-02

## 2022-03-02 NOTE — PROGRESS NOTES
[address]  Breast Navigator Encounter    Name:    Lady Chanel  Age:    74 y.o. Diagnosis:     LEFT breast cancer    Interdisciplinary Team:  Med-Onc:    Surg-Onc:    Dr. Ny Fregoso:    Plastics:    :     Nurse Navigator:  Jase Moss RN, BSN, [facility]      Encounter type:  []Patient Initiated  []Navigator Follow-up []Pre-op  []Post-op  []Check-in Prior to First Treatment []Treatment Modality Change  []Survivorship Transition [x]Other:   Initial Navigator Encounter      Narrative: Attempted to reach out to patient prior to her appointment next week with Dr. Tamie Barajas. She was not available, so I left a message asking her to call me back at her convenience. I did mention on the message that I was trying to get her set up for a breast MRI prior to her appointment with Dr. Tamie Barajas next week.           Jase Moss RN, BSN, [facility]  Oncology Breast Navigator     [address]  [address] [facility], [facility] [address].  W: [phone]  F: [phone]  Brittany@"[email or web address]  Good Help to Those in [facility]

## 2022-03-02 NOTE — ACP (ADVANCE CARE PLANNING)
Advance Care Planning   Ambulatory ACP Specialist Patient Outreach    Date:  3/2/2022    ACP Specialist:  Ignacia Kinney LPN    Outreach call to patient in follow-up to ACP Specialist referral from:    [x] PCP  [] Provider   [] Ambulatory Care Management [] Other     For:                  [x] Advance Directive Assistance              [] Complete Portable DNR order              [] Complete POST/MOST              [] Code Status Discussion             [] Discuss Goals of Care             [] Early ACP Decision-Making              [] Other (Specify)    Date Referral Received: 2/2/22    Today's Outreach:  [] First   [] Second  [] Third       Third outreach made by: [] Phone  [] Email / mail    [] FORMTEKt     Intervention:  [] Spoke with Patient   [] Left VM requesting return call      Outcome: No response from pt within 30 days. Will close referral at this time. Next Step:   [] ACP scheduled conversation  [] Outreach again in one week               [] Email / Mail ACP Info Sheets  [] Email / Mail Advance Directive   [x] Closing referral.  Routing closure to referring provider/staff and to ACP Specialist . [] Closure letter mailed to patient with invitation to contact ACP Specialist if / when ready.   Thank you for this referral.

## 2022-03-02 NOTE — PROGRESS NOTES
[address]  Breast Navigator Encounter    Name:    Yessica Lala  Age:    74 y.o. Diagnosis:      LEFT DCIS with focal microinvasion    Interdisciplinary Team:  Med-Onc:    Dr. Val Romero  Surg-Onc:    Dr. Belem Coffey:    Plastics:    :    Lorene Hercules   Nurse Navigator:  Yakov Zendejas [facility], BSN, [facility]      Encounter type:  []Patient Initiated  []Navigator Follow-up []Pre-op  []Post-op  []Check-in Prior to First Treatment []Treatment Modality Change  []Survivorship Transition [x]Other:   Initial Navigator Encounter    Narrative:    Called patient for initial navigator contact. She has recurrent LEFT breast DCIS and has seen Dr. Eduard Rosas in the past for a lumpectomy. Discussed breast MRI as the next step in her work-up. She remembers the MRI that she had in 2020 being very loud, so obviously would like to avoid this, however I told her that this was an important part of her pre-surgical work-up. She went to [facility] before. With the help of the imaging navigator, I was able to get this scheduled for 3/8/2021 at 8:15 am at [facility]. She did not have any questions for me today. Has been following with Irving Maier NP, so is very familiar with Dr. Eduard Rosas and that practice. Provided the patient with my contact information and discussed my role in her care. Will continue to follow patient throughout  the care continuum.               Yakov Zendejas RN, BSN, [facility]  Oncology Breast Navigator     [address]  [address] [place] [address] [place]  22.  W: [phone]  F: [phone]  [email or web address]  Good Help to Those in [facility]

## 2022-03-08 ENCOUNTER — HOSPITAL ENCOUNTER (OUTPATIENT)
Dept: MRI IMAGING | Age: 67
Discharge: HOME | End: 2022-03-08
Attending: SURGERY
Payer: MEDICARE

## 2022-03-08 DIAGNOSIS — D05.92 BREAST CANCER, STAGE 0, LEFT: ICD-10-CM

## 2022-03-08 PROCEDURE — A9585 GADOBUTROL INJECTION: HCPCS | Performed by: RADIOLOGY

## 2022-03-08 PROCEDURE — 77049 MRI BREAST C-+ W/CAD BI: CPT

## 2022-03-08 PROCEDURE — 74011250636 HC RX REV CODE- 250/636: Performed by: RADIOLOGY

## 2022-03-08 RX ADMIN — GADOBUTROL 9 ML: 604.72 INJECTION INTRAVENOUS at 08:56

## 2022-03-11 ENCOUNTER — OFFICE VISIT (OUTPATIENT)
Dept: SURGERY | Age: 67
End: 2022-03-11
Payer: MEDICARE

## 2022-03-11 VITALS
DIASTOLIC BLOOD PRESSURE: 68 MMHG | HEIGHT: 65 IN | SYSTOLIC BLOOD PRESSURE: 165 MMHG | HEART RATE: 77 BPM | BODY MASS INDEX: 34.82 KG/M2 | WEIGHT: 209 LBS

## 2022-03-11 DIAGNOSIS — D05.92 BREAST CANCER, STAGE 0, LEFT: Primary | ICD-10-CM

## 2022-03-11 PROCEDURE — 99215 OFFICE O/P EST HI 40 MIN: CPT | Performed by: SURGERY

## 2022-03-11 PROCEDURE — 3017F COLORECTAL CA SCREEN DOC REV: CPT | Performed by: SURGERY

## 2022-03-11 PROCEDURE — G8754 DIAS BP LESS 90: HCPCS | Performed by: SURGERY

## 2022-03-11 PROCEDURE — G8428 CUR MEDS NOT DOCUMENT: HCPCS | Performed by: SURGERY

## 2022-03-11 PROCEDURE — G9899 SCRN MAM PERF RSLTS DOC: HCPCS | Performed by: SURGERY

## 2022-03-11 PROCEDURE — G8536 NO DOC ELDER MAL SCRN: HCPCS | Performed by: SURGERY

## 2022-03-11 PROCEDURE — 1101F PT FALLS ASSESS-DOCD LE1/YR: CPT | Performed by: SURGERY

## 2022-03-11 PROCEDURE — G8417 CALC BMI ABV UP PARAM F/U: HCPCS | Performed by: SURGERY

## 2022-03-11 PROCEDURE — 1090F PRES/ABSN URINE INCON ASSESS: CPT | Performed by: SURGERY

## 2022-03-11 PROCEDURE — G8399 PT W/DXA RESULTS DOCUMENT: HCPCS | Performed by: SURGERY

## 2022-03-11 PROCEDURE — G8753 SYS BP > OR = 140: HCPCS | Performed by: SURGERY

## 2022-03-11 PROCEDURE — G8432 DEP SCR NOT DOC, RNG: HCPCS | Performed by: SURGERY

## 2022-03-11 NOTE — PROGRESS NOTES
HISTORY OF PRESENT ILLNESS  Juanita Cummings is a 74 y.o. female. HPI ESTABLISHED patient here for BREAST talk for LEFT breast DCIS W/Focal micro invasion. Patient is feeling very nervous. No pain mentioned   accompanied by her friend today. ER-100%,NM-50% too small for her2 staining      Breast history -   Referring - Dr. Cipriano Gannon  2009 - LEFT breast cancer (probably DCIS) in 2009.  Had surgery by Dr. Chary Dominguez and whole breast radiation by Dr. Pauly Friedman at [address] [place] [address].    Did not want to take tamoxifen then. Abnormal screening mammo on LEFT 2/2020.    02/18/20: LEFT breast stereo bx. PATH: DCIS, solid and comedo pattern with comedonecrosis and intraluminal coarse calcification, nuclear grade 2, ER+(99%), NM not performed. Clinical stage 0. MRI 3/5/20 no other abnormalities. Excision [id number] - Dr. Kalli Chavis DIAGNOSIS   Left breast, excision:   Ductal carcinoma in situ, intermediate grade (grade 2). Size: 12 mm. Necrosis: Present, Central comedo-type necrosis. Margins uninvolved by DCIS: Distance from closest margin: <1 mm, posterior. Regional lymph nodes: No lymph nodes submitted or found. Pathologic stage: PTis NX   Additional pathologic findings: Biopsy site changes with squamous metaplasia.   No XRT - Left breast previously radiated  Saw Dr. Edgardo Krishnamurthy for a consult - declined AI and tamoxifen     Family history -   A paternal aunt had breast cancer and possible ovarian cancer.    A paternal first cousin was just diagnosed with breast cancer in her mid-61s.    Her son has skin cancer. Patient - genetic testing negative, VUS NBN        KENDRA Results (most recent):  Results from [place] encounter on 02/22/22    KENDRA POST BX IMAGING LT INCL CAD    Addendum 2/25/2022  9:04 AM  Addendum:  PATHOLOGY CORRELATION  . INDICATION:  Patient recently underwent core needle biopsy of the left breast.  .   FINDINGS:  The pathology report reveals ductal carcinoma in situ with focal microinvasion. .  IMPRESSION:  This is a concordant result. Zulma Vela RECOMMENDATION:  Clinical management. The patient has an appointment to see Dr. Sina Hmamonds. .  The results of this biopsy were discussed with the patient by telephone by  myself. Narrative  INDICATION: Concerning calcifications identified on recent mammogram.  COMPARISON: Previous mammogram, 2/9/2022. Zulma Vela PROCEDURE:  After obtaining informed consent, and performing a \"time-out\" procedure, the  patient was placed on the stereotactic table with the left breast in the ML  position.  images were obtained. Using sterile technique and less than  10 cc's lidocaine for local anesthesia, a small skin incision was made and the  vacuum-assisted Suros core needle was advanced into the breast.  Appropriate  needle position with respect to the calcifications was documented with pre and  post-fire images. Multiple cores were taken. Zulma Vela Specimen radiography shows that calcifications were present in the cores  . A biopsy clip was placed to suri the biopsy site for future localization  purposes and its position confirmed with post-procedure digital mammograms. .  There were no apparent complications. .    Impression  Stereotactic biopsy with Hydromark clip placement and pathology pending. Further  recommendations will be based on final pathology results. MRI Results (most recent):  Results from [place] encounter on 03/08/22    MRI BREAST BI W WO CONT    Narrative  INDICATION: Left DCIS, grade 2, ER/TX positive. Prior left lumpectomy in 2009  and 2020. COMPARISON: 3/5/2020. Breast imaging from February 2022. TECHNIQUE:  Multisequence, multiplanar, bilateral breast MRI was performed in prone position  using a dedicated breast coil. Images were obtained without contrast and dynamic  postcontrast images were obtained in multiple phases. 9 mL IV Gadavist was  administered. Subtraction images were reconstructed.  Postcontrast images were  reviewed with dedicated kinetic analysis software. FINDINGS:  There is mild background parenchymal enhancement and scattered fibroglandular  tissue. Right breast:  No suspicious enhancing foci. No axillary or internal mammary chain  lymphadenopathy. Left breast:  16 mm curvilinear enhancement extends along the inferior margin of the biopsy  clip at 9:00-10:00 in the anterior third. This may represent DCIS or merely  reactive enhancement due to the recent biopsy. No other suspicious enhancement. There are lumpectomy scars in the middle third at 10:00 and in the posterior  third along the nipple line. No axillary or internal mammary chain  lymphadenopathy. A summary portfolio with key images has been sent from kinetic analysis software  to PACS. Impression  1. Curvilinear enhancement along the left anterior biopsy clip (16 mm). 2. No other suspicious enhancement. 3. No lymphadenopathy. 4. BI-RADS Assessment Category 6: Known biopsy proven malignancy. Review of Systems   All other systems reviewed and are negative.       Physical Exam    ASSESSMENT and PLAN  {ASSESSMENT/PLAN:[id number]}

## 2022-03-11 NOTE — PROGRESS NOTES
HISTORY OF PRESENT ILLNESS  Yasmeen Patel is a 74 y.o. female. HPI  ESTABLISHED patient here for BREAST talk for LEFT breast DCIS w/focal micro invasion. Patient is feeling very nervous. No pain mentioned. accompanied by her friend today.     ER-100%,WI-50% too small for her2 staining        Breast history -   Referring - Dr. Wilma Haynes  2009 - LEFT breast cancer (probably DCIS) in 2009.  Had surgery by Dr. Fernanda Bhatt and whole breast radiation by Dr. Omid Johnson at [facility].    Did not want to take tamoxifen then. Abnormal screening mammo on LEFT 2/2020.    02/18/20: LEFT breast stereo bx. PATH: DCIS, solid and comedo pattern with comedonecrosis and intraluminal coarse calcification, nuclear grade 2, ER+(99%), WI not performed. Clinical stage 0. MRI 3/5/20 no other abnormalities. Excision [id number] - Dr. Alicia Mckeon DIAGNOSIS   Left breast, excision:   Ductal carcinoma in situ, intermediate grade (grade 2). Size: 12 mm. Necrosis: Present, Central comedo-type necrosis. Margins uninvolved by DCIS: Distance from closest margin: <1 mm, posterior. Regional lymph nodes: No lymph nodes submitted or found. Pathologic stage: PTis NX   Additional pathologic findings: Biopsy site changes with squamous metaplasia.   No XRT - Left breast previously radiated  Saw Dr. Celeste Valles for a consult - declined AI and tamoxifen     Family history -   A paternal aunt had breast cancer and possible ovarian cancer.    A paternal first cousin was just diagnosed with breast cancer in her mid-61s.    Her son has skin cancer. Patient - genetic testing negative, VUS NBN           KENDRA Results (most recent):  Results from Hospital Encounter encounter on 02/22/22     KENDRA POST BX IMAGING LT INCL CAD     Addendum 2/25/2022  9:04 AM  Addendum:  PATHOLOGY CORRELATION  . INDICATION:  Patient recently underwent core needle biopsy of the left breast.  .   FINDINGS:  The pathology report reveals ductal carcinoma in situ with focal microinvasion. .  IMPRESSION:  This is a concordant result. Hany Macho RECOMMENDATION:  Clinical management. The patient has an appointment to see Dr. Leni Menendez. .  The results of this biopsy were discussed with the patient by telephone by  myself.     Narrative  INDICATION: Concerning calcifications identified on recent mammogram.  COMPARISON: Previous mammogram, 2/9/2022. Hany Macho PROCEDURE:  After obtaining informed consent, and performing a \"time-out\" procedure, the  patient was placed on the stereotactic table with the left breast in the ML  position.  images were obtained. Using sterile technique and less than  10 cc's lidocaine for local anesthesia, a small skin incision was made and the  vacuum-assisted Suros core needle was advanced into the breast.  Appropriate  needle position with respect to the calcifications was documented with pre and  post-fire images. Multiple cores were taken. Hany Macho Specimen radiography shows that calcifications were present in the cores  . A biopsy clip was placed to suri the biopsy site for future localization  purposes and its position confirmed with post-procedure digital mammograms. .  There were no apparent complications. .     Impression  Stereotactic biopsy with Hydromark clip placement and pathology pending. Further  recommendations will be based on final pathology results.        MRI Results (most recent):  Results from Hospital Encounter encounter on 03/08/22     MRI BREAST BI W WO CONT     Narrative  INDICATION: Left DCIS, grade 2, ER/LA positive. Prior left lumpectomy in 2009  and 2020.     COMPARISON: 3/5/2020. Breast imaging from February 2022.     TECHNIQUE:  Multisequence, multiplanar, bilateral breast MRI was performed in prone position  using a dedicated breast coil. Images were obtained without contrast and dynamic  postcontrast images were obtained in multiple phases. 9 mL IV Gadavist was  administered. Subtraction images were reconstructed.  Postcontrast images were  reviewed with dedicated kinetic analysis software.     FINDINGS:  There is mild background parenchymal enhancement and scattered fibroglandular  tissue.     Right breast:  No suspicious enhancing foci. No axillary or internal mammary chain  lymphadenopathy.     Left breast:  16 mm curvilinear enhancement extends along the inferior margin of the biopsy  clip at 9:00-10:00 in the anterior third. This may represent DCIS or merely  reactive enhancement due to the recent biopsy. No other suspicious enhancement. There are lumpectomy scars in the middle third at 10:00 and in the posterior  third along the nipple line. No axillary or internal mammary chain  lymphadenopathy.     A summary portfolio with key images has been sent from kinetic analysis software  to PACS.    Impression  1. Curvilinear enhancement along the left anterior biopsy clip (16 mm). 2. No other suspicious enhancement. 3. No lymphadenopathy. 4. BI-RADS Assessment Category 6: Known biopsy proven malignancy.          Review of Systems   All other systems reviewed and are negative. Physical Exam  Constitutional:       Appearance: She is well-developed. She is not diaphoretic. HENT:      Head: Normocephalic and atraumatic. Right Ear: External ear normal.      Left Ear: External ear normal.   Eyes:      General: No scleral icterus. Right eye: No discharge. Left eye: No discharge. Pupils: Pupils are equal, round, and reactive to light. Neck:      Thyroid: No thyromegaly. Vascular: No JVD. Trachea: No tracheal deviation. Cardiovascular:      Rate and Rhythm: Normal rate and regular rhythm. Heart sounds: Normal heart sounds. Pulmonary:      Effort: Pulmonary effort is normal. No tachypnea, accessory muscle usage or respiratory distress. Breath sounds: Normal breath sounds. No stridor.    Chest:   Breasts: Breasts are symmetrical.      Right: No inverted nipple, mass, nipple discharge, skin change or tenderness. Left: No inverted nipple, mass, nipple discharge, skin change or tenderness. Abdominal:      General: There is no distension. Palpations: Abdomen is soft. There is no mass. Tenderness: There is no abdominal tenderness. Musculoskeletal:         General: Normal range of motion. Cervical back: Normal range of motion and neck supple. Lymphadenopathy:      Cervical: No cervical adenopathy. Skin:     General: Skin is warm and dry. Neurological:      Mental Status: She is alert and oriented to person, place, and time. She is not disoriented. Psychiatric:         Speech: Speech normal.         Behavior: Behavior normal.         Thought Content: Thought content normal.         Judgment: Judgment normal.         ASSESSMENT and PLAN  Encounter Diagnoses   Name Primary?  Breast cancer, stage 0, left Yes      Discussed why DCIS came back. Discussion removing from the area, lumpectomy or mastectomy. Patient doesn't want mastectomy. No XRT the last time. But had XRT the first occurrence. SNBx  Or not? On initial pathology, showed suspected area of microinvasion. schedule for lumpectomy and SNBx.       Total time 40 minutes

## 2022-03-11 NOTE — PATIENT INSTRUCTIONS
Breast Cancer: Care Instructions  Your Care Instructions     Breast cancer occurs when abnormal cells grow out of control in the breast. These cancer cells can spread within the breast, to nearby lymph nodes and other tissues, and to other parts of the body. Being treated for cancer can weaken your body, and you may feel very tired. Get the rest your body needs so you can feel better. Finding out that you have cancer is scary. You may feel many emotions and may need some help coping. Seek out family, friends, and counselors for support. You also can do things at home to make yourself feel better while you go through treatment. Call the [employer] (7-923.758.1924) or visit its website at [employer] for more information. Follow-up care is a key part of your treatment and safety. Be sure to make and go to all appointments, and call your doctor if you are having problems. It's also a good idea to know your test results and keep a list of the medicines you take. How can you care for yourself at home? · Take your medicines exactly as prescribed. Call your doctor if you think you are having a problem with your medicine. You may get medicine for nausea and vomiting if you have these side effects. · Follow your doctor's instructions to relieve pain. Pain from cancer and surgery can almost always be controlled. Use pain medicine when you first notice pain, before it becomes severe. · Eat healthy food. If you do not feel like eating, try to eat food that has protein and extra calories to keep up your strength and prevent weight loss. Drink liquid meal replacements for extra calories and protein. Try to eat your main meal early. · Get some physical activity every day, but do not get too tired. Keep doing the hobbies you enjoy as your energy allows. · Do not smoke. Smoking can make your cancer worse. If you need help quitting, talk to your doctor about stop-smoking programs and medicines.  These can increase your chances of quitting for good. · Take steps to control your stress and workload. Learn relaxation techniques. ? Share your feelings. Stress and tension affect our emotions. By expressing your feelings to others, you may be able to understand and cope with them. ? Consider joining a support group. Talking about a problem with your spouse, a good friend, or other people with similar problems is a good way to reduce tension and stress. ? Express yourself through art. Try writing, crafts, dance, or art to relieve stress. Some dance, writing, or art groups may be available just for people who have cancer. ? Be kind to your body and mind. Getting enough sleep, eating a healthy diet, and taking time to do things you enjoy can contribute to an overall feeling of balance in your life and can help reduce stress. ? Get help if you need it. Discuss your concerns with your doctor or counselor. · If you are vomiting or have diarrhea:  ? Drink plenty of fluids to prevent dehydration. Choose water and other clear liquids. If you have kidney, heart, or liver disease and have to limit fluids, talk with your doctor before you increase the amount of fluids you drink. ? When you are able to eat, try clear soups, mild foods, and liquids until all symptoms are gone for 12 to 48 hours. Other good choices include dry toast, crackers, cooked cereal, and gelatin dessert, such as Jell-O.  · If you have not already done so, prepare a list of advance directives. Advance directives are instructions to your doctor and family members about what kind of care you want if you become unable to speak or express yourself. When should you call for help? Call 911 anytime you think you may need emergency care. For example, call if:    · You passed out (lost consciousness).    Call your doctor now or seek immediate medical care if:    · You have a fever.     · You have abnormal bleeding.     · You think you have an infection.     · You have new or worse pain.     · You have new symptoms, such as a cough, belly pain, vomiting, diarrhea, or a rash. Watch closely for changes in your health, and be sure to contact your doctor if:    · You are much more tired than usual.     · You have swollen glands in your armpits, groin, or neck.     · You do not get better as expected. Where can you learn more? Go to http://www.cadena.com/  Enter V321 in the search box to learn more about \"Breast Cancer: Care Instructions. \"  Current as of: September 8, 2021               Content Version: 13.2  © 2786-6283 [employer]. Care instructions adapted under license by [employer] (which disclaims liability or warranty for this information). If you have questions about a medical condition or this instruction, always ask your healthcare professional. [address] any warranty or liability for your use of this information.

## 2022-03-15 DIAGNOSIS — D05.12 DUCTAL CARCINOMA IN SITU OF LEFT BREAST: Primary | ICD-10-CM

## 2022-03-17 DIAGNOSIS — D05.12 DUCTAL CARCINOMA IN SITU OF LEFT BREAST: Primary | ICD-10-CM

## 2022-03-18 PROBLEM — E11.9 TYPE II DIABETES MELLITUS, WELL CONTROLLED (HCC): Status: ACTIVE | Noted: 2022-02-01

## 2022-03-18 PROBLEM — M1A.0720 IDIOPATHIC CHRONIC GOUT OF LEFT FOOT WITHOUT TOPHUS: Status: ACTIVE | Noted: 2020-07-08

## 2022-03-19 PROBLEM — E04.2 MULTINODULAR GOITER: Status: ACTIVE | Noted: 2017-02-10

## 2022-03-19 PROBLEM — E66.9 OBESITY (BMI 30-39.9): Status: ACTIVE | Noted: 2019-08-06

## 2022-03-19 PROBLEM — E78.2 MIXED HYPERLIPIDEMIA: Status: ACTIVE | Noted: 2019-08-06

## 2022-03-28 DIAGNOSIS — E78.2 MIXED HYPERLIPIDEMIA: ICD-10-CM

## 2022-03-28 RX ORDER — PRAVASTATIN SODIUM 20 MG/1
TABLET ORAL
Qty: 90 TABLET | Refills: 0 | Status: SHIPPED | OUTPATIENT
Start: 2022-03-28 | End: 2022-06-24

## 2022-04-14 ENCOUNTER — HOSPITAL ENCOUNTER (OUTPATIENT)
Age: 67
Setting detail: OUTPATIENT SURGERY
Discharge: HOME | End: 2022-04-14
Attending: SURGERY | Admitting: SURGERY
Payer: MEDICARE

## 2022-04-14 ENCOUNTER — ANESTHESIA EVENT (OUTPATIENT)
Dept: MEDSURG UNIT | Age: 67
End: 2022-04-14
Payer: MEDICARE

## 2022-04-14 ENCOUNTER — ANESTHESIA (OUTPATIENT)
Dept: MEDSURG UNIT | Age: 67
End: 2022-04-14
Payer: MEDICARE

## 2022-04-14 ENCOUNTER — APPOINTMENT (OUTPATIENT)
Dept: NUCLEAR MEDICINE | Age: 67
End: 2022-04-14
Attending: SURGERY
Payer: MEDICARE

## 2022-04-14 VITALS
TEMPERATURE: 98.1 F | WEIGHT: 210 LBS | DIASTOLIC BLOOD PRESSURE: 61 MMHG | HEART RATE: 88 BPM | RESPIRATION RATE: 18 BRPM | OXYGEN SATURATION: 93 % | SYSTOLIC BLOOD PRESSURE: 133 MMHG | BODY MASS INDEX: 35.49 KG/M2

## 2022-04-14 DIAGNOSIS — D05.12 DUCTAL CARCINOMA IN SITU OF LEFT BREAST: ICD-10-CM

## 2022-04-14 LAB
GLUCOSE BLD STRIP.AUTO-MCNC: 99 MG/DL (ref 65–117)
SERVICE CMNT-IMP: NORMAL

## 2022-04-14 PROCEDURE — A9520 TC99 TILMANOCEPT DIAG 0.5MCI: HCPCS

## 2022-04-14 PROCEDURE — 74011250637 HC RX REV CODE- 250/637: Performed by: ANESTHESIOLOGY

## 2022-04-14 PROCEDURE — 74011000250 HC RX REV CODE- 250: Performed by: ANESTHESIOLOGY

## 2022-04-14 PROCEDURE — 76060000062 HC AMB SURG ANES 1 TO 1.5 HR: Performed by: SURGERY

## 2022-04-14 PROCEDURE — 77030002996 HC SUT SLK J&J -A: Performed by: SURGERY

## 2022-04-14 PROCEDURE — 2709999900 HC NON-CHARGEABLE SUPPLY: Performed by: SURGERY

## 2022-04-14 PROCEDURE — 77030011267 HC ELECTRD BLD COVD -A: Performed by: SURGERY

## 2022-04-14 PROCEDURE — 77030040361 HC SLV COMPR DVT MDII -B: Performed by: SURGERY

## 2022-04-14 PROCEDURE — 74011250636 HC RX REV CODE- 250/636: Performed by: ANESTHESIOLOGY

## 2022-04-14 PROCEDURE — 77030031139 HC SUT VCRL2 J&J -A: Performed by: SURGERY

## 2022-04-14 PROCEDURE — 76210000035 HC AMBSU PH I REC 1 TO 1.5 HR: Performed by: SURGERY

## 2022-04-14 PROCEDURE — 77030040922 HC BLNKT HYPOTHRM STRY -A

## 2022-04-14 PROCEDURE — 82962 GLUCOSE BLOOD TEST: CPT

## 2022-04-14 PROCEDURE — 19301 PARTIAL MASTECTOMY: CPT | Performed by: SURGERY

## 2022-04-14 PROCEDURE — 38525 BIOPSY/REMOVAL LYMPH NODES: CPT | Performed by: SURGERY

## 2022-04-14 PROCEDURE — 77030010507 HC ADH SKN DERMBND J&J -B: Performed by: SURGERY

## 2022-04-14 PROCEDURE — 88307 TISSUE EXAM BY PATHOLOGIST: CPT

## 2022-04-14 PROCEDURE — 74011000250 HC RX REV CODE- 250: Performed by: SURGERY

## 2022-04-14 PROCEDURE — 74011250636 HC RX REV CODE- 250/636: Performed by: SURGERY

## 2022-04-14 PROCEDURE — 77030010509 HC AIRWY LMA MSK TELE -A: Performed by: ANESTHESIOLOGY

## 2022-04-14 PROCEDURE — 77030008552 HC TBNG SMK EVAC BFLF -A: Performed by: SURGERY

## 2022-04-14 PROCEDURE — 77030041680 HC PNCL ELECSURG SMK EVAC CNMD -B: Performed by: SURGERY

## 2022-04-14 PROCEDURE — 77030002933 HC SUT MCRYL J&J -A: Performed by: SURGERY

## 2022-04-14 PROCEDURE — 76030000001 HC AMB SURG OR TIME 1 TO 1.5: Performed by: SURGERY

## 2022-04-14 RX ORDER — SODIUM CHLORIDE 0.9 % (FLUSH) 0.9 %
5-40 SYRINGE (ML) INJECTION AS NEEDED
Status: CANCELLED | OUTPATIENT
Start: 2022-04-14

## 2022-04-14 RX ORDER — SODIUM CHLORIDE, SODIUM LACTATE, POTASSIUM CHLORIDE, CALCIUM CHLORIDE 600; 310; 30; 20 MG/100ML; MG/100ML; MG/100ML; MG/100ML
INJECTION, SOLUTION INTRAVENOUS
Status: DISCONTINUED | OUTPATIENT
Start: 2022-04-14 | End: 2022-04-14 | Stop reason: HOSPADM

## 2022-04-14 RX ORDER — EPHEDRINE SULFATE/0.9% NACL/PF 50 MG/5 ML
SYRINGE (ML) INTRAVENOUS AS NEEDED
Status: DISCONTINUED | OUTPATIENT
Start: 2022-04-14 | End: 2022-04-14 | Stop reason: HOSPADM

## 2022-04-14 RX ORDER — GLYCOPYRROLATE 0.2 MG/ML
INJECTION INTRAMUSCULAR; INTRAVENOUS AS NEEDED
Status: DISCONTINUED | OUTPATIENT
Start: 2022-04-14 | End: 2022-04-14 | Stop reason: HOSPADM

## 2022-04-14 RX ORDER — LIDOCAINE HYDROCHLORIDE AND EPINEPHRINE 10; 10 MG/ML; UG/ML
30 INJECTION, SOLUTION INFILTRATION; PERINEURAL ONCE
Status: CANCELLED | OUTPATIENT
Start: 2022-04-14 | End: 2022-04-14

## 2022-04-14 RX ORDER — OXYCODONE AND ACETAMINOPHEN 5; 325 MG/1; MG/1
1 TABLET ORAL
Qty: 15 TABLET | Refills: 0 | Status: SHIPPED | OUTPATIENT
Start: 2022-04-14 | End: 2022-04-17

## 2022-04-14 RX ORDER — HYDROMORPHONE HYDROCHLORIDE 1 MG/ML
0.2 INJECTION, SOLUTION INTRAMUSCULAR; INTRAVENOUS; SUBCUTANEOUS
Status: CANCELLED | OUTPATIENT
Start: 2022-04-14

## 2022-04-14 RX ORDER — SODIUM CHLORIDE 0.9 % (FLUSH) 0.9 %
5-40 SYRINGE (ML) INJECTION EVERY 8 HOURS
Status: CANCELLED | OUTPATIENT
Start: 2022-04-14

## 2022-04-14 RX ORDER — FENTANYL CITRATE 50 UG/ML
25 INJECTION, SOLUTION INTRAMUSCULAR; INTRAVENOUS
Status: CANCELLED | OUTPATIENT
Start: 2022-04-14

## 2022-04-14 RX ORDER — OXYCODONE HYDROCHLORIDE 5 MG/1
5 TABLET ORAL
Status: CANCELLED | OUTPATIENT
Start: 2022-04-14

## 2022-04-14 RX ORDER — MORPHINE SULFATE 2 MG/ML
2 INJECTION, SOLUTION INTRAMUSCULAR; INTRAVENOUS
Status: CANCELLED | OUTPATIENT
Start: 2022-04-14

## 2022-04-14 RX ORDER — LIDOCAINE HYDROCHLORIDE 10 MG/ML
0.1 INJECTION, SOLUTION EPIDURAL; INFILTRATION; INTRACAUDAL; PERINEURAL AS NEEDED
Status: DISCONTINUED | OUTPATIENT
Start: 2022-04-14 | End: 2022-04-14 | Stop reason: HOSPADM

## 2022-04-14 RX ORDER — PHENYLEPHRINE HCL IN 0.9% NACL 0.4MG/10ML
SYRINGE (ML) INTRAVENOUS AS NEEDED
Status: DISCONTINUED | OUTPATIENT
Start: 2022-04-14 | End: 2022-04-14 | Stop reason: HOSPADM

## 2022-04-14 RX ORDER — PROPOFOL 10 MG/ML
INJECTION, EMULSION INTRAVENOUS AS NEEDED
Status: DISCONTINUED | OUTPATIENT
Start: 2022-04-14 | End: 2022-04-14 | Stop reason: HOSPADM

## 2022-04-14 RX ORDER — DEXAMETHASONE SODIUM PHOSPHATE 4 MG/ML
INJECTION, SOLUTION INTRA-ARTICULAR; INTRALESIONAL; INTRAMUSCULAR; INTRAVENOUS; SOFT TISSUE AS NEEDED
Status: DISCONTINUED | OUTPATIENT
Start: 2022-04-14 | End: 2022-04-14 | Stop reason: HOSPADM

## 2022-04-14 RX ORDER — ONDANSETRON 2 MG/ML
4 INJECTION INTRAMUSCULAR; INTRAVENOUS AS NEEDED
Status: CANCELLED | OUTPATIENT
Start: 2022-04-14

## 2022-04-14 RX ORDER — ATROPINE SULFATE 0.4 MG/ML
INJECTION, SOLUTION ENDOTRACHEAL; INTRAMEDULLARY; INTRAMUSCULAR; INTRAVENOUS; SUBCUTANEOUS AS NEEDED
Status: DISCONTINUED | OUTPATIENT
Start: 2022-04-14 | End: 2022-04-14 | Stop reason: HOSPADM

## 2022-04-14 RX ORDER — LIDOCAINE HYDROCHLORIDE 20 MG/ML
INJECTION, SOLUTION EPIDURAL; INFILTRATION; INTRACAUDAL; PERINEURAL AS NEEDED
Status: DISCONTINUED | OUTPATIENT
Start: 2022-04-14 | End: 2022-04-14 | Stop reason: HOSPADM

## 2022-04-14 RX ORDER — SODIUM CHLORIDE 0.9 % (FLUSH) 0.9 %
5-40 SYRINGE (ML) INJECTION AS NEEDED
Status: DISCONTINUED | OUTPATIENT
Start: 2022-04-14 | End: 2022-04-14 | Stop reason: HOSPADM

## 2022-04-14 RX ORDER — FENTANYL CITRATE 50 UG/ML
INJECTION, SOLUTION INTRAMUSCULAR; INTRAVENOUS AS NEEDED
Status: DISCONTINUED | OUTPATIENT
Start: 2022-04-14 | End: 2022-04-14 | Stop reason: HOSPADM

## 2022-04-14 RX ORDER — ACETAMINOPHEN 325 MG/1
650 TABLET ORAL ONCE
Status: COMPLETED | OUTPATIENT
Start: 2022-04-14 | End: 2022-04-14

## 2022-04-14 RX ORDER — SODIUM CHLORIDE, SODIUM LACTATE, POTASSIUM CHLORIDE, CALCIUM CHLORIDE 600; 310; 30; 20 MG/100ML; MG/100ML; MG/100ML; MG/100ML
50 INJECTION, SOLUTION INTRAVENOUS CONTINUOUS
Status: DISCONTINUED | OUTPATIENT
Start: 2022-04-14 | End: 2022-04-14 | Stop reason: HOSPADM

## 2022-04-14 RX ORDER — BUPIVACAINE HYDROCHLORIDE AND EPINEPHRINE 5; 5 MG/ML; UG/ML
30 INJECTION, SOLUTION EPIDURAL; INTRACAUDAL; PERINEURAL ONCE
Status: CANCELLED | OUTPATIENT
Start: 2022-04-14 | End: 2022-04-14

## 2022-04-14 RX ORDER — MIDAZOLAM HYDROCHLORIDE 1 MG/ML
INJECTION, SOLUTION INTRAMUSCULAR; INTRAVENOUS AS NEEDED
Status: DISCONTINUED | OUTPATIENT
Start: 2022-04-14 | End: 2022-04-14 | Stop reason: HOSPADM

## 2022-04-14 RX ORDER — VANCOMYCIN/0.9 % SOD CHLORIDE 1.5G/250ML
1500 PLASTIC BAG, INJECTION (ML) INTRAVENOUS
Status: COMPLETED | OUTPATIENT
Start: 2022-04-14 | End: 2022-04-14

## 2022-04-14 RX ORDER — KETOROLAC TROMETHAMINE 30 MG/ML
INJECTION, SOLUTION INTRAMUSCULAR; INTRAVENOUS AS NEEDED
Status: DISCONTINUED | OUTPATIENT
Start: 2022-04-14 | End: 2022-04-14 | Stop reason: HOSPADM

## 2022-04-14 RX ORDER — SODIUM CHLORIDE 0.9 % (FLUSH) 0.9 %
5-40 SYRINGE (ML) INJECTION EVERY 8 HOURS
Status: DISCONTINUED | OUTPATIENT
Start: 2022-04-14 | End: 2022-04-14 | Stop reason: HOSPADM

## 2022-04-14 RX ORDER — ONDANSETRON 2 MG/ML
INJECTION INTRAMUSCULAR; INTRAVENOUS AS NEEDED
Status: DISCONTINUED | OUTPATIENT
Start: 2022-04-14 | End: 2022-04-14 | Stop reason: HOSPADM

## 2022-04-14 RX ADMIN — FENTANYL CITRATE 50 MCG: 50 INJECTION, SOLUTION INTRAMUSCULAR; INTRAVENOUS at 10:50

## 2022-04-14 RX ADMIN — Medication 120 MCG: at 11:40

## 2022-04-14 RX ADMIN — Medication 120 MCG: at 10:34

## 2022-04-14 RX ADMIN — ONDANSETRON HYDROCHLORIDE 4 MG: 2 INJECTION, SOLUTION INTRAMUSCULAR; INTRAVENOUS at 11:30

## 2022-04-14 RX ADMIN — GLYCOPYRROLATE 0.2 MG: 0.2 INJECTION, SOLUTION INTRAMUSCULAR; INTRAVENOUS at 10:34

## 2022-04-14 RX ADMIN — PROPOFOL 50 MG: 10 INJECTION, EMULSION INTRAVENOUS at 10:27

## 2022-04-14 RX ADMIN — SODIUM CHLORIDE, POTASSIUM CHLORIDE, SODIUM LACTATE AND CALCIUM CHLORIDE: 600; 310; 30; 20 INJECTION, SOLUTION INTRAVENOUS at 10:15

## 2022-04-14 RX ADMIN — VANCOMYCIN HYDROCHLORIDE 1500 MG: 10 INJECTION, POWDER, LYOPHILIZED, FOR SOLUTION INTRAVENOUS at 10:02

## 2022-04-14 RX ADMIN — Medication 0.2 MG: at 10:37

## 2022-04-14 RX ADMIN — KETOROLAC TROMETHAMINE 15 MG: 30 INJECTION, SOLUTION INTRAMUSCULAR; INTRAVENOUS at 11:30

## 2022-04-14 RX ADMIN — GLYCOPYRROLATE 0.2 MG: 0.2 INJECTION, SOLUTION INTRAMUSCULAR; INTRAVENOUS at 10:32

## 2022-04-14 RX ADMIN — PROPOFOL 50 MG: 10 INJECTION, EMULSION INTRAVENOUS at 10:50

## 2022-04-14 RX ADMIN — FENTANYL CITRATE 50 MCG: 50 INJECTION, SOLUTION INTRAMUSCULAR; INTRAVENOUS at 10:25

## 2022-04-14 RX ADMIN — Medication 10 MG: at 11:05

## 2022-04-14 RX ADMIN — Medication 10 MG: at 10:39

## 2022-04-14 RX ADMIN — DEXAMETHASONE SODIUM PHOSPHATE 4 MG: 4 INJECTION, SOLUTION INTRAMUSCULAR; INTRAVENOUS at 10:54

## 2022-04-14 RX ADMIN — SODIUM CHLORIDE, POTASSIUM CHLORIDE, SODIUM LACTATE AND CALCIUM CHLORIDE 50 ML/HR: 600; 310; 30; 20 INJECTION, SOLUTION INTRAVENOUS at 09:35

## 2022-04-14 RX ADMIN — LIDOCAINE HYDROCHLORIDE 80 MG: 20 INJECTION, SOLUTION EPIDURAL; INFILTRATION; INTRACAUDAL; PERINEURAL at 10:25

## 2022-04-14 RX ADMIN — ACETAMINOPHEN 650 MG: 325 TABLET ORAL at 09:22

## 2022-04-14 RX ADMIN — Medication 120 MCG: at 11:26

## 2022-04-14 RX ADMIN — MIDAZOLAM 2 MG: 1 INJECTION INTRAMUSCULAR; INTRAVENOUS at 10:15

## 2022-04-14 RX ADMIN — PROPOFOL 150 MG: 10 INJECTION, EMULSION INTRAVENOUS at 10:26

## 2022-04-14 RX ADMIN — Medication 120 MCG: at 11:17

## 2022-04-14 NOTE — ANESTHESIA POSTPROCEDURE EVALUATION
Post-Anesthesia Evaluation and Assessment    Patient: Harjeet Ramos MRN: [id number]  SSN: [id number]    YOB: 1955  Age: 74 y.o. Sex: female      I have evaluated the patient and they are stable and ready for discharge from the PACU. Cardiovascular Function/Vital Signs  Visit Vitals  /61   Pulse 88   Temp 36.7 °C (98.1 °F)   Resp 18   Wt 95.3 kg (210 lb)   SpO2 93%   BMI 35.49 kg/m²       Patient is status post General anesthesia for Procedure(s):  LEFT BREAST LUMPECTOMY WITH ULTRASOUND/LEFT BREAST SENTINEL NODE BIOPSY  . .    Nausea/Vomiting: None    Postoperative hydration reviewed and adequate. Pain:  Pain Scale 1: Numeric (0 - 10) (04/14/22 1300)  Pain Intensity 1: 0 (04/14/22 1300)   Managed    Neurological Status:   Neuro (WDL): Within Defined Limits (04/14/22 1300)  Neuro  Neurologic State: Alert (04/14/22 1230)  LUE Motor Response: Purposeful (04/14/22 1230)  LLE Motor Response: Purposeful (04/14/22 1230)  RUE Motor Response: Purposeful (04/14/22 1230)  RLE Motor Response: Purposeful (04/14/22 1230)   At baseline    Mental Status, Level of Consciousness: Alert and  oriented to person, place, and time    Pulmonary Status:   O2 Device: None (04/14/22 1152)   Adequate oxygenation and airway patent    Complications related to anesthesia: None    Post-anesthesia assessment completed. No concerns    Signed By: Taz Velasquez MD     April 14, 2022              Procedure(s):  LEFT BREAST LUMPECTOMY WITH ULTRASOUND/LEFT BREAST SENTINEL NODE BIOPSY  . .    general    <BSHSIANPOST>    INITIAL Post-op Vital signs:   Vitals Value Taken Time   /61 04/14/22 1230   Temp 36.7 °C (98.1 °F) 04/14/22 1152   Pulse 90 04/14/22 1242   Resp 17 04/14/22 1242   SpO2 95 % 04/14/22 1242   Vitals shown include unvalidated device data.

## 2022-04-14 NOTE — H&P
HISTORY OF PRESENT ILLNESS  Sai Gonsalez is a 74 y.o. female. HPI  ESTABLISHED patient here for BREAST talk for LEFT breast DCIS w/focal micro invasion. Patient is feeling very nervous. No pain mentioned. accompanied by her friend today.     ER-100%,VA-50% too small for her2 staining        Breast history -   Referring - Dr. Rita Lloyd  2009 - LEFT breast cancer (probably DCIS) in 2009.  Had surgery by Dr. Keya Reid and whole breast radiation by Dr. Yelitza Garibay at [facility].    Did not want to take tamoxifen then. Abnormal screening mammo on LEFT 2/2020.    02/18/20: LEFT breast stereo bx. PATH: DCIS, solid and comedo pattern with comedonecrosis and intraluminal coarse calcification, nuclear grade 2, ER+(99%), VA not performed. Clinical stage 0. MRI 3/5/20 no other abnormalities. Excision 2/1371 - Dr. Era Alvarado DIAGNOSIS   Left breast, excision:   Ductal carcinoma in situ, intermediate grade (grade 2). Size: 12 mm. Necrosis: Present, Central comedo-type necrosis. Margins uninvolved by DCIS: Distance from closest margin: <1 mm, posterior. Regional lymph nodes: No lymph nodes submitted or found. Pathologic stage: PTis NX   Additional pathologic findings: Biopsy site changes with squamous metaplasia.   No XRT - Left breast previously radiated  Saw Dr. Armas Neighbor for a consult - declined AI and tamoxifen     Family history -   A paternal aunt had breast cancer and possible ovarian cancer.    A paternal first cousin was just diagnosed with breast cancer in her mid-61s.    Her son has skin cancer. Patient - genetic testing negative, VUS NBN           KENDRA Results (most recent):  Results from Hospital Encounter encounter on 02/22/22     KENDRA POST BX IMAGING LT INCL CAD     Addendum 2/25/2022  9:04 AM  Addendum:  PATHOLOGY CORRELATION  . INDICATION:  Patient recently underwent core needle biopsy of the left breast.  .   FINDINGS:  The pathology report reveals ductal carcinoma in situ with focal microinvasion. .  IMPRESSION:  This is a concordant result. Jarad Cross RECOMMENDATION:  Clinical management. The patient has an appointment to see Dr. Felicitas Plasencia. .  The results of this biopsy were discussed with the patient by telephone by  myself.     Narrative  INDICATION: Concerning calcifications identified on recent mammogram.  COMPARISON: Previous mammogram, 2/9/2022. Jarad Cross PROCEDURE:  After obtaining informed consent, and performing a \"time-out\" procedure, the  patient was placed on the stereotactic table with the left breast in the ML  position.   images were obtained.   Using sterile technique and less than  10 cc's lidocaine for local anesthesia, a small skin incision was made and the  vacuum-assisted Suros core needle was advanced into the breast.  Appropriate  needle position with respect to the calcifications was documented with pre and  post-fire images.  Multiple cores were taken. Jarad Cross Specimen radiography shows that calcifications were present in the cores  . A biopsy clip was placed to suri the biopsy site for future localization  purposes and its position confirmed with post-procedure digital mammograms. .  There were no apparent complications. .     Impression  Stereotactic biopsy with Hydromark clip placement and pathology pending. Further  recommendations will be based on final pathology results.        MRI Results (most recent):  Results from Hospital Encounter encounter on 03/08/22     MRI BREAST BI W WO CONT     Narrative  INDICATION: Left DCIS, grade 2, ER/SC positive. Prior left lumpectomy in 2009  and 2020.     COMPARISON: 3/5/2020. Breast imaging from February 2022.     TECHNIQUE:  Multisequence, multiplanar, bilateral breast MRI was performed in prone position  using a dedicated breast coil. Images were obtained without contrast and dynamic  postcontrast images were obtained in multiple phases. 9 mL IV Gadavist was  administered. Subtraction images were reconstructed.  Postcontrast images were  reviewed with dedicated kinetic analysis software.     FINDINGS:  There is mild background parenchymal enhancement and scattered fibroglandular  tissue.     Right breast:  No suspicious enhancing foci. No axillary or internal mammary chain  lymphadenopathy.     Left breast:  16 mm curvilinear enhancement extends along the inferior margin of the biopsy  clip at 9:00-10:00 in the anterior third. This may represent DCIS or merely  reactive enhancement due to the recent biopsy. No other suspicious enhancement. There are lumpectomy scars in the middle third at 10:00 and in the posterior  third along the nipple line. No axillary or internal mammary chain  lymphadenopathy.     A summary portfolio with key images has been sent from kinetic analysis software  to PACS.    Impression  1. Curvilinear enhancement along the left anterior biopsy clip (16 mm). 2. No other suspicious enhancement. 3. No lymphadenopathy. 4. BI-RADS Assessment Category 6: Known biopsy proven malignancy.           Review of Systems   All other systems reviewed and are negative.        Physical Exam  Constitutional:       Appearance: She is well-developed. She is not diaphoretic. HENT:      Head: Normocephalic and atraumatic. Right Ear: External ear normal.      Left Ear: External ear normal.   Eyes:      General: No scleral icterus. Right eye: No discharge. Left eye: No discharge. Pupils: Pupils are equal, round, and reactive to light. Neck:      Thyroid: No thyromegaly. Vascular: No JVD. Trachea: No tracheal deviation. Cardiovascular:      Rate and Rhythm: Normal rate and regular rhythm. Heart sounds: Normal heart sounds. Pulmonary:      Effort: Pulmonary effort is normal. No tachypnea, accessory muscle usage or respiratory distress. Breath sounds: Normal breath sounds. No stridor.    Chest:   Breasts: Breasts are symmetrical.      Right: No inverted nipple, mass, nipple discharge, skin change or tenderness. Left: No inverted nipple, mass, nipple discharge, skin change or tenderness.         Abdominal:      General: There is no distension. Palpations: Abdomen is soft. There is no mass. Tenderness: There is no abdominal tenderness. Musculoskeletal:         General: Normal range of motion. Cervical back: Normal range of motion and neck supple. Lymphadenopathy:      Cervical: No cervical adenopathy. Skin:     General: Skin is warm and dry. Neurological:      Mental Status: She is alert and oriented to person, place, and time. She is not disoriented. Psychiatric:         Speech: Speech normal.         Behavior: Behavior normal.         Thought Content: Thought content normal.         Judgment: Judgment normal.            ASSESSMENT and PLAN       Encounter Diagnoses   Name Primary?  Breast cancer, stage 0, left Yes      Discussed why DCIS came back. Discussion removing from the area, lumpectomy or mastectomy. Patient doesn't want mastectomy. No XRT the last time. But had XRT the first occurrence. SNBx  Or not? On initial pathology, showed suspected area of microinvasion. schedule for lumpectomy and SNBx.

## 2022-04-14 NOTE — BRIEF OP NOTE
Brief Postoperative Note    Patient: Tessie Jain  YOB: 1955  MRN: [id number]    Date of Procedure: 4/14/2022     Pre-Op Diagnosis: LEFT BREAST DCIS    Post-Op Diagnosis: Same as preoperative diagnosis. Procedure(s):  LEFT BREAST LUMPECTOMY WITH ULTRASOUND/LEFT BREAST SENTINEL NODE BIOPSY  .     Surgeon(s):  Marisela Arciniega MD    Surgical Assistant: None    Anesthesia: General     Estimated Blood Loss (mL): Minimal    Complications: None    Specimens:   ID Type Source Tests Collected by Time Destination   1 : LEFT AXILLARY SENTINAL NODE BIOPSY #1 Fresh Axillary  Marisela Arciniega MD 4/14/2022 1055 Pathology   2 : LEFT BREAST LUMPECTOMY Fresh Breast  Marisela Arciniega MD 4/14/2022 1113 Pathology        Implants: * No implants in log *    Drains: * No LDAs found *    Findings: sent node x 1, spec radiograph pos calcs, clip directly visualized    Electronically Signed by Kari Townsend MD on 8/07/4622 at 11:36 AM

## 2022-04-14 NOTE — DISCHARGE INSTRUCTIONS
DISCHARGE SUMMARY from Nurse                    POST ANESTHESIA INSTRUCTIONS  PATIENT INSTRUCTIONS:    After general anesthesia or intravenous sedation, for 24 hours or while taking prescription Narcotics:  Limit your activities  Do not drive and operate hazardous machinery  Do not make important personal or business decisions  Do  not drink alcoholic beverages  If you have not urinated within 8 hours after discharge, please contact your surgeon on call. Report the following to your surgeon:  Excessive pain, swelling, redness or odor of or around the surgical area  Temperature over 100.5  Nausea and vomiting lasting longer than 4 hours or if unable to take medications  Any signs of decreased circulation or nerve impairment to extremity: change in color, persistent  numbness, tingling, coldness or increase pain  Any questions    What to do at Home:      If you have any concerns, please call Dr Leonidas Mccain    *  Please give a list of your current medications to your Primary Care Provider. *  Please update this list whenever your medications are discontinued, doses are      changed, or new medications (including over-the-counter products) are added. *  Please carry medication information at all times in case of emergency situations. These are general instructions for a healthy lifestyle:    No smoking/ No tobacco products/ Avoid exposure to second hand smoke  Surgeon General's Warning:  Quitting smoking now greatly reduces serious risk to your health.     Obesity, smoking, and sedentary lifestyle greatly increases your risk for illness    A healthy diet, regular physical exercise & weight monitoring are important for maintaining a healthy lifestyle    You may be retaining fluid if you have a history of heart failure or if you experience any of the following symptoms:  Weight gain of 3 pounds or more overnight or 5 pounds in a week, increased swelling in our hands or feet or shortness of breath while lying flat in bed. Please call your doctor as soon as you notice any of these symptoms; do not wait until your next office visit. The discharge information has been reviewed with the patient and caregiver. The patient and caregiver verbalized understanding. Discharge medications reviewed with the patient and caregiver and appropriate educational materials and side effects teaching were provided. ___________________________________________________________________________________________________________________________________                              Discharge Instructions from Dr. Chino Flores    · I will call you with the pathology results, typically within 1 week from today. · You may shower, but no hot tubs, swimming pools, or baths until your incision is healed. · No heavy lifting with the affected extremity (nothing greater than 5 pounds), and limit its use for the next 4-5 days. · You may use an ice pack for comfort for the next couple of days, but do not place ice directly on the skin. Rather, use a towel or clothing to serve as a barrier between skin and ice to prevent injury. · If I placed a drain, follow the drain instructions provided, especially as you keep a record of the drain output. · Follow medication instructions carefully. · Watch for signs of infection as listed below. · Redness  · Swelling  · Drainage from the incision or from your nipple that appears infected  · Fever over 101 degrees for consecutive readings, or over 99.5 if you are currently undergoing chemotherapy. · Call our office (number is below) for a follow-up appointment. · If you have any problems, our phone number is [phone].

## 2022-04-15 NOTE — OP NOTES
[facility]  OPERATIVE REPORT    Name:  Miller Guthrie  MR#:  [id number]  :  1955  ACCOUNT #:  [de-identified]  DATE OF SERVICE:  2022    PREOPERATIVE DIAGNOSIS:  Ductal carcinoma in situ of the left breast, probable recurrence. POSTOPERATIVE DIAGNOSIS:  Ductal carcinoma in situ of the left breast, probable recurrence. PROCEDURE PERFORMED:  Left lumpectomy with intraoperative ultrasound and left sentinel node biopsy. SURGEON:  Vince Salinas MD    ASSISTANT:  Martha Morelos RN    ANESTHESIA:  General.    COMPLICATIONS:  None. SPECIMENS REMOVED:  Left axillary sentinel lymph node x1 and left breast mass. IMPLANTS:  None. ESTIMATED BLOOD LOSS:  Minimal.    INDICATIONS:  The patient is a 75-year-old female who has had 2 episodes of ductal carcinoma in situ of the left breast, now has this third episode. She has opted for breast conservation. She had an area of suspected microinvasion, and therefore, a sentinel node biopsy was performed. PROCEDURE:  After lymphoscintigraphy in Radiology, the patient was brought to the operating room. After satisfactory induction of general LMA anesthesia, the patient was prepped and draped in sterile fashion. An axillary incision was made and deepened through subcutaneous tissue with Bovie cautery. Utilizing the Neoprobe, a single sentinel node was found in the deep axilla. It was removed and sent for permanent section. All dissection planes were hemostatic. The wound was anesthetized with 0.5% Marcaine, closed with an inner layer of 3-0 Vicryl and a running subcuticular 4-0 Monocryl on the skin. Attention was then turned to the breast where the previous upper inner quadrant incision was made after ultrasound identified the area of biopsy clip. A skin flap was raised superomedially to the area of the clip, and dissection continued around this area and then down to chest wall.   A standard lumpectomy was then performed. Specimen was removed and oriented. Specimen radiograph was obtained with the [address] [place] [address] system which revealed the presence of calcifications within the center of the specimen, and a gel portion of the [facility] clip was also visualized on mammogram.  The actual clip was directly visualized and found in the wound and removed. All dissection planes were hemostatic. The wound was anesthetized with 0.5% Marcaine. Some tissue was mobilized off the chest wall, and the incision was then closed in 2 layers with inner layers of 3-0 Vicryl and a running subcuticular 4-0 Monocryl on the skin. The patient tolerated the procedure well with no complications. She was taken to the recovery room in stable condition. Daron Prater MD      HARDEEP/[id number]/  D:  04/14/2022 15:10  T:  04/15/2022 2:19  JOB #:  [id number]  CC:   Bryna Ling, MD Guido Cranker, MD Marzette Sams, DO

## 2022-04-19 ENCOUNTER — TELEPHONE (OUTPATIENT)
Dept: SURGERY | Age: 67
End: 2022-04-19

## 2022-04-23 DIAGNOSIS — I10 ESSENTIAL HYPERTENSION: ICD-10-CM

## 2022-04-23 RX ORDER — POTASSIUM CHLORIDE 750 MG/1
CAPSULE, EXTENDED RELEASE ORAL
Qty: 270 CAPSULE | Refills: 0 | Status: SHIPPED | OUTPATIENT
Start: 2022-04-23 | End: 2022-07-21

## 2022-04-29 ENCOUNTER — TELEPHONE (OUTPATIENT)
Dept: SURGERY | Age: 67
End: 2022-04-29

## 2022-04-29 ENCOUNTER — OFFICE VISIT (OUTPATIENT)
Dept: SURGERY | Age: 67
End: 2022-04-29
Payer: COMMERCIAL

## 2022-04-29 VITALS — BODY MASS INDEX: 34.99 KG/M2 | WEIGHT: 210 LBS | HEIGHT: 65 IN

## 2022-04-29 DIAGNOSIS — D05.92 BREAST CANCER, STAGE 0, LEFT: Primary | ICD-10-CM

## 2022-04-29 DIAGNOSIS — D05.12 DUCTAL CARCINOMA IN SITU (DCIS) OF LEFT BREAST: ICD-10-CM

## 2022-04-29 DIAGNOSIS — E11.9 TYPE II DIABETES MELLITUS, WELL CONTROLLED (HCC): ICD-10-CM

## 2022-04-29 PROCEDURE — 99024 POSTOP FOLLOW-UP VISIT: CPT | Performed by: SURGERY

## 2022-04-29 NOTE — PROGRESS NOTES
HISTORY OF PRESENT ILLNESS  Katie Contreras is a 74 y.o. female. HPI  ESTABLISHED Patient here for post op follow up LEFT breast lumpectomy. Having slight pain from healing but states she is doing well.      Breast history-   Referring - Dr. Macrina Perea  2009 - LEFT breast cancer (probably DCIS) in 2009.  Had surgery by Dr. Erica Zendejas and whole breast radiation by Dr. Ariel San at [facility].    Did not want to take tamoxifen then. Abnormal screening mammo on LEFT 2/2020.    02/18/20: LEFT breast stereo bx. PATH: DCIS, solid and comedo pattern with comedonecrosis and intraluminal coarse calcification, nuclear grade 2, ER+(99%), LA not performed. Clinical stage 0. MRI 3/5/20 no other abnormalities. Excision [id number] - Dr. Georgia Coleman DIAGNOSIS   Left breast, excision:   Ductal carcinoma in situ, intermediate grade (grade 2). Size: 12 mm. Necrosis: Present, Central comedo-type necrosis. Margins uninvolved by DCIS: Distance from closest margin: <1 mm, posterior. Regional lymph nodes: No lymph nodes submitted or found. Pathologic stage: PTis NX   Additional pathologic findings: Biopsy site changes with squamous metaplasia.   No XRT - Left breast previously radiated  Saw Dr. Kvng Rodgers for a consult - declined AI and tamoxifen    2009: LEFT breast cancer (probably DCIS).  Treated with lumpectomy (Dr. Erica Zendejas) and whole breast radiation (Dr. Ariel San). 02/18/20: LEFT breast stereo bx. PATH: DCIS, solid and comedo pattern with comedonecrosis and intraluminal coarse calcification, nuclear grade 2, ER+(99%), LA not performed. Clinical stage 0.    04/14/22: LEFT lumpectomy and SLNbx. PATH: DCIS with focal microinvasion, 12mm, nuclear grade II, negative margins, ER+(>10%)/LA+. Pathologic stage: pTm1mi, pN0.      Past Medical History:   Diagnosis Date    Adverse effect of anesthesia     SLOW TO AWAKEN     Arthritis     Breast cancer ([facility] [address]     lumpectomy with radiation, 2009, left    Breast cancer, left ([facility] [address] 2020    DCIS     Diabetes ([facility] [address]     Hyperlipidemia     Hypertension     Radiation therapy complication     3266    Thyroid disease     GOARDER ON THROAT        Past Surgical History:   Procedure Laterality Date    HX BREAST BIOPSY      HX BREAST LUMPECTOMY Left     2009    HX BREAST LUMPECTOMY Left 3/12/2020    LEFT BREAST LUMPECTOMY WITH ULTRASOUND performed by Sunil Barnett MD at [facility] AM[facility] OR    HX BREAST LUMPECTOMY Left 4/14/2022    LEFT BREAST LUMPECTOMY WITH ULTRASOUND/LEFT BREAST SENTINEL NODE BIOPSY performed by Sunil Barnett MD at [facility] AMBULATORY OR    HX ORTHOPAEDIC      Bunion Surgery BOTH FEET     HX TUBAL LIGATION      IL BREAST SURGERY PROCEDURE UNLISTED      lumpectomy       Social History     Socioeconomic History    Marital status:      Spouse name: Not on file    Number of children: Not on file    Years of education: Not on file    Highest education level: Not on file   Occupational History    Not on file   Tobacco Use    Smoking status: Never Smoker    Smokeless tobacco: Never Used   Vaping Use    Vaping Use: Never used   Substance and Sexual Activity    Alcohol use: No    Drug use: Never    Sexual activity: Never   Other Topics Concern    Not on file   Social History Narrative    Not on file     Social Determinants of Health     Financial Resource Strain:     Difficulty of Paying Living Expenses: Not on file   Food Insecurity:     Worried About Running Out of Food in the Last Year: Not on file    Nemesio of Food in the Last Year: Not on file   Transportation Needs:     Lack of Transportation (Medical): Not on file    Lack of Transportation (Non-Medical):  Not on file   Physical Activity:     Days of Exercise per Week: Not on file    Minutes of Exercise per Session: Not on file   Stress:     Feeling of Stress : Not on file   Social Connections:     Frequency of Communication with Friends and Family: Not on file    Frequency of Social Gatherings with Friends and Family: Not on file    Attends Mandaeism Services: Not on file    Active Member of Clubs or Organizations: Not on file    Attends Club or Organization Meetings: Not on file    Marital Status: Not on file   Intimate Partner Violence:     Fear of Current or Ex-Partner: Not on file    Emotionally Abused: Not on file    Physically Abused: Not on file    Sexually Abused: Not on file   Housing Stability:     Unable to Pay for Housing in the Last Year: Not on file    Number of [place] in the Last Year: Not on file    Unstable Housing in the Last Year: Not on file       Current Outpatient Medications on File Prior to Visit   Medication Sig Dispense Refill    potassium chloride SA (MICRO-K) 10 mEq capsule TAKE 3 CAPSULES BY MOUTH EVERY  Capsule 0    pravastatin (PRAVACHOL) 20 mg tablet TAKE 1 TABLET BY MOUTH EVERY NIGHT 90 Tablet 0    indapamide (LOZOL) 1.25 mg tablet Take 1 Tablet by mouth daily. 90 Tablet 0    metFORMIN (GLUCOPHAGE) 850 mg tablet Take 1 Tablet by mouth two (2) times daily (with meals) for 90 days. 180 Tablet 0    lisinopriL (PRINIVIL, ZESTRIL) 10 mg tablet TAKE 1 TABLET BY MOUTH EVERY DAY 90 Tablet 1    cyanocobalamin 1,000 mcg tablet Take 1,000 mcg by mouth daily.  ACCU-CHEK SMARTVIEW TEST STRIP strip PLEASE USE TWICE DAILY AS NEEDED 100 Strip 11    glucose blood VI test strips (ACCU-CHEK SMARTVIEW TEST STRIP) strip Please use two times daily as needed for Dx code 250.02 100 Strip 11    BETA-CAROTENE,A, W-C & E/ZN/CU (VISION-CHARANJIT PRESERVE PO) Take  by mouth two (2) times a day.  Cholecalciferol, Vitamin D3, (VITAMIN D3) 1,000 unit cap Take 5,000 Units by mouth daily. 4 tabs daily  Indications: vitamin D deficiency       No current facility-administered medications on file prior to visit. Allergies   Allergen Reactions    Pcn [Penicillins] Hives       OB History    No obstetric history on file.       Obstetric Comments   Menarche 13, LMP 2004, # of children 1, age of 4st delivery 23, Hysterectomy/oophorectomy No/No, Breast bx Yes, history of breast feeding No, BCP No, Hormone therapy No               ROS      Physical Exam  Constitutional:       Appearance: She is well-developed. She is not diaphoretic. HENT:      Head: Normocephalic and atraumatic. Right Ear: External ear normal.      Left Ear: External ear normal.   Eyes:      General: No scleral icterus. Right eye: No discharge. Left eye: No discharge. Pupils: Pupils are equal, round, and reactive to light. Neck:      Thyroid: No thyromegaly. Vascular: No JVD. Trachea: No tracheal deviation. Cardiovascular:      Rate and Rhythm: Normal rate and regular rhythm. Heart sounds: Normal heart sounds. Pulmonary:      Effort: Pulmonary effort is normal. No tachypnea, accessory muscle usage or respiratory distress. Breath sounds: Normal breath sounds. No stridor. Chest:   Breasts: Breasts are symmetrical.      Right: No inverted nipple, mass, nipple discharge, skin change or tenderness. Left: No inverted nipple, mass, nipple discharge, skin change or tenderness. Abdominal:      General: There is no distension. Palpations: Abdomen is soft. There is no mass. Tenderness: There is no abdominal tenderness. Musculoskeletal:         General: Normal range of motion. Cervical back: Normal range of motion and neck supple. Lymphadenopathy:      Cervical: No cervical adenopathy. Skin:     General: Skin is warm and dry. Neurological:      Mental Status: She is alert and oriented to person, place, and time. She is not disoriented. Psychiatric:         Speech: Speech normal.         Behavior: Behavior normal.         Thought Content: Thought content normal.         Judgment: Judgment normal.       ASSESSMENT and PLAN    ICD-10-CM ICD-9-CM    1.  Breast cancer, stage 0, left  D05.92 233.0      Patient presents for f/u s/p LEFT breast lumpectomy and SLNbx on 04/14/22, and is doing well overall. Well healed incision s/p lumpectomy, no evidence of local recurrence. Well healed incision s/p SLNBx. Will refer pt to medical oncologist for further consultation of treatment. F/U 6 months with Rosa Epperson. This plan was reviewed with the patient and patient agrees. All questions were answered.     Written by William Louise, as dictated by Dr. Cat Damian MD.

## 2022-04-29 NOTE — PROGRESS NOTES
HISTORY OF PRESENT ILLNESS  Caesar Costa is a 74 y.o. female. HPI ESTABLISHED Patient here for post op follow up LEFT breast lumpectomy. Having slight pain from healing but states she is doing well. LEFT breast cancer- ER-100%,GA-50% too small for her2 staining     2009: LEFT breast cancer (probably DCIS). Treated with lumpectomy (Dr. Santo Lucas) and whole breast radiation (Dr. Addy Pratt). Breast history-   Referring - Dr. Mcallister Fearing  2009 - LEFT breast cancer (probably DCIS) in 2009.  Had surgery by Dr. Santo Lucas and whole breast radiation by Dr. Addy Pratt at [facility].    Did not want to take tamoxifen then. Abnormal screening mammo on LEFT 2/2020.    02/18/20: LEFT breast stereo bx. PATH: DCIS, solid and comedo pattern with comedonecrosis and intraluminal coarse calcification, nuclear grade 2, ER+(99%), GA not performed. Clinical stage 0. MRI 3/5/20 no other abnormalities. Excision [id number] - Dr. Carlton Schulte DIAGNOSIS   Left breast, excision:   Ductal carcinoma in situ, intermediate grade (grade 2). Size: 12 mm. Necrosis: Present, Central comedo-type necrosis. Margins uninvolved by DCIS: Distance from closest margin: <1 mm, posterior. Regional lymph nodes: No lymph nodes submitted or found.    Pathologic stage: PTis NX   Additional pathologic findings: Biopsy site changes with squamous metaplasia.   No XRT - Left breast previously radiated  Saw Dr. Louise Cheng for a consult - declined AI and tamoxifen        ROS    Physical Exam    ASSESSMENT and PLAN  {ASSESSMENT/PLAN:69981}

## 2022-05-01 RX ORDER — METFORMIN HYDROCHLORIDE 850 MG/1
TABLET ORAL
Qty: 180 TABLET | Refills: 0 | Status: SHIPPED | OUTPATIENT
Start: 2022-05-01 | End: 2022-07-28

## 2022-05-04 ENCOUNTER — OFFICE VISIT (OUTPATIENT)
Dept: ONCOLOGY | Age: 67
End: 2022-05-04
Payer: MEDICARE

## 2022-05-04 VITALS
WEIGHT: 210 LBS | SYSTOLIC BLOOD PRESSURE: 136 MMHG | BODY MASS INDEX: 35.49 KG/M2 | HEART RATE: 68 BPM | DIASTOLIC BLOOD PRESSURE: 73 MMHG | OXYGEN SATURATION: 97 % | TEMPERATURE: 97.9 F | RESPIRATION RATE: 17 BRPM

## 2022-05-04 DIAGNOSIS — C50.912 MALIGNANT NEOPLASM OF LEFT BREAST IN FEMALE, ESTROGEN RECEPTOR POSITIVE, UNSPECIFIED SITE OF BREAST (HCC): ICD-10-CM

## 2022-05-04 DIAGNOSIS — E66.01 SEVERE OBESITY (BMI 35.0-39.9) WITH COMORBIDITY (HCC): ICD-10-CM

## 2022-05-04 DIAGNOSIS — Z17.0 MALIGNANT NEOPLASM OF LEFT BREAST IN FEMALE, ESTROGEN RECEPTOR POSITIVE, UNSPECIFIED SITE OF BREAST (HCC): ICD-10-CM

## 2022-05-04 DIAGNOSIS — D05.12 DUCTAL CARCINOMA IN SITU (DCIS) OF LEFT BREAST: Primary | ICD-10-CM

## 2022-05-04 DIAGNOSIS — Z17.0 MALIGNANT NEOPLASM OF RIGHT BREAST IN FEMALE, ESTROGEN RECEPTOR POSITIVE, UNSPECIFIED SITE OF BREAST (HCC): ICD-10-CM

## 2022-05-04 DIAGNOSIS — C50.911 MALIGNANT NEOPLASM OF RIGHT BREAST IN FEMALE, ESTROGEN RECEPTOR POSITIVE, UNSPECIFIED SITE OF BREAST (HCC): ICD-10-CM

## 2022-05-04 PROBLEM — C50.919 MALIGNANT NEOPLASM OF UNSPECIFIED SITE OF UNSPECIFIED FEMALE BREAST (HCC): Status: ACTIVE | Noted: 2022-05-04

## 2022-05-04 PROCEDURE — G8427 DOCREV CUR MEDS BY ELIG CLIN: HCPCS | Performed by: INTERNAL MEDICINE

## 2022-05-04 PROCEDURE — G8536 NO DOC ELDER MAL SCRN: HCPCS | Performed by: INTERNAL MEDICINE

## 2022-05-04 PROCEDURE — 1090F PRES/ABSN URINE INCON ASSESS: CPT | Performed by: INTERNAL MEDICINE

## 2022-05-04 PROCEDURE — G8432 DEP SCR NOT DOC, RNG: HCPCS | Performed by: INTERNAL MEDICINE

## 2022-05-04 PROCEDURE — G8417 CALC BMI ABV UP PARAM F/U: HCPCS | Performed by: INTERNAL MEDICINE

## 2022-05-04 PROCEDURE — G8399 PT W/DXA RESULTS DOCUMENT: HCPCS | Performed by: INTERNAL MEDICINE

## 2022-05-04 PROCEDURE — G9899 SCRN MAM PERF RSLTS DOC: HCPCS | Performed by: INTERNAL MEDICINE

## 2022-05-04 PROCEDURE — 99215 OFFICE O/P EST HI 40 MIN: CPT | Performed by: INTERNAL MEDICINE

## 2022-05-04 PROCEDURE — 1101F PT FALLS ASSESS-DOCD LE1/YR: CPT | Performed by: INTERNAL MEDICINE

## 2022-05-04 PROCEDURE — G0463 HOSPITAL OUTPT CLINIC VISIT: HCPCS | Performed by: INTERNAL MEDICINE

## 2022-05-04 PROCEDURE — G8754 DIAS BP LESS 90: HCPCS | Performed by: INTERNAL MEDICINE

## 2022-05-04 PROCEDURE — G8752 SYS BP LESS 140: HCPCS | Performed by: INTERNAL MEDICINE

## 2022-05-04 PROCEDURE — 3017F COLORECTAL CA SCREEN DOC REV: CPT | Performed by: INTERNAL MEDICINE

## 2022-05-04 RX ORDER — ANASTROZOLE 1 MG/1
1 TABLET ORAL DAILY
Qty: 30 TABLET | Refills: 5 | Status: SHIPPED | OUTPATIENT
Start: 2022-05-04 | End: 2022-10-28

## 2022-05-04 RX ORDER — FERROUS SULFATE 324(65)MG
TABLET, DELAYED RELEASE (ENTERIC COATED) ORAL
COMMUNITY

## 2022-05-04 RX ORDER — LANOLIN ALCOHOL/MO/W.PET/CERES
CREAM (GRAM) TOPICAL
COMMUNITY

## 2022-05-04 NOTE — PROGRESS NOTES
Erica Craven is a 74 y.o. female    Chief complaint :Breast cancer  1. Have you been to the ER, urgent care clinic since your last visit? Hospitalized since your last visit? No    2. Have you seen or consulted any other health care providers outside of the [address] [place] [address] since your last visit? Include any pap smears or colon screening.  No

## 2022-05-04 NOTE — PATIENT INSTRUCTIONS
Anastrozole (Arimidex®)   This information is about a hormonal therapy used to treat breast cancer called anastrozole, which is also called Arimidex®. Throughout this page we refer to it by its more commonly used name, Arimidex. This information should ideally be read with our general information about breast cancer or secondary breast cancer. Arimidex   Arimidex is a type of hormonal therapy used to treat breast cancer in women who have been through the menopause (change of life). You will see your doctor regularly while you have this treatment so they can monitor its effects. Hormonal therapies interfere with the production or action of particular hormones in the body. Hormones are substances produced naturally in the body. They act as chemical messengers and help control the activity of cells and organs. Aromatase inhibitors   Many breast cancers rely on the hormone oestrogen to grow. These cancers are known as hormone-sensitive breast cancers. In women who have had their menopause, the main source of oestrogen is through the conversion of androgens (sex hormones produced by the adrenal glands) into oestrogens. This is carried out by an enzyme called aromatase. The conversion process is known as aromatisation, and it happens mainly in the fatty tissues of the body. Arimidex is a drug that blocks the process of aromatisation, and so reduces the amount of oestrogen in the body. As less oestrogen reaches the cancer cells, they grow more slowly or stop growing altogether. Drugs that work in this way are known as aromatase inhibitors. Other aromatase inhibitors include letrozole (Femara®) and exemestane (Aromasin®). How Arimidex is taken   Arimidex is a tablet that is taken once a day. It should be swallowed whole with a glass of water, at about the same time each day. It doesn't matter whether this is in the morning or evening.    When it is given   Your doctor will take into account a number of different factors when planning your treatment. Arimidex is used to treat post-menopausal women with hormone-sensitive breast cancer. Early breast cancer   Arimidex may be given to women with early breast cancer (cancer that has not spread) after they have had surgery to remove the cancer. Giving treatment after surgery to reduce the chance of the cancer coming back is known as adjuvant therapy. For some women, Arimidex may be more effective than tamoxifen, and it has different side effects. Studies show that switching to Arimidex after taking tamoxifen for 2-3 years may be better than five years of tamoxifen for some women. Advanced breast cancer   Arimidex can be used to treat women who have breast cancer that has spread to other parts of the body (advanced or secondary breast cancer). It can also be used to treat women whose breast cancer has come back after initial treatment. You might find our information about staging and grading of breast cancer useful. Length of treatment   Your doctors will discuss the length of treatment they feel is appropriate for you. Arimidex may be given over a number of years, or for as long as it is controlling your cancer, depending on your individual situation. Possible side effects   Each person's reaction to any medicine is different. Most people have very few side effects with Arimidex, while others may experience more. The side effects described here won't affect everyone and may be different if you are taking more than one drug. We have outlined the most common side effects, but haven't included those that are rare and therefore unlikely to affect you. If you notice any effects which aren't listed here, discuss them with your doctor or nurse. You may have some of the following side effects, to varying degrees:   Hot flushes and sweats   These are usually mild and may wear off after a period of time.  Sometimes people find it helps to cut down on tea, coffee, nicotine and alcohol. Research suggests that hormones called progestogens or some types of antidepressants may be helpful in controlling this side effect. Your doctor or nurse can discuss this with you. Some people find complementary therapies such as acupuncture helpful. Your GP may be able to give you details about having these on the NHS. If you find your own therapist, make sure that they are properly qualified and registered. You can read more about treatments for menopausal symptoms like hot flushes in our information about managing menopausal symptoms. Vaginal dryness   This may occur while using Arimidex. Gels that can help to overcome the dryness are available. You can buy these from a  or your doctor can prescribe them. Feeling sick (nausea) and being sick (vomiting)   These side effects are rare and usually mild. They can usually be effectively treated, so let your doctor know if you are affected. Feeling sick can often be relieved by taking your tablet with food or at night. Diarrhoea   If you have diarrhoea it's important to drink plenty of fluids. Hair thinning   Some women notice that their hair becomes thinner while taking Arimidex. This is usually mild and the hair grows back at the end of treatment. Headaches   Some people have headaches while taking Arimidex, but this is not common. It's important to drink plenty of fluids. Let your doctor know if you are getting headaches, as they can prescribe medication to help. Skin rashes   Rarely, Arimidex can cause skin rashes. Vaginal bleeding   Some women have some vaginal bleeding, usually in the first few weeks of treatment. This is rare and usually occurs after changing from other hormonal therapies to treatment with Arimidex. If the bleeding continues, tell your doctor or breast care nurse. Joint pains/muscular stiffness   Some women have pain and stiffness in their joints while taking Arimidex.  Let your doctor know if these effects are a problem. You may find it helpful to take mild painkillers. Tiredness (fatigue) and lethargy   Some people can have increased tiredness, especially at the start of treatment. It's important to get plenty of rest. If you are very sleepy you should not drive or operate machinery. Risk of osteoporosis   Women who have, or are at risk of, osteoporosis (weakened bones), should have their bone strength assessed before and during treatment with Arimidex. Some women may need to take bone-strengthening drugs to help prevent osteoporosis developing. Always let your doctor or nurse know about any side effects you have. There are usually ways in which they can be controlled or improved. Things to remember about Arimidex tablets    Arimidex may interact with other medicines. Tell your doctor about any medicines you are taking, including non-prescribed drugs such as complementary therapies, vitamins and herbal drugs.  Keep the tablets in a safe place, out of the reach of children.  Keep the tablets in the original packaging and store them at room temperature, away from heat and direct sunlight.  Don't worry if you forget to take your tablet. Do not take a double dose. The levels of the drug in your blood will not change very much, but try not to miss more than one or two tablets in a row.  If your doctor decides to stop the treatment, return any remaining tablets to the pharmacist. Don't flush them down the toilet or throw them away.  Remember to get a new prescription a few weeks before you run out of tablets, and make sure that you have plenty for holidays. References   This information is based on our Anastrozole (Arimidex®) fact sheet and has been compiled using information from a number of reliable sources, including:    marques Cantrell. Gely Boothe: The Complete Drug Reference. 36th edition. 2009. [employer]. [employer]. 59th edition. 2010.  [employer] and [address].  Early and locally advanced breast cancer: diagnosis and treatment. February 2009. National institute for Health and Clinical Excellence (NICE). electronic Medicines Compendium [facility]). www.medicines. org.uk (accessed September 2010). Anastrozole (Arimidex®)   This information is about a hormonal therapy used to treat breast cancer called anastrozole, which is also called Arimidex®. Throughout this page we refer to it by its more commonly used name, Arimidex. This information should ideally be read with our general information about breast cancer or secondary breast cancer. Arimidex   Arimidex is a type of hormonal therapy used to treat breast cancer in women who have been through the menopause (change of life). You will see your doctor regularly while you have this treatment so they can monitor its effects. Hormonal therapies interfere with the production or action of particular hormones in the body. Hormones are substances produced naturally in the body. They act as chemical messengers and help control the activity of cells and organs. Aromatase inhibitors   Many breast cancers rely on the hormone oestrogen to grow. These cancers are known as hormone-sensitive breast cancers. In women who have had their menopause, the main source of oestrogen is through the conversion of androgens (sex hormones produced by the adrenal glands) into oestrogens. This is carried out by an enzyme called aromatase. The conversion process is known as aromatisation, and it happens mainly in the fatty tissues of the body. Arimidex is a drug that blocks the process of aromatisation, and so reduces the amount of oestrogen in the body. As less oestrogen reaches the cancer cells, they grow more slowly or stop growing altogether. Drugs that work in this way are known as aromatase inhibitors.  Other aromatase inhibitors include letrozole (Femara®) and exemestane (Aromasin®). How Arimidex is taken   Arimidex is a tablet that is taken once a day. It should be swallowed whole with a glass of water, at about the same time each day. It doesn't matter whether this is in the morning or evening. When it is given   Your doctor will take into account a number of different factors when planning your treatment. Arimidex is used to treat post-menopausal women with hormone-sensitive breast cancer. Early breast cancer   Arimidex may be given to women with early breast cancer (cancer that has not spread) after they have had surgery to remove the cancer. Giving treatment after surgery to reduce the chance of the cancer coming back is known as adjuvant therapy. For some women, Arimidex may be more effective than tamoxifen, and it has different side effects. Studies show that switching to Arimidex after taking tamoxifen for 2-3 years may be better than five years of tamoxifen for some women. Advanced breast cancer   Arimidex can be used to treat women who have breast cancer that has spread to other parts of the body (advanced or secondary breast cancer). It can also be used to treat women whose breast cancer has come back after initial treatment. You might find our information about staging and grading of breast cancer useful. Length of treatment   Your doctors will discuss the length of treatment they feel is appropriate for you. Arimidex may be given over a number of years, or for as long as it is controlling your cancer, depending on your individual situation. Possible side effects   Each person's reaction to any medicine is different. Most people have very few side effects with Arimidex, while others may experience more. The side effects described here won't affect everyone and may be different if you are taking more than one drug. We have outlined the most common side effects, but haven't included those that are rare and therefore unlikely to affect you.  If you notice any effects which aren't listed here, discuss them with your doctor or nurse. You may have some of the following side effects, to varying degrees:   Hot flushes and sweats   These are usually mild and may wear off after a period of time. Sometimes people find it helps to cut down on tea, coffee, nicotine and alcohol. Research suggests that hormones called progestogens or some types of antidepressants may be helpful in controlling this side effect. Your doctor or nurse can discuss this with you. Some people find complementary therapies such as acupuncture helpful. Your GP may be able to give you details about having these on the NHS. If you find your own therapist, make sure that they are properly qualified and registered. You can read more about treatments for menopausal symptoms like hot flushes in our information about managing menopausal symptoms. Vaginal dryness   This may occur while using Arimidex. Gels that can help to overcome the dryness are available. You can buy these from a  or your doctor can prescribe them. Feeling sick (nausea) and being sick (vomiting)   These side effects are rare and usually mild. They can usually be effectively treated, so let your doctor know if you are affected. Feeling sick can often be relieved by taking your tablet with food or at night. Diarrhoea   If you have diarrhoea it's important to drink plenty of fluids. Hair thinning   Some women notice that their hair becomes thinner while taking Arimidex. This is usually mild and the hair grows back at the end of treatment. Headaches   Some people have headaches while taking Arimidex, but this is not common. It's important to drink plenty of fluids. Let your doctor know if you are getting headaches, as they can prescribe medication to help. Skin rashes   Rarely, Arimidex can cause skin rashes. Vaginal bleeding   Some women have some vaginal bleeding, usually in the first few weeks of treatment.  This is rare and usually occurs after changing from other hormonal therapies to treatment with Arimidex. If the bleeding continues, tell your doctor or breast care nurse. Joint pains/muscular stiffness   Some women have pain and stiffness in their joints while taking Arimidex. Let your doctor know if these effects are a problem. You may find it helpful to take mild painkillers. Tiredness (fatigue) and lethargy   Some people can have increased tiredness, especially at the start of treatment. It's important to get plenty of rest. If you are very sleepy you should not drive or operate machinery. Risk of osteoporosis   Women who have, or are at risk of, osteoporosis (weakened bones), should have their bone strength assessed before and during treatment with Arimidex. Some women may need to take bone-strengthening drugs to help prevent osteoporosis developing. Always let your doctor or nurse know about any side effects you have. There are usually ways in which they can be controlled or improved. Things to remember about Arimidex tablets   Arimidex may interact with other medicines. Tell your doctor about any medicines you are taking, including non-prescribed drugs such as complementary therapies, vitamins and herbal drugs. Keep the tablets in a safe place, out of the reach of children. Keep the tablets in the original packaging and store them at room temperature, away from heat and direct sunlight. Don't worry if you forget to take your tablet. Do not take a double dose. The levels of the drug in your blood will not change very much, but try not to miss more than one or two tablets in a row. If your doctor decides to stop the treatment, return any remaining tablets to the pharmacist. Don't flush them down the toilet or throw them away. Remember to get a new prescription a few weeks before you run out of tablets, and make sure that you have plenty for holidays.    References   This information is based on our Anastrozole (Arimidex®) fact sheet and has been compiled using information from a number of reliable sources, including:   Chelsy Solorzano: The Complete Drug Reference. 36th edition. 2009. [employer]. [employer]. 59th edition. 2010. [facility] and [address] [place] [address]. Early and locally advanced breast cancer: diagnosis and treatment. February 2009. National institute for Health and Clinical Excellence (NICE).  electronic Medicines Compendium [facility]). www.medicines. org.uk (accessed September 2010).

## 2022-05-04 NOTE — PROGRESS NOTES
[facility] [place] [address] [address], [address]  W: [phone]  F: [phone]    Reason for Visit:   Aj Salas is a 74 y.o. female who is seen in consultation at the request of Dr. 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 Treatment History:     LEFT lumpectomy 4/22. LEFT Lumpectomy 3/20  LEFT lumpectomy/ radiation 2009. No tamoxifen by choice. STAGE:   PATHOLOGIC STAGE CLASSIFICATION (pTNM, AJCC 8th Edition)       Primary Tumor (pT): pT1mi       Regional Lymph Nodes Modifier: (sn): [place] node(s) evaluated       Regional Lymph Nodes (pN): pN0    SPECIAL STUDIES       Breast Biomarker Testing Performed on Previous Biopsy: Estrogen         Receptor (ER), Progesterone Receptor (PgR)           Estrogen Receptor (ER) Status: Positive (greater than 10% of             cells demonstrate nuclear positivity)           Progesterone Receptor (PgR) Status: Positive         History of Present Illness:     Pt seen today for office consult for new LEFT breast DCIS with microinvasive disease post lumpectomy again. This is third episode of DCIS.   HTN/ DM/ goiter/   Here today to discuss adjuvant therapy. Feels fine today. No fevers/ chills/ chest pain/ SOB/ nausea/ vomiting/diarrhea/ pain/fatigue        Last visit 2020:   DCIS dx 2/20 post lumpectomy  Abnormal screening mammo on LEFT 2/20.     02/18/20: LEFT breast stereo bx. PATH: DCIS, solid and comedo pattern with comedonecrosis and intraluminal coarse calcification, nuclear grade 2, ER+(99%), [place] not performed. Clinical stage 0. MRI 3/5/20 no other abnormalities. Excision 3/20:   FINAL PATHOLOGIC DIAGNOSIS   Left breast, excision:   Ductal carcinoma in situ, intermediate grade (grade 2). Size: 12 mm. Necrosis: Present, Central comedo-type necrosis. Margins uninvolved by DCIS: Distance from closest margin: <1 mm, posterior.    Regional lymph nodes: No lymph nodes submitted or found. Pathologic stage: PTis NX   Additional pathologic findings: Biopsy site changes with squamous metaplasia.      Pt has hx of ? LEFT breast cancer (probably DCIS) in 2009.  Had surgery by Dr. Benita Mejía and whole breast radiation by Dr. Michael Gaxiola at [facility].    Did not want to take tamoxifen then. Pt is wearing a mask today due to COVID. Feels healthy overall. Has DM/HTN/ high cholesterol. Does not want to take tamoxifen or a pill for prevenation. FH - A paternal aunt had breast cancer and possible ovarian cancer.  A paternal first cousin was just diagnosed with breast cancer in her [address] [place] son has skin cancer.     Past Medical History:   Diagnosis Date    Adverse effect of anesthesia     SLOW TO AWAKEN     Arthritis     Breast cancer ([address]     lumpectomy with radiation, 2009, left    Breast cancer, left ([address] 2020    DCIS     Diabetes ([address]     Hyperlipidemia     Hypertension     Radiation therapy complication     0395    Thyroid disease     GOARDER ON THROAT       Past Surgical History:   Procedure Laterality Date    HX BREAST BIOPSY      HX BREAST LUMPECTOMY Left     2009    HX BREAST LUMPECTOMY Left 3/12/2020    LEFT BREAST LUMPECTOMY WITH ULTRASOUND performed by Chula Kahn MD at [address] [place] [address] HX BREAST LUMPECTOMY Left 4/14/2022    LEFT BREAST LUMPECTOMY WITH ULTRASOUND/LEFT BREAST SENTINEL NODE BIOPSY performed [facility]ahn [facility] at [facility] AMBULATORY OR    HX ORTHOPAEDIC      Bunion Surgery BOTH FEET     HX TUBAL LIGATION      UT BREAST SURGERY PROCEDURE UNLISTED      lumpectomy      Social History     Tobacco Use    Smoking status: Never Smoker    Smokeless tobacco: Never Used   Substance Use Topics    Alcohol use: No      Family History   Problem Relation Age of Onset    Diabetes Father     Stroke Sister     Diabetes Brother     Heart Disease Mother     Hypertension Sister     Diabetes Brother     Diabetes Brother     Diabetes Brother  Cancer Son         LUMP ON NECK     Anesth Problems Neg Hx      Current Outpatient Medications   Medication Sig    ferrous sulfate 324 mg (65 mg iron) tablet Take  by mouth Daily (before breakfast).  ascorbic acid (VITAMIN C) 500 mg chewable tablet Take  by mouth.  metFORMIN (GLUCOPHAGE) 850 mg tablet TAKE 1 TABLET BY MOUTH TWICE DAILY WITH MEALS    potassium chloride SA (MICRO-K) 10 mEq capsule TAKE 3 CAPSULES BY MOUTH EVERY DAY    pravastatin (PRAVACHOL) 20 mg tablet TAKE 1 TABLET BY MOUTH EVERY NIGHT    indapamide (LOZOL) 1.25 mg tablet Take 1 Tablet by mouth daily.  lisinopriL (PRINIVIL, ZESTRIL) 10 mg tablet TAKE 1 TABLET BY MOUTH EVERY DAY    cyanocobalamin 1,000 mcg tablet Take 1,000 mcg by mouth daily.  ACCU-CHEK SMARTVIEW TEST STRIP strip PLEASE USE TWICE DAILY AS NEEDED    glucose blood VI test strips (ACCU-CHEK SMARTVIEW TEST STRIP) strip Please use two times daily as needed for Dx code 250.02    BETA-CAROTENE,A, W-C & E/ZN/CU (VISION-CHARANJIT PRESERVE PO) Take  by mouth two (2) times a day.  Cholecalciferol, Vitamin D3, (VITAMIN D3) 1,000 unit cap Take 5,000 Units by mouth daily. 4 tabs daily  Indications: vitamin D deficiency     No current facility-administered medications for this visit. Allergies   Allergen Reactions    Pcn [Penicillins] Hives        A complete review of systems was obtained, negative except as described above and as reported on ROS sheet scanned into system.        Physical Exam:     Visit Vitals  /73 (BP 1 Location: Right upper arm, BP Patient Position: Sitting)   Pulse 68   Temp 97.9 °F (36.6 °C) (Temporal)   Resp 17   Wt 210 lb (95.3 kg)   SpO2 97%   BMI 35.49 kg/m²     ECOG PS: 0  General: No distress  Eyes: PERRLA, anicteric sclerae  HENT: Atraumatic, wearing a mask  Neck: Supple  Respiratory: CTAB, normal respiratory effort  CV: Normal rate, regular rhythm, no murmurs, no peripheral edema  GI: Soft, nontender, nondistended, no masses  MS: Normal gait and station. Digits without clubbing or cyanosis. Skin: No rashes, ecchymoses, or petechiae. Normal temperature, turgor, and texture. Psych: Alert, oriented, appropriate affect, normal judgment/insight  Breast LEFT lumpectomy healing well. Old lump scar with lesions. No masses palpable on RIGHT    Results:     Lab Results   Component Value Date/Time    WBC 5.0 01/17/2022 11:01 AM    HGB 10.7 (L) 01/17/2022 11:01 AM    HCT 34.1 (L) 01/17/2022 11:01 AM    PLATELET 869 34/23/1469 11:01 AM    MCV 89.5 01/17/2022 11:01 AM    ABS. NEUTROPHILS 2.6 03/30/2017 08:24 AM     Lab Results   Component Value Date/Time    Sodium 142 01/17/2022 11:01 AM    Potassium 4.0 01/17/2022 11:01 AM    Chloride 109 (H) 01/17/2022 11:01 AM    CO2 26 01/17/2022 11:01 AM    Glucose 102 (H) 01/17/2022 11:01 AM    BUN 20 01/17/2022 11:01 AM    Creatinine 0.89 01/17/2022 11:01 AM    GFR est AA >60 01/17/2022 11:01 AM    GFR est non-AA >60 01/17/2022 11:01 AM    Calcium 10.0 01/17/2022 11:01 AM    Glucose (POC) 99 04/14/2022 09:37 AM    Creatinine (POC) 0.9 03/05/2020 08:25 AM     Lab Results   Component Value Date/Time    Bilirubin, total 0.6 01/17/2022 11:01 AM    ALT (SGPT) 22 01/17/2022 11:01 AM    Alk. phosphatase 69 01/17/2022 11:01 AM    Protein, total 7.5 01/17/2022 11:01 AM    Albumin 4.1 01/17/2022 11:01 AM    Globulin 3.4 01/17/2022 11:01 AM     MRI Results (most recent):  Results from Hospital Encounter encounter on 03/08/22    MRI BREAST BI W WO CONT    Narrative  INDICATION: Left DCIS, grade 2, ER/KS positive. Prior left lumpectomy in 2009  and 2020. COMPARISON: 3/5/2020. Breast imaging from February 2022. TECHNIQUE:  Multisequence, multiplanar, bilateral breast MRI was performed in prone position  using a dedicated breast coil. Images were obtained without contrast and dynamic  postcontrast images were obtained in multiple phases. 9 mL IV Gadavist was  administered. Subtraction images were reconstructed. Postcontrast images were  reviewed with dedicated kinetic analysis software. FINDINGS:  There is mild background parenchymal enhancement and scattered fibroglandular  tissue. Right breast:  No suspicious enhancing foci. No axillary or internal mammary chain  lymphadenopathy. Left breast:  16 mm curvilinear enhancement extends along the inferior margin of the biopsy  clip at 9:00-10:00 in the anterior third. This may represent DCIS or merely  reactive enhancement due to the recent biopsy. No other suspicious enhancement. There are lumpectomy scars in the middle third at 10:00 and in the posterior  third along the nipple line. No axillary or internal mammary chain  lymphadenopathy. A summary portfolio with key images has been sent from kinetic analysis software  to PACS. Impression  1. Curvilinear enhancement along the left anterior biopsy clip (16 mm). 2. No other suspicious enhancement. 3. No lymphadenopathy. 4. BI-RADS Assessment Category 6: Known biopsy proven malignancy. Records reviewed and summarized above. Pathology report(s) reviewed above. Radiology report(s) reviewed above. 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 PATHOLOGIC STAGE CLASSIFICATION (pTNM, AJCC 8th Edition)       Primary Tumor (pT): pT1mi       Regional Lymph Nodes Modifier: (sn): [place] node(s) evaluated       Regional Lymph Nodes (pN): pN0    SPECIAL STUDIES       Breast Biomarker Testing Performed on Previous Biopsy: Estrogen         Receptor (ER), Progesterone Receptor (PgR)           Estrogen Receptor (ER) Status: Positive (greater than 10% of             cells demonstrate nuclear positivity)           Progesterone Receptor (PgR) Status: Positive     Seen today for fu. Last seen 2 years ago. New recurrent LEFT breast DCIS with microinvasion. Path and records reviewed with pt today. Did well with surgery. Cannot have more radiation. Reviewed adjuvant hormonal therapy today with AI vs tamoxifen. The risks and benefits of aromatase inhibitors (anastrozole, letrozole, and exemestane) were discussed in detail and the patient was informed of the following: Risks include the development of painful muscles and joints (arthralgia/myalgia) and bone loss. Muscle and joint pain can be severe but rarely result in any tissue damage; symptoms usually resolve in several weeks when the medication is stopped. Bone loss is common and a bone density test is recommended as a baseline and then yearly to every several years depending on initial results. The risk of fractures is increased by a few percent in patients taking these drugs, but careful monitoring of bone density and using bone protecting agents when indicated can minimize these risks. Unlike tamoxifen there is no increased risk of blood clots or endometrial cancer. AIs can cause or worsen vaginal dryness but women using these drugs should not use vaginal estrogen preparations for these symptoms. AIs can also cause or increase hot flashes. Any other symptoms should be reported. Pt is fine with trying anastrozole. Ordered today. Will start in a week or so. Clinically pt feels well today. Will fu with us in 3 months. 2) hx of stage 0 LEFT breast DCIS ER+ with hx of lumpectomy 3/20. Hx of ? LEFT Breast DCIS 2009 at [facility] with hx of lumpectomy/ radiation and no tamoxifen by choice. was not interested in taking a pill for prevention now. Cannot have radiation as had this in past. Had [id number] and low risk. Has genetic testing and reviewed VUS today. 2) DM/HTN/high cholesterol/ goiter. Per PCP. 3) psychosocial.  Mood good. Has family support. Coping well. F/u here in 3 months  Call if questions. I appreciate the opportunity to participate in Ms. Cheyenne Ross's care.     Signed By: Pedro Snyder, DO

## 2022-05-14 DIAGNOSIS — I10 ESSENTIAL HYPERTENSION: ICD-10-CM

## 2022-05-15 RX ORDER — INDAPAMIDE 1.25 MG/1
1.25 TABLET, FILM COATED ORAL DAILY
Qty: 90 TABLET | Refills: 0 | Status: SHIPPED | OUTPATIENT
Start: 2022-05-15 | End: 2022-08-11

## 2022-06-10 DIAGNOSIS — I10 ESSENTIAL HYPERTENSION: ICD-10-CM

## 2022-06-11 RX ORDER — LISINOPRIL 10 MG/1
TABLET ORAL
Qty: 90 TABLET | Refills: 1 | Status: SHIPPED | OUTPATIENT
Start: 2022-06-11

## 2022-06-15 ENCOUNTER — OFFICE VISIT (OUTPATIENT)
Dept: PRIMARY CARE CLINIC | Age: 67
End: 2022-06-15
Payer: MEDICARE

## 2022-06-15 VITALS
TEMPERATURE: 97.1 F | BODY MASS INDEX: 35.61 KG/M2 | DIASTOLIC BLOOD PRESSURE: 76 MMHG | RESPIRATION RATE: 18 BRPM | SYSTOLIC BLOOD PRESSURE: 136 MMHG | HEIGHT: 64 IN | OXYGEN SATURATION: 100 % | WEIGHT: 208.6 LBS | HEART RATE: 62 BPM

## 2022-06-15 DIAGNOSIS — E11.9 TYPE II DIABETES MELLITUS, WELL CONTROLLED (HCC): Primary | ICD-10-CM

## 2022-06-15 DIAGNOSIS — M81.0 POSTMENOPAUSAL BONE LOSS: ICD-10-CM

## 2022-06-15 DIAGNOSIS — I10 PRIMARY HYPERTENSION: ICD-10-CM

## 2022-06-15 DIAGNOSIS — M1A.0720 IDIOPATHIC CHRONIC GOUT OF LEFT FOOT WITHOUT TOPHUS: ICD-10-CM

## 2022-06-15 DIAGNOSIS — D05.12 DUCTAL CARCINOMA IN SITU (DCIS) OF LEFT BREAST: ICD-10-CM

## 2022-06-15 PROBLEM — C50.912 MALIGNANT NEOPLASM OF UNSPECIFIED SITE OF LEFT FEMALE BREAST (HCC): Status: RESOLVED | Noted: 2022-05-04 | Resolved: 2022-06-15

## 2022-06-15 PROBLEM — C50.911 MALIGNANT NEOPLASM OF UNSPECIFIED SITE OF RIGHT FEMALE BREAST (HCC): Status: RESOLVED | Noted: 2022-05-04 | Resolved: 2022-06-15

## 2022-06-15 LAB
ALBUMIN SERPL-MCNC: 4 G/DL (ref 3.5–5)
ALBUMIN/GLOB SERPL: 1.1 {RATIO} (ref 1.1–2.2)
ALP SERPL-CCNC: 83 U/L (ref 45–117)
ALT SERPL-CCNC: 24 U/L (ref 12–78)
ANION GAP SERPL CALC-SCNC: 8 MMOL/L (ref 5–15)
AST SERPL-CCNC: 12 U/L (ref 15–37)
BILIRUB SERPL-MCNC: 0.6 MG/DL (ref 0.2–1)
BUN SERPL-MCNC: 20 MG/DL (ref 6–20)
BUN/CREAT SERPL: 22 (ref 12–20)
CALCIUM SERPL-MCNC: 10.6 MG/DL (ref 8.5–10.1)
CHLORIDE SERPL-SCNC: 106 MMOL/L (ref 97–108)
CO2 SERPL-SCNC: 27 MMOL/L (ref 21–32)
CREAT SERPL-MCNC: 0.89 MG/DL (ref 0.55–1.02)
CREAT UR-MCNC: 123 MG/DL
EST. AVERAGE GLUCOSE BLD GHB EST-MCNC: 143 MG/DL
GLOBULIN SER CALC-MCNC: 3.8 G/DL (ref 2–4)
GLUCOSE SERPL-MCNC: 108 MG/DL (ref 65–100)
HBA1C MFR BLD: 6.6 % (ref 4–5.6)
MICROALBUMIN UR-MCNC: 1.14 MG/DL
MICROALBUMIN/CREAT UR-RTO: 9 MG/G (ref 0–30)
POTASSIUM SERPL-SCNC: 4.1 MMOL/L (ref 3.5–5.1)
PROT SERPL-MCNC: 7.8 G/DL (ref 6.4–8.2)
SODIUM SERPL-SCNC: 141 MMOL/L (ref 136–145)
URATE SERPL-MCNC: 7.5 MG/DL (ref 2.6–6)

## 2022-06-15 PROCEDURE — G8427 DOCREV CUR MEDS BY ELIG CLIN: HCPCS | Performed by: INTERNAL MEDICINE

## 2022-06-15 PROCEDURE — 1090F PRES/ABSN URINE INCON ASSESS: CPT | Performed by: INTERNAL MEDICINE

## 2022-06-15 PROCEDURE — 3017F COLORECTAL CA SCREEN DOC REV: CPT | Performed by: INTERNAL MEDICINE

## 2022-06-15 PROCEDURE — 3044F HG A1C LEVEL LT 7.0%: CPT | Performed by: INTERNAL MEDICINE

## 2022-06-15 PROCEDURE — G8417 CALC BMI ABV UP PARAM F/U: HCPCS | Performed by: INTERNAL MEDICINE

## 2022-06-15 PROCEDURE — 1123F ACP DISCUSS/DSCN MKR DOCD: CPT | Performed by: INTERNAL MEDICINE

## 2022-06-15 PROCEDURE — 1101F PT FALLS ASSESS-DOCD LE1/YR: CPT | Performed by: INTERNAL MEDICINE

## 2022-06-15 PROCEDURE — G8754 DIAS BP LESS 90: HCPCS | Performed by: INTERNAL MEDICINE

## 2022-06-15 PROCEDURE — 99214 OFFICE O/P EST MOD 30 MIN: CPT | Performed by: INTERNAL MEDICINE

## 2022-06-15 PROCEDURE — G8510 SCR DEP NEG, NO PLAN REQD: HCPCS | Performed by: INTERNAL MEDICINE

## 2022-06-15 PROCEDURE — G8536 NO DOC ELDER MAL SCRN: HCPCS | Performed by: INTERNAL MEDICINE

## 2022-06-15 PROCEDURE — G9899 SCRN MAM PERF RSLTS DOC: HCPCS | Performed by: INTERNAL MEDICINE

## 2022-06-15 PROCEDURE — G8752 SYS BP LESS 140: HCPCS | Performed by: INTERNAL MEDICINE

## 2022-06-15 PROCEDURE — 2022F DILAT RTA XM EVC RTNOPTHY: CPT | Performed by: INTERNAL MEDICINE

## 2022-06-15 RX ORDER — IBUPROFEN 200 MG
CAPSULE ORAL
Qty: 100 STRIP | Refills: 2 | Status: SHIPPED | OUTPATIENT
Start: 2022-06-15

## 2022-06-15 RX ORDER — INSULIN PUMP SYRINGE, 3 ML
EACH MISCELLANEOUS
Qty: 1 KIT | Refills: 0 | Status: SHIPPED | OUTPATIENT
Start: 2022-06-15

## 2022-06-15 NOTE — PROGRESS NOTES
1. \"Have you been to the ER, urgent care clinic since your last visit? Hospitalized since your last visit? \" No    2. \"Have you seen or consulted any other health care providers outside of the [address] [place] [address] since your last visit? \" No     3. For patients aged 39-70: Has the patient had a colonoscopy / FIT/ Cologuard? Yes - no Care Gap present      If the patient is female:    4. For patients aged 41-77: Has the patient had a mammogram within the past 2 years? Yes - no Care Gap present      5. For patients aged 21-65: Has the patient had a pap smear? NA - based on age or sex     Chief Complaint   Patient presents with    Follow-up     Diabetes. Lower back pain that goes down both hips and legs.

## 2022-06-15 NOTE — PROGRESS NOTES
Emeli Michelle (: 1955) is a 69 y.o. female, established patient, here for evaluation of the following chief complaint(s):  Follow-up (Diabetes. Lower back pain that goes down both hips and legs. )     Written by Siomara Child, as dictated by Dr. Khushbu Whiting MD.      ASSESSMENT/PLAN:  Below is the assessment and plan developed based on review of pertinent history, physical exam, labs, studies, and medications. 1. Type II diabetes mellitus, well controlled ([address]  Recheck hgb A1c and microalbumin. Continue with metformin 850 mg BID for now. -      DIABETES FOOT EXAM  -     HEMOGLOBIN A1C WITH EAG; Future  -     MICROALBUMIN, UR, RAND W/ MICROALB/CREAT RATIO; Future  -     Blood-Glucose Meter monitoring kit; Check blood sugar 3 times a day, Normal, Disp-1 Kit, R-0 sent to pharmacy. -     glucose blood VI test strips (blood glucose test) strip; Check blood sugar 3 times a day, Normal, Disp-100 Strip, R-2 sent to pharmacy. 2. Primary hypertension  BP was elevated today, but she took her medications in office and BP improved to normal range. Continue with lisinopril 10 mg daily and indapamide 1.25 mg daily. Labs ordered for medication management.   -     METABOLIC PANEL, COMPREHENSIVE; Future    3. Idiopathic chronic gout of left foot without tophus  Will check uric acid level. -     URIC ACID; Future    4. Ductal carcinoma in situ (DCIS) of left breast  Followed by Dr. Driss Laird (breast surgery) and Dr. Nick Dowling (oncology). She is s/p left breast lumpectomy and left breast sentinel node bx with Dr. Driss Laird (breast surgery) on 22. Continue with anastrozole 1 mg daily. 5. Postmenopausal bone loss  Ordered DEXA scan. -     DEXA BONE DENSITY STUDY AXIAL; Future    SUBJECTIVE/OBJECTIVE:  HPI   The patient presents today for a follow-up on chronic conditions. She is fasting today for blood work. Her BP today is elevated at 154/74.  She is on lisinopril 10 mg daily and indapamide 1.25 mg daily for HTN, but she did not take her medication this AM because she is fasting and thought she needed to take her medication with food. She took her medication today in office and repeat BP is improved at 136/76. She is on metformin 850 mg BID for diabetes. She has not really been watching her diet or exercising daily. She had 1 gout attack in her right second toe since her last visit. She did not take anything for the gout attack. She is s/p left breast lumpectomy and left breast sentinel node bx with Dr. Leonidas Mccain (breast surgery) on 04/14/22. She has had 2 episodes of DCIS of the left breast and this was her third episode. Since surgery she has seen Dr. Cristhian Varela (oncology) who started her on anastrozole 1 mg daily. Her son passed away 1 year ago (01/2021). She has been dealing with the grief well overall and does not think she needs to go to counseling at this time. Patient Active Problem List   Diagnosis Code    HTN (hypertension) I10    History of left breast cancer Z85.3    Multinodular goiter E04.2    Obesity (BMI 30-39. 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  ascorbic acid (VITAMIN C) 500 mg chewable tablet Take  by mouth.  anastrozole (ARIMIDEX) 1 mg tablet Take 1 mg by mouth daily.  Indications: hormone receptor positive breast cancer 30 Tablet 5  metFORMIN (GLUCOPHAGE) 850 mg tablet TAKE 1 TABLET BY MOUTH TWICE DAILY WITH MEALS 180 Tablet 0    potassium chloride SA (MICRO-K) 10 mEq capsule TAKE 3 CAPSULES BY MOUTH EVERY  Capsule 0    pravastatin (PRAVACHOL) 20 mg tablet TAKE 1 TABLET BY MOUTH EVERY NIGHT 90 Tablet 0    cyanocobalamin 1,000 mcg tablet Take 1,000 mcg by mouth daily.  Cholecalciferol, Vitamin D3, (VITAMIN D3) 1,000 unit cap Take 5,000 Units by mouth daily. 4 tabs daily  Indications: vitamin D deficiency      [DISCONTINUED] ACCU-CHEK SMARTVIEW TEST STRIP strip PLEASE USE TWICE DAILY AS NEEDED 100 Strip 11    [DISCONTINUED] glucose blood VI test strips (ACCU-CHEK SMARTVIEW TEST STRIP) strip Please use two times daily as needed for Dx code 250.02 100 Strip 11    BETA-CAROTENE,A, W-C & E/ZN/CU (VISION-CHARANJIT PRESERVE PO) Take  by mouth two (2) times a day. No current facility-administered medications on file prior to visit.        Allergies   Allergen Reactions    Pcn [Penicillins] Hives       Past Medical History:   Diagnosis Date    Adverse effect of anesthesia     SLOW TO AWAKEN     Arthritis     Breast cancer ([address]     lumpectomy with radiation, 2009, left    Breast cancer, left ([address] 2020    DCIS     Diabetes ([address]     Hyperlipidemia     Hypertension     Radiation therapy complication     0899    Thyroid disease     GOARDER ON THROAT        Past Surgical History:   Procedure Laterality Date    HX BREAST BIOPSY      HX BREAST LUMPECTOMY Left     2009    HX BREAST LUMPECTOMY Left 3/12/2020    LEFT BREAST LUMPECTOMY WITH ULTRASOUND performed by Alexy Vasquez MD at [place] [address] HX BREAST LUMPECTOMY Left 4/14/2022    LEFT BREAST LUMPECTOMY WITH ULTRASOUND/LEFT BREAST SENTINEL NODE BIOPSY performed [facility]y [facility] at [facility] HX ORTHOPAEDIC      Bunion Surgery BOTH FEET     HX TUBAL LIGATION      NM BREAST SURGERY PROCEDURE UNLISTED      lumpectomy       Family History   Problem Relation Age of Onset    Diabetes Father     Stroke Sister     Diabetes Brother     Heart Disease Mother     Hypertension Sister     Diabetes Brother     Diabetes Brother     Diabetes Brother     Cancer Son         LUMP ON NECK     Anesth Problems Neg Hx        Social History     Socioeconomic History    Marital status:      Spouse name: Not on file    Number of children: Not on file    Years of education: Not on file    Highest education level: Not on file   Occupational History    Not on file   Tobacco Use    Smoking status: Never Smoker    Smokeless tobacco: Never Used   Vaping Use    Vaping Use: Never used   Substance and Sexual Activity    Alcohol use: No    Drug use: Never    Sexual activity: Never   Other Topics Concern    Not on file   Social History Narrative    Not on file       Admission on 04/14/2022, Discharged on 04/14/2022   Component Date Value Ref Range Status    Glucose (POC) 04/14/2022 99  65 - 117 mg/dL Final    Comment: (NOTE)  The FDA has indicated that no capillary point of care blood glucose  monitoring systems are approved for use in \"critically ill\" patients,  however they have not defined this population. The College of  American Pathologists has recommended that these devices should not  be used in cases such as severe hypotension, dehydration, shock, and  hyperglycemic-hyperosmolar state, amongst others. Venous or arterial  collection is the recommended specimen for testing these patients.  Performed by 04/14/2022 Willie Thomson RN   Final      Review of Systems   Constitutional: Negative for activity change, fatigue and unexpected weight change. HENT: Negative for congestion, hearing loss, rhinorrhea and sore throat. Eyes: Negative for discharge. Respiratory: Negative for cough, chest tightness and shortness of breath. Cardiovascular: Negative for leg swelling.    Gastrointestinal: Negative for abdominal pain, constipation and diarrhea. Genitourinary: Negative for dysuria, flank pain, frequency and urgency. Musculoskeletal: Negative for arthralgias, back pain and myalgias. Skin: Negative for color change and rash. Neurological: Negative for dizziness, light-headedness and headaches. Psychiatric/Behavioral: Negative for dysphoric mood and sleep disturbance. The patient is not nervous/anxious. Visit Vitals  /76 (BP 1 Location: Right arm, BP Patient Position: Sitting)   Pulse 62   Temp 97.1 °F (36.2 °C) (Temporal)   Resp 18   Ht 5' 4\" (1.626 m)   Wt 208 lb 9.6 oz (94.6 kg)   SpO2 100%   BMI 35.81 kg/m²      Physical Exam  Vitals and nursing note reviewed. Constitutional:       General: She is not in acute distress. Appearance: Normal appearance. She is well-developed. She is not diaphoretic. HENT:      Right Ear: External ear normal.      Left Ear: External ear normal.   Eyes:      General: No scleral icterus. Right eye: No discharge. Left eye: No discharge. Extraocular Movements: Extraocular movements intact. Conjunctiva/sclera: Conjunctivae normal.   Cardiovascular:      Rate and Rhythm: Normal rate and regular rhythm. Pulmonary:      Effort: Pulmonary effort is normal.      Breath sounds: Normal breath sounds. No wheezing. Abdominal:      General: Bowel sounds are normal.      Palpations: Abdomen is soft. Tenderness: There is no abdominal tenderness. Musculoskeletal:      Cervical back: Normal range of motion and neck supple. Feet:      Comments: Diabetic foot exam:     Left: Vibratory sensation normal    Sharp/dull discrimination normal    Filament test normal sensation with micro filament   Pulse DP: 2+ (normal)   Deformities: None  Right: Vibratory sensation normal   Sharp/dull discrimination normal   Filament test normal sensation with micro filament   Pulse DP: 2+ (normal)   Deformities: None   Lymphadenopathy:      Cervical: No cervical adenopathy.    Neurological: Mental Status: She is alert and oriented to person, place, and time. Psychiatric:         Mood and Affect: Mood and affect normal.       An electronic signature was used to authenticate this note.   -- Chiquita Sands

## 2022-06-16 NOTE — PROGRESS NOTES
Result discussed with patient. She will work on low carb diet and will increase physical activity. Repeat labs in 3 months.

## 2022-06-24 DIAGNOSIS — E78.2 MIXED HYPERLIPIDEMIA: ICD-10-CM

## 2022-06-24 RX ORDER — PRAVASTATIN SODIUM 20 MG/1
TABLET ORAL
Qty: 90 TABLET | Refills: 0 | Status: SHIPPED | OUTPATIENT
Start: 2022-06-24 | End: 2022-06-28

## 2022-06-28 DIAGNOSIS — E78.2 MIXED HYPERLIPIDEMIA: ICD-10-CM

## 2022-06-28 RX ORDER — PRAVASTATIN SODIUM 20 MG/1
TABLET ORAL
Qty: 90 TABLET | Refills: 0 | Status: SHIPPED | OUTPATIENT
Start: 2022-06-28

## 2022-07-21 DIAGNOSIS — I10 ESSENTIAL HYPERTENSION: ICD-10-CM

## 2022-07-21 RX ORDER — POTASSIUM CHLORIDE 750 MG/1
CAPSULE, EXTENDED RELEASE ORAL
Qty: 270 CAPSULE | Refills: 0 | Status: SHIPPED | OUTPATIENT
Start: 2022-07-21 | End: 2022-10-17

## 2022-07-28 DIAGNOSIS — E11.9 TYPE II DIABETES MELLITUS, WELL CONTROLLED (HCC): ICD-10-CM

## 2022-07-28 RX ORDER — METFORMIN HYDROCHLORIDE 850 MG/1
TABLET ORAL
Qty: 180 TABLET | Refills: 0 | Status: SHIPPED | OUTPATIENT
Start: 2022-07-28 | End: 2022-10-23

## 2022-08-04 ENCOUNTER — HOSPITAL ENCOUNTER (OUTPATIENT)
Dept: MAMMOGRAPHY | Age: 67
Discharge: HOME | End: 2022-08-04
Attending: INTERNAL MEDICINE

## 2022-08-04 DIAGNOSIS — M81.0 POSTMENOPAUSAL BONE LOSS: ICD-10-CM

## 2022-08-11 DIAGNOSIS — I10 ESSENTIAL HYPERTENSION: ICD-10-CM

## 2022-08-11 RX ORDER — INDAPAMIDE 1.25 MG/1
1.25 TABLET, FILM COATED ORAL DAILY
Qty: 90 TABLET | Refills: 0 | Status: SHIPPED | OUTPATIENT
Start: 2022-08-11

## 2022-08-17 ENCOUNTER — OFFICE VISIT (OUTPATIENT)
Dept: ONCOLOGY | Age: 67
End: 2022-08-17
Payer: MEDICARE

## 2022-08-17 VITALS
TEMPERATURE: 97.6 F | HEART RATE: 104 BPM | WEIGHT: 202.4 LBS | OXYGEN SATURATION: 97 % | BODY MASS INDEX: 34.56 KG/M2 | DIASTOLIC BLOOD PRESSURE: 58 MMHG | HEIGHT: 64 IN | SYSTOLIC BLOOD PRESSURE: 137 MMHG

## 2022-08-17 DIAGNOSIS — Z17.0 MALIGNANT NEOPLASM OF LEFT BREAST IN FEMALE, ESTROGEN RECEPTOR POSITIVE, UNSPECIFIED SITE OF BREAST (HCC): Primary | ICD-10-CM

## 2022-08-17 DIAGNOSIS — C50.912 MALIGNANT NEOPLASM OF LEFT BREAST IN FEMALE, ESTROGEN RECEPTOR POSITIVE, UNSPECIFIED SITE OF BREAST (HCC): Primary | ICD-10-CM

## 2022-08-17 PROCEDURE — G8399 PT W/DXA RESULTS DOCUMENT: HCPCS | Performed by: INTERNAL MEDICINE

## 2022-08-17 PROCEDURE — 1123F ACP DISCUSS/DSCN MKR DOCD: CPT | Performed by: INTERNAL MEDICINE

## 2022-08-17 PROCEDURE — G9899 SCRN MAM PERF RSLTS DOC: HCPCS | Performed by: INTERNAL MEDICINE

## 2022-08-17 PROCEDURE — 1090F PRES/ABSN URINE INCON ASSESS: CPT | Performed by: INTERNAL MEDICINE

## 2022-08-17 PROCEDURE — G8752 SYS BP LESS 140: HCPCS | Performed by: INTERNAL MEDICINE

## 2022-08-17 PROCEDURE — G8417 CALC BMI ABV UP PARAM F/U: HCPCS | Performed by: INTERNAL MEDICINE

## 2022-08-17 PROCEDURE — 3017F COLORECTAL CA SCREEN DOC REV: CPT | Performed by: INTERNAL MEDICINE

## 2022-08-17 PROCEDURE — G8754 DIAS BP LESS 90: HCPCS | Performed by: INTERNAL MEDICINE

## 2022-08-17 PROCEDURE — G8432 DEP SCR NOT DOC, RNG: HCPCS | Performed by: INTERNAL MEDICINE

## 2022-08-17 PROCEDURE — G8536 NO DOC ELDER MAL SCRN: HCPCS | Performed by: INTERNAL MEDICINE

## 2022-08-17 PROCEDURE — G8427 DOCREV CUR MEDS BY ELIG CLIN: HCPCS | Performed by: INTERNAL MEDICINE

## 2022-08-17 PROCEDURE — G0463 HOSPITAL OUTPT CLINIC VISIT: HCPCS | Performed by: INTERNAL MEDICINE

## 2022-08-17 PROCEDURE — 99213 OFFICE O/P EST LOW 20 MIN: CPT | Performed by: INTERNAL MEDICINE

## 2022-08-17 PROCEDURE — 1101F PT FALLS ASSESS-DOCD LE1/YR: CPT | Performed by: INTERNAL MEDICINE

## 2022-08-17 NOTE — PROGRESS NOTES
[facility] [place] [address] [address], [place]: [phone]  F: [phone]    Reason for Visit:   Jigar Ivan is a 69 y.o. female who is seen for fu LEFT breast DCIS. Treatment History:     LEFT lumpectomy 4/22. LEFT Lumpectomy 3/20  LEFT lumpectomy/ radiation 2009. No tamoxifen by choice. STAGE:   PATHOLOGIC STAGE CLASSIFICATION (pTNM, AJCC 8th Edition)       Primary Tumor (pT): pT1mi       Regional Lymph Nodes Modifier: (sn): [place] node(s) evaluated       Regional Lymph Nodes (pN): pN0    SPECIAL STUDIES       Breast Biomarker Testing Performed on Previous Biopsy: Estrogen         Receptor (ER), Progesterone Receptor (PgR)           Estrogen Receptor (ER) Status: Positive (greater than 10% of             cells demonstrate nuclear positivity)           Progesterone Receptor (PgR) Status: Positive         History of Present Illness:     Pt seen today for office fu for LEFT breast cancer on adjuvant hormonal therapy with AI. Having some hot flashes. Tolerating AI fine. No new issues. No fevers/ chills/ chest pain/ SOB/ nausea/ vomiting/diarrhea/ pain/fatigue          Last visit:  consult for new LEFT breast DCIS with microinvasive disease post lumpectomy again. This is third episode of DCIS.   HTN/ DM/ goiter/   Here today to discuss adjuvant therapy. Feels fine today.    No fevers/ chills/ chest pain/ SOB/ nausea/ vomiting/diarrhea/ pain/fatigue      Past Medical History:   Diagnosis Date    Adverse effect of anesthesia     SLOW TO AWAKEN     Arthritis     Breast cancer ([address]     lumpectomy with radiation, 2009, left    Breast cancer, left ([address] 2020    DCIS     Diabetes ([address]     Hyperlipidemia     Hypertension     Radiation therapy complication     2063    Thyroid disease     GOARDER ON THROAT       Past Surgical History:   Procedure Laterality Date    HX BREAST BIOPSY      HX BREAST LUMPECTOMY Left     2009    HX BREAST LUMPECTOMY Left 3/12/2020    LEFT BREAST LUMPECTOMY WITH ULTRASOUND performed by José Miguel Bosch MD at [place] [address]    HX BREAST LUMPECTOMY Left 4/14/2022    LEFT BREAST LUMPECTOMY WITH ULTRASOUND/LEFT BREAST SENTINEL NODE BIOPSY performed by José Miguel Bosch MD at [place] [address]    HX ORTHOPAEDIC      Bunion Surgery BOTH FEET     HX TUBAL LIGATION      HI BREAST SURGERY PROCEDURE UNLISTED      lumpectomy      Social History     Tobacco Use    Smoking status: Never    Smokeless tobacco: Never   Substance Use Topics    Alcohol use: No      Family History   Problem Relation Age of Onset    Diabetes Father     Stroke Sister     Diabetes Brother     Heart Disease Mother     Hypertension Sister     Diabetes Brother     Diabetes Brother     Diabetes Brother     Cancer Son         LUMP ON NECK     Anesth Problems Neg Hx      Current Outpatient Medications   Medication Sig    indapamide (LOZOL) 1.25 mg tablet TAKE 1 TABLET BY MOUTH DAILY    metFORMIN (GLUCOPHAGE) 850 mg tablet TAKE 1 TABLET BY MOUTH TWICE DAILY WITH MEALS    potassium chloride SA (MICRO-K) 10 mEq capsule TAKE 3 CAPSULES BY MOUTH EVERY DAY    pravastatin (PRAVACHOL) 20 mg tablet TAKE 1 TABLET BY MOUTH EVERY NIGHT    glucose blood VI test strips (blood glucose test) strip Check blood sugar 3 times a day    lisinopriL (PRINIVIL, ZESTRIL) 10 mg tablet TAKE 1 TABLET BY MOUTH EVERY DAY    ferrous sulfate 324 mg (65 mg iron) tablet Take  by mouth Daily (before breakfast). ascorbic acid (VITAMIN C) 500 mg chewable tablet Take  by mouth. anastrozole (ARIMIDEX) 1 mg tablet Take 1 mg by mouth daily. Indications: hormone receptor positive breast cancer    cyanocobalamin 1,000 mcg tablet Take 1,000 mcg by mouth daily. BETA-CAROTENE,A, W-C & E/ZN/CU (VISION-CHARANJIT PRESERVE PO) Take  by mouth two (2) times a day. cholecalciferol (VITAMIN D3) 25 mcg (1,000 unit) cap Take 5,000 Units by mouth daily.  4 tabs daily  Indications: vitamin D deficiency    Blood-Glucose Meter monitoring kit Check blood sugar 3 times a day     No current facility-administered medications for this visit. Allergies   Allergen Reactions    Pcn [Penicillins] Hives        A complete review of systems was obtained, negative except as described above and as reported on ROS sheet scanned into system. Physical Exam:     Visit Vitals  BP (!) 137/58 (BP 1 Location: Left upper arm, BP Patient Position: Sitting)   Pulse (!) 104   Temp 97.6 °F (36.4 °C) (Temporal)   Ht 5' 4\" (1.626 m)   Wt 202 lb 6.4 oz (91.8 kg)   SpO2 97%   BMI 34.74 kg/m²       ECOG PS: 0  General: No distress  Eyes: PERRLA, anicteric sclerae  HENT: Atraumatic, wearing a mask  Neck: Supple  Respiratory: CTAB, normal respiratory effort  CV: Normal rate, regular rhythm  GI: Soft, nontender, nondistended, no masses  MS: Normal gait and station. Digits without clubbing or cyanosis. Skin: No rashes, ecchymoses, or petechiae. Normal temperature, turgor, and texture. Psych: Alert, oriented, appropriate affect, normal judgment/insight  Breast LEFT lumpectomy scar full/ hard chronically. . No masses palpable on RIGHT    Results:     Lab Results   Component Value Date/Time    WBC 5.0 01/17/2022 11:01 AM    HGB 10.7 (L) 01/17/2022 11:01 AM    HCT 34.1 (L) 01/17/2022 11:01 AM    PLATELET 822 00/48/1860 11:01 AM    MCV 89.5 01/17/2022 11:01 AM    ABS.  NEUTROPHILS 2.6 03/30/2017 08:24 AM     Lab Results   Component Value Date/Time    Sodium 141 06/15/2022 10:06 AM    Potassium 4.1 06/15/2022 10:06 AM    Chloride 106 06/15/2022 10:06 AM    CO2 27 06/15/2022 10:06 AM    Glucose 108 (H) 06/15/2022 10:06 AM    BUN 20 06/15/2022 10:06 AM    Creatinine 0.89 06/15/2022 10:06 AM    GFR est AA >60 06/15/2022 10:06 AM    GFR est non-AA >60 06/15/2022 10:06 AM    Calcium 10.6 (H) 06/15/2022 10:06 AM    Glucose (POC) 99 04/14/2022 09:37 AM    Creatinine (POC) 0.9 03/05/2020 08:25 AM     Lab Results   Component Value Date/Time Bilirubin, total 0.6 06/15/2022 10:06 AM    ALT (SGPT) 24 06/15/2022 10:06 AM    Alk. phosphatase 83 06/15/2022 10:06 AM    Protein, total 7.8 06/15/2022 10:06 AM    Albumin 4.0 06/15/2022 10:06 AM    Globulin 3.8 06/15/2022 10:06 AM     MRI Results (most recent):  Results from Hospital Encounter encounter on 03/08/22    MRI BREAST BI W WO CONT    Narrative  INDICATION: Left DCIS, grade 2, ER/LA positive. Prior left lumpectomy in 2009  and 2020. COMPARISON: 3/5/2020. Breast imaging from February 2022. TECHNIQUE:  Multisequence, multiplanar, bilateral breast MRI was performed in prone position  using a dedicated breast coil. Images were obtained without contrast and dynamic  postcontrast images were obtained in multiple phases. 9 mL IV Gadavist was  administered. Subtraction images were reconstructed. Postcontrast images were  reviewed with dedicated kinetic analysis software. FINDINGS:  There is mild background parenchymal enhancement and scattered fibroglandular  tissue. Right breast:  No suspicious enhancing foci. No axillary or internal mammary chain  lymphadenopathy. Left breast:  16 mm curvilinear enhancement extends along the inferior margin of the biopsy  clip at 9:00-10:00 in the anterior third. This may represent DCIS or merely  reactive enhancement due to the recent biopsy. No other suspicious enhancement. There are lumpectomy scars in the middle third at 10:00 and in the posterior  third along the nipple line. No axillary or internal mammary chain  lymphadenopathy. A summary portfolio with key images has been sent from kinetic analysis software  to PACS. Impression  1. Curvilinear enhancement along the left anterior biopsy clip (16 mm). 2. No other suspicious enhancement. 3. No lymphadenopathy. 4. BI-RADS Assessment Category 6: Known biopsy proven malignancy. Records reviewed and summarized above. Pathology report(s) reviewed above.   Radiology report(s) reviewed above.    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 PATHOLOGIC STAGE CLASSIFICATION (pTNM, AJCC 8th Edition)       Primary Tumor (pT): pT1mi       Regional Lymph Nodes Modifier: (sn): [place] node(s) evaluated       Regional Lymph Nodes (pN): pN0    SPECIAL STUDIES       Breast Biomarker Testing Performed on Previous Biopsy: Estrogen         Receptor (ER), Progesterone Receptor (PgR)           Estrogen Receptor (ER) Status: Positive (greater than 10% of             cells demonstrate nuclear positivity)           Progesterone Receptor (PgR) Status: Positive      New recurrent LEFT breast DCIS with microinvasion. On adjuvant anastrozole. Tolerating fine and will continue. Clinically pt feels well today. Mammo due 2/23. Will see breast surgery then. Labs and routine HM with PCP. Will fu with us in 3 months. 2) hx of stage 0 LEFT breast DCIS ER+ with hx of lumpectomy 3/20. Hx of ? LEFT Breast DCIS 2009 at [facility] with hx of lumpectomy/ radiation and no tamoxifen by choice. was not interested in taking a pill for prevention now. Cannot have radiation as had this in past. Had [address] and low risk. Has genetic testing and reviewed VUS today. 2) DM/HTN/high cholesterol/ goiter. Per PCP. For dexa. 3) psychosocial.  Mood good. Has family support. Coping well. F/u here in 3 months, surgery in 6 months  Call if questions. I appreciate the opportunity to participate in Ms. Cheyenne oRss's care.     Signed By: Faith Yeboah DO

## 2022-08-17 NOTE — PROGRESS NOTES
Geeta Garcia is a 69 y.o. female  Chief Complaint   Patient presents with    Follow-up     LEFT breast DCIS   1. Have you been to the ER, urgent care clinic since your last visit? Hospitalized since your last visit? No    2. Have you seen or consulted any other health care providers outside of the [address] [place] [address] since your last visit? Include any pap smears or colon screening.  No

## 2022-09-21 ENCOUNTER — HOSPITAL ENCOUNTER (OUTPATIENT)
Dept: MAMMOGRAPHY | Age: 67
Discharge: HOME | End: 2022-09-21
Attending: INTERNAL MEDICINE
Payer: MEDICARE

## 2022-09-21 PROCEDURE — 77080 DXA BONE DENSITY AXIAL: CPT

## 2022-09-23 NOTE — PROGRESS NOTES
Results reviewed. Release via [address] [facility], [place]! Bone density scan came back fine.

## 2022-10-17 DIAGNOSIS — I10 ESSENTIAL HYPERTENSION: ICD-10-CM

## 2022-10-17 RX ORDER — POTASSIUM CHLORIDE 750 MG/1
CAPSULE, EXTENDED RELEASE ORAL
Qty: 270 CAPSULE | Refills: 0 | Status: SHIPPED | OUTPATIENT
Start: 2022-10-17

## 2022-10-23 DIAGNOSIS — E11.9 TYPE II DIABETES MELLITUS, WELL CONTROLLED (HCC): ICD-10-CM

## 2022-10-23 RX ORDER — METFORMIN HYDROCHLORIDE 850 MG/1
TABLET ORAL
Qty: 180 TABLET | Refills: 0 | Status: SHIPPED | OUTPATIENT
Start: 2022-10-23

## 2022-10-28 RX ORDER — ANASTROZOLE 1 MG/1
TABLET ORAL
Qty: 30 TABLET | Refills: 5 | Status: SHIPPED | OUTPATIENT
Start: 2022-10-28

## 2022-11-07 DIAGNOSIS — I10 ESSENTIAL HYPERTENSION: ICD-10-CM

## 2022-11-07 RX ORDER — INDAPAMIDE 1.25 MG/1
1.25 TABLET, FILM COATED ORAL DAILY
Qty: 90 TABLET | Refills: 0 | Status: SHIPPED | OUTPATIENT
Start: 2022-11-07

## 2022-11-20 NOTE — PROGRESS NOTES
[facility] [place] [facility] [place] [address] [address], [place]: [phone]  F: [phone]    Reason for Visit:   Baltazar Chery is a 69 y.o. female who is seen for fu LEFT breast DCIS. Treatment History:     LEFT lumpectomy 4/22. LEFT Lumpectomy 3/20  LEFT lumpectomy/ radiation 2009. No tamoxifen by choice. STAGE:   PATHOLOGIC STAGE CLASSIFICATION (pTNM, AJCC 8th Edition)       Primary Tumor (pT): pT1mi       Regional Lymph Nodes Modifier: (sn): [place] node(s) evaluated       Regional Lymph Nodes (pN): pN0    SPECIAL STUDIES       Breast Biomarker Testing Performed on Previous Biopsy: Estrogen         Receptor (ER), Progesterone Receptor (PgR)           Estrogen Receptor (ER) Status: Positive (greater than 10% of             cells demonstrate nuclear positivity)           Progesterone Receptor (PgR) Status: Positive         History of Present Illness:     Pt seen today for office fu for LEFT breast cancer on adjuvant hormonal therapy with AI. Tolerating AI fine. Has gout. No new issues.    No fevers/ chills/ chest pain/ SOB/ nausea/ vomiting/diarrhea/ pain/fatigue      Past Medical History:   Diagnosis Date    Adverse effect of anesthesia     SLOW TO AWAKEN     Arthritis     Breast cancer ([address]     lumpectomy with radiation, 2009, left    Breast cancer, left ([address] 2020    DCIS     Diabetes ([address]     Hyperlipidemia     Hypertension     Radiation therapy complication     8839    Thyroid disease     GOARDER ON THROAT       Past Surgical History:   Procedure Laterality Date    HX BREAST BIOPSY      HX BREAST LUMPECTOMY Left     2009    HX BREAST LUMPECTOMY Left 3/12/2020    LEFT BREAST LUMPECTOMY WITH ULTRASOUND performed by James Payne MD at [place] [address]    HX BREAST LUMPECTOMY Left 4/14/2022    LEFT BREAST LUMPECTOMY WITH ULTRASOUND/LEFT BREAST SENTINEL NODE BIOPSY performed by James Payne MD at [place] [address]    HX ORTHOPAEDIC      Bunion Surgery BOTH FEET     HX TUBAL LIGATION      MO BREAST SURGERY PROCEDURE UNLISTED      lumpectomy      Social History     Tobacco Use    Smoking status: Never    Smokeless tobacco: Never   Substance Use Topics    Alcohol use: No      Family History   Problem Relation Age of Onset    Diabetes Father     Stroke Sister     Diabetes Brother     Heart Disease Mother     Hypertension Sister     Diabetes Brother     Diabetes Brother     Diabetes Brother     Cancer Son         LUMP ON NECK     Anesth Problems Neg Hx      Current Outpatient Medications   Medication Sig    indapamide (LOZOL) 1.25 mg tablet TAKE 1 TABLET BY MOUTH DAILY    anastrozole (ARIMIDEX) 1 mg tablet TAKE 1 TABLET BY MOUTH DAILY    metFORMIN (GLUCOPHAGE) 850 mg tablet TAKE 1 TABLET BY MOUTH TWICE DAILY WITH MEALS    potassium chloride SA (MICRO-K) 10 mEq capsule TAKE 3 CAPSULES BY MOUTH EVERY DAY    pravastatin (PRAVACHOL) 20 mg tablet TAKE 1 TABLET BY MOUTH EVERY NIGHT    Blood-Glucose Meter monitoring kit Check blood sugar 3 times a day    glucose blood VI test strips (blood glucose test) strip Check blood sugar 3 times a day    lisinopriL (PRINIVIL, ZESTRIL) 10 mg tablet TAKE 1 TABLET BY MOUTH EVERY DAY    ferrous sulfate 324 mg (65 mg iron) tablet Take  by mouth Daily (before breakfast). ascorbic acid (VITAMIN C) 500 mg chewable tablet Take  by mouth.    cyanocobalamin 1,000 mcg tablet Take 1,000 mcg by mouth daily. BETA-CAROTENE,A, W-C & E/ZN/CU (VISION-CHARANJIT PRESERVE PO) Take  by mouth two (2) times a day. cholecalciferol (VITAMIN D3) 25 mcg (1,000 unit) cap Take 5,000 Units by mouth daily. 4 tabs daily  Indications: vitamin D deficiency     No current facility-administered medications for this visit. Allergies   Allergen Reactions    Pcn [Penicillins] Hives        A complete review of systems was obtained, negative except as described above and as reported on ROS sheet scanned into system.        Physical Exam: Visit Vitals  /67 (BP 1 Location: Right arm, BP Patient Position: Sitting)   Pulse (!) 108   Temp (!) 96 °F (35.6 °C) (Temporal)   Ht 5' 4\" (1.626 m)   Wt 199 lb 4.8 oz (90.4 kg)   SpO2 97%   BMI 34.21 kg/m²         ECOG PS: 0  General: No distress  Eyes: PERRLA, anicteric sclerae  HENT: Atraumatic, wearing a mask  Neck: Supple  Respiratory: CTAB, normal respiratory effort  CV: Normal rate, regular rhythm  GI: Soft, nontender, nondistended, no masses  MS: Normal gait and station. Digits without clubbing or cyanosis. Skin: No rashes, ecchymoses, or petechiae. Normal temperature, turgor, and texture. Psych: Alert, oriented, appropriate affect, normal judgment/insight  Breast LEFT lumpectomy scar full/ hard chronically. . No masses palpable on RIGHT    Results:     Lab Results   Component Value Date/Time    WBC 5.0 01/17/2022 11:01 AM    HGB 10.7 (L) 01/17/2022 11:01 AM    HCT 34.1 (L) 01/17/2022 11:01 AM    PLATELET 803 06/40/0927 11:01 AM    MCV 89.5 01/17/2022 11:01 AM    ABS. NEUTROPHILS 2.6 03/30/2017 08:24 AM     Lab Results   Component Value Date/Time    Sodium 141 06/15/2022 10:06 AM    Potassium 4.1 06/15/2022 10:06 AM    Chloride 106 06/15/2022 10:06 AM    CO2 27 06/15/2022 10:06 AM    Glucose 108 (H) 06/15/2022 10:06 AM    BUN 20 06/15/2022 10:06 AM    Creatinine 0.89 06/15/2022 10:06 AM    GFR est AA >60 06/15/2022 10:06 AM    GFR est non-AA >60 06/15/2022 10:06 AM    Calcium 10.6 (H) 06/15/2022 10:06 AM    Glucose (POC) 99 04/14/2022 09:37 AM    Creatinine (POC) 0.9 03/05/2020 08:25 AM     Lab Results   Component Value Date/Time    Bilirubin, total 0.6 06/15/2022 10:06 AM    ALT (SGPT) 24 06/15/2022 10:06 AM    Alk.  phosphatase 83 06/15/2022 10:06 AM    Protein, total 7.8 06/15/2022 10:06 AM    Albumin 4.0 06/15/2022 10:06 AM    Globulin 3.8 06/15/2022 10:06 AM     MRI Results (most recent):  Results from Hospital Encounter encounter on 03/08/22    MRI BREAST BI W WO CONT    Narrative  INDICATION: Left DCIS, grade 2, ER/LA positive. Prior left lumpectomy in 2009  and 2020. COMPARISON: 3/5/2020. Breast imaging from February 2022. TECHNIQUE:  Multisequence, multiplanar, bilateral breast MRI was performed in prone position  using a dedicated breast coil. Images were obtained without contrast and dynamic  postcontrast images were obtained in multiple phases. 9 mL IV Gadavist was  administered. Subtraction images were reconstructed. Postcontrast images were  reviewed with dedicated kinetic analysis software. FINDINGS:  There is mild background parenchymal enhancement and scattered fibroglandular  tissue. Right breast:  No suspicious enhancing foci. No axillary or internal mammary chain  lymphadenopathy. Left breast:  16 mm curvilinear enhancement extends along the inferior margin of the biopsy  clip at 9:00-10:00 in the anterior third. This may represent DCIS or merely  reactive enhancement due to the recent biopsy. No other suspicious enhancement. There are lumpectomy scars in the middle third at 10:00 and in the posterior  third along the nipple line. No axillary or internal mammary chain  lymphadenopathy. A summary portfolio with key images has been sent from kinetic analysis software  to PACS. Impression  1. Curvilinear enhancement along the left anterior biopsy clip (16 mm). 2. No other suspicious enhancement. 3. No lymphadenopathy. 4. BI-RADS Assessment Category 6: Known biopsy proven malignancy. Records reviewed and summarized above. Pathology report(s) reviewed above. Radiology report(s) reviewed above. 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    PATHOLOGIC STAGE CLASSIFICATION (pTNM, AJCC 8th Edition)       Primary Tumor (pT): pT1mi       Regional Lymph Nodes Modifier: (sn): [place] node(s) evaluated       Regional Lymph Nodes (pN): pN0    SPECIAL STUDIES       Breast Biomarker Testing Performed on Previous Biopsy: Estrogen         Receptor (ER), Progesterone Receptor (PgR)           Estrogen Receptor (ER) Status: Positive (greater than 10% of             cells demonstrate nuclear positivity)           Progesterone Receptor (PgR) Status: Positive      Seen today for 3 mo fu. On adjuvant anastrozole. Tolerating fine and will continue. Clinically pt feels well today. Mammo due 2/23. Ordered. Will see breast surgery then. Labs and routine HM with PCP. Will fu with us in 6 months. 2) hx of stage 0 LEFT breast DCIS ER+ with hx of lumpectomy 3/20. Hx of ? LEFT Breast DCIS 2009 at [facility] with hx of lumpectomy/ radiation and no tamoxifen by choice. was not interested in taking a pill for prevention now. Cannot have radiation as had this in past. Had [id number] and low risk. Has genetic testing and reviewed VUS today. 2) DM/HTN/high cholesterol/ goiter. Per PCP. 3) psychosocial.  Mood good. Has family support. Coping well. F/u here in 6 months, surgery in 3 months  Call if questions. I appreciate the opportunity to participate in Ms. Cheyenne Ross's care.     Signed By: Andres Resendiz DO

## 2022-11-21 ENCOUNTER — OFFICE VISIT (OUTPATIENT)
Dept: ONCOLOGY | Age: 67
End: 2022-11-21
Payer: MEDICARE

## 2022-11-21 VITALS
DIASTOLIC BLOOD PRESSURE: 67 MMHG | BODY MASS INDEX: 34.02 KG/M2 | WEIGHT: 199.3 LBS | OXYGEN SATURATION: 97 % | HEIGHT: 64 IN | TEMPERATURE: 96 F | SYSTOLIC BLOOD PRESSURE: 133 MMHG | HEART RATE: 108 BPM

## 2022-11-21 DIAGNOSIS — C50.912 MALIGNANT NEOPLASM OF LEFT BREAST IN FEMALE, ESTROGEN RECEPTOR POSITIVE, UNSPECIFIED SITE OF BREAST (HCC): Primary | ICD-10-CM

## 2022-11-21 DIAGNOSIS — Z17.0 MALIGNANT NEOPLASM OF LEFT BREAST IN FEMALE, ESTROGEN RECEPTOR POSITIVE, UNSPECIFIED SITE OF BREAST (HCC): Primary | ICD-10-CM

## 2022-11-21 PROCEDURE — G0463 HOSPITAL OUTPT CLINIC VISIT: HCPCS | Performed by: INTERNAL MEDICINE

## 2022-11-21 NOTE — PROGRESS NOTES
Yessica Lala is a 69 y.o. female  Chief Complaint   Patient presents with    Follow-up      LEFT breast DCIS     1. Have you been to the ER, urgent care clinic since your last visit? Hospitalized since your last visit? No    2. Have you seen or consulted any other health care providers outside of the [address] [place] [address] since your last visit? Include any pap smears or colon screening.  No

## 2022-11-28 ENCOUNTER — TELEPHONE (OUTPATIENT)
Dept: PRIMARY CARE CLINIC | Age: 67
End: 2022-11-28

## 2022-11-28 DIAGNOSIS — C50.912 MALIGNANT NEOPLASM OF LEFT BREAST IN FEMALE, ESTROGEN RECEPTOR POSITIVE, UNSPECIFIED SITE OF BREAST (HCC): Primary | ICD-10-CM

## 2022-11-28 DIAGNOSIS — M1A.0720 IDIOPATHIC CHRONIC GOUT OF LEFT FOOT WITHOUT TOPHUS: ICD-10-CM

## 2022-11-28 DIAGNOSIS — E11.9 TYPE II DIABETES MELLITUS, WELL CONTROLLED (HCC): Primary | ICD-10-CM

## 2022-11-28 DIAGNOSIS — Z17.0 MALIGNANT NEOPLASM OF LEFT BREAST IN FEMALE, ESTROGEN RECEPTOR POSITIVE, UNSPECIFIED SITE OF BREAST (HCC): Primary | ICD-10-CM

## 2022-11-28 DIAGNOSIS — E04.2 MULTINODULAR GOITER: ICD-10-CM

## 2022-11-28 DIAGNOSIS — D05.12 DUCTAL CARCINOMA IN SITU (DCIS) OF LEFT BREAST: ICD-10-CM

## 2022-11-30 DIAGNOSIS — E04.2 MULTINODULAR GOITER: ICD-10-CM

## 2022-11-30 DIAGNOSIS — M1A.0720 IDIOPATHIC CHRONIC GOUT OF LEFT FOOT WITHOUT TOPHUS: ICD-10-CM

## 2022-11-30 DIAGNOSIS — E11.9 TYPE II DIABETES MELLITUS, WELL CONTROLLED (HCC): ICD-10-CM

## 2022-12-01 LAB
ALBUMIN SERPL-MCNC: 3.7 G/DL (ref 3.5–5)
ALBUMIN/GLOB SERPL: 1.1 {RATIO} (ref 1.1–2.2)
ALP SERPL-CCNC: 66 U/L (ref 45–117)
ALT SERPL-CCNC: 28 U/L (ref 12–78)
ANION GAP SERPL CALC-SCNC: 5 MMOL/L (ref 5–15)
AST SERPL-CCNC: 11 U/L (ref 15–37)
BILIRUB SERPL-MCNC: 0.4 MG/DL (ref 0.2–1)
BUN SERPL-MCNC: 16 MG/DL (ref 6–20)
BUN/CREAT SERPL: 18 (ref 12–20)
CALCIUM SERPL-MCNC: 9.9 MG/DL (ref 8.5–10.1)
CHLORIDE SERPL-SCNC: 109 MMOL/L (ref 97–108)
CHOLEST SERPL-MCNC: 167 MG/DL
CO2 SERPL-SCNC: 28 MMOL/L (ref 21–32)
CREAT SERPL-MCNC: 0.91 MG/DL (ref 0.55–1.02)
ERYTHROCYTE [DISTWIDTH] IN BLOOD BY AUTOMATED COUNT: 13.7 % (ref 11.5–14.5)
EST. AVERAGE GLUCOSE BLD GHB EST-MCNC: 140 MG/DL
GLOBULIN SER CALC-MCNC: 3.5 G/DL (ref 2–4)
GLUCOSE SERPL-MCNC: 93 MG/DL (ref 65–100)
HBA1C MFR BLD: 6.5 % (ref 4–5.6)
HCT VFR BLD AUTO: 31.9 % (ref 35–47)
HDLC SERPL-MCNC: 69 MG/DL
HDLC SERPL: 2.4 {RATIO} (ref 0–5)
HGB BLD-MCNC: 10 G/DL (ref 11.5–16)
LDLC SERPL CALC-MCNC: 78.6 MG/DL (ref 0–100)
MCH RBC QN AUTO: 28.7 PG (ref 26–34)
MCHC RBC AUTO-ENTMCNC: 31.3 G/DL (ref 30–36.5)
MCV RBC AUTO: 91.4 FL (ref 80–99)
NRBC # BLD: 0 K/UL (ref 0–0.01)
NRBC BLD-RTO: 0 PER 100 WBC
PLATELET # BLD AUTO: 299 K/UL (ref 150–400)
PMV BLD AUTO: 11.7 FL (ref 8.9–12.9)
POTASSIUM SERPL-SCNC: 4.3 MMOL/L (ref 3.5–5.1)
PROT SERPL-MCNC: 7.2 G/DL (ref 6.4–8.2)
RBC # BLD AUTO: 3.49 M/UL (ref 3.8–5.2)
SODIUM SERPL-SCNC: 142 MMOL/L (ref 136–145)
TRIGL SERPL-MCNC: 97 MG/DL (ref ?–150)
TSH SERPL DL<=0.05 MIU/L-ACNC: 1.44 UIU/ML (ref 0.36–3.74)
URATE SERPL-MCNC: 7.9 MG/DL (ref 2.6–6)
VLDLC SERPL CALC-MCNC: 19.4 MG/DL
WBC # BLD AUTO: 5.3 K/UL (ref 3.6–11)

## 2022-12-04 NOTE — PROGRESS NOTES
Results reviewed. Release via [address] [place] [address] will be discuss during an appointment.

## 2022-12-05 ENCOUNTER — TRANSCRIBE ORDER (OUTPATIENT)
Dept: SCHEDULING | Age: 67
End: 2022-12-05

## 2022-12-05 ENCOUNTER — OFFICE VISIT (OUTPATIENT)
Dept: PRIMARY CARE CLINIC | Age: 67
End: 2022-12-05
Payer: MEDICARE

## 2022-12-05 VITALS
HEIGHT: 64 IN | BODY MASS INDEX: 35.03 KG/M2 | TEMPERATURE: 97.1 F | RESPIRATION RATE: 18 BRPM | DIASTOLIC BLOOD PRESSURE: 65 MMHG | SYSTOLIC BLOOD PRESSURE: 132 MMHG | WEIGHT: 205.2 LBS | OXYGEN SATURATION: 100 % | HEART RATE: 68 BPM

## 2022-12-05 DIAGNOSIS — M1A.0710 IDIOPATHIC CHRONIC GOUT OF RIGHT FOOT WITHOUT TOPHUS: ICD-10-CM

## 2022-12-05 DIAGNOSIS — I10 PRIMARY HYPERTENSION: Primary | ICD-10-CM

## 2022-12-05 DIAGNOSIS — E11.9 WELL CONTROLLED DIABETES MELLITUS (HCC): ICD-10-CM

## 2022-12-05 DIAGNOSIS — E78.2 MIXED HYPERLIPIDEMIA: ICD-10-CM

## 2022-12-05 DIAGNOSIS — R92.8 ABNORMAL MAMMOGRAM: Primary | ICD-10-CM

## 2022-12-05 PROCEDURE — 3017F COLORECTAL CA SCREEN DOC REV: CPT | Performed by: INTERNAL MEDICINE

## 2022-12-05 PROCEDURE — 3078F DIAST BP <80 MM HG: CPT | Performed by: INTERNAL MEDICINE

## 2022-12-05 PROCEDURE — 2022F DILAT RTA XM EVC RTNOPTHY: CPT | Performed by: INTERNAL MEDICINE

## 2022-12-05 PROCEDURE — 1101F PT FALLS ASSESS-DOCD LE1/YR: CPT | Performed by: INTERNAL MEDICINE

## 2022-12-05 PROCEDURE — G8417 CALC BMI ABV UP PARAM F/U: HCPCS | Performed by: INTERNAL MEDICINE

## 2022-12-05 PROCEDURE — 1123F ACP DISCUSS/DSCN MKR DOCD: CPT | Performed by: INTERNAL MEDICINE

## 2022-12-05 PROCEDURE — 1090F PRES/ABSN URINE INCON ASSESS: CPT | Performed by: INTERNAL MEDICINE

## 2022-12-05 PROCEDURE — G8752 SYS BP LESS 140: HCPCS | Performed by: INTERNAL MEDICINE

## 2022-12-05 PROCEDURE — 3044F HG A1C LEVEL LT 7.0%: CPT | Performed by: INTERNAL MEDICINE

## 2022-12-05 PROCEDURE — G9899 SCRN MAM PERF RSLTS DOC: HCPCS | Performed by: INTERNAL MEDICINE

## 2022-12-05 PROCEDURE — G8432 DEP SCR NOT DOC, RNG: HCPCS | Performed by: INTERNAL MEDICINE

## 2022-12-05 PROCEDURE — 99214 OFFICE O/P EST MOD 30 MIN: CPT | Performed by: INTERNAL MEDICINE

## 2022-12-05 PROCEDURE — G8754 DIAS BP LESS 90: HCPCS | Performed by: INTERNAL MEDICINE

## 2022-12-05 PROCEDURE — G8536 NO DOC ELDER MAL SCRN: HCPCS | Performed by: INTERNAL MEDICINE

## 2022-12-05 PROCEDURE — 3074F SYST BP LT 130 MM HG: CPT | Performed by: INTERNAL MEDICINE

## 2022-12-05 PROCEDURE — G8399 PT W/DXA RESULTS DOCUMENT: HCPCS | Performed by: INTERNAL MEDICINE

## 2022-12-05 PROCEDURE — G8427 DOCREV CUR MEDS BY ELIG CLIN: HCPCS | Performed by: INTERNAL MEDICINE

## 2022-12-05 RX ORDER — ALLOPURINOL 100 MG/1
100 TABLET ORAL DAILY
Qty: 90 TABLET | Refills: 0 | Status: SHIPPED | OUTPATIENT
Start: 2022-12-05 | End: 2023-03-05

## 2022-12-05 NOTE — PROGRESS NOTES
1. \"Have you been to the ER, urgent care clinic since your last visit? Hospitalized since your last visit? \" No    2. \"Have you seen or consulted any other health care providers outside of the [address] [place] [address] since your last visit? \" Yes When: [facility]       3. For patients aged 39-70: Has the patient had a colonoscopy / FIT/ Cologuard? Yes - no Care Gap present      If the patient is female:    4. For patients aged 41-77: Has the patient had a mammogram within the past 2 years? Yes - no Care Gap present      5. For patients aged 21-65: Has the patient had a pap smear? NA - based on age or sex     Chief Complaint   Patient presents with    Diabetes     Follow up    Gout     Had a flair up.

## 2022-12-05 NOTE — PROGRESS NOTES
Miguel Alvarado (: 1955) is a 69 y.o. female, established patient, here for evaluation of the following chief complaint(s):  Diabetes (Follow up) and Gout (Had a flair up. )       ASSESSMENT/PLAN:  Below is the assessment and plan developed based on review of pertinent history, physical exam, labs, studies, and medications. 1. Primary hypertension  Continue on same dose Lozol and lisinopril. Recommended monitoring blood pressure readings at home. 2. Well controlled diabetes mellitus ([address]  Hemoglobin A1c has improved on 850 mg metformin dose. I did recommend her walking daily to improve her numbers. 3. Idiopathic chronic gout of right foot without tophus  -     allopurinoL (ZYLOPRIM) 100 mg tablet; Take 1 Tablet by mouth daily for 90 days. , Normal, Disp-90 Tablet, R-0. Potential side effects discussed with her. 4. Mixed hyperlipidemia  Lipid panel in normal range we will continue same dose of Pravachol. However if she loses 20 pounds weight then we can cut down on her dose. SUBJECTIVE/OBJECTIVE:  Patient seen today for follow-up. She is here to review her labs and also to discuss medication for her gout. She had a gout flareup about 2 weeks ago and pain was so severe that she was not able to walk. She has cut down on her red meat and cheese. She would like to discuss medication option. Her hemoglobin A1c has improved to 6.5% but uric acid levels have gone up to 7.6. Her lipid panel came back well within the normal range. She has been overall doing well and watching her diet but she did admit that she is not doing much physical activity.       Visit Vitals  /65 (BP 1 Location: Right upper arm, BP Patient Position: Sitting) Comment: Manual   Pulse 68   Temp 97.1 °F (36.2 °C) (Temporal)   Resp 18   Ht 5' 4\" (1.626 m)   Wt 205 lb 3.2 oz (93.1 kg)   SpO2 100%   BMI 35.22 kg/m²     Patient Active Problem List   Diagnosis Code    HTN (hypertension) I10    History of left breast cancer Z85.3    Multinodular goiter E04.2    Obesity (BMI 30-39. 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 M1A.0720    Type II diabetes mellitus, well controlled (HCC) E11.9    Ductal carcinoma in situ (DCIS) of left breast D05.12    Malignant neoplasm of unspecified site of unspecified female breast C50.919        Current Outpatient Medications on File Prior to Visit   Medication Sig Dispense Refill    indapamide (LOZOL) 1.25 mg tablet TAKE 1 TABLET BY MOUTH DAILY 90 Tablet 0    anastrozole (ARIMIDEX) 1 mg tablet TAKE 1 TABLET BY MOUTH DAILY 30 Tablet 5    metFORMIN (GLUCOPHAGE) 850 mg tablet TAKE 1 TABLET BY MOUTH TWICE DAILY WITH MEALS 180 Tablet 0    potassium chloride SA (MICRO-K) 10 mEq capsule TAKE 3 CAPSULES BY MOUTH EVERY  Capsule 0    pravastatin (PRAVACHOL) 20 mg tablet TAKE 1 TABLET BY MOUTH EVERY NIGHT 90 Tablet 0    Blood-Glucose Meter monitoring kit Check blood sugar 3 times a day 1 Kit 0    glucose blood VI test strips (blood glucose test) strip Check blood sugar 3 times a day 100 Strip 2    lisinopriL (PRINIVIL, ZESTRIL) 10 mg tablet TAKE 1 TABLET BY MOUTH EVERY DAY 90 Tablet 1    ferrous sulfate 324 mg (65 mg iron) tablet Take  by mouth Daily (before breakfast). ascorbic acid (VITAMIN C) 500 mg chewable tablet Take  by mouth.      cyanocobalamin 1,000 mcg tablet Take 1,000 mcg by mouth daily. BETA-CAROTENE,A, W-C & E/ZN/CU (VISION-CHARANJIT PRESERVE PO) Take  by mouth two (2) times a day. cholecalciferol (VITAMIN D3) 25 mcg (1,000 unit) cap Take 5,000 Units by mouth daily. 4 tabs daily  Indications: vitamin D deficiency       No current facility-administered medications on file prior to visit.        Allergies   Allergen Reactions    Pcn [Penicillins] Hives       Past Medical History:   Diagnosis Date    Adverse effect of anesthesia     SLOW TO AWAKEN     Arthritis     [facility] [address] [address]     lumpectomy with radiation, 2009, left    Breast cancer, left ([facility] [address] 2020    DCIS     Diabetes ([facility] [address]     Hyperlipidemia     Hypertension     Radiation therapy complication     2367    Thyroid disease     GOARDER ON THROAT        Past Surgical History:   Procedure Laterality Date    HX BREAST BIOPSY      HX BREAST LUMPECTOMY Left     2009    HX BREAST LUMPECTOMY Left 3/12/2020    LEFT BREAST LUMPECTOMY WITH ULTRASOUND performed by James Payne MD at [place] [address]    HX BREAST LUMPECTOMY Left 4/14/2022    LEFT BREAST LUMPECTOMY WITH ULTRASOUND/LEFT BREAST SENTINEL NODE BIOPSY performed by James Payne MD at [facility] AMBULATORY OR    HX ORTHOPAEDIC      Bunion Surgery BOTH FEET     HX TUBAL LIGATION      MD BREAST SURGERY PROCEDURE UNLISTED      lumpectomy       Family History   Problem Relation Age of Onset    Diabetes Father     Stroke Sister     Diabetes Brother     Heart Disease Mother     Hypertension Sister     Diabetes Brother     Diabetes Brother     Diabetes Brother     Cancer Son         LUMP ON NECK     Anesth Problems Neg Hx        Social History     Socioeconomic History    Marital status:      Spouse name: Not on file    Number of children: Not on file    Years of education: Not on file    Highest education level: Not on file   Occupational History    Not on file   Tobacco Use    Smoking status: Never    Smokeless tobacco: Never   Vaping Use    Vaping Use: Never used   Substance and Sexual Activity    Alcohol use: No    Drug use: Never    Sexual activity: Never   Other Topics Concern    Not on file   Social History Narrative    Not on file     Social Determinants of Health     Financial Resource Strain: Low Risk     Difficulty of Paying Living Expenses: Not very hard   Food Insecurity: No Food Insecurity    Worried About Running Out of Food in the Last Year: Never true    Ran Out of Food in the Last Year: Never true   Transportation Needs: Not on file   Physical Activity: Not on file   Stress: Not on file   Social Connections: Not on file   Intimate Partner Violence: Not on file   Housing Stability: Not on file       Orders Only on 11/30/2022   Component Date Value Ref Range Status    TSH 11/30/2022 1.44  0.36 - 3.74 uIU/mL Final    Comment:   Due to TSH heterogeneity, both structurally and degree of glycosylation, monoclonal antibodies used in the TSH assay may not accurately quantitate TSH. Therefore, this result should be correlated with clinical findings as well as with other assessments of thyroid function, e.g., free T4, free T3. Cholesterol, total 11/30/2022 167  <200 MG/DL Final    Triglyceride 11/30/2022 97  <150 MG/DL Final    Based on NCEP-ATP III:  Triglycerides <150 mg/dL  is considered normal, 150-199 mg/dL  borderline high,  200-499 mg/dL high and  greater than or equal to 500 mg/dL very high. HDL Cholesterol 11/30/2022 69  MG/DL Final    Based on NCEP ATP III, HDL Cholesterol <40 mg/dL is considered low and >60 mg/dL is elevated.     LDL, calculated 11/30/2022 78.6  0 - 100 MG/DL Final    Comment: Based on the NCEP-ATP: LDL-C concentrations are considered  optimal <100 mg/dL,  near optimal/above Normal 100-129 mg/dL  Borderline High: 130-159, High: 160-189 mg/dL  Very High: Greater than or equal to 190 mg/dL      VLDL, calculated 11/30/2022 19.4  MG/DL Final    CHOL/HDL Ratio 11/30/2022 2.4  0.0 - 5.0   Final    WBC 11/30/2022 5.3  3.6 - 11.0 K/uL Final    RBC 11/30/2022 3.49 (A)  3.80 - 5.20 M/uL Final    HGB 11/30/2022 10.0 (A)  11.5 - 16.0 g/dL Final    HCT 11/30/2022 31.9 (A)  35.0 - 47.0 % Final    MCV 11/30/2022 91.4  80.0 - 99.0 FL Final    MCH 11/30/2022 28.7  26.0 - 34.0 PG Final    MCHC 11/30/2022 31.3  30.0 - 36.5 g/dL Final    RDW 11/30/2022 13.7  11.5 - 14.5 % Final    PLATELET 06/91/8078 436  150 - 400 K/uL Final    MPV 11/30/2022 11.7  8.9 - 12.9 FL Final    NRBC 11/30/2022 0.0  0  WBC Final    ABSOLUTE NRBC 11/30/2022 0.00  0.00 - 0.01 K/uL Final    Sodium 11/30/2022 142  136 - 145 mmol/L Final    Potassium 11/30/2022 4.3  3.5 - 5.1 mmol/L Final    Chloride 11/30/2022 109 (A)  97 - 108 mmol/L Final    CO2 11/30/2022 28  21 - 32 mmol/L Final    Anion gap 11/30/2022 5  5 - 15 mmol/L Final    Glucose 11/30/2022 93  65 - 100 mg/dL Final    BUN 11/30/2022 16  6 - 20 MG/DL Final    Creatinine 11/30/2022 0.91  0.55 - 1.02 MG/DL Final    BUN/Creatinine ratio 11/30/2022 18  12 - 20   Final    eGFR 11/30/2022 >60  >60 ml/min/1.73m2 Final    Comment:   Pediatric calculator link: [employer]at. org/professionals/kdoqi/gfr_calculatorped    Effective Oct 3, 2022    These results are not intended for use in patients <25years of age. eGFR results are calculated without a race factor using  the 2021 CKD-EPI equation. Careful clinical correlation is recommended, particularly when comparing to results calculated using previous equations. The CKD-EPI equation is less accurate in patients with extremes of muscle mass, extra-renal metabolism of creatinine, excessive creatine ingestion, or following therapy that affects renal tubular secretion. Calcium 11/30/2022 9.9  8.5 - 10.1 MG/DL Final    Bilirubin, total 11/30/2022 0.4  0.2 - 1.0 MG/DL Final    ALT (SGPT) 11/30/2022 28  12 - 78 U/L Final    AST (SGOT) 11/30/2022 11 (A)  15 - 37 U/L Final    Alk.  phosphatase 11/30/2022 66  45 - 117 U/L Final    Protein, total 11/30/2022 7.2  6.4 - 8.2 g/dL Final    Albumin 11/30/2022 3.7  3.5 - 5.0 g/dL Final    Globulin 11/30/2022 3.5  2.0 - 4.0 g/dL Final    A-G Ratio 11/30/2022 1.1  1.1 - 2.2   Final    Hemoglobin A1c 11/30/2022 6.5 (A)  4.0 - 5.6 % Final    Comment: NEW METHOD  PLEASE NOTE NEW REFERENCE RANGE  (NOTE)  HbA1C Interpretive Ranges  <5.7              Normal  5.7 - 6.4         Consider Prediabetes  >6.5              Consider Diabetes      Est. average glucose 11/30/2022 140  mg/dL Final    Uric acid 11/30/2022 7.9 (A)  2.6 - 6.0 MG/DL Final        Review of Systems   Constitutional:  Negative for activity change, fatigue and unexpected weight change. HENT:  Negative for congestion, hearing loss, rhinorrhea and sore throat. Eyes:  Negative for discharge. Respiratory:  Negative for cough, chest tightness and shortness of breath. Cardiovascular:  Negative for leg swelling. Gastrointestinal:  Negative for abdominal pain, constipation and diarrhea. Genitourinary:  Negative for dysuria, flank pain, frequency and urgency. Musculoskeletal:  Negative for arthralgias, back pain and myalgias. Skin:  Negative for color change and rash. Neurological:  Negative for dizziness, light-headedness and headaches. Psychiatric/Behavioral:  Negative for dysphoric mood and sleep disturbance. The patient is not nervous/anxious. Physical Exam    Vitals and nursing note reviewed. Constitutional:       Appearance: Normal appearance. obese. Eyes:      Extraocular Movements: Extraocular movements intact. Pupils: Pupils are equal, round, and reactive to light. Cardiovascular:      Rate and Rhythm: Normal rate and regular rhythm. Pulmonary:      Effort: Pulmonary effort is normal.      Breath sounds: Normal breath sounds. Abdominal:      General: Bowel sounds are normal. There is no distension. Palpations: Abdomen is soft. Musculoskeletal:         General: No swelling. Normal range of motion. Cervical back: Normal range of motion and neck supple. Right lower leg: No edema. Left lower leg: No edema. Neurological:      General: No focal deficit present. Mental Status:  Alert and oriented to person, place, and time. Motor: No weakness. Psychiatric:         Mood and Affect: Mood normal.         Behavior: Behavior normal.           An electronic signature was used to authenticate this note.   -- Nika Dyer MD

## 2022-12-08 DIAGNOSIS — I10 ESSENTIAL HYPERTENSION: ICD-10-CM

## 2022-12-08 RX ORDER — LISINOPRIL 10 MG/1
TABLET ORAL
Qty: 90 TABLET | Refills: 1 | Status: SHIPPED | OUTPATIENT
Start: 2022-12-08

## 2022-12-22 DIAGNOSIS — E78.2 MIXED HYPERLIPIDEMIA: ICD-10-CM

## 2022-12-22 RX ORDER — PRAVASTATIN SODIUM 20 MG/1
TABLET ORAL
Qty: 90 TABLET | Refills: 0 | Status: SHIPPED | OUTPATIENT
Start: 2022-12-22

## 2023-01-08 DIAGNOSIS — E11.9 TYPE II DIABETES MELLITUS, WELL CONTROLLED (HCC): ICD-10-CM

## 2023-01-08 DIAGNOSIS — I10 ESSENTIAL HYPERTENSION: ICD-10-CM

## 2023-01-08 RX ORDER — POTASSIUM CHLORIDE 750 MG/1
CAPSULE, EXTENDED RELEASE ORAL
Qty: 270 CAPSULE | Refills: 0 | Status: SHIPPED | OUTPATIENT
Start: 2023-01-08

## 2023-01-08 RX ORDER — METFORMIN HYDROCHLORIDE 850 MG/1
TABLET ORAL
Qty: 180 TABLET | Refills: 0 | Status: SHIPPED | OUTPATIENT
Start: 2023-01-08

## 2023-02-10 ENCOUNTER — HOSPITAL ENCOUNTER (OUTPATIENT)
Dept: MAMMOGRAPHY | Age: 68
End: 2023-02-10
Attending: REGISTERED NURSE
Payer: MEDICARE

## 2023-02-10 ENCOUNTER — HOSPITAL ENCOUNTER (OUTPATIENT)
Dept: ULTRASOUND IMAGING | Age: 68
End: 2023-02-10
Attending: REGISTERED NURSE
Payer: MEDICARE

## 2023-02-10 ENCOUNTER — TELEPHONE (OUTPATIENT)
Dept: ONCOLOGY | Age: 68
End: 2023-02-10

## 2023-02-10 DIAGNOSIS — C50.912 MALIGNANT NEOPLASM OF LEFT BREAST IN FEMALE, ESTROGEN RECEPTOR POSITIVE, UNSPECIFIED SITE OF BREAST (HCC): ICD-10-CM

## 2023-02-10 DIAGNOSIS — R92.8 ABNORMAL MAMMOGRAM: ICD-10-CM

## 2023-02-10 DIAGNOSIS — D05.12 DUCTAL CARCINOMA IN SITU (DCIS) OF LEFT BREAST: ICD-10-CM

## 2023-02-10 DIAGNOSIS — Z17.0 MALIGNANT NEOPLASM OF LEFT BREAST IN FEMALE, ESTROGEN RECEPTOR POSITIVE, UNSPECIFIED SITE OF BREAST (HCC): ICD-10-CM

## 2023-02-10 PROCEDURE — 77062 BREAST TOMOSYNTHESIS BI: CPT

## 2023-02-10 NOTE — TELEPHONE ENCOUNTER
Patient  was return the Nurse Lalo Johnson phone call.   Please give her a call back at [phone]

## 2023-02-10 NOTE — PROGRESS NOTES
Call to patient, left message that RN would like to relay results, supplied callback info, but also sent [facility].

## 2023-02-10 NOTE — PROGRESS NOTES
Please let patient know that mammogram is negative/good. She can do Breast MRI due to thickness at lumpectomy site if she wants.

## 2023-02-12 DIAGNOSIS — I10 ESSENTIAL HYPERTENSION: ICD-10-CM

## 2023-02-12 RX ORDER — INDAPAMIDE 1.25 MG/1
1.25 TABLET, FILM COATED ORAL DAILY
Qty: 90 TABLET | Refills: 0 | Status: SHIPPED | OUTPATIENT
Start: 2023-02-12

## 2023-02-13 ENCOUNTER — OFFICE VISIT (OUTPATIENT)
Dept: SURGERY | Age: 68
End: 2023-02-13
Payer: MEDICARE

## 2023-02-13 VITALS
HEIGHT: 64 IN | HEART RATE: 67 BPM | DIASTOLIC BLOOD PRESSURE: 56 MMHG | BODY MASS INDEX: 35 KG/M2 | SYSTOLIC BLOOD PRESSURE: 145 MMHG | WEIGHT: 205 LBS

## 2023-02-13 DIAGNOSIS — D05.12 DUCTAL CARCINOMA IN SITU (DCIS) OF LEFT BREAST: Primary | ICD-10-CM

## 2023-02-13 DIAGNOSIS — Z85.3 HISTORY OF BREAST CANCER IN FEMALE: ICD-10-CM

## 2023-02-13 DIAGNOSIS — Z98.890 S/P LUMPECTOMY, LEFT BREAST: ICD-10-CM

## 2023-02-13 PROCEDURE — 99213 OFFICE O/P EST LOW 20 MIN: CPT | Performed by: NURSE PRACTITIONER

## 2023-02-13 PROCEDURE — G8399 PT W/DXA RESULTS DOCUMENT: HCPCS | Performed by: NURSE PRACTITIONER

## 2023-02-13 PROCEDURE — G8417 CALC BMI ABV UP PARAM F/U: HCPCS | Performed by: NURSE PRACTITIONER

## 2023-02-13 PROCEDURE — 3077F SYST BP >= 140 MM HG: CPT | Performed by: NURSE PRACTITIONER

## 2023-02-13 PROCEDURE — G8427 DOCREV CUR MEDS BY ELIG CLIN: HCPCS | Performed by: NURSE PRACTITIONER

## 2023-02-13 PROCEDURE — 3078F DIAST BP <80 MM HG: CPT | Performed by: NURSE PRACTITIONER

## 2023-02-13 PROCEDURE — 1101F PT FALLS ASSESS-DOCD LE1/YR: CPT | Performed by: NURSE PRACTITIONER

## 2023-02-13 PROCEDURE — 3017F COLORECTAL CA SCREEN DOC REV: CPT | Performed by: NURSE PRACTITIONER

## 2023-02-13 PROCEDURE — G9899 SCRN MAM PERF RSLTS DOC: HCPCS | Performed by: NURSE PRACTITIONER

## 2023-02-13 PROCEDURE — 1123F ACP DISCUSS/DSCN MKR DOCD: CPT | Performed by: NURSE PRACTITIONER

## 2023-02-13 PROCEDURE — G8536 NO DOC ELDER MAL SCRN: HCPCS | Performed by: NURSE PRACTITIONER

## 2023-02-13 PROCEDURE — 1090F PRES/ABSN URINE INCON ASSESS: CPT | Performed by: NURSE PRACTITIONER

## 2023-02-13 PROCEDURE — G8432 DEP SCR NOT DOC, RNG: HCPCS | Performed by: NURSE PRACTITIONER

## 2023-02-13 NOTE — PROGRESS NOTES
HISTORY OF PRESENT ILLNESS  Felipe Reno is a 69 y.o. female. HPI ESTABLISHED patient presents for follow-up to LEFT breast cancer. Denies breast mass, skin changes, nipple discharge and pain. Breast history -   Referring - Dr. Claude Olsen  2009 - LEFT breast cancer (probably DCIS) in 2009. Had surgery by Dr. Nayeli De Leon and whole breast radiation by Dr. Khai Brito at [facility]. Did not want to take tamoxifen then. Abnormal screening mammo on LEFT 2/2020.    02/18/20: LEFT breast stereo bx. PATH: DCIS, solid and comedo pattern with comedonecrosis and intraluminal coarse calcification, nuclear grade 2, ER+(99%), FL not performed. Clinical stage 0. MRI 3/5/20 no other abnormalities. Excision 3/9530 - Dr. Day Huston DIAGNOSIS   Left breast, excision:   Ductal carcinoma in situ, intermediate grade (grade 2). Size: 12 mm. Pathologic stage: PTis NX   No XRT - Left breast previously radiated  Saw Dr. Teresa Jimenez for a consult - declined AI and tamoxifen  2/9/22 - abnormal mammogram  2/22/22 - LEFT breast core biopsy - LEFT breast DCIS - ER pos, FL pos  4/14/22 - LEFT breast lumpectomy and SLNB - Dr. Polina Malik with [facility]  Started Arimidex - Dr. Teresa Jimenez     Family history -   A paternal aunt had breast cancer and possible ovarian cancer. A paternal first cousin was just diagnosed with breast cancer in her mid-61s. Her son has skin cancer. Patient - genetic testing negative, VUS NBN      OB History    No obstetric history on file.       Obstetric Comments   Menarche 13, LMP 2004, # of children 1, age of 4st delivery 23, Hysterectomy/oophorectomy No/No, Breast bx Yes, history of breast feeding No, BCP No, Hormone therapy No                   Past Surgical History:   Procedure Laterality Date    HX BREAST BIOPSY      HX BREAST LUMPECTOMY Left     2009    HX BREAST LUMPECTOMY Left 3/12/2020    LEFT BREAST LUMPECTOMY WITH ULTRASOUND performed by Elizabeth Dodge Alex Jessica MD at [place] [address]    HX BREAST LUMPECTOMY Left 4/14/2022    LEFT BREAST LUMPECTOMY WITH ULTRASOUND/LEFT BREAST SENTINEL NODE BIOPSY performed by Mehran Geronimo MD at [facility] AMBULATORY OR    HX ORTHOPAEDIC      Bunion Surgery BOTH FEET     HX TUBAL LIGATION      OR BREAST SURGERY PROCEDURE UNLISTED      lumpectomy         [facility] Results (most recent):  Results from Hospital Encounter encounter on 02/10/23    [facility] 3D MARIA INES W MAMMO BI DX INCL CAD    Narrative  EXAM:  [facility] 3D MARIA INES W MAMMO BI DX INCL CAD. INDICATION: History of breast cancer status post lumpectomy with radiation into  [address] and subsequent recurrence with repeat lumpectomy in April 2022 with  DCIS and microinvasion here for first postoperative mammogram with report of  increasing firmness at site of lumpectomy over the last month or two. COMPARISON: MRI 3/8/2022 and prior exams dating to mammogram of 1/31/2018. COMPOSITION: There are scattered areas of fibroglandular density. TECHNIQUE: Routine CC and MLO views of each breast and spot magnification  craniocaudal and spot medication mediolateral views of the left breast were  performed with digital technique and interpreted with use of computer-aided  detection. Additionally, tomosynthesis in the craniocaudal and mediolateral  projections of both breasts was performed. FINDINGS: Density, volume loss, and distortion at site of large postsurgical  lumpectomy area in the left breast spanning the retroareolar to posterior breast  centrally with no suspicious calcifications. There is otherwise no mass,  suspicious calcification, distortion, skin thickening, or nipple retraction. No  other significant change from the prior study. Impression  Postlumpectomy change left breast with no suspicious calcifications  to suggest persistent or recurrent disease. BI-RADS Assessment Category 2:  Benign finding.     RECOMMENDATION: Diagnostic mammogram in one year in the absence of new or  suspicious clinical findings per postlumpectomy protocol. NOTE: If there is clinical concern for recurrence relative to firmness at  lumpectomy site, MRI can be considered. Ultrasound not performed today as the  extensive postsurgical change would likely render ultrasound noncontributory. I  have reviewed this case with one of my partners who has additional training in  breast imaging and who agrees with this interpretation. ROS    Physical Exam  Constitutional:       Appearance: Normal appearance. Chest:   Breasts:     Right: No mass, nipple discharge, skin change or tenderness. Left: No mass, nipple discharge, skin change or tenderness. Musculoskeletal:      Comments: FROM - UE x 2   Lymphadenopathy:      Upper Body:      Right upper body: No supraclavicular or axillary adenopathy. Left upper body: No supraclavicular or axillary adenopathy. Neurological:      Mental Status: She is alert. Psychiatric:         Attention and Perception: Attention normal.         Mood and Affect: Mood normal.         Speech: Speech normal.         Behavior: Behavior normal.         Visit Vitals  BP (!) 145/56 (BP 1 Location: Right arm, BP Patient Position: Sitting, BP Cuff Size: Small adult)   Pulse 67   Ht 5' 4\" (1.626 m)   Wt 205 lb (93 kg)   BMI 35.19 kg/m²       ASSESSMENT and PLAN    ICD-10-CM ICD-9-CM    1. Ductal carcinoma in situ (DCIS) of left breast  D05.12 233.0       2. S/P lumpectomy, left breast  Z98.890 V45.89       3. History of breast cancer in female  Z80.3 V10.3 [facility] 3D MARIA INES W MAMMO BI DX INCL CAD          Normal exam and imaging with no evidence of local recurrence. Reviewed normal post treatment changes. Reviewed s/sx of local recurrence including changes on breast imaging, swelling, nodularity and increasing firmness. Reports the surgical site has felt hard since a few weeks after surgery. Discussed use of MRI for further evaluation based on mammogram report.   Discussed how a breast MRI looks for activity in the breast vs the mammogram looking for a change in architecture. Declined breast MRI at this time. BDmammogram 3D in 2/2023. Taking AI and continues care with Dr. Chioma Dickerson. RTC in 6 months or sooner PRN. She is comfortable with this plan. All questions answered and she stated understanding. Total time spent for this patient - 20 minutes.

## 2023-02-13 NOTE — TELEPHONE ENCOUNTER
Returned call to patient, ID verified X 2, [phone]. Patient states had read [facility] sent last week regarding recent Mammogram results, seen at Breast Surgery today, does not wish to have MRI at this time. Appreciative of update. To Provider.

## 2023-02-13 NOTE — PROGRESS NOTES
HISTORY OF PRESENT ILLNESS  Maryjo Thomas is a 69 y.o. female.   HPI    ROS    Physical Exam    ASSESSMENT and PLAN  {ASSESSMENT/PLAN:51822}

## 2023-03-07 DIAGNOSIS — M1A.0710 IDIOPATHIC CHRONIC GOUT OF RIGHT FOOT WITHOUT TOPHUS: ICD-10-CM

## 2023-03-07 RX ORDER — ALLOPURINOL 100 MG/1
TABLET ORAL
Qty: 90 TABLET | Refills: 0 | Status: SHIPPED | OUTPATIENT
Start: 2023-03-07

## 2023-03-25 DIAGNOSIS — E78.2 MIXED HYPERLIPIDEMIA: ICD-10-CM

## 2023-03-25 RX ORDER — PRAVASTATIN SODIUM 20 MG/1
TABLET ORAL
Qty: 90 TABLET | Refills: 0 | Status: SHIPPED | OUTPATIENT
Start: 2023-03-25

## 2023-04-01 NOTE — PROGRESS NOTES
LLE ulcers The patient called and left a message that she would like to discuss her MRI results. Dr. Katarzyna Peñaloza made aware and will call the patient. Melchor Velazquez methadone dose Infected Left Lower Leg Ulcers

## 2023-04-03 ENCOUNTER — OFFICE VISIT (OUTPATIENT)
Dept: PRIMARY CARE CLINIC | Age: 68
End: 2023-04-03
Payer: MEDICARE

## 2023-04-03 DIAGNOSIS — E11.9 WELL CONTROLLED DIABETES MELLITUS (HCC): ICD-10-CM

## 2023-04-03 DIAGNOSIS — E78.2 MIXED HYPERLIPIDEMIA: ICD-10-CM

## 2023-04-03 DIAGNOSIS — M1A.0720 IDIOPATHIC CHRONIC GOUT OF LEFT FOOT WITHOUT TOPHUS: ICD-10-CM

## 2023-04-03 DIAGNOSIS — Z71.89 ACP (ADVANCE CARE PLANNING): ICD-10-CM

## 2023-04-03 DIAGNOSIS — Z00.00 MEDICARE ANNUAL WELLNESS VISIT, SUBSEQUENT: Primary | ICD-10-CM

## 2023-04-03 DIAGNOSIS — E66.01 SEVERE OBESITY (BMI 35.0-39.9) WITH COMORBIDITY (HCC): ICD-10-CM

## 2023-04-03 PROCEDURE — 3078F DIAST BP <80 MM HG: CPT | Performed by: INTERNAL MEDICINE

## 2023-04-03 PROCEDURE — 1123F ACP DISCUSS/DSCN MKR DOCD: CPT | Performed by: INTERNAL MEDICINE

## 2023-04-03 PROCEDURE — 1090F PRES/ABSN URINE INCON ASSESS: CPT | Performed by: INTERNAL MEDICINE

## 2023-04-03 PROCEDURE — 3075F SYST BP GE 130 - 139MM HG: CPT | Performed by: INTERNAL MEDICINE

## 2023-04-03 PROCEDURE — 1101F PT FALLS ASSESS-DOCD LE1/YR: CPT | Performed by: INTERNAL MEDICINE

## 2023-04-03 PROCEDURE — G8510 SCR DEP NEG, NO PLAN REQD: HCPCS | Performed by: INTERNAL MEDICINE

## 2023-04-03 PROCEDURE — 3046F HEMOGLOBIN A1C LEVEL >9.0%: CPT | Performed by: INTERNAL MEDICINE

## 2023-04-03 PROCEDURE — 99214 OFFICE O/P EST MOD 30 MIN: CPT | Performed by: INTERNAL MEDICINE

## 2023-04-03 PROCEDURE — G8536 NO DOC ELDER MAL SCRN: HCPCS | Performed by: INTERNAL MEDICINE

## 2023-04-03 PROCEDURE — G8427 DOCREV CUR MEDS BY ELIG CLIN: HCPCS | Performed by: INTERNAL MEDICINE

## 2023-04-03 PROCEDURE — G8417 CALC BMI ABV UP PARAM F/U: HCPCS | Performed by: INTERNAL MEDICINE

## 2023-04-03 PROCEDURE — G8399 PT W/DXA RESULTS DOCUMENT: HCPCS | Performed by: INTERNAL MEDICINE

## 2023-04-03 PROCEDURE — G9899 SCRN MAM PERF RSLTS DOC: HCPCS | Performed by: INTERNAL MEDICINE

## 2023-04-03 PROCEDURE — 2022F DILAT RTA XM EVC RTNOPTHY: CPT | Performed by: INTERNAL MEDICINE

## 2023-04-03 PROCEDURE — G0439 PPPS, SUBSEQ VISIT: HCPCS | Performed by: INTERNAL MEDICINE

## 2023-04-03 PROCEDURE — 3017F COLORECTAL CA SCREEN DOC REV: CPT | Performed by: INTERNAL MEDICINE

## 2023-04-03 NOTE — PROGRESS NOTES
Andrea Vega (: 1955) is a 69 y.o. female, established patient, here for evaluation of the following chief complaint(s): Annual Wellness Visit      ASSESSMENT/PLAN:  Below is the assessment and plan developed based on review of pertinent history, physical exam, labs, studies, and medications. 1. Well controlled diabetes mellitus ([facility]  -     HEMOGLOBIN A1C WITH EAG; Future  -     METABOLIC PANEL, COMPREHENSIVE; Future  -     CBC W/O DIFF; Future  -     MICROALBUMIN, UR, RAND W/ MICROALB/CREAT RATIO; Future  I ordered blood work to check hemoglobin A1C levels, a CMP, a CBC, and a urine analysis to check microalbumin levels. I recommend that she continue taking metformin 850 mg BID with meals. 2. Severe obesity (BMI 35.0-39. 9) with comorbidity ([facility]  -     LIPID PANEL; Future  I ordered a lipid panel. I recommend watching and decreasing carbohydrate intake. I explained that 5,000 steps are needed daily for healthy lifestyle and to maintain conditioning, and she will need to increase to 10,000 a day to work on weight loss. 3. Mixed hyperlipidemia  I recommend that she continue taking pravastatin 20 mg nightly. 4. Idiopathic chronic gout of left foot without tophus  -     URIC ACID; Future  I will recheck her uric acid levels and will re-evaluate her allopurinol medication. SUBJECTIVE/OBJECTIVE:  HPI  The patient presents today for a MWV and to follow up on chronic conditions. She is not fasting today. Her BP is elevated today in office at 147/64, 135/75 on manual repeat in right arm while sitting down. She does not check her blood pressure at home but she does have a blood pressure monitor. She is not walking daily. She has not had a gout attack since her last visit. She is taking allopurinol 100 mg daily. She is taking pravastatin 20 mg nightly, lisinopril 10 mg daily, metformin 850 mg BID with meals, allopurinol 100 mg daily, and indapamide 1.25 mg daily.  She denies dry cough.    She does not sleep well at night and she has not tried melatonin yet. Patient Active Problem List   Diagnosis Code    HTN (hypertension) I10    History of left breast cancer Z85.3    Multinodular goiter E04.2    Obesity (BMI 30-39. 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 M1A.0720    Type II diabetes mellitus, well controlled (HCC) E11.9    Ductal carcinoma in situ (DCIS) of left breast D05.12    Malignant neoplasm of unspecified site of unspecified female breast C50.919        Current Outpatient Medications on File Prior to Visit   Medication Sig Dispense Refill    pravastatin (PRAVACHOL) 20 mg tablet TAKE 1 TABLET BY MOUTH EVERY NIGHT 90 Tablet 0    allopurinoL (ZYLOPRIM) 100 mg tablet TAKE 1 TABLET BY MOUTH DAILY 90 Tablet 0    indapamide (LOZOL) 1.25 mg tablet TAKE 1 TABLET BY MOUTH DAILY 90 Tablet 0    potassium chloride SA (MICRO-K) 10 mEq capsule TAKE 3 CAPSULES BY MOUTH EVERY  Capsule 0    metFORMIN (GLUCOPHAGE) 850 mg tablet TAKE 1 TABLET BY MOUTH TWICE DAILY WITH MEALS 180 Tablet 0    lisinopriL (PRINIVIL, ZESTRIL) 10 mg tablet TAKE 1 TABLET BY MOUTH EVERY DAY 90 Tablet 1    anastrozole (ARIMIDEX) 1 mg tablet TAKE 1 TABLET BY MOUTH DAILY 30 Tablet 5    Blood-Glucose Meter monitoring kit Check blood sugar 3 times a day 1 Kit 0    glucose blood VI test strips (blood glucose test) strip Check blood sugar 3 times a day 100 Strip 2    ferrous sulfate 324 mg (65 mg iron) tablet Take  by mouth Daily (before breakfast). ascorbic acid (VITAMIN C) 500 mg chewable tablet Take  by mouth.      cyanocobalamin 1,000 mcg tablet Take 1,000 mcg by mouth daily. BETA-CAROTENE,A, W-C & E/ZN/CU (VISION-CHARANJIT PRESERVE PO) Take  by mouth two (2) times a day. cholecalciferol (VITAMIN D3) 25 mcg (1,000 unit) cap Take 5,000 Units by mouth daily.  4 tabs daily  Indications: vitamin D deficiency       No current facility-administered medications on file prior to visit.        Allergies   Allergen Reactions    Pcn [Penicillins] Hives       Past Medical History:   Diagnosis Date    Adverse effect of anesthesia     SLOW TO AWAKEN     Arthritis     Breast cancer ([facility]     lumpectomy with radiation, 2009, left    Breast cancer, left ([facility] 2020    DCIS     Diabetes ([facility]     Hyperlipidemia     Hypertension     Radiation therapy complication     8077    Thyroid disease     GOARDER ON THROAT        Past Surgical History:   Procedure Laterality Date    HX BREAST BIOPSY      HX BREAST LUMPECTOMY Left     2009    HX BREAST LUMPECTOMY Left 3/12/2020    LEFT BREAST LUMPECTOMY WITH ULTRASOUND performed by John Armenta MD at [place] [address]    HX BREAST LUMPECTOMY Left 4/14/2022    LEFT BREAST LUMPECTOMY WITH ULTRASOUND/LEFT BREAST SENTINEL NODE BIOPSY performed by [facility]a MD at [facility] AM[facility]UL[facility] OR    HX ORTHOPAEDIC      Bunion Surgery BOTH FEET     HX TUBAL LIGATION      VT UNLISTED PROCEDURE BREAST      lumpectomy       Family History   Problem Relation Age of Onset    Diabetes Father     Stroke Sister     Diabetes Brother     Heart Disease Mother     Hypertension Sister     Diabetes Brother     Diabetes Brother     Diabetes Brother     Cancer Son         LUMP ON NECK     Anesth Problems Neg Hx        Social History     Socioeconomic History    Marital status:      Spouse name: Not on file    Number of children: Not on file    Years of education: Not on file    Highest education level: Not on file   Occupational History    Not on file   Tobacco Use    Smoking status: Never    Smokeless tobacco: Never   Vaping Use    Vaping Use: Never used   Substance and Sexual Activity    Alcohol use: No    Drug use: Never    Sexual activity: Never   Other Topics Concern    Not on file   Social History Narrative    Not on file     Social Determinants of Health     Financial Resource Strain: Low Risk     Difficulty of Paying Living Expenses: Not very hard Food Insecurity: No Food Insecurity    Worried About Running Out of Food in the Last Year: Never true    Ran Out of Food in the Last Year: Never true   Transportation Needs: Not on file   Physical Activity: Not on file   Stress: Not on file   Social Connections: Not on file   Intimate Partner Violence: Not on file   Housing Stability: Not on file       No visits with results within 3 Month(s) from this visit. Latest known visit with results is:   Orders Only on 11/30/2022   Component Date Value Ref Range Status    TSH 11/30/2022 1.44  0.36 - 3.74 uIU/mL Final    Comment:   Due to TSH heterogeneity, both structurally and degree of glycosylation, monoclonal antibodies used in the TSH assay may not accurately quantitate TSH. Therefore, this result should be correlated with clinical findings as well as with other assessments of thyroid function, e.g., free T4, free T3. Cholesterol, total 11/30/2022 167  <200 MG/DL Final    Triglyceride 11/30/2022 97  <150 MG/DL Final    Based on NCEP-ATP III:  Triglycerides <150 mg/dL  is considered normal, 150-199 mg/dL  borderline high,  200-499 mg/dL high and  greater than or equal to 500 mg/dL very high. HDL Cholesterol 11/30/2022 69  MG/DL Final    Based on NCEP ATP III, HDL Cholesterol <40 mg/dL is considered low and >60 mg/dL is elevated.     LDL, calculated 11/30/2022 78.6  0 - 100 MG/DL Final    Comment: Based on the NCEP-ATP: LDL-C concentrations are considered  optimal <100 mg/dL,  near optimal/above Normal 100-129 mg/dL  Borderline High: 130-159, High: 160-189 mg/dL  Very High: Greater than or equal to 190 mg/dL      VLDL, calculated 11/30/2022 19.4  MG/DL Final    CHOL/HDL Ratio 11/30/2022 2.4  0.0 - 5.0   Final    WBC 11/30/2022 5.3  3.6 - 11.0 K/uL Final    RBC 11/30/2022 3.49 (A)  3.80 - 5.20 M/uL Final    HGB 11/30/2022 10.0 (A)  11.5 - 16.0 g/dL Final    HCT 11/30/2022 31.9 (A)  35.0 - 47.0 % Final    MCV 11/30/2022 91.4  80.0 - 99.0 FL Final    MCH 11/30/2022 28.7  26.0 - 34.0 PG Final    MCHC 11/30/2022 31.3  30.0 - 36.5 g/dL Final    RDW 11/30/2022 13.7  11.5 - 14.5 % Final    PLATELET 49/89/2031 803  150 - 400 K/uL Final    MPV 11/30/2022 11.7  8.9 - 12.9 FL Final    NRBC 11/30/2022 0.0  0  WBC Final    ABSOLUTE NRBC 11/30/2022 0.00  0.00 - 0.01 K/uL Final    Sodium 11/30/2022 142  136 - 145 mmol/L Final    Potassium 11/30/2022 4.3  3.5 - 5.1 mmol/L Final    Chloride 11/30/2022 109 (A)  97 - 108 mmol/L Final    CO2 11/30/2022 28  21 - 32 mmol/L Final    Anion gap 11/30/2022 5  5 - 15 mmol/L Final    Glucose 11/30/2022 93  65 - 100 mg/dL Final    BUN 11/30/2022 16  6 - 20 MG/DL Final    Creatinine 11/30/2022 0.91  0.55 - 1.02 MG/DL Final    BUN/Creatinine ratio 11/30/2022 18  12 - 20   Final    eGFR 11/30/2022 >60  >60 ml/min/1.73m2 Final    Comment:   Pediatric calculator link: [employer].at. org/professionals/kdoqi/gfr_calculatorped    Effective Oct 3, 2022    These results are not intended for use in patients <25years of age. eGFR results are calculated without a race factor using  the 2021 CKD-EPI equation. Careful clinical correlation is recommended, particularly when comparing to results calculated using previous equations. The CKD-EPI equation is less accurate in patients with extremes of muscle mass, extra-renal metabolism of creatinine, excessive creatine ingestion, or following therapy that affects renal tubular secretion. Calcium 11/30/2022 9.9  8.5 - 10.1 MG/DL Final    Bilirubin, total 11/30/2022 0.4  0.2 - 1.0 MG/DL Final    ALT (SGPT) 11/30/2022 28  12 - 78 U/L Final    AST (SGOT) 11/30/2022 11 (A)  15 - 37 U/L Final    Alk.  phosphatase 11/30/2022 66  45 - 117 U/L Final    Protein, total 11/30/2022 7.2  6.4 - 8.2 g/dL Final    Albumin 11/30/2022 3.7  3.5 - 5.0 g/dL Final    Globulin 11/30/2022 3.5  2.0 - 4.0 g/dL Final    A-G Ratio 11/30/2022 1.1  1.1 - 2.2   Final    Hemoglobin A1c 11/30/2022 6.5 (A)  4.0 - 5.6 % Final Comment: NEW METHOD  PLEASE NOTE NEW REFERENCE RANGE  (NOTE)  HbA1C Interpretive Ranges  <5.7              Normal  5.7 - 6.4         Consider Prediabetes  >6.5              Consider Diabetes      Est. average glucose 11/30/2022 140  mg/dL Final    Uric acid 11/30/2022 7.9 (A)  2.6 - 6.0 MG/DL Final     Review of Systems   Constitutional:  Negative for activity change, fatigue and unexpected weight change. HENT:  Negative for congestion, hearing loss, rhinorrhea and sore throat. Eyes:  Negative for discharge. Respiratory:  Negative for cough, chest tightness and shortness of breath. Cardiovascular:  Negative for leg swelling. Gastrointestinal:  Negative for abdominal pain, constipation and diarrhea. Genitourinary:  Negative for dysuria, flank pain, frequency and urgency. Musculoskeletal:  Negative for arthralgias, back pain and myalgias. Skin:  Negative for color change and rash. Neurological:  Negative for dizziness, light-headedness and headaches. Psychiatric/Behavioral:  Negative for dysphoric mood and sleep disturbance. The patient is not nervous/anxious. Visit Vitals  BP (!) 147/64 (BP 1 Location: Right upper arm, BP Patient Position: Sitting)   Pulse 66   Temp 97.1 °F (36.2 °C) (Temporal)   Resp 16   Ht 5' 4\" (1.626 m)   Wt 205 lb (93 kg)   SpO2 99%   BMI 35.19 kg/m²       Physical Exam  Vitals and nursing note reviewed. Constitutional:       General: She is not in acute distress. Appearance: She is obese. She is not diaphoretic. HENT:      Right Ear: External ear normal.      Left Ear: External ear normal.   Eyes:      General: No scleral icterus. Right eye: No discharge. Left eye: No discharge. Extraocular Movements: Extraocular movements intact. Conjunctiva/sclera: Conjunctivae normal.   Cardiovascular:      Rate and Rhythm: Normal rate and regular rhythm.    Pulmonary:      Effort: Pulmonary effort is normal.      Breath sounds: Normal breath sounds. No wheezing. Abdominal:      General: Bowel sounds are normal.      Palpations: Abdomen is soft. Tenderness: There is no abdominal tenderness. Musculoskeletal:      Cervical back: Normal range of motion and neck supple. Lymphadenopathy:      Cervical: No cervical adenopathy. Neurological:      Mental Status: She is alert and oriented to person, place, and time. Psychiatric:         Mood and Affect: Mood and affect normal.         I, Britni Kline MD, personally performed the services described in this documentation as scribed by Gila Neumann in my presence, and it is both accurate and complete. An electronic signature was used to authenticate this note.   -- Gila Neumann

## 2023-04-03 NOTE — PROGRESS NOTES
1. \"Have you been to the ER, urgent care clinic since your last visit? Hospitalized since your last visit? \" No    2. \"Have you seen or consulted any other health care providers outside of the [address] [place] [address] since your last visit? \" Yes When: Eye doctor and [facility]      3. For patients aged 39-70: Has the patient had a colonoscopy / FIT/ Cologuard? Yes - no Care Gap present      If the patient is female:    4. For patients aged 41-77: Has the patient had a mammogram within the past 2 years? Yes - no Care Gap present      5. For patients aged 21-65: Has the patient had a pap smear? Hasn't had one in 2 years. Is going to call [facility]. Chief Complaint   Patient presents with    Annual Wellness Visit         Gab Roy is a 69 y.o. female and presents for Annual Medicare Wellness Visit. Assessment of cognitive impairment: Alert and oriented x 3. .  3 most recent PHQ Screens 4/3/2023   PHQ Not Done -   Little interest or pleasure in doing things Not at all   Feeling down, depressed, irritable, or hopeless Not at all   Total Score PHQ 2 0   Trouble falling or staying asleep, or sleeping too much -   Feeling tired or having little energy -   Poor appetite, weight loss, or overeating -   Feeling bad about yourself - or that you are a failure or have let yourself or your family down -   Trouble concentrating on things such as school, work, reading, or watching TV -   Moving or speaking so slowly that other people could have noticed; or the opposite being so fidgety that others notice -   Thoughts of being better off dead, or hurting yourself in some way -   PHQ 9 Score -   How difficult have these problems made it for you to do your work, take care of your home and get along with others -          . Fall Risk Assessment, last 12 mths 4/3/2023   Able to walk? Yes   Fall in past 12 months? 0   Do you feel unsteady?  0   Are you worried about falling 0   Number of falls in past 12 months -   Fall with injury? -           Abuse Screening Questionnaire 4/3/2023   Do you ever feel afraid of your partner? N   Are you in a relationship with someone who physically or mentally threatens you? N   Is it safe for you to go home? Y          Activities of Daily Living:  Self-care. Requires assistance with: no ADLs  Patient handle his/her own medications  yes Use of pill box  yes      ADL Assessment 4/3/2023   Feeding yourself No Help Needed   Getting from bed to chair No Help Needed   Getting dressed No Help Needed   Bathing or showering No Help Needed   Walk across the room (includes cane/walker) No Help Needed   Using the telphone No Help Needed   Taking your medications No Help Needed   Preparing meals No Help Needed   Managing money (expenses/bills) No Help Needed   Moderately strenuous housework (laundry) No Help Needed   Shopping for personal items (toiletries/medicines) No Help Needed   Shopping for groceries No Help Needed   Driving No Help Needed   Climbing a flight of stairs No Help Needed   Getting to places beyond walking distances No Help Needed          Health Maintenance:  Daily Aspirin: no  Bone Density: up to date  Glaucoma Screening: yes  Immunizations:    Tetanus: up to date. Influenza: up to date. Shingles: up to date. PPSV-23: up to date. Prevnar-13: up to date. [id number]: up to date    Cancer screening:    Cervical: Not in the last 2 years. Breast: up to date. Colon: up to date. Alcohol Risk Screen:   On any occasion during the past 3 months, have you had more than 3 drinks(female) or 4 drinks (male) containing alcohol in one? No  Do you average more than 7 drinks (female) or 14 drinks (male) per week? No  Type and amount:None    Hearing Loss:  Hearing is good. Vision Loss:   Wears glasses, contact lenses, or have any other visual impairment  yes    Adult Nutrition Screen:  No risk factors noted.     Advance Care Planning:   End of Life Planning: has an advanced directive - a copy HAS NOT been provided. ToniaMirtha [address] ACP-Facilitator appointment no      Medications/Allergies: Reviewed with patient  Prior to Admission medications    Medication Sig Start Date End Date Taking? Authorizing Provider   pravastatin (PRAVACHOL) 20 mg tablet TAKE 1 TABLET BY MOUTH EVERY NIGHT 3/25/23   Tanesha Otoole MD   allopurinoL (ZYLOPRIM) 100 mg tablet TAKE 1 TABLET BY MOUTH DAILY 3/7/23   Tanesha Otoole MD   indapamide (LOZOL) 1.25 mg tablet TAKE 1 TABLET BY MOUTH DAILY 2/12/23   Tanesha Otoole MD   potassium chloride SA (MICRO-K) 10 mEq capsule TAKE 3 CAPSULES BY MOUTH EVERY DAY 1/8/23   Tanesha Otoole MD   metFORMIN (GLUCOPHAGE) 850 mg tablet TAKE 1 TABLET BY MOUTH TWICE DAILY WITH MEALS 1/8/23   Tanesha Otoole MD   lisinopriL (PRINIVIL, ZESTRIL) 10 mg tablet TAKE 1 TABLET BY MOUTH EVERY DAY 12/8/22   Tanesha Otoole MD   anastrozole (ARIMIDEX) 1 mg tablet TAKE 1 TABLET BY MOUTH DAILY 10/28/22   Ethel Portillo, DO   Blood-Glucose Meter monitoring kit Check blood sugar 3 times a day 6/15/22   Tanesha Otoole MD   glucose blood VI test strips (blood glucose test) strip Check blood sugar 3 times a day 6/15/22   Tanesha Otoole MD   ferrous sulfate 324 mg (65 mg iron) tablet Take  by mouth Daily (before breakfast). Provider, Historical   ascorbic acid (VITAMIN C) 500 mg chewable tablet Take  by mouth. Provider, Historical   cyanocobalamin 1,000 mcg tablet Take 1,000 mcg by mouth daily. Provider, Historical   BETA-CAROTENE,A, W-C & E/ZN/CU (VISION-CHARANJIT PRESERVE PO) Take  by mouth two (2) times a day. Provider, Historical   cholecalciferol (VITAMIN D3) 25 mcg (1,000 unit) cap Take 5,000 Units by mouth daily. 4 tabs daily  Indications: vitamin D deficiency    Provider, Historical       Allergies   Allergen Reactions    Pcn [Penicillins] Hives       Objective:  . Visit Vitals  BP (!) 147/64 (BP 1 Location: Right upper arm, BP Patient Position: Sitting)   Pulse 66   Temp 97.1 °F (36.2 °C) (Temporal)   Resp 16   Ht 5' 4\" (1.626 m)   Wt 205 lb (93 kg)   SpO2 99%   BMI 35.19 kg/m²        Problem List: Reviewed with patient and discussed risk factors. Patient Active Problem List   Diagnosis Code    HTN (hypertension) I10    History of left breast cancer Z85.3    Multinodular goiter E04.2    Obesity (BMI 30-39. 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 M1A.0720    Type II diabetes mellitus, well controlled ([facility] E11.9    Ductal carcinoma in situ (DCIS) of left breast D05.12    Malignant neoplasm of unspecified site of unspecified female breast C50.919       PSH: Reviewed with patient  Past Surgical History:   Procedure Laterality Date    HX BREAST BIOPSY      HX BREAST LUMPECTOMY Left     2009    HX BREAST LUMPECTOMY Left 3/12/2020    LEFT BREAST LUMPECTOMY WITH ULTRASOUND performed by Pepe Graham MD at [place] [address]    HX BREAST LUMPECTOMY Left 4/14/2022    LEFT BREAST LUMPECTOMY WITH ULTRASOUND/LEFT BREAST SENTINEL NODE BIOPSY performed by Pepe Graham MD at [facility] AMBULATORY OR    HX ORTHOPAEDIC      Bunion Surgery BOTH FEET     HX TUBAL LIGATION      LA UNLISTED PROCEDURE BREAST      lumpectomy        SH: Reviewed with patient  Social History     Tobacco Use    Smoking status: Never    Smokeless tobacco: Never   Vaping Use    Vaping Use: Never used   Substance Use Topics    Alcohol use: No    Drug use: Never       FH: Reviewed with patient  Family History   Problem Relation Age of Onset    Diabetes Father     Stroke Sister     Diabetes Brother     Heart Disease Mother     Hypertension Sister     Diabetes Brother     Diabetes Brother     Diabetes Brother     Cancer Son         LUMP ON NECK     Anesth Problems Neg Hx        Current medical providers:    Patient Care Team:  All Brady MD as PCP - General (Internal Medicine Physician)  All Brady MD as PCP - [facility] Empaneled Provider  Jarret Barba MD as Consulting Provider (Endocrinology Physician)  Dennise Charles MD (Surgery General)  Nitin Marin DO as Physician (Hematology and Oncology)    Plan:      Diagnoses and all orders for this visit:    Medicare annual wellness visit, subsequent  . Age appropriate Health screening and immunization discussed with patient. ACP (advance care planning)  -     REFERRAL TO [facility]   Topic Date Due    COVID-19 Vaccine (4 - Booster for Moderna series) 02/09/2022    Medicare Yearly Exam  02/02/2023    Colorectal Cancer Screening Combo  06/13/2023    Diabetic Alb to Cr ratio (uACR) test  06/15/2023    Foot Exam Q1  06/15/2023    GFR test (Diabetes, CKD 3-4, OR last GFR 15-59)  11/30/2023    A1C test (Diabetic or Prediabetic)  11/30/2023    Lipid Screen  11/30/2023    Breast Cancer Screen Mammogram  02/10/2024    Depression Screen  03/31/2024    Eye Exam Retinal or Dilated  04/06/2024    DTaP/Tdap/Td series (3 - Td or Tdap) 12/02/2029    Hepatitis C Screening  Completed    Bone Densitometry (Dexa) Screening  Completed    Shingles Vaccine  Completed    Flu Vaccine  Completed    Pneumococcal 65+ years  Completed          Urinary/ Fecal Incontinence: None     Regular physical exercise: Not much lately     Patient verbalized understanding of information presented. AVS and Medicare Part B Preventive Services Table printed and given to pt and reviewed. See table for findings under Recommendation and Scheduled. All of the patient's questions were answered.

## 2023-04-04 ENCOUNTER — PATIENT OUTREACH (OUTPATIENT)
Dept: CASE MANAGEMENT | Age: 68
End: 2023-04-04

## 2023-04-04 NOTE — ACP (ADVANCE CARE PLANNING)
Advance Care Planning   Ambulatory ACP Specialist Patient Outreach    Date:  4/4/2023    ACP Specialist:  Nir Contreras    Outreach call to patient in follow-up to ACP Specialist referral from:  Aida Murphy MD    [x] PCP  [] Provider   [] Ambulatory Care Management [] Other     For:                  [x] Advance Directive Assistance              [] Complete Portable DNR order              [] Complete POST/POLST/MOST              [] Code Status Discussion             [] Discuss Goals of Care             [] Early ACP Decision-Making              [] Other (Specify)    Date Referral Received:  4/3/2023    Outreaches:       [x] 1st -  Date:  4/4/2023                              Intervention:  [] Spoke with Patient  By: [] Phone [x] Left Voice mail [] Email / Mail    [] [employer]  [] Other (Specify) : Outcomes: Attempted ACP outreach - no answer. Left VM requesting return call regarding referral received by ACP team from physician's office. Will attempt outreach again in one week if return call not received. [] 2nd -  Date:                                Intervention:  [] Spoke with Patient  By: [] Phone [] Left Voice mail [] Email / Mail    [] [employer]  [] Other [id number] : Outcomes:                [] 3rd -  Date:                               Intervention:  [] Spoke with Patient  By: [] Phone [] Left Voice mail [] Email / Mail    [] [employer]  [] Other [id number] : Outcomes:           []  Additional Outreach -  Date:     (Specify Dates & special circumstances): Outcomes:       Next Step:   [] ACP scheduled conversation  [x] Outreach again in one week               [] Email / Mail ACP Info Sheets  [] Email / Mail Advance Directive   [] Closing referral.  Routing closure to referring provider/staff and to ACP Specialist .     [] Closure letter mailed to patient with invitation to contact ACP Specialist if / when ready. [] Other (Specify here):       Thank you for this referral.

## 2023-04-17 DIAGNOSIS — E11.9 TYPE II DIABETES MELLITUS, WELL CONTROLLED (HCC): ICD-10-CM

## 2023-04-17 DIAGNOSIS — I10 ESSENTIAL HYPERTENSION: ICD-10-CM

## 2023-04-17 RX ORDER — POTASSIUM CHLORIDE 750 MG/1
CAPSULE, EXTENDED RELEASE ORAL
Qty: 270 CAPSULE | Refills: 0 | Status: SHIPPED | OUTPATIENT
Start: 2023-04-17

## 2023-04-17 RX ORDER — ANASTROZOLE 1 MG/1
TABLET ORAL
Qty: 30 TABLET | Refills: 5 | Status: SHIPPED | OUTPATIENT
Start: 2023-04-17

## 2023-04-17 RX ORDER — METFORMIN HYDROCHLORIDE 850 MG/1
TABLET ORAL
Qty: 180 TABLET | Refills: 0 | Status: SHIPPED | OUTPATIENT
Start: 2023-04-17

## 2023-04-22 DIAGNOSIS — Z85.3 HISTORY OF BREAST CANCER IN FEMALE: Primary | ICD-10-CM

## 2023-04-22 DIAGNOSIS — R92.8 ABNORMAL MAMMOGRAM: Primary | ICD-10-CM

## 2023-05-22 ENCOUNTER — OFFICE VISIT (OUTPATIENT)
Age: 68
End: 2023-05-22
Payer: MEDICARE

## 2023-05-22 VITALS
HEART RATE: 67 BPM | RESPIRATION RATE: 16 BRPM | TEMPERATURE: 98.7 F | HEIGHT: 64 IN | SYSTOLIC BLOOD PRESSURE: 130 MMHG | BODY MASS INDEX: 34.33 KG/M2 | WEIGHT: 201.1 LBS | DIASTOLIC BLOOD PRESSURE: 66 MMHG

## 2023-05-22 DIAGNOSIS — C50.912 MALIGNANT NEOPLASM OF LEFT BREAST IN FEMALE, ESTROGEN RECEPTOR POSITIVE, UNSPECIFIED SITE OF BREAST (HCC): Primary | ICD-10-CM

## 2023-05-22 DIAGNOSIS — Z17.0 MALIGNANT NEOPLASM OF LEFT BREAST IN FEMALE, ESTROGEN RECEPTOR POSITIVE, UNSPECIFIED SITE OF BREAST (HCC): Primary | ICD-10-CM

## 2023-05-22 PROCEDURE — 99213 OFFICE O/P EST LOW 20 MIN: CPT | Performed by: INTERNAL MEDICINE

## 2023-05-22 PROCEDURE — 3075F SYST BP GE 130 - 139MM HG: CPT | Performed by: INTERNAL MEDICINE

## 2023-05-22 PROCEDURE — G8427 DOCREV CUR MEDS BY ELIG CLIN: HCPCS | Performed by: INTERNAL MEDICINE

## 2023-05-22 PROCEDURE — G8417 CALC BMI ABV UP PARAM F/U: HCPCS | Performed by: INTERNAL MEDICINE

## 2023-05-22 PROCEDURE — G8399 PT W/DXA RESULTS DOCUMENT: HCPCS | Performed by: INTERNAL MEDICINE

## 2023-05-22 PROCEDURE — 1090F PRES/ABSN URINE INCON ASSESS: CPT | Performed by: INTERNAL MEDICINE

## 2023-05-22 PROCEDURE — 1036F TOBACCO NON-USER: CPT | Performed by: INTERNAL MEDICINE

## 2023-05-22 PROCEDURE — 1123F ACP DISCUSS/DSCN MKR DOCD: CPT | Performed by: INTERNAL MEDICINE

## 2023-05-22 PROCEDURE — 3017F COLORECTAL CA SCREEN DOC REV: CPT | Performed by: INTERNAL MEDICINE

## 2023-05-22 PROCEDURE — 3078F DIAST BP <80 MM HG: CPT | Performed by: INTERNAL MEDICINE

## 2023-05-22 ASSESSMENT — PATIENT HEALTH QUESTIONNAIRE - PHQ9
SUM OF ALL RESPONSES TO PHQ QUESTIONS 1-9: 0
SUM OF ALL RESPONSES TO PHQ9 QUESTIONS 1 & 2: 0
SUM OF ALL RESPONSES TO PHQ QUESTIONS 1-9: 0
2. FEELING DOWN, DEPRESSED OR HOPELESS: 0
SUM OF ALL RESPONSES TO PHQ QUESTIONS 1-9: 0
SUM OF ALL RESPONSES TO PHQ QUESTIONS 1-9: 0
1. LITTLE INTEREST OR PLEASURE IN DOING THINGS: 0

## 2023-05-22 NOTE — PROGRESS NOTES
Rm    Chief Complaint   Patient presents with    Cancer     6 mo follow up        /66 (Site: Right Upper Arm, Position: Sitting, Cuff Size: Medium Adult)   Pulse 67   Temp 98.7 °F (37.1 °C)   Resp 16   Ht 5' 4\" (1.626 m)   Wt 201 lb 1.6 oz (91.2 kg)   BMI 34.52 kg/m²      1. Have you been to the ER, urgent care clinic since your last visit? Hospitalized since your last visit? no    2. Have you seen or consulted any other health care providers outside of the [address] [place] [address] since your last visit? Include any pap smears or colon screening. no    Health Maintenance Due   Topic Date Due    COVID-19 Vaccine (4 - Booster for Moderna series) 02/09/2022    Diabetic retinal exam  04/06/2023    Colorectal Cancer Screen  06/13/2023    Diabetic foot exam  06/15/2023    Diabetic Alb to Cr ratio (uACR) test  06/15/2023        No flowsheet data found. No flowsheet data found. No flowsheet data found.

## 2023-05-23 DIAGNOSIS — I10 ESSENTIAL (PRIMARY) HYPERTENSION: Primary | ICD-10-CM

## 2023-05-23 RX ORDER — INDAPAMIDE 1.25 MG/1
1.25 TABLET, FILM COATED ORAL DAILY
Qty: 90 TABLET | Refills: 0 | Status: SHIPPED | OUTPATIENT
Start: 2023-05-23

## 2023-06-20 DIAGNOSIS — M1A.0720 IDIOPATHIC CHRONIC GOUT OF LEFT FOOT WITHOUT TOPHUS: Primary | ICD-10-CM

## 2023-06-20 RX ORDER — ALLOPURINOL 100 MG/1
100 TABLET ORAL DAILY
Qty: 90 TABLET | Refills: 0 | Status: SHIPPED | OUTPATIENT
Start: 2023-06-20

## 2023-06-28 DIAGNOSIS — E11.9 TYPE II DIABETES MELLITUS, WELL CONTROLLED (HCC): ICD-10-CM

## 2023-06-28 DIAGNOSIS — E78.2 MIXED HYPERLIPIDEMIA: ICD-10-CM

## 2023-06-28 DIAGNOSIS — I10 ESSENTIAL HYPERTENSION: ICD-10-CM

## 2023-06-28 DIAGNOSIS — I15.9 SECONDARY HYPERTENSION: Primary | ICD-10-CM

## 2023-06-28 RX ORDER — PRAVASTATIN SODIUM 20 MG
TABLET ORAL
Qty: 90 TABLET | Refills: 0 | Status: SHIPPED | OUTPATIENT
Start: 2023-06-28

## 2023-06-28 RX ORDER — POTASSIUM CHLORIDE 750 MG/1
CAPSULE, EXTENDED RELEASE ORAL
Qty: 270 CAPSULE | Refills: 0 | Status: SHIPPED | OUTPATIENT
Start: 2023-06-28

## 2023-06-28 RX ORDER — LISINOPRIL 10 MG/1
TABLET ORAL
Qty: 90 TABLET | Refills: 0 | Status: SHIPPED | OUTPATIENT
Start: 2023-06-28

## 2023-08-21 ENCOUNTER — OFFICE VISIT (OUTPATIENT)
Age: 68
End: 2023-08-21
Payer: MEDICARE

## 2023-08-21 DIAGNOSIS — Z85.3 HISTORY OF BREAST CANCER IN FEMALE: ICD-10-CM

## 2023-08-21 DIAGNOSIS — Z98.890 S/P LUMPECTOMY OF BREAST: ICD-10-CM

## 2023-08-21 DIAGNOSIS — D05.12 INTRADUCTAL CARCINOMA IN SITU OF LEFT BREAST: Primary | ICD-10-CM

## 2023-08-21 PROCEDURE — G8399 PT W/DXA RESULTS DOCUMENT: HCPCS | Performed by: NURSE PRACTITIONER

## 2023-08-21 PROCEDURE — 1123F ACP DISCUSS/DSCN MKR DOCD: CPT | Performed by: NURSE PRACTITIONER

## 2023-08-21 PROCEDURE — G8417 CALC BMI ABV UP PARAM F/U: HCPCS | Performed by: NURSE PRACTITIONER

## 2023-08-21 PROCEDURE — 1036F TOBACCO NON-USER: CPT | Performed by: NURSE PRACTITIONER

## 2023-08-21 PROCEDURE — 99213 OFFICE O/P EST LOW 20 MIN: CPT | Performed by: NURSE PRACTITIONER

## 2023-08-21 PROCEDURE — 1090F PRES/ABSN URINE INCON ASSESS: CPT | Performed by: NURSE PRACTITIONER

## 2023-08-21 PROCEDURE — 3017F COLORECTAL CA SCREEN DOC REV: CPT | Performed by: NURSE PRACTITIONER

## 2023-08-21 PROCEDURE — G8427 DOCREV CUR MEDS BY ELIG CLIN: HCPCS | Performed by: NURSE PRACTITIONER

## 2023-08-21 NOTE — PROGRESS NOTES
HISTORY OF PRESENT ILLNESS  Kaushal Nathan is a 77 y.o. female     HPI  ESTABLISHED patient presents for follow-up to LEFT breast cancer. Denies breast mass, skin changes, nipple discharge and pain. Breast history -    Referring - Dr. Ann Vu   2009 - LEFT breast cancer (probably DCIS) in 2009. Had surgery by Dr. Yenni Moses and whole breast radiation by Dr. Greg Carney at [facility]. Did not want to take tamoxifen then. Abnormal screening mammo on LEFT 2/2020.     02/18/20: LEFT breast stereo bx. PATH: DCIS, solid and comedo pattern with comedonecrosis and intraluminal coarse calcification, nuclear grade 2, ER+(99%), CA not performed. Clinical stage 0. MRI 3/5/20 no other abnormalities. Excision 1/7503 - Dr. Reymundo Horton DIAGNOSIS    Left breast, excision:    Ductal carcinoma in situ, intermediate grade (grade 2). Size: 12 mm. Pathologic stage: PTis NX    No XRT - Left breast previously radiated   Saw Dr. Charlene Gross for a consult - declined AI and tamoxifen   2/9/22 - abnormal mammogram   2/22/22 - LEFT breast core biopsy - LEFT breast DCIS - ER pos, CA pos   4/14/22 - LEFT breast lumpectomy and SLNB - Dr. Bender Labs with Greg Mcadams   Started Arimidex - Dr. Charlene Gross           Family history -    A paternal aunt had breast cancer and possible ovarian cancer. A paternal first cousin was just diagnosed with breast cancer in her mid-61s. Her son has skin cancer. Patient - genetic testing negative, VUS NBN        OB History    No obstetric history on file.       Obstetric Comments   Menarche 13, LMP 2004, # of children 1, age of 4st delivery 23, Hysterectomy/oophorectomy No/No, Breast bx Yes, history of breast feeding  No, BCP No, Hormone therapy No                   Past Surgical History:   Procedure Laterality Date    BREAST BIOPSY      BREAST LUMPECTOMY Left 3/12/2020    LEFT BREAST LUMPECTOMY WITH ULTRASOUND performed by Arvind Nielson,

## 2023-08-29 DIAGNOSIS — I10 ESSENTIAL (PRIMARY) HYPERTENSION: ICD-10-CM

## 2023-08-29 RX ORDER — INDAPAMIDE 1.25 MG/1
1.25 TABLET ORAL DAILY
Qty: 90 TABLET | Refills: 0 | Status: SHIPPED | OUTPATIENT
Start: 2023-08-29

## 2023-09-22 DIAGNOSIS — M1A.0720 IDIOPATHIC CHRONIC GOUT OF LEFT FOOT WITHOUT TOPHUS: ICD-10-CM

## 2023-09-22 RX ORDER — ALLOPURINOL 100 MG/1
100 TABLET ORAL DAILY
Qty: 90 TABLET | Refills: 0 | Status: SHIPPED | OUTPATIENT
Start: 2023-09-22

## 2023-09-26 DIAGNOSIS — E11.9 TYPE II DIABETES MELLITUS, WELL CONTROLLED (HCC): ICD-10-CM

## 2023-09-26 DIAGNOSIS — I10 ESSENTIAL (PRIMARY) HYPERTENSION: ICD-10-CM

## 2023-09-26 DIAGNOSIS — E78.2 MIXED HYPERLIPIDEMIA: ICD-10-CM

## 2023-09-26 DIAGNOSIS — I10 ESSENTIAL HYPERTENSION: ICD-10-CM

## 2023-09-26 DIAGNOSIS — I15.9 SECONDARY HYPERTENSION: ICD-10-CM

## 2023-09-26 RX ORDER — INDAPAMIDE 1.25 MG/1
1.25 TABLET ORAL DAILY
Qty: 90 TABLET | Refills: 0 | Status: SHIPPED | OUTPATIENT
Start: 2023-09-26

## 2023-09-26 RX ORDER — LISINOPRIL 10 MG/1
TABLET ORAL
Qty: 90 TABLET | Refills: 0 | Status: SHIPPED | OUTPATIENT
Start: 2023-09-26

## 2023-09-26 RX ORDER — PRAVASTATIN SODIUM 20 MG
TABLET ORAL
Qty: 90 TABLET | Refills: 0 | Status: SHIPPED | OUTPATIENT
Start: 2023-09-26

## 2023-09-26 RX ORDER — POTASSIUM CHLORIDE 750 MG/1
CAPSULE, EXTENDED RELEASE ORAL
Qty: 270 CAPSULE | Refills: 0 | Status: SHIPPED | OUTPATIENT
Start: 2023-09-26

## 2023-09-26 RX ORDER — ANASTROZOLE 1 MG/1
TABLET ORAL
Qty: 90 TABLET | Refills: 1 | Status: SHIPPED | OUTPATIENT
Start: 2023-09-26

## 2023-11-27 ENCOUNTER — OFFICE VISIT (OUTPATIENT)
Age: 68
End: 2023-11-27
Payer: MEDICARE

## 2023-11-27 VITALS
BODY MASS INDEX: 34.16 KG/M2 | OXYGEN SATURATION: 98 % | SYSTOLIC BLOOD PRESSURE: 131 MMHG | HEART RATE: 63 BPM | WEIGHT: 199 LBS | DIASTOLIC BLOOD PRESSURE: 69 MMHG | TEMPERATURE: 98.2 F | RESPIRATION RATE: 18 BRPM

## 2023-11-27 DIAGNOSIS — Z85.3 HISTORY OF LEFT BREAST CANCER: ICD-10-CM

## 2023-11-27 DIAGNOSIS — I15.9 SECONDARY HYPERTENSION: ICD-10-CM

## 2023-11-27 DIAGNOSIS — Z17.0 MALIGNANT NEOPLASM OF LEFT BREAST IN FEMALE, ESTROGEN RECEPTOR POSITIVE, UNSPECIFIED SITE OF BREAST (HCC): Primary | ICD-10-CM

## 2023-11-27 DIAGNOSIS — C50.912 MALIGNANT NEOPLASM OF LEFT BREAST IN FEMALE, ESTROGEN RECEPTOR POSITIVE, UNSPECIFIED SITE OF BREAST (HCC): Primary | ICD-10-CM

## 2023-11-27 DIAGNOSIS — E11.9 TYPE II DIABETES MELLITUS, WELL CONTROLLED (HCC): ICD-10-CM

## 2023-11-27 PROCEDURE — G8399 PT W/DXA RESULTS DOCUMENT: HCPCS | Performed by: INTERNAL MEDICINE

## 2023-11-27 PROCEDURE — 3017F COLORECTAL CA SCREEN DOC REV: CPT | Performed by: INTERNAL MEDICINE

## 2023-11-27 PROCEDURE — 3075F SYST BP GE 130 - 139MM HG: CPT | Performed by: INTERNAL MEDICINE

## 2023-11-27 PROCEDURE — 99213 OFFICE O/P EST LOW 20 MIN: CPT | Performed by: INTERNAL MEDICINE

## 2023-11-27 PROCEDURE — 1090F PRES/ABSN URINE INCON ASSESS: CPT | Performed by: INTERNAL MEDICINE

## 2023-11-27 PROCEDURE — 1036F TOBACCO NON-USER: CPT | Performed by: INTERNAL MEDICINE

## 2023-11-27 PROCEDURE — G8417 CALC BMI ABV UP PARAM F/U: HCPCS | Performed by: INTERNAL MEDICINE

## 2023-11-27 PROCEDURE — 1123F ACP DISCUSS/DSCN MKR DOCD: CPT | Performed by: INTERNAL MEDICINE

## 2023-11-27 PROCEDURE — G8427 DOCREV CUR MEDS BY ELIG CLIN: HCPCS | Performed by: INTERNAL MEDICINE

## 2023-11-27 PROCEDURE — 2022F DILAT RTA XM EVC RTNOPTHY: CPT | Performed by: INTERNAL MEDICINE

## 2023-11-27 PROCEDURE — G8484 FLU IMMUNIZE NO ADMIN: HCPCS | Performed by: INTERNAL MEDICINE

## 2023-11-27 PROCEDURE — 3078F DIAST BP <80 MM HG: CPT | Performed by: INTERNAL MEDICINE

## 2023-11-27 PROCEDURE — 3044F HG A1C LEVEL LT 7.0%: CPT | Performed by: INTERNAL MEDICINE

## 2023-11-27 NOTE — PROGRESS NOTES
Gris Tong is a 77 y.o. female    Chief Complaint   Patient presents with    Follow-up      breast cancer       1. Have you been to the ER, urgent care clinic since your last visit? Hospitalized since your last visit? No    2. Have you seen or consulted any other health care providers outside of the [address] [place] [address] since your last visit? Include any pap smears or colon screening.  Yes, Dr. Sp Zhang/edmundo
BREAST LUMPECTOMY Left     2009    BREAST SURGERY      lumpectomy    [facility]AN[facility] STEREO BREAST BX ADD LESION LEFT Left 2/22/2022    KANWAL STEREO BREAST BX ADD LESION LEFT 2/22/2022 [facility]    KANWAL STEROTACTIC LOC BREAST BIOPSY LEFT Left 2/18/2020    KANWAL STEROTACTIC LOC BREAST BIOPSY LEFT 2/18/2020 [facility]    ORTHOPEDIC SURGERY      Bunion Surgery BOTH FEET     TUBAL LIGATION        Social History     Tobacco Use    Smoking status: Never    Smokeless tobacco: Never   Substance Use Topics    Alcohol use: No      Family History   Problem Relation Age of Onset    Diabetes Father     Stroke Sister     Diabetes Brother     Heart Disease Mother     Anesth Problems Neg Hx     Diabetes Brother     Diabetes Brother     Diabetes Brother     Cancer Son         LUMP ON NECK     Hypertension Sister      Current Outpatient Medications   Medication Sig    Multiple Vitamins-Minerals (PRESERVISION AREDS 2 PO) Take by mouth    lisinopril (PRINIVIL;ZESTRIL) 10 MG tablet TAKE 1 TABLET BY MOUTH EVERY DAY    anastrozole (ARIMIDEX) 1 MG tablet TAKE 1 TABLET BY MOUTH DAILY    metFORMIN (GLUCOPHAGE) 850 MG tablet TAKE 1 TABLET BY MOUTH TWICE DAILY WITH MEALS    potassium chloride (MICRO-K) 10 MEQ extended release capsule TAKE 3 CAPSULES BY MOUTH EVERY DAY    indapamide (LOZOL) 1.25 MG tablet TAKE 1 TABLET BY MOUTH DAILY    pravastatin (PRAVACHOL) 20 MG tablet TAKE 1 TABLET BY MOUTH EVERY NIGHT    allopurinol (ZYLOPRIM) 100 MG tablet TAKE 1 TABLET BY MOUTH DAILY    Ascorbic Acid 500 MG CHEW Take by mouth    vitamin D 25 MCG (1000 UT) CAPS Take 5 capsules by mouth daily    cyanocobalamin 1000 MCG tablet Take 1 tablet by mouth daily    Ferrous Sulfate 324 MG TBEC Take by mouth every morning (before breakfast)     No current facility-administered medications for this visit.       Allergies   Allergen Reactions    Penicillins Hives        A complete review of systems was obtained, negative except as described above and as reported on ROS sheet

## 2023-12-25 DIAGNOSIS — E78.2 MIXED HYPERLIPIDEMIA: ICD-10-CM

## 2023-12-25 DIAGNOSIS — E11.9 TYPE II DIABETES MELLITUS, WELL CONTROLLED (HCC): ICD-10-CM

## 2023-12-25 DIAGNOSIS — I10 ESSENTIAL HYPERTENSION: ICD-10-CM

## 2023-12-25 DIAGNOSIS — I15.9 SECONDARY HYPERTENSION: ICD-10-CM

## 2023-12-26 DIAGNOSIS — I15.9 SECONDARY HYPERTENSION: ICD-10-CM

## 2023-12-26 DIAGNOSIS — E78.2 MIXED HYPERLIPIDEMIA: ICD-10-CM

## 2023-12-26 DIAGNOSIS — I10 ESSENTIAL HYPERTENSION: ICD-10-CM

## 2023-12-26 DIAGNOSIS — E11.9 TYPE II DIABETES MELLITUS, WELL CONTROLLED (HCC): ICD-10-CM

## 2023-12-26 RX ORDER — POTASSIUM CHLORIDE 750 MG/1
CAPSULE, EXTENDED RELEASE ORAL
Qty: 270 CAPSULE | Refills: 0 | Status: SHIPPED | OUTPATIENT
Start: 2023-12-26

## 2023-12-26 RX ORDER — PRAVASTATIN SODIUM 20 MG
TABLET ORAL
Qty: 30 TABLET | Refills: 0 | Status: SHIPPED | OUTPATIENT
Start: 2023-12-26 | End: 2023-12-26

## 2023-12-26 RX ORDER — POTASSIUM CHLORIDE 750 MG/1
CAPSULE, EXTENDED RELEASE ORAL
Qty: 90 CAPSULE | Refills: 0 | Status: SHIPPED | OUTPATIENT
Start: 2023-12-26 | End: 2023-12-26

## 2023-12-26 RX ORDER — PRAVASTATIN SODIUM 20 MG
TABLET ORAL
Qty: 90 TABLET | Refills: 0 | Status: SHIPPED | OUTPATIENT
Start: 2023-12-26

## 2023-12-26 RX ORDER — LISINOPRIL 10 MG/1
TABLET ORAL
Qty: 90 TABLET | Refills: 0 | Status: SHIPPED | OUTPATIENT
Start: 2023-12-26

## 2023-12-26 RX ORDER — LISINOPRIL 10 MG/1
TABLET ORAL
Qty: 30 TABLET | Refills: 0 | Status: SHIPPED | OUTPATIENT
Start: 2023-12-26 | End: 2023-12-26

## 2024-01-07 SDOH — ECONOMIC STABILITY: FOOD INSECURITY: WITHIN THE PAST 12 MONTHS, THE FOOD YOU BOUGHT JUST DIDN'T LAST AND YOU DIDN'T HAVE MONEY TO GET MORE.: NEVER TRUE

## 2024-01-07 SDOH — ECONOMIC STABILITY: INCOME INSECURITY: HOW HARD IS IT FOR YOU TO PAY FOR THE VERY BASICS LIKE FOOD, HOUSING, MEDICAL CARE, AND HEATING?: NOT HARD AT ALL

## 2024-01-07 SDOH — ECONOMIC STABILITY: TRANSPORTATION INSECURITY
IN THE PAST 12 MONTHS, HAS LACK OF TRANSPORTATION KEPT YOU FROM MEETINGS, WORK, OR FROM GETTING THINGS NEEDED FOR DAILY LIVING?: NO

## 2024-01-07 SDOH — ECONOMIC STABILITY: HOUSING INSECURITY
IN THE LAST 12 MONTHS, WAS THERE A TIME WHEN YOU DID NOT HAVE A STEADY PLACE TO SLEEP OR SLEPT IN A SHELTER (INCLUDING NOW)?: NO

## 2024-01-07 SDOH — ECONOMIC STABILITY: FOOD INSECURITY: WITHIN THE PAST 12 MONTHS, YOU WORRIED THAT YOUR FOOD WOULD RUN OUT BEFORE YOU GOT MONEY TO BUY MORE.: NEVER TRUE

## 2024-01-07 SDOH — HEALTH STABILITY: PHYSICAL HEALTH: ON AVERAGE, HOW MANY MINUTES DO YOU ENGAGE IN EXERCISE AT THIS LEVEL?: 10 MIN

## 2024-01-07 SDOH — HEALTH STABILITY: PHYSICAL HEALTH: ON AVERAGE, HOW MANY DAYS PER WEEK DO YOU ENGAGE IN MODERATE TO STRENUOUS EXERCISE (LIKE A BRISK WALK)?: 1 DAY

## 2024-01-07 ASSESSMENT — LIFESTYLE VARIABLES
HOW OFTEN DO YOU HAVE A DRINK CONTAINING ALCOHOL: NEVER
HOW OFTEN DO YOU HAVE SIX OR MORE DRINKS ON ONE OCCASION: 1
HOW MANY STANDARD DRINKS CONTAINING ALCOHOL DO YOU HAVE ON A TYPICAL DAY: 0
HOW MANY STANDARD DRINKS CONTAINING ALCOHOL DO YOU HAVE ON A TYPICAL DAY: PATIENT DOES NOT DRINK
HOW OFTEN DO YOU HAVE A DRINK CONTAINING ALCOHOL: 1

## 2024-01-07 ASSESSMENT — PATIENT HEALTH QUESTIONNAIRE - PHQ9
SUM OF ALL RESPONSES TO PHQ9 QUESTIONS 1 & 2: 0
SUM OF ALL RESPONSES TO PHQ QUESTIONS 1-9: 0
2. FEELING DOWN, DEPRESSED OR HOPELESS: 0
1. LITTLE INTEREST OR PLEASURE IN DOING THINGS: 0
SUM OF ALL RESPONSES TO PHQ QUESTIONS 1-9: 0

## 2024-01-08 ENCOUNTER — OFFICE VISIT (OUTPATIENT)
Dept: PRIMARY CARE CLINIC | Facility: CLINIC | Age: 69
End: 2024-01-08
Payer: MEDICARE

## 2024-01-08 ENCOUNTER — TELEPHONE (OUTPATIENT)
Dept: PRIMARY CARE CLINIC | Facility: CLINIC | Age: 69
End: 2024-01-08

## 2024-01-08 VITALS
OXYGEN SATURATION: 100 % | DIASTOLIC BLOOD PRESSURE: 80 MMHG | WEIGHT: 199 LBS | TEMPERATURE: 97.1 F | SYSTOLIC BLOOD PRESSURE: 136 MMHG | RESPIRATION RATE: 18 BRPM | BODY MASS INDEX: 33.97 KG/M2 | HEIGHT: 64 IN | HEART RATE: 60 BPM

## 2024-01-08 DIAGNOSIS — E78.2 MIXED HYPERLIPIDEMIA: ICD-10-CM

## 2024-01-08 DIAGNOSIS — M1A.0720 IDIOPATHIC CHRONIC GOUT OF LEFT FOOT WITHOUT TOPHUS: ICD-10-CM

## 2024-01-08 DIAGNOSIS — Z12.11 COLON CANCER SCREENING: ICD-10-CM

## 2024-01-08 DIAGNOSIS — I10 PRIMARY HYPERTENSION: ICD-10-CM

## 2024-01-08 DIAGNOSIS — Z85.3 HISTORY OF BREAST CANCER: ICD-10-CM

## 2024-01-08 DIAGNOSIS — E11.9 TYPE II DIABETES MELLITUS, WELL CONTROLLED (HCC): Primary | ICD-10-CM

## 2024-01-08 DIAGNOSIS — E11.9 TYPE II DIABETES MELLITUS, WELL CONTROLLED (HCC): ICD-10-CM

## 2024-01-08 LAB
ALBUMIN SERPL-MCNC: 4.2 G/DL (ref 3.5–5)
ALBUMIN/GLOB SERPL: 1.2 (ref 1.1–2.2)
ALP SERPL-CCNC: 77 U/L (ref 45–117)
ALT SERPL-CCNC: 22 U/L (ref 12–78)
ANION GAP SERPL CALC-SCNC: 7 MMOL/L (ref 5–15)
AST SERPL-CCNC: 15 U/L (ref 15–37)
BILIRUB SERPL-MCNC: 0.5 MG/DL (ref 0.2–1)
BUN/CREAT SERPL: 21 (ref 12–20)
CALCIUM SERPL-MCNC: 9.8 MG/DL (ref 8.5–10.1)
CHLORIDE SERPL-SCNC: 107 MMOL/L (ref 97–108)
CHOLEST SERPL-MCNC: 206 MG/DL
CO2 SERPL-SCNC: 27 MMOL/L (ref 21–32)
CREAT SERPL-MCNC: 0.86 MG/DL (ref 0.55–1.02)
CREAT UR-MCNC: 141 MG/DL
ERYTHROCYTE [DISTWIDTH] IN BLOOD BY AUTOMATED COUNT: 14.6 % (ref 11.5–14.5)
EST. AVERAGE GLUCOSE BLD GHB EST-MCNC: 131 MG/DL
GLOBULIN SER CALC-MCNC: 3.5 G/DL (ref 2–4)
GLUCOSE SERPL-MCNC: 102 MG/DL (ref 65–100)
HBA1C MFR BLD: 6.2 % (ref 4–5.6)
HCT VFR BLD AUTO: 34.8 % (ref 35–47)
HDLC SERPL-MCNC: 82 MG/DL
HDLC SERPL: 2.5 (ref 0–5)
HGB BLD-MCNC: 11 G/DL (ref 11.5–16)
LDLC SERPL CALC-MCNC: 104 MG/DL (ref 0–100)
MCH RBC QN AUTO: 28.4 PG (ref 26–34)
MCHC RBC AUTO-ENTMCNC: 31.6 G/DL (ref 30–36.5)
MCV RBC AUTO: 89.7 FL (ref 80–99)
MICROALBUMIN UR-MCNC: 2.09 MG/DL
MICROALBUMIN/CREAT UR-RTO: 15 MG/G (ref 0–30)
NRBC # BLD: 0 K/UL (ref 0–0.01)
NRBC BLD-RTO: 0 PER 100 WBC
PLATELET # BLD AUTO: 215 K/UL (ref 150–400)
PMV BLD AUTO: 11.9 FL (ref 8.9–12.9)
POTASSIUM SERPL-SCNC: 3.6 MMOL/L (ref 3.5–5.1)
PROT SERPL-MCNC: 7.7 G/DL (ref 6.4–8.2)
RBC # BLD AUTO: 3.88 M/UL (ref 3.8–5.2)
SODIUM SERPL-SCNC: 141 MMOL/L (ref 136–145)
TRIGL SERPL-MCNC: 100 MG/DL
URATE SERPL-MCNC: 5.8 MG/DL (ref 2.6–6)
VLDLC SERPL CALC-MCNC: 20 MG/DL
WBC # BLD AUTO: 5.1 K/UL (ref 3.6–11)

## 2024-01-08 PROCEDURE — 2022F DILAT RTA XM EVC RTNOPTHY: CPT | Performed by: INTERNAL MEDICINE

## 2024-01-08 PROCEDURE — G8484 FLU IMMUNIZE NO ADMIN: HCPCS | Performed by: INTERNAL MEDICINE

## 2024-01-08 PROCEDURE — G8417 CALC BMI ABV UP PARAM F/U: HCPCS | Performed by: INTERNAL MEDICINE

## 2024-01-08 PROCEDURE — G8399 PT W/DXA RESULTS DOCUMENT: HCPCS | Performed by: INTERNAL MEDICINE

## 2024-01-08 PROCEDURE — 3078F DIAST BP <80 MM HG: CPT | Performed by: INTERNAL MEDICINE

## 2024-01-08 PROCEDURE — 3075F SYST BP GE 130 - 139MM HG: CPT | Performed by: INTERNAL MEDICINE

## 2024-01-08 PROCEDURE — G8427 DOCREV CUR MEDS BY ELIG CLIN: HCPCS | Performed by: INTERNAL MEDICINE

## 2024-01-08 PROCEDURE — 1123F ACP DISCUSS/DSCN MKR DOCD: CPT | Performed by: INTERNAL MEDICINE

## 2024-01-08 PROCEDURE — 1036F TOBACCO NON-USER: CPT | Performed by: INTERNAL MEDICINE

## 2024-01-08 PROCEDURE — 3044F HG A1C LEVEL LT 7.0%: CPT | Performed by: INTERNAL MEDICINE

## 2024-01-08 PROCEDURE — 3017F COLORECTAL CA SCREEN DOC REV: CPT | Performed by: INTERNAL MEDICINE

## 2024-01-08 PROCEDURE — 99214 OFFICE O/P EST MOD 30 MIN: CPT | Performed by: INTERNAL MEDICINE

## 2024-01-08 PROCEDURE — 1090F PRES/ABSN URINE INCON ASSESS: CPT | Performed by: INTERNAL MEDICINE

## 2024-01-08 ASSESSMENT — ENCOUNTER SYMPTOMS
SHORTNESS OF BREATH: 0
BACK PAIN: 0
CONSTIPATION: 0
COUGH: 1
DIARRHEA: 1
COLOR CHANGE: 0
ABDOMINAL PAIN: 0
RHINORRHEA: 0
SORE THROAT: 0
EYE DISCHARGE: 0
CHEST TIGHTNESS: 0

## 2024-01-08 NOTE — PROGRESS NOTES
\"Have you been to the ER, urgent care clinic since your last visit?  Hospitalized since your last visit?\"    NO      “Have you seen or consulted any other health care providers outside of [facility] since your last visit?”    NO      “Have you had a colorectal cancer screening such as a colonoscopy/FIT/Cologuard?    Has been over 10 years.        Chief Complaint   Patient presents with    Follow-up     Pt is ok with scribe.   
  Neurological:  Positive for headaches. Negative for dizziness and light-headedness.   Psychiatric/Behavioral:  Negative for dysphoric mood and sleep disturbance. The patient is not nervous/anxious.           /80 (Site: Right Upper Arm, Position: Sitting)   Pulse 60   Temp 97.1 °F (36.2 °C) (Temporal)   Resp 18   Ht 1.626 m (5' 4\")   Wt 90.3 kg (199 lb)   SpO2 100%   BMI 34.16 kg/m²     Physical Exam  Vitals and nursing note reviewed.   Constitutional:       General: She is not in acute distress.     Appearance: Normal appearance. She is obese. She is not diaphoretic.   HENT:      Right Ear: External ear normal.      Left Ear: External ear normal.   Eyes:      General: No scleral icterus.        Right eye: No discharge.         Left eye: No discharge.      Extraocular Movements: Extraocular movements intact.      Conjunctiva/sclera: Conjunctivae normal.   Cardiovascular:      Rate and Rhythm: Normal rate and regular rhythm.   Pulmonary:      Effort: Pulmonary effort is normal.      Breath sounds: Normal breath sounds. No wheezing.   Abdominal:      General: Bowel sounds are normal.      Palpations: Abdomen is soft.      Tenderness: There is no abdominal tenderness.   Musculoskeletal:      Cervical back: Normal range of motion and neck supple.   Feet:      Comments: Visual inspection:  Deformity/amputation: absent  Skin lesions/pre-ulcerative calluses: absent  Edema: right- negative, left- negative    Sensory exam:  Monofilament sensation: normal  (minimum of 5 random plantar locations tested, avoiding callused areas - > 1 area with absence of sensation is + for neuropathy)    Plus at least one of the following:  Pulses: normal,   Pinprick: Intact  Proprioception: Intact  Vibration (128 Hz): Intact   Lymphadenopathy:      Cervical: No cervical adenopathy.   Neurological:      Mental Status: She is alert and oriented to person, place, and time.   Psychiatric:         Mood and Affect: Mood normal.

## 2024-01-08 NOTE — TELEPHONE ENCOUNTER
Called and spoke to pt and told her we have her on the scheduled today for MWV but her MWV isn't due until April. Is this a follow up? She said she hasn't seen Dr. Romero since last April. I said ok, I will put this as a follow up and we will see you later. She said ok, thank you.

## 2024-01-15 ENCOUNTER — ANESTHESIA EVENT (OUTPATIENT)
Facility: HOSPITAL | Age: 69
End: 2024-01-15
Payer: MEDICARE

## 2024-01-15 NOTE — ANESTHESIA PRE PROCEDURE
Department of Anesthesiology  Preprocedure Note       Name:  Sandhya Mckinney   Age:  68 y.o.  :  1955                                          MRN:  [id number]         Date:  1/15/2024      Surgeon: Surgeon(s):  Ron Montenegro Jr., MD    Procedure: Procedure(s):  COLONOSCOPY    Medications prior to admission:   Prior to Admission medications    Medication Sig Start Date End Date Taking? Authorizing Provider   Ascorbic Acid (VITAMIN C PO) Take by mouth    ProviderAmi MD   lisinopril (PRINIVIL;ZESTRIL) 10 MG tablet TAKE 1 TABLET BY MOUTH EVERY DAY 23   Jere Garcia APRN - NP   metFORMIN (GLUCOPHAGE) 850 MG tablet TAKE 1 TABLET BY MOUTH TWICE DAILY WITH MEALS 23   Jere Garcia APRN - NP   potassium chloride (MICRO-K) 10 MEQ extended release capsule TAKE 3 CAPSULES BY MOUTH EVERY DAY 23   Jere Garcia APRN - NP   pravastatin (PRAVACHOL) 20 MG tablet TAKE 1 TABLET BY MOUTH EVERY NIGHT 23   Jere Garcia APRN - NP   allopurinol (ZYLOPRIM) 100 MG tablet TAKE 1 TABLET BY MOUTH DAILY 23   Aurora Romero MD   Multiple Vitamins-Minerals (PRESERVISION AREDS 2 PO) Take by mouth    Ami Schwartz MD   anastrozole (ARIMIDEX) 1 MG tablet TAKE 1 TABLET BY MOUTH DAILY 23   Fatimah Bell APRN - NP   indapamide (LOZOL) 1.25 MG tablet TAKE 1 TABLET BY MOUTH DAILY 23   Aurora Romero MD   Ascorbic Acid 500 MG CHEW Take by mouth    Automatic Reconciliation, Ar   vitamin D 25 MCG (1000 UT) CAPS Take 5 capsules by mouth daily    Automatic Reconciliation, Ar   cyanocobalamin 1000 MCG tablet Take 1 tablet by mouth daily    Automatic Reconciliation, Ar   Ferrous Sulfate 324 MG TBEC Take by mouth every morning (before breakfast)    Automatic Reconciliation, Ar       Current medications:    No current facility-administered medications for this encounter.     Current Outpatient Medications   Medication Sig Dispense Refill   • Ascorbic Acid

## 2024-01-16 ENCOUNTER — HOSPITAL ENCOUNTER (OUTPATIENT)
Facility: HOSPITAL | Age: 69
Setting detail: OUTPATIENT SURGERY
Discharge: HOME | End: 2024-01-16
Attending: INTERNAL MEDICINE | Admitting: INTERNAL MEDICINE
Payer: MEDICARE

## 2024-01-16 ENCOUNTER — ANESTHESIA (OUTPATIENT)
Facility: HOSPITAL | Age: 69
End: 2024-01-16
Payer: MEDICARE

## 2024-01-16 VITALS
OXYGEN SATURATION: 100 % | DIASTOLIC BLOOD PRESSURE: 48 MMHG | HEART RATE: 63 BPM | SYSTOLIC BLOOD PRESSURE: 139 MMHG | TEMPERATURE: 97.4 F | WEIGHT: 197 LBS | HEIGHT: 64 IN | BODY MASS INDEX: 33.63 KG/M2 | RESPIRATION RATE: 16 BRPM

## 2024-01-16 PROCEDURE — 6360000002 HC RX W HCPCS: Performed by: NURSE ANESTHETIST, CERTIFIED REGISTERED

## 2024-01-16 PROCEDURE — 2709999900 HC NON-CHARGEABLE SUPPLY: Performed by: INTERNAL MEDICINE

## 2024-01-16 PROCEDURE — 3700000001 HC ADD 15 MINUTES (ANESTHESIA): Performed by: INTERNAL MEDICINE

## 2024-01-16 PROCEDURE — 7100000010 HC PHASE II RECOVERY - FIRST 15 MIN: Performed by: INTERNAL MEDICINE

## 2024-01-16 PROCEDURE — 2500000003 HC RX 250 WO HCPCS: Performed by: NURSE ANESTHETIST, CERTIFIED REGISTERED

## 2024-01-16 PROCEDURE — 3700000000 HC ANESTHESIA ATTENDED CARE: Performed by: INTERNAL MEDICINE

## 2024-01-16 PROCEDURE — 7100000011 HC PHASE II RECOVERY - ADDTL 15 MIN: Performed by: INTERNAL MEDICINE

## 2024-01-16 PROCEDURE — 3600007502: Performed by: INTERNAL MEDICINE

## 2024-01-16 PROCEDURE — 3600007512: Performed by: INTERNAL MEDICINE

## 2024-01-16 RX ORDER — SODIUM CHLORIDE 0.9 % (FLUSH) 0.9 %
5-40 SYRINGE (ML) INJECTION EVERY 12 HOURS SCHEDULED
Status: DISCONTINUED | OUTPATIENT
Start: 2024-01-16 | End: 2024-01-16 | Stop reason: HOSPADM

## 2024-01-16 RX ORDER — SODIUM CHLORIDE 0.9 % (FLUSH) 0.9 %
5-40 SYRINGE (ML) INJECTION PRN
Status: DISCONTINUED | OUTPATIENT
Start: 2024-01-16 | End: 2024-01-16 | Stop reason: HOSPADM

## 2024-01-16 RX ORDER — LIDOCAINE HYDROCHLORIDE 20 MG/ML
INJECTION, SOLUTION EPIDURAL; INFILTRATION; INTRACAUDAL; PERINEURAL PRN
Status: DISCONTINUED | OUTPATIENT
Start: 2024-01-16 | End: 2024-01-16 | Stop reason: SDUPTHER

## 2024-01-16 RX ORDER — SODIUM CHLORIDE 9 MG/ML
25 INJECTION, SOLUTION INTRAVENOUS PRN
Status: DISCONTINUED | OUTPATIENT
Start: 2024-01-16 | End: 2024-01-16 | Stop reason: HOSPADM

## 2024-01-16 RX ADMIN — PROPOFOL 50 MG: 10 INJECTION, EMULSION INTRAVENOUS at 13:44

## 2024-01-16 RX ADMIN — LIDOCAINE HYDROCHLORIDE 100 MG: 20 INJECTION, SOLUTION EPIDURAL; INFILTRATION; INTRACAUDAL; PERINEURAL at 13:30

## 2024-01-16 RX ADMIN — PROPOFOL 50 MG: 10 INJECTION, EMULSION INTRAVENOUS at 13:33

## 2024-01-16 RX ADMIN — PROPOFOL 100 MG: 10 INJECTION, EMULSION INTRAVENOUS at 13:30

## 2024-01-16 ASSESSMENT — PAIN - FUNCTIONAL ASSESSMENT: PAIN_FUNCTIONAL_ASSESSMENT: NONE - DENIES PAIN

## 2024-01-16 NOTE — OP NOTE
[facility]                  Colonoscopy Operative Report    1/16/2024      Sandhya Mckinney  [id number]  1955    Procedure Type:   Colonoscopy --screening     Indications:    Screening colonoscopy     Pre-operative Diagnosis: see indication above    Post-operative Diagnosis:  See findings below    :  DEMARCO Montenegro Jr, MD    Referring Provider: Aurora Romero MD      Sedation:  MAC anesthesia Propofol    Pre-Procedural Exam:      Airway: clear,  No airway problems anticipated  Heart: RRR, without gallops or rubs  Lungs: clear bilaterally without wheezes, crackles, or rhonchi  Abdomen: soft, nontender, nondistended, bowel sounds present  Mental Status: awake, alert and oriented to person, place and time     Procedure Details:  After informed consent was obtained with all risks and benefits of procedure explained and preoperative exam completed, the patient was taken to the endoscopy suite and placed in the left lateral decubitus position.  Upon sequential sedation as per above, a digital rectal exam was performed .  The Olympus videocolonoscope  was inserted in the rectum and carefully advanced to the cecum, which was identified by the ileocecal valve and appendiceal orifice.  The cecum was identified by the ileocecal valve and appendiceal orifice.  The quality of preparation was good.  The colonoscope was slowly withdrawn with careful evaluation between folds. Retroflexion in the rectum was completed demonstrating internal hemorrhoids.     Findings:   Rectum: Grade 1 internal hemorrhoid(s);  Sigmoid:     -Diverticulosis  Descending Colon: normal  Transverse Colon: normal  Ascending Colon: Two lipomas---10 to 12 mm  Cecum: normal  Terminal Ileum: not intubated      Specimen Removed:  none    Complications: None.     EBL:  None.    Impression:    normal colonic mucosa throughout  diverticulosis,  Mild in degree, involving the sigmoid  hemorrhoids internal, Moderate in

## 2024-01-16 NOTE — H&P
DANNA Montenegro MD  [facility]  [address], [address]  [place] [address] [phone]  [email or web address]    Gastroenterology Outpatient History and Physical    Patient: Sandhya MONTGOMERY Hank    Physician: DEMARCO Montenegro MD    Vital Signs: Blood pressure (!) 159/62, pulse 65, temperature 97.8 °F (36.6 °C), temperature source Temporal, height 1.626 m (5' 4\"), weight 89.4 kg (197 lb), SpO2 98 %.    Allergies:   Allergies   Allergen Reactions    Penicillins Hives       Chief Complaint: Screening colonoscopy    History of Present Illness: Here for a screening colonoscopy.  Last colonoscopy was 10 years ago and showed no polyps. Currently has no GI symptoms. No FH of colon cancer or polyps.    History:  Past Medical History:   Diagnosis Date    Arthritis     Breast cancer (HCC)     lumpectomy with radiation, 2009, left    Diabetes (HCC)     Hyperlipidemia     Hypertension     Prolonged emergence from general anesthesia     Thyroid goiter       Past Surgical History:   Procedure Laterality Date    BREAST BIOPSY      BREAST LUMPECTOMY Left 3/12/2020    LEFT BREAST LUMPECTOMY WITH ULTRASOUND performed by Edgar Junior Jr., MD at [facility] AM[facility]UL[facility] OR    BREAST LUMPECTOMY Left 4/14/2022    LEFT BREAST LUMPECTOMY WITH ULTRASOUND/LEFT BREAST SENTINEL NODE BIOPSY performed by Edgar Junior Jr., MD at [facility] AMBULATORY OR    BREAST LUMPECTOMY Left     2009    BREAST SURGERY      lumpectomy    KANWAL STEREO BREAST BX ADD LESION LEFT Left 2/22/2022    KANWAL STEREO BREAST BX ADD LESION LEFT 2/22/2022 [facility]    KANWAL STEROTACTIC LOC BREAST BIOPSY LEFT Left 2/18/2020    KANWAL STEROTACTIC LOC BREAST BIOPSY LEFT 2/18/2020 [facility]    ORTHOPEDIC SURGERY      Bunion Surgery BOTH FEET     TUBAL LIGATION        Social History     Socioeconomic History    Marital status:      Spouse name: None    Number of children: None    Years of education: None    Highest

## 2024-01-16 NOTE — PROCEDURES
Endoscopy Case End Note:    ***:  Procedure scope was pre-cleaned, per protocol, at bedside by ILENE Mason.

## 2024-01-16 NOTE — DISCHARGE INSTRUCTIONS
DANNA Montenegro MD  [facility]  [address], [address]  [place] [address] [phone]  [email or web address]    Sandhya Mckinney  [id number]  1955    COLON DISCHARGE INSTRUCTIONS  Discomfort:  Redness at IV site- apply warm compress to area; if redness or soreness persist- contact your physician  There may be a slight amount of blood passed from the rectum  Gaseous discomfort- walking, belching will help relieve any discomfort  You may not operate a vehicle for 12 hours  You may not engage in an occupation involving machinery or appliances for rest of today  You may not drink alcoholic beverages for at least 12 hours  Avoid making any critical decisions for at least 24 hour  DIET:   High fiber diet.   - however -  remember your colon is empty and a heavy meal will produce gas.   Avoid these foods:  vegetables, fried / greasy foods, carbonated drinks for today      ACTIVITY:  You may resume your normal daily activities it is recommended that you spend the remainder of the day resting -  avoid any strenuous activity.    CALL M.D.  ANY SIGN OF:   Increasing pain, nausea, vomiting  Abdominal distension (swelling)  New increased bleeding (oral or rectal)  Fever (chills)  Pain in chest area  Bloody discharge from nose or mouth  Shortness of breath     COLONOSCOPY FINDINGS:  Your colonoscopy showed: no polyps found. Do have some mild diverticulosis and internal hemorrhoids.    Follow-up Instructions:   Call Dr. DEMARCO Montenegro if any questions or problems.   Telephone # [phone]  Should have a repeat colonoscopy in 10 years.    Patient Education on Sedation / Analgesia Administered for Procedure      For 24 hours after general anesthesia or intravenous analgesia / sedation:  Have someone responsible help you with your care  Limit your activities  Do not drive and operate hazardous machinery  Do not make important personal, legal or business

## 2024-01-16 NOTE — ANESTHESIA POSTPROCEDURE EVALUATION
Department of Anesthesiology  Postprocedure Note    Patient: Sandhya Mckinney  MRN: [id number]  YOB: 1955  Date of evaluation: 1/16/2024    Procedure Summary       Date: 01/16/24 Room / Location: [facility] ENDO [facility] / [facility]    Anesthesia Start: 1328 Anesthesia Stop: 1347    Procedure: COLONOSCOPY (Lower GI Region) Diagnosis:       Screening for colon cancer      (Screening for colon cancer [Z12.11])    Surgeons: Ron Montenegro Jr., MD Responsible Provider: ELIZABETH Lynne MD    Anesthesia Type: MAC ASA Status: 2            Anesthesia Type: MAC    Alesia Phase I: Alesia Score: 9    Alesia Phase II: Alesia Score: 10    Anesthesia Post Evaluation    Patient location during evaluation: bedside  Patient participation: complete - patient participated  Level of consciousness: responsive to verbal stimuli and awake and alert  Pain score: 2  Nausea & Vomiting: no nausea  Cardiovascular status: blood pressure returned to baseline  Respiratory status: acceptable  Hydration status: euvolemic  Multimodal analgesia pain management approach  Pain management: adequate    No notable events documented.

## 2024-02-06 ENCOUNTER — HOSPITAL ENCOUNTER (OUTPATIENT)
Facility: HOSPITAL | Age: 69
Discharge: HOME | End: 2024-02-09
Payer: MEDICARE

## 2024-02-06 DIAGNOSIS — Z85.3 HISTORY OF BREAST CANCER IN FEMALE: ICD-10-CM

## 2024-02-06 PROCEDURE — G0279 TOMOSYNTHESIS, MAMMO: HCPCS

## 2024-02-12 ENCOUNTER — OFFICE VISIT (OUTPATIENT)
Age: 69
End: 2024-02-12
Payer: MEDICARE

## 2024-02-12 VITALS — BODY MASS INDEX: 33.63 KG/M2 | WEIGHT: 197 LBS | HEIGHT: 64 IN

## 2024-02-12 DIAGNOSIS — Z92.3 S/P RADIATION THERAPY: ICD-10-CM

## 2024-02-12 DIAGNOSIS — Z85.3 HISTORY OF BREAST CANCER IN FEMALE: ICD-10-CM

## 2024-02-12 DIAGNOSIS — Z98.890 S/P LUMPECTOMY OF BREAST: ICD-10-CM

## 2024-02-12 DIAGNOSIS — D05.12 INTRADUCTAL CARCINOMA IN SITU OF LEFT BREAST: Primary | ICD-10-CM

## 2024-02-12 PROCEDURE — 99213 OFFICE O/P EST LOW 20 MIN: CPT | Performed by: NURSE PRACTITIONER

## 2024-02-12 PROCEDURE — G8427 DOCREV CUR MEDS BY ELIG CLIN: HCPCS | Performed by: NURSE PRACTITIONER

## 2024-02-12 PROCEDURE — 1090F PRES/ABSN URINE INCON ASSESS: CPT | Performed by: NURSE PRACTITIONER

## 2024-02-12 PROCEDURE — G8484 FLU IMMUNIZE NO ADMIN: HCPCS | Performed by: NURSE PRACTITIONER

## 2024-02-12 PROCEDURE — G8417 CALC BMI ABV UP PARAM F/U: HCPCS | Performed by: NURSE PRACTITIONER

## 2024-02-12 PROCEDURE — G8399 PT W/DXA RESULTS DOCUMENT: HCPCS | Performed by: NURSE PRACTITIONER

## 2024-02-12 PROCEDURE — 3017F COLORECTAL CA SCREEN DOC REV: CPT | Performed by: NURSE PRACTITIONER

## 2024-02-12 PROCEDURE — 1036F TOBACCO NON-USER: CPT | Performed by: NURSE PRACTITIONER

## 2024-02-12 PROCEDURE — 1123F ACP DISCUSS/DSCN MKR DOCD: CPT | Performed by: NURSE PRACTITIONER

## 2024-02-12 NOTE — PROGRESS NOTES
HISTORY OF PRESENT ILLNESS  Sandhya Mckinney is a 68 y.o. female     HPI  ESTABLISHED patient presents for follow-up to LEFT breast cancer.   Denies breast mass, skin changes, nipple discharge and pain.             Breast history -    Referring - Dr. Aurora Romero    - LEFT breast cancer (probably DCIS) in .  Had surgery by Dr. Castillo and whole breast radiation by Dr. Jadon Salazar at [facility].     Did not want to take tamoxifen then.    Abnormal screening mammo on LEFT 2020.     20: LEFT breast stereo bx. PATH: DCIS, solid and comedo pattern with comedonecrosis and intraluminal coarse calcification, nuclear grade 2, ER+(99%), KS not performed. Clinical stage 0.   MRI 3/5/20 no other abnormalities.   Excision 3/2020 - Dr. Junior   FINAL PATHOLOGIC DIAGNOSIS    Left breast, excision:    Ductal carcinoma in situ, intermediate grade (grade 2).    Size: 12 mm.    Pathologic stage: PTis NX    No XRT - Left breast previously radiated   Saw Dr. Preston for a consult - declined AI and tamoxifen   22 - abnormal mammogram   22 - LEFT breast core biopsy - LEFT breast DCIS - ER pos, KS pos   22 - LEFT breast lumpectomy and SLNB - Dr. Junior   DCIS with microinvasion - T1miN0   Started Arimidex - Dr. Preston             Family history -    A paternal aunt had breast cancer and possible ovarian cancer.     A paternal first cousin was just diagnosed with breast cancer in her mid-60s.     Her son has skin cancer.   Patient - genetic testing negative, VUS NBN        OB History          1    Para   1    Term   1            AB        Living             SAB        IAB        Ectopic        Molar        Multiple        Live Births   1          Obstetric Comments   Menarche 11, LMP , # of children 1, age of 1st delivery 20, Hysterectomy/oophorectomy No/No, Breast bx Yes, history of breast feeding  No, BCP No, Hormone therapy No                   Past Surgical History:   Procedure

## 2024-02-26 DIAGNOSIS — I10 ESSENTIAL (PRIMARY) HYPERTENSION: ICD-10-CM

## 2024-02-26 RX ORDER — INDAPAMIDE 1.25 MG/1
1.25 TABLET ORAL DAILY
Qty: 90 TABLET | Refills: 0 | Status: SHIPPED | OUTPATIENT
Start: 2024-02-26

## 2024-03-18 DIAGNOSIS — M1A.0720 IDIOPATHIC CHRONIC GOUT OF LEFT FOOT WITHOUT TOPHUS: ICD-10-CM

## 2024-03-18 RX ORDER — ALLOPURINOL 100 MG/1
100 TABLET ORAL DAILY
Qty: 90 TABLET | Refills: 0 | Status: SHIPPED | OUTPATIENT
Start: 2024-03-18

## 2024-03-24 DIAGNOSIS — I10 ESSENTIAL (PRIMARY) HYPERTENSION: ICD-10-CM

## 2024-03-24 DIAGNOSIS — I15.9 SECONDARY HYPERTENSION: ICD-10-CM

## 2024-03-24 DIAGNOSIS — Z17.0 MALIGNANT NEOPLASM OF LEFT BREAST IN FEMALE, ESTROGEN RECEPTOR POSITIVE, UNSPECIFIED SITE OF BREAST (HCC): Primary | ICD-10-CM

## 2024-03-24 DIAGNOSIS — I10 ESSENTIAL HYPERTENSION: ICD-10-CM

## 2024-03-24 DIAGNOSIS — E11.9 TYPE II DIABETES MELLITUS, WELL CONTROLLED (HCC): ICD-10-CM

## 2024-03-24 DIAGNOSIS — E78.2 MIXED HYPERLIPIDEMIA: ICD-10-CM

## 2024-03-24 DIAGNOSIS — C50.912 MALIGNANT NEOPLASM OF LEFT BREAST IN FEMALE, ESTROGEN RECEPTOR POSITIVE, UNSPECIFIED SITE OF BREAST (HCC): Primary | ICD-10-CM

## 2024-03-24 RX ORDER — INDAPAMIDE 1.25 MG/1
1.25 TABLET ORAL DAILY
Qty: 90 TABLET | Refills: 0 | Status: SHIPPED | OUTPATIENT
Start: 2024-03-24

## 2024-03-24 RX ORDER — POTASSIUM CHLORIDE 750 MG/1
CAPSULE, EXTENDED RELEASE ORAL
Qty: 270 CAPSULE | Refills: 0 | Status: SHIPPED | OUTPATIENT
Start: 2024-03-24

## 2024-03-24 RX ORDER — LISINOPRIL 10 MG/1
TABLET ORAL
Qty: 90 TABLET | Refills: 0 | Status: SHIPPED | OUTPATIENT
Start: 2024-03-24

## 2024-03-24 RX ORDER — PRAVASTATIN SODIUM 20 MG
TABLET ORAL
Qty: 90 TABLET | Refills: 0 | Status: SHIPPED | OUTPATIENT
Start: 2024-03-24

## 2024-03-25 RX ORDER — ANASTROZOLE 1 MG/1
TABLET ORAL
Qty: 90 TABLET | Refills: 1 | Status: SHIPPED | OUTPATIENT
Start: 2024-03-25

## 2024-03-25 NOTE — TELEPHONE ENCOUNTER
Oral Hormone therapy     Sandhya Mckinney is a  68 y.o.female  diagnosed with breast cancer . Ms. Mckinney is being treated with anastrozole.     Medication name: anastrozole    Dose:  1 mg   Frequency: daily    Ordering provider: Dianna Domínguez DO  Start date: 2022        Last OV 11/27/23  Next OV 5/28/24    Refill for anastrozole sent to pharmacy on file       Fatimah James, PHARMD, BCOP, BCPS    For Pharmacy Admin Tracking Only    Program: Medical Group  CPA in place:  Yes  Recommendation Provided To: Patient/Caregiver: 1 via Telephone  Intervention Detail: Refill(s) Provided  Intervention Accepted By: Patient/Caregiver: 1    Time Spent (min): 10

## 2024-05-07 SDOH — HEALTH STABILITY: PHYSICAL HEALTH: ON AVERAGE, HOW MANY DAYS PER WEEK DO YOU ENGAGE IN MODERATE TO STRENUOUS EXERCISE (LIKE A BRISK WALK)?: 1 DAY

## 2024-05-07 SDOH — HEALTH STABILITY: PHYSICAL HEALTH: ON AVERAGE, HOW MANY MINUTES DO YOU ENGAGE IN EXERCISE AT THIS LEVEL?: 10 MIN

## 2024-05-07 ASSESSMENT — LIFESTYLE VARIABLES
HOW OFTEN DO YOU HAVE SIX OR MORE DRINKS ON ONE OCCASION: 1
HOW OFTEN DO YOU HAVE A DRINK CONTAINING ALCOHOL: NEVER
HOW MANY STANDARD DRINKS CONTAINING ALCOHOL DO YOU HAVE ON A TYPICAL DAY: PATIENT DOES NOT DRINK
HOW MANY STANDARD DRINKS CONTAINING ALCOHOL DO YOU HAVE ON A TYPICAL DAY: 0
HOW OFTEN DO YOU HAVE A DRINK CONTAINING ALCOHOL: 1

## 2024-05-07 ASSESSMENT — PATIENT HEALTH QUESTIONNAIRE - PHQ9
SUM OF ALL RESPONSES TO PHQ QUESTIONS 1-9: 0
SUM OF ALL RESPONSES TO PHQ9 QUESTIONS 1 & 2: 0
SUM OF ALL RESPONSES TO PHQ QUESTIONS 1-9: 0
1. LITTLE INTEREST OR PLEASURE IN DOING THINGS: NOT AT ALL
2. FEELING DOWN, DEPRESSED OR HOPELESS: NOT AT ALL
SUM OF ALL RESPONSES TO PHQ QUESTIONS 1-9: 0
SUM OF ALL RESPONSES TO PHQ QUESTIONS 1-9: 0

## 2024-05-08 ENCOUNTER — OFFICE VISIT (OUTPATIENT)
Dept: PRIMARY CARE CLINIC | Facility: CLINIC | Age: 69
End: 2024-05-08

## 2024-05-08 VITALS
HEART RATE: 67 BPM | BODY MASS INDEX: 34.28 KG/M2 | DIASTOLIC BLOOD PRESSURE: 72 MMHG | TEMPERATURE: 97.1 F | HEIGHT: 64 IN | RESPIRATION RATE: 16 BRPM | OXYGEN SATURATION: 98 % | SYSTOLIC BLOOD PRESSURE: 136 MMHG | WEIGHT: 200.8 LBS

## 2024-05-08 DIAGNOSIS — E11.9 WELL CONTROLLED DIABETES MELLITUS (HCC): ICD-10-CM

## 2024-05-08 DIAGNOSIS — E78.2 MIXED HYPERLIPIDEMIA: ICD-10-CM

## 2024-05-08 DIAGNOSIS — M1A.0720 IDIOPATHIC CHRONIC GOUT OF LEFT FOOT WITHOUT TOPHUS: ICD-10-CM

## 2024-05-08 DIAGNOSIS — Z71.89 ACP (ADVANCE CARE PLANNING): ICD-10-CM

## 2024-05-08 DIAGNOSIS — I10 PRIMARY HYPERTENSION: ICD-10-CM

## 2024-05-08 DIAGNOSIS — M25.511 CHRONIC RIGHT SHOULDER PAIN: ICD-10-CM

## 2024-05-08 DIAGNOSIS — Z85.3 HISTORY OF BREAST CANCER: ICD-10-CM

## 2024-05-08 DIAGNOSIS — E04.2 MULTINODULAR GOITER: ICD-10-CM

## 2024-05-08 DIAGNOSIS — Z00.00 MEDICARE ANNUAL WELLNESS VISIT, SUBSEQUENT: Primary | ICD-10-CM

## 2024-05-08 DIAGNOSIS — G89.29 CHRONIC RIGHT SHOULDER PAIN: ICD-10-CM

## 2024-05-09 ENCOUNTER — TELEPHONE (OUTPATIENT)
Dept: PRIMARY CARE CLINIC | Facility: CLINIC | Age: 69
End: 2024-05-09

## 2024-05-09 LAB
ALBUMIN SERPL-MCNC: 3.8 G/DL (ref 3.5–5)
ALBUMIN/GLOB SERPL: 1.1 (ref 1.1–2.2)
ALP SERPL-CCNC: 80 U/L (ref 45–117)
ALT SERPL-CCNC: 24 U/L (ref 12–78)
ANION GAP SERPL CALC-SCNC: 6 MMOL/L (ref 5–15)
AST SERPL-CCNC: 12 U/L (ref 15–37)
BILIRUB SERPL-MCNC: 0.6 MG/DL (ref 0.2–1)
BUN SERPL-MCNC: 19 MG/DL (ref 6–20)
BUN/CREAT SERPL: 19 (ref 12–20)
CALCIUM SERPL-MCNC: 10.5 MG/DL (ref 8.5–10.1)
CHLORIDE SERPL-SCNC: 108 MMOL/L (ref 97–108)
CHOLEST SERPL-MCNC: 192 MG/DL
CO2 SERPL-SCNC: 28 MMOL/L (ref 21–32)
CREAT SERPL-MCNC: 1.01 MG/DL (ref 0.55–1.02)
EST. AVERAGE GLUCOSE BLD GHB EST-MCNC: 128 MG/DL
GLOBULIN SER CALC-MCNC: 3.4 G/DL (ref 2–4)
GLUCOSE SERPL-MCNC: 107 MG/DL (ref 65–100)
HBA1C MFR BLD: 6.1 % (ref 4–5.6)
HDLC SERPL-MCNC: 75 MG/DL
HDLC SERPL: 2.6 (ref 0–5)
LDLC SERPL CALC-MCNC: 94.8 MG/DL (ref 0–100)
POTASSIUM SERPL-SCNC: 4 MMOL/L (ref 3.5–5.1)
PROT SERPL-MCNC: 7.2 G/DL (ref 6.4–8.2)
SODIUM SERPL-SCNC: 142 MMOL/L (ref 136–145)
TRIGL SERPL-MCNC: 111 MG/DL
VLDLC SERPL CALC-MCNC: 22.2 MG/DL

## 2024-05-16 ENCOUNTER — HOSPITAL ENCOUNTER (OUTPATIENT)
Facility: HOSPITAL | Age: 69
Discharge: HOME | End: 2024-05-16
Attending: INTERNAL MEDICINE
Payer: MEDICARE

## 2024-05-16 DIAGNOSIS — E04.2 MULTINODULAR GOITER: ICD-10-CM

## 2024-05-16 PROCEDURE — 76536 US EXAM OF HEAD AND NECK: CPT

## 2024-05-28 ENCOUNTER — TELEPHONE (OUTPATIENT)
Age: 69
End: 2024-05-28

## 2024-05-28 NOTE — PROGRESS NOTES
Health Decision Maker has been checked with the patient   Primary Decision Maker: Annmarie Mckeon - [facility] - [phone]     Patient has stated that the AI scribe can be used    \"Have you been to the ER, urgent care clinic since your last visit?  Hospitalized since your last visit?\"    NO    “Have you seen or consulted any other health care providers outside of [facility] since your last visit?”    NO      Vitals:    05/08/24 0930   BP: 136/72   Site: Left Upper Arm   Pulse: 67   Resp: 16   Temp: 97.1 °F (36.2 °C)   SpO2: 98%   Weight: 91.1 kg (200 lb 12.8 oz)   Height: 1.626 m (5' 4\")      Depression: Not at risk (5/7/2024)    PHQ-2     PHQ-2 Score: 0          Click Here for Release of Records Request    Specialist patient sees: None    Chart reviewed: immunizations are documented.   Immunization History   Administered Date(s) Administered    COVID-19, MODERNA BLUE border, Primary or Immunocompromised, (age 12y+), IM, 100 mcg/0.5mL 03/11/2021, 04/08/2021, 12/15/2021    Influenza Virus Vaccine 10/14/2019, 09/15/2020    Influenza, FLUAD, (age 65 y+), Adjuvanted, 0.5mL 09/15/2020    Influenza, FLUCELVAX, (age 6 mo+), MDCK, PF, 0.5mL 10/14/2019    Influenza, FLUZONE (age 65 y+), High Dose, 0.7mL 09/13/2023    Influenza, High Dose (Fluzone 65 yrs and older) 11/17/2018, 10/04/2021    Pneumococcal, PCV-13, PREVNAR 13, (age 6w+), IM, 0.5mL 04/10/2019    Pneumococcal, PPSV23, PNEUMOVAX 23, (age 2y+), SC/IM, 0.5mL 09/27/2016, 07/08/2020    TDaP, ADACEL (age 10y-64y), BOOSTRIX (age 10y+), IM, 0.5mL 05/04/2016, 12/02/2019    Zoster Live (Zostavax) 01/10/2017    Zoster Recombinant (Shingrix) 05/14/2019, 07/15/2019      
  Ascorbic Acid (VITAMIN C PO) Take 1,000 mg by mouth daily Yes Provider, MD Ami   Multiple Vitamins-Minerals (PRESERVISION AREDS 2 PO) Take by mouth in the morning and at bedtime Yes Provider, MD Ami   vitamin D3 (CHOLECALCIFEROL) 125 MCG (5000 UT) TABS tablet Take 1 tablet by mouth daily Yes Automatic Reconciliation, Ar   cyanocobalamin 1000 MCG tablet Take 1 tablet by mouth daily Yes Automatic Reconciliation, Ar   Ferrous Sulfate 324 MG TBEC Take by mouth every morning (before breakfast) Yes Automatic Reconciliation, Ar       CareTeam (Including outside providers/suppliers regularly involved in providing care):   Patient Care Team:  Aurora Romero MD as PCP - General  Aurora Romero MD as PCP - Empaneled Provider  Marbella Quiroga MD as Consulting Physician  Dianna Preston DO as Physician     Reviewed and updated this visit:  Tobacco  Allergies  Meds  Problems  Med Hx  Surg Hx  Soc Hx  Fam Hx       Reviewed and updated this visit:  Tobacco  Allergies  Meds  Problems  Med Hx  Surg Hx  Soc Hx  Fam Hx      Health screenings:   Eye exam up to date , sees [facility] eye [place].   Mammogram up to date , followed by Breast surgeon and Oncology   DEXA scan up to date   Colon cancer screening up to date   PAP smear will make an appointment with new Gynecologist.  COVID vaccine up to date   Pneumonia vaccine  up to date   Tdap up to date   Influenza  up to date   Shingles vaccine  up to date   Activity level stays active   Incontinence None   Controlled substance/Opioids none     All other Health screenings done under rooming encounter        
No abnormalities noted

## 2024-05-28 NOTE — TELEPHONE ENCOUNTER
Called patient left vm advising of rescheduling appt from 6/4. Left date time and c/b # if that date and time doesn't work

## 2024-06-17 ENCOUNTER — HOSPITAL ENCOUNTER (OUTPATIENT)
Facility: HOSPITAL | Age: 69
Setting detail: RECURRING SERIES
Discharge: HOME | End: 2024-06-20
Attending: INTERNAL MEDICINE
Payer: MEDICARE

## 2024-06-17 PROCEDURE — 97110 THERAPEUTIC EXERCISES: CPT

## 2024-06-17 PROCEDURE — 97162 PT EVAL MOD COMPLEX 30 MIN: CPT

## 2024-06-17 PROCEDURE — 97140 MANUAL THERAPY 1/> REGIONS: CPT

## 2024-06-17 NOTE — THERAPY EVALUATION
times per week for 24 treatments.    Patient/ Caregiver education and instruction: Diagnosis, prognosis, self care, activity modification, and exercises   [x]  Plan of care has been reviewed with PTA      Certification Period: 06/17/2024 - 09/15/2024      Jerilyn Rey, PT       6/17/2024       1:39 PM        ===================================================================  I certify that the above Therapy Services are being furnished while the patient is under my care. I agree with the treatment plan and certify that this therapy is necessary.    Physician's Signature:_________________________   DATE:_________   TIME:________                           Aurora Romero MD    ** Signature, Date and Time must be completed for valid certification **  Please sign and fax to [phone].  Thank you

## 2024-06-20 DIAGNOSIS — M1A.0720 IDIOPATHIC CHRONIC GOUT OF LEFT FOOT WITHOUT TOPHUS: ICD-10-CM

## 2024-06-20 RX ORDER — ALLOPURINOL 100 MG/1
100 TABLET ORAL DAILY
Qty: 30 TABLET | Refills: 0 | Status: SHIPPED | OUTPATIENT
Start: 2024-06-20

## 2024-06-20 RX ORDER — ALLOPURINOL 100 MG/1
100 TABLET ORAL DAILY
Qty: 90 TABLET | OUTPATIENT
Start: 2024-06-20

## 2024-06-24 ENCOUNTER — HOSPITAL ENCOUNTER (OUTPATIENT)
Facility: HOSPITAL | Age: 69
Setting detail: RECURRING SERIES
Discharge: HOME | End: 2024-06-27
Attending: INTERNAL MEDICINE
Payer: MEDICARE

## 2024-06-24 PROCEDURE — 97110 THERAPEUTIC EXERCISES: CPT

## 2024-06-24 NOTE — PROGRESS NOTES
PHYSICAL THERAPY - MEDICARE DAILY TREATMENT NOTE (updated 3/23)      Date: 2024          Patient Name:  Sandhya Mckinney :  1955   Medical   Diagnosis:  Chronic right shoulder pain [M25.511, G89.29] Treatment Diagnosis:  M25.511  RIGHT SHOULDER PAIN    Referral Source:  Aurora Romero MD Insurance:   Payor: MEDICARE / Plan: MEDICARE PART A AND B / Product Type: *No Product type* /                     Patient  verified yes     Visit #   Current  / Total 2 24   Time   In / Out 10:34 am 11:14 am   Total Treatment Time 40 min   Total Timed Codes 40 min   1:1 Treatment Time 34 min      [facility] Totals Reminder:  bill using total billable   min of TIMED therapeutic procedures and modalities.   8-22 min = 1 unit; 23-37 min = 2 units; 38-52 min = 3 units; 53-67 min = 4 units; 68-82 min = 5 units        SUBJECTIVE    Pain Level (0-10 scale): 1-2/10    Any medication changes, allergies to medications, adverse drug reactions, diagnosis change, or new procedure performed?: [x] No    [] Yes (see summary sheet for update)  Medications: Verified on Patient Summary List    Subjective functional status/changes:     Patient reports significant improvement in pain since initial evaluation. Reports she has been performing the exercises nearly everyday and really feels they have been helping.    OBJECTIVE      Therapeutic Procedures:  Tx Min Billable or 1:1 Min (if diff from Tx Min) Procedure, Rationale, Specifics   [id number] Therapeutic Exercise (timed):  increase ROM, strength, coordination, balance, and proprioception to improve patient's ability to progress to PLOF and address remaining functional goals. (see flow sheet as applicable)     Details if applicable:            Details if applicable:           Details if applicable:           Details if applicable:            Details if applicable:     40 34    Total Total       [x]  Patient Education billed concurrently with other procedures   [x] Review HEP    []

## 2024-07-01 ENCOUNTER — OFFICE VISIT (OUTPATIENT)
Age: 69
End: 2024-07-01
Payer: MEDICARE

## 2024-07-01 VITALS
WEIGHT: 203 LBS | TEMPERATURE: 98.2 F | DIASTOLIC BLOOD PRESSURE: 67 MMHG | OXYGEN SATURATION: 98 % | BODY MASS INDEX: 34.84 KG/M2 | HEART RATE: 81 BPM | RESPIRATION RATE: 18 BRPM | SYSTOLIC BLOOD PRESSURE: 156 MMHG

## 2024-07-01 DIAGNOSIS — C50.912 MALIGNANT NEOPLASM OF LEFT BREAST IN FEMALE, ESTROGEN RECEPTOR POSITIVE, UNSPECIFIED SITE OF BREAST (HCC): Primary | ICD-10-CM

## 2024-07-01 DIAGNOSIS — Z85.3 HISTORY OF LEFT BREAST CANCER: ICD-10-CM

## 2024-07-01 DIAGNOSIS — Z17.0 MALIGNANT NEOPLASM OF LEFT BREAST IN FEMALE, ESTROGEN RECEPTOR POSITIVE, UNSPECIFIED SITE OF BREAST (HCC): Primary | ICD-10-CM

## 2024-07-01 PROCEDURE — 99213 OFFICE O/P EST LOW 20 MIN: CPT | Performed by: INTERNAL MEDICINE

## 2024-07-01 PROCEDURE — 1090F PRES/ABSN URINE INCON ASSESS: CPT | Performed by: INTERNAL MEDICINE

## 2024-07-01 PROCEDURE — 3077F SYST BP >= 140 MM HG: CPT | Performed by: INTERNAL MEDICINE

## 2024-07-01 PROCEDURE — G8399 PT W/DXA RESULTS DOCUMENT: HCPCS | Performed by: INTERNAL MEDICINE

## 2024-07-01 PROCEDURE — 3017F COLORECTAL CA SCREEN DOC REV: CPT | Performed by: INTERNAL MEDICINE

## 2024-07-01 PROCEDURE — G8427 DOCREV CUR MEDS BY ELIG CLIN: HCPCS | Performed by: INTERNAL MEDICINE

## 2024-07-01 PROCEDURE — G8417 CALC BMI ABV UP PARAM F/U: HCPCS | Performed by: INTERNAL MEDICINE

## 2024-07-01 PROCEDURE — 1123F ACP DISCUSS/DSCN MKR DOCD: CPT | Performed by: INTERNAL MEDICINE

## 2024-07-01 PROCEDURE — 1036F TOBACCO NON-USER: CPT | Performed by: INTERNAL MEDICINE

## 2024-07-01 PROCEDURE — 3078F DIAST BP <80 MM HG: CPT | Performed by: INTERNAL MEDICINE

## 2024-07-01 NOTE — PROGRESS NOTES
Sandhyacathi Mckinney is a 69 y.o. female    Chief Complaint   Patient presents with    Follow-up     breast cancer       1. Have you been to the ER, urgent care clinic since your last visit?  Hospitalized since your last visit?No    2. Have you seen or consulted any other health care providers outside of the [facility] since your last visit?  Include any pap smears or colon screening. Yes, VEI      
breast.  No new visualized mass, suspicious microcalcification or architectural  distortion.  .    Impression  1. Postsurgical and posttreatment changes in the left breast.  BI-RADS Category 2 - Benign findings.  .  RECOMMENDATION:  Mammogram in 1 year.        Records reviewed and summarized above.  Pathology report(s) reviewed above.  Radiology report(s) reviewed above.      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    PATHOLOGIC STAGE CLASSIFICATION (pTNM, AJCC 8th Edition)       Primary Tumor (pT): pT1mi       Regional Lymph Nodes Modifier: (sn): Waka node(s) evaluated       Regional Lymph Nodes (pN): pN0    SPECIAL STUDIES        Breast Biomarker Testing Performed on Previous Biopsy: Estrogen         Receptor (ER), Progesterone Receptor (PgR)           Estrogen Receptor (ER) Status: Positive (greater than 10% of             cells demonstrate nuclear  positivity)           Progesterone Receptor (PgR) Status: Positive        Seen today for 6 mo fu.     On adjuvant anastrozole. Tolerating fine and will continue.    Clinically pt feels well today.    Mammo 2/24 good   Labs and routine HM with PCP.    Will fu with us in 6 months.       2) hx of stage 0 LEFT breast DCIS ER+ with hx of lumpectomy 3/20.   Hx of ? LEFT Breast DCIS 2009 at [facility] [place] with hx of lumpectomy/ radiation and no tamoxifen by choice.    was not interested in taking a pill for prevention now.    Cannot have radiation as had this in past. Had DCISionRT and low risk.   Has genetic testing and VUS        2) DM/HTN/high cholesterol/ goiter. Per PCP.        3) psychosocial.  Mood good. Has family support.    Coping well.  SW support prn.     Fu here in 6 months  Call if questions    I appreciate the opportunity to participate in Ms. Sandhya Mckinney's care.    Signed By: Dianna Preston DO

## 2024-07-02 ENCOUNTER — HOSPITAL ENCOUNTER (OUTPATIENT)
Facility: HOSPITAL | Age: 69
Setting detail: RECURRING SERIES
End: 2024-07-02
Attending: INTERNAL MEDICINE
Payer: MEDICARE

## 2024-07-08 ENCOUNTER — HOSPITAL ENCOUNTER (OUTPATIENT)
Facility: HOSPITAL | Age: 69
Setting detail: RECURRING SERIES
Discharge: HOME | End: 2024-07-11
Attending: INTERNAL MEDICINE
Payer: MEDICARE

## 2024-07-08 PROCEDURE — 97110 THERAPEUTIC EXERCISES: CPT

## 2024-07-08 NOTE — PROGRESS NOTES
PHYSICAL THERAPY - MEDICARE DAILY TREATMENT NOTE (updated 3/23)      Date: 2024          Patient Name:  Sandhya Mckinney :  1955   Medical   Diagnosis:  Chronic right shoulder pain [M25.511, G89.29] Treatment Diagnosis:  M25.511  RIGHT SHOULDER PAIN    Referral Source:  Aurora Romero MD Insurance:   Payor: MEDICARE / Plan: MEDICARE PART A AND B / Product Type: *No Product type* /                     Patient  verified yes     Visit #   Current  / Total 3 24   Time   In / Out 10:32 am 11:20 am   Total Treatment Time 48 min   Total Timed Codes 48 min   1:1 Treatment Time 40 min      [facility] Totals Reminder:  bill using total billable   min of TIMED therapeutic procedures and modalities.   8-22 min = 1 unit; 23-37 min = 2 units; 38-52 min = 3 units; 53-67 min = 4 units; 68-82 min = 5 units        SUBJECTIVE    Pain Level (0-10 scale): 2/10    Any medication changes, allergies to medications, adverse drug reactions, diagnosis change, or new procedure performed?: [x] No    [] Yes (see summary sheet for update)  Medications: Verified on Patient Summary List    Subjective functional status/changes:     Patient reports she is grumpy this morning due to not getting good sleep last night and being tired, but notes her shoulder pain has been doing well. Admits she has not been as consistent over past week with exercises due to the holiday. Reports she had one episode of intense pain on Saturday, notes she performed some of the banded exercises and this helped alleviate the pain.    OBJECTIVE    Therapeutic Procedures:  Tx Min Billable or 1:1 Min (if diff from Tx Min) Procedure, Rationale, Specifics   [id number] Therapeutic Exercise (timed):  increase ROM, strength, coordination, balance, and proprioception to improve patient's ability to progress to PLOF and address remaining functional goals. (see flow sheet as applicable)     Details if applicable:            Details if applicable:           Details if

## 2024-07-15 ENCOUNTER — HOSPITAL ENCOUNTER (OUTPATIENT)
Facility: HOSPITAL | Age: 69
Setting detail: RECURRING SERIES
Discharge: HOME | End: 2024-07-18
Attending: INTERNAL MEDICINE
Payer: MEDICARE

## 2024-07-15 PROCEDURE — 97110 THERAPEUTIC EXERCISES: CPT

## 2024-07-15 NOTE — PROGRESS NOTES
[facility]  [address]  [place] [address]  Phone: [phone]   Fax: [phone]     PHYSICAL THERAPY PROGRESS NOTE  Patient Name:  Sandhya Mckinney :  1955   Treatment/Medical Diagnosis: Chronic right shoulder pain [M25.511, G89.29]   Referral Source:  Aurora Romero MD     Date of Initial Visit:  2024 Attended Visits:  4 Missed Visits:  1     SUMMARY OF TREATMENT/ASSESSMENT:  Patient is a 69 year old female who has been seen in skilled physical therapy for 4 visits since initial evaluation on 2024 due to right shoulder pain. Patient has made steady progress towards goals, but has been limited secondary to poor compliance to HEP. Extensive time has been spent reviewing home exercise program, requiring moderate verbal and tactile cues to correct form and technique. Reviewed proper frequency and prescription of exercises as well. Patient able to perform all exercises in clinic with proper muscle activation following cues and notes favorable response to exercise when performed correctly. Patient verbalizes understanding of all discussed above. Patient does demonstrate improving cervical and right shoulder A/PROM through all planes compared to initial evaluation, but continues to note pain at end-range due to poor GH and scap stability. Patient demonstrates improving, but still impaired, UE/periscapular muscle strength. Patient has subjective report of limited tolerance to performing lifting and carrying tasks without onset of pain likely due to continued muscle weakness. Patient tried to help her sister move a TV over the weekend and had to put it down due to pain. Patient unable to perform tasks such as emptying  or unloading groceries without pain and irritation as well. Patient would benefit from continued strength training through all UE and postural musculature to improve overall 
goals, but has been limited secondary to poor compliance to HEP. Extensive time has been spent reviewing home exercise program, requiring moderate verbal and tactile cues to correct form and technique. Reviewed proper frequency and prescription of exercises as well. Patient able to perform all exercises in clinic with proper muscle activation following cues and notes favorable response to exercise when performed correctly. Patient verbalizes understanding of all discussed above. Patient does demonstrate improving cervical and right shoulder A/PROM through all planes compared to initial evaluation, but continues to note pain at end-range due to poor GH and scap stability. Patient demonstrates improving, but still impaired, UE/periscapular muscle strength. Patient has subjective report of limited tolerance to performing lifting and carrying tasks without onset of pain likely due to continued muscle weakness. Patient tried to help her sister move a TV over the weekend and had to put it down due to pain. Patient unable to perform tasks such as emptying  or unloading groceries without pain and irritation as well. Patient would benefit from continued strength training through all UE and postural musculature to improve overall strength and endurance allowing return to household chores and daily tasks without onset of pain. Patient will continue to benefit from skilled PT / OT services to modify and progress therapeutic interventions, analyze and address functional mobility deficits, analyze and address ROM deficits, analyze and address strength deficits, analyze and address soft tissue restrictions, analyze and cue for proper movement patterns, and analyze and modify for postural abnormalities to address functional deficits and attain remaining goals.    Progress toward goals / Updated goals:  [x]  See Progress Note/Recertification  Short Term Goals: To be accomplished in 12 treatments.  Patient will demonstrate

## 2024-07-22 ENCOUNTER — APPOINTMENT (OUTPATIENT)
Facility: HOSPITAL | Age: 69
End: 2024-07-22
Attending: INTERNAL MEDICINE
Payer: MEDICARE

## 2024-07-22 DIAGNOSIS — M1A.0720 IDIOPATHIC CHRONIC GOUT OF LEFT FOOT WITHOUT TOPHUS: ICD-10-CM

## 2024-07-22 RX ORDER — ALLOPURINOL 100 MG/1
100 TABLET ORAL DAILY
Qty: 30 TABLET | Refills: 2 | Status: SHIPPED | OUTPATIENT
Start: 2024-07-22

## 2024-07-29 ENCOUNTER — APPOINTMENT (OUTPATIENT)
Facility: HOSPITAL | Age: 69
End: 2024-07-29
Attending: INTERNAL MEDICINE
Payer: MEDICARE

## 2024-08-11 DIAGNOSIS — I10 ESSENTIAL (PRIMARY) HYPERTENSION: ICD-10-CM

## 2024-08-11 RX ORDER — INDAPAMIDE 1.25 MG/1
1.25 TABLET ORAL DAILY
Qty: 90 TABLET | Refills: 0 | Status: SHIPPED | OUTPATIENT
Start: 2024-08-11

## 2024-08-12 ENCOUNTER — OFFICE VISIT (OUTPATIENT)
Age: 69
End: 2024-08-12
Payer: MEDICARE

## 2024-08-12 VITALS — BODY MASS INDEX: 34.15 KG/M2 | HEIGHT: 64 IN | WEIGHT: 200 LBS

## 2024-08-12 DIAGNOSIS — Z98.890 S/P LUMPECTOMY OF BREAST: ICD-10-CM

## 2024-08-12 DIAGNOSIS — Z85.3 HISTORY OF BREAST CANCER IN FEMALE: ICD-10-CM

## 2024-08-12 DIAGNOSIS — Z92.3 S/P RADIATION THERAPY: ICD-10-CM

## 2024-08-12 DIAGNOSIS — D05.12 INTRADUCTAL CARCINOMA IN SITU OF LEFT BREAST: Primary | ICD-10-CM

## 2024-08-12 PROCEDURE — G8427 DOCREV CUR MEDS BY ELIG CLIN: HCPCS | Performed by: NURSE PRACTITIONER

## 2024-08-12 PROCEDURE — 3017F COLORECTAL CA SCREEN DOC REV: CPT | Performed by: NURSE PRACTITIONER

## 2024-08-12 PROCEDURE — 1123F ACP DISCUSS/DSCN MKR DOCD: CPT | Performed by: NURSE PRACTITIONER

## 2024-08-12 PROCEDURE — 1090F PRES/ABSN URINE INCON ASSESS: CPT | Performed by: NURSE PRACTITIONER

## 2024-08-12 PROCEDURE — G8399 PT W/DXA RESULTS DOCUMENT: HCPCS | Performed by: NURSE PRACTITIONER

## 2024-08-12 PROCEDURE — G8417 CALC BMI ABV UP PARAM F/U: HCPCS | Performed by: NURSE PRACTITIONER

## 2024-08-12 PROCEDURE — 1036F TOBACCO NON-USER: CPT | Performed by: NURSE PRACTITIONER

## 2024-08-12 PROCEDURE — 99213 OFFICE O/P EST LOW 20 MIN: CPT | Performed by: NURSE PRACTITIONER

## 2024-08-12 NOTE — PROGRESS NOTES
HISTORY OF PRESENT ILLNESS  Sandhya Mckinney is a 69 y.o. female     HPI  ESTABLISHED patient presents for follow-up to LEFT breast cancer.   Denies breast mass, skin changes, nipple discharge and pain.             Breast history -    Referring - Dr. Aurora Romero    - LEFT breast cancer (probably DCIS) in .  Had surgery by Dr. Castillo and whole breast radiation by Dr. Jadon Salazar at [facility].     Did not want to take tamoxifen then.    Abnormal screening mammo on LEFT 2020.    20: LEFT breast stereo bx. PATH: DCIS, solid and comedo pattern with comedonecrosis and intraluminal coarse calcification, nuclear grade 2, ER+(99%), WY not performed. Clinical stage 0.   MRI 3/5/20 no other abnormalities.   Excision 3/2020 - Dr. Junior   FINAL PATHOLOGIC DIAGNOSIS    Left breast, excision:    Ductal carcinoma in situ, intermediate grade (grade 2).    Size: 12 mm.    Pathologic stage: PTis NX    No XRT - Left breast previously radiated   Saw Dr. Preston for a consult - declined AI and tamoxifen   22 - abnormal mammogram   22 - LEFT breast core biopsy - LEFT breast DCIS - ER pos, WY pos   22 - LEFT breast lumpectomy and SLNB - Dr. Junior   DCIS with microinvasion - T1miN0   Started Arimidex - Dr. Preston             Family history -    A paternal aunt had breast cancer and possible ovarian cancer.     A paternal first cousin was just diagnosed with breast cancer in her mid-60s.     Her son has skin cancer.   Patient - genetic testing negative, VUS NBN        OB History          1    Para   1    Term   1            AB        Living             SAB        IAB        Ectopic        Molar        Multiple        Live Births   1          Obstetric Comments   Menarche 11, LMP , # of children 1, age of 1st delivery 20, Hysterectomy/oophorectomy No/No, Breast bx Yes, history of breast feeding  No, BCP No, Hormone therapy No                   Past Surgical History:   Procedure

## 2024-08-13 DIAGNOSIS — I10 ESSENTIAL HYPERTENSION: ICD-10-CM

## 2024-08-13 DIAGNOSIS — E11.9 TYPE II DIABETES MELLITUS, WELL CONTROLLED (HCC): ICD-10-CM

## 2024-08-13 DIAGNOSIS — E78.2 MIXED HYPERLIPIDEMIA: ICD-10-CM

## 2024-08-13 DIAGNOSIS — I15.9 SECONDARY HYPERTENSION: ICD-10-CM

## 2024-08-13 RX ORDER — PRAVASTATIN SODIUM 20 MG
TABLET ORAL
Qty: 90 TABLET | Refills: 0 | Status: SHIPPED | OUTPATIENT
Start: 2024-08-13

## 2024-08-13 RX ORDER — POTASSIUM CHLORIDE 750 MG/1
CAPSULE, EXTENDED RELEASE ORAL
Qty: 270 CAPSULE | Refills: 0 | Status: SHIPPED | OUTPATIENT
Start: 2024-08-13

## 2024-08-13 RX ORDER — LISINOPRIL 10 MG/1
TABLET ORAL
Qty: 90 TABLET | Refills: 0 | Status: SHIPPED | OUTPATIENT
Start: 2024-08-13

## 2024-09-20 DIAGNOSIS — I10 ESSENTIAL HYPERTENSION: ICD-10-CM

## 2024-09-20 DIAGNOSIS — C50.912 MALIGNANT NEOPLASM OF LEFT BREAST IN FEMALE, ESTROGEN RECEPTOR POSITIVE, UNSPECIFIED SITE OF BREAST (HCC): ICD-10-CM

## 2024-09-20 DIAGNOSIS — I15.9 SECONDARY HYPERTENSION: ICD-10-CM

## 2024-09-20 DIAGNOSIS — E11.9 TYPE II DIABETES MELLITUS, WELL CONTROLLED (HCC): ICD-10-CM

## 2024-09-20 DIAGNOSIS — Z17.0 MALIGNANT NEOPLASM OF LEFT BREAST IN FEMALE, ESTROGEN RECEPTOR POSITIVE, UNSPECIFIED SITE OF BREAST (HCC): ICD-10-CM

## 2024-09-20 DIAGNOSIS — E78.2 MIXED HYPERLIPIDEMIA: ICD-10-CM

## 2024-09-20 DIAGNOSIS — I10 ESSENTIAL (PRIMARY) HYPERTENSION: ICD-10-CM

## 2024-09-20 RX ORDER — LISINOPRIL 10 MG/1
TABLET ORAL
Qty: 90 TABLET | Refills: 0 | OUTPATIENT
Start: 2024-09-20

## 2024-09-20 RX ORDER — POTASSIUM CHLORIDE 750 MG/1
CAPSULE, EXTENDED RELEASE ORAL
Qty: 270 CAPSULE | Refills: 0 | Status: SHIPPED | OUTPATIENT
Start: 2024-09-20

## 2024-09-20 RX ORDER — INDAPAMIDE 1.25 MG/1
1.25 TABLET ORAL DAILY
Qty: 90 TABLET | Refills: 0 | OUTPATIENT
Start: 2024-09-20

## 2024-09-20 RX ORDER — PRAVASTATIN SODIUM 20 MG
TABLET ORAL
Qty: 90 TABLET | Refills: 0 | OUTPATIENT
Start: 2024-09-20

## 2024-09-20 RX ORDER — ANASTROZOLE 1 MG/1
TABLET ORAL
Qty: 90 TABLET | Refills: 1 | Status: SHIPPED | OUTPATIENT
Start: 2024-09-20

## 2024-10-28 DIAGNOSIS — I15.9 SECONDARY HYPERTENSION: ICD-10-CM

## 2024-10-28 DIAGNOSIS — E11.9 TYPE II DIABETES MELLITUS, WELL CONTROLLED (HCC): ICD-10-CM

## 2024-10-28 DIAGNOSIS — I10 ESSENTIAL (PRIMARY) HYPERTENSION: ICD-10-CM

## 2024-10-28 DIAGNOSIS — E78.2 MIXED HYPERLIPIDEMIA: ICD-10-CM

## 2024-10-29 RX ORDER — INDAPAMIDE 1.25 MG/1
1.25 TABLET ORAL DAILY
Qty: 90 TABLET | Refills: 0 | Status: SHIPPED | OUTPATIENT
Start: 2024-10-29

## 2024-10-29 RX ORDER — LISINOPRIL 10 MG/1
TABLET ORAL
Qty: 90 TABLET | Refills: 0 | Status: SHIPPED | OUTPATIENT
Start: 2024-10-29

## 2024-10-29 RX ORDER — PRAVASTATIN SODIUM 20 MG
TABLET ORAL
Qty: 90 TABLET | Refills: 0 | Status: SHIPPED | OUTPATIENT
Start: 2024-10-29

## 2024-12-11 ENCOUNTER — OFFICE VISIT (OUTPATIENT)
Dept: PRIMARY CARE CLINIC | Facility: CLINIC | Age: 69
End: 2024-12-11

## 2024-12-11 VITALS
SYSTOLIC BLOOD PRESSURE: 152 MMHG | HEIGHT: 64 IN | TEMPERATURE: 97 F | DIASTOLIC BLOOD PRESSURE: 82 MMHG | RESPIRATION RATE: 18 BRPM | HEART RATE: 66 BPM | WEIGHT: 209 LBS | OXYGEN SATURATION: 99 % | BODY MASS INDEX: 35.68 KG/M2

## 2024-12-11 DIAGNOSIS — E11.9 WELL CONTROLLED DIABETES MELLITUS (HCC): ICD-10-CM

## 2024-12-11 DIAGNOSIS — I10 PRIMARY HYPERTENSION: Primary | ICD-10-CM

## 2024-12-11 DIAGNOSIS — I10 PRIMARY HYPERTENSION: ICD-10-CM

## 2024-12-11 DIAGNOSIS — M1A.0720 IDIOPATHIC CHRONIC GOUT OF LEFT FOOT WITHOUT TOPHUS: ICD-10-CM

## 2024-12-11 DIAGNOSIS — E78.2 MIXED HYPERLIPIDEMIA: ICD-10-CM

## 2024-12-11 DIAGNOSIS — M79.605 PAIN IN BOTH LOWER EXTREMITIES: ICD-10-CM

## 2024-12-11 DIAGNOSIS — M79.604 PAIN IN BOTH LOWER EXTREMITIES: ICD-10-CM

## 2024-12-11 LAB
CREAT UR-MCNC: 119 MG/DL
MICROALBUMIN UR-MCNC: 1.52 MG/DL
MICROALBUMIN/CREAT UR-RTO: 13 MG/G (ref 0–30)
SPECIMEN HOLD: NORMAL

## 2024-12-11 RX ORDER — LISINOPRIL 20 MG/1
20 TABLET ORAL DAILY
Qty: 90 TABLET | Refills: 1 | Status: SHIPPED | OUTPATIENT
Start: 2024-12-11

## 2024-12-11 ASSESSMENT — ENCOUNTER SYMPTOMS
COUGH: 0
CHEST TIGHTNESS: 0
BACK PAIN: 1
RHINORRHEA: 0
SORE THROAT: 0
CONSTIPATION: 0
EYE DISCHARGE: 0
COLOR CHANGE: 0
SHORTNESS OF BREATH: 0
DIARRHEA: 0
ABDOMINAL PAIN: 0

## 2024-12-11 ASSESSMENT — PATIENT HEALTH QUESTIONNAIRE - PHQ9
SUM OF ALL RESPONSES TO PHQ QUESTIONS 1-9: 0
1. LITTLE INTEREST OR PLEASURE IN DOING THINGS: NOT AT ALL
SUM OF ALL RESPONSES TO PHQ9 QUESTIONS 1 & 2: 0
SUM OF ALL RESPONSES TO PHQ QUESTIONS 1-9: 0
SUM OF ALL RESPONSES TO PHQ QUESTIONS 1-9: 0
2. FEELING DOWN, DEPRESSED OR HOPELESS: NOT AT ALL
SUM OF ALL RESPONSES TO PHQ QUESTIONS 1-9: 0

## 2024-12-11 NOTE — PROGRESS NOTES
\"Have you been to the ER, urgent care clinic since your last visit?  Hospitalized since your last visit?\"    NO      “Have you seen or consulted any other health care providers outside our system since your last visit?”    Eye doctor       Chief Complaint   Patient presents with    Follow-up       Pt is ok with scribe.   
equal to 500 mg/dL very high.    HDL 05/08/2024 75  MG/DL Final    Based on NCEP ATP III, HDL Cholesterol <40 mg/dL is considered low and >60 mg/dL is elevated.    LDL Cholesterol 05/08/2024 94.8  0 - 100 MG/DL Final    Comment: Based on the NCEP-ATP: LDL-C concentrations are considered  optimal <100 mg/dL,  near optimal/above Normal 100-129 mg/dL  Borderline High: 130-159, High: 160-189 mg/dL  Very High: Greater than or equal to 190 mg/dL      VLDL Cholesterol Calculated 05/08/2024 22.2  MG/DL Final    Chol/HDL Ratio 05/08/2024 2.6  0.0 - 5.0   Final    Hemoglobin A1C 05/08/2024 6.1 (H)  4.0 - 5.6 % Final    Comment: (NOTE)  HbA1C Interpretive Ranges  <5.7              Normal  5.7 - 6.4         Consider Prediabetes  >6.5              Consider Diabetes      Estimated Avg Glucose 05/08/2024 128  mg/dL Final    Sodium 05/08/2024 142  136 - 145 mmol/L Final    Potassium 05/08/2024 4.0  3.5 - 5.1 mmol/L Final    Chloride 05/08/2024 108  97 - 108 mmol/L Final    CO2 05/08/2024 28  21 - 32 mmol/L Final    Anion Gap 05/08/2024 6  5 - 15 mmol/L Final    Glucose 05/08/2024 107 (H)  65 - 100 mg/dL Final    BUN 05/08/2024 19  6 - 20 MG/DL Final    Creatinine 05/08/2024 1.01  0.55 - 1.02 MG/DL Final    BUN/Creatinine Ratio 05/08/2024 19  12 - 20   Final    Est, Glom Filt Rate 05/08/2024 60 (L)  >60 ml/min/1.73m2 Final    Comment:   Pediatric calculator link: https://www.kidney.org/professionals/kdoqi/gfr_calculatorped    These results are not intended for use in patients <18 years of age.    eGFR results are calculated without a race factor using  the 2021 CKD-EPI equation. Careful clinical correlation is recommended, particularly when comparing to results calculated using previous equations.  The CKD-EPI equation is less accurate in patients with extremes of muscle mass, extra-renal metabolism of creatinine, excessive creatine ingestion, or following therapy that affects renal tubular secretion.      Calcium 05/08/2024 10.5

## 2024-12-12 LAB
ALBUMIN SERPL-MCNC: 4 G/DL (ref 3.5–5)
ALBUMIN/GLOB SERPL: 1.3 (ref 1.1–2.2)
ALP SERPL-CCNC: 79 U/L (ref 45–117)
ALT SERPL-CCNC: 31 U/L (ref 12–78)
ANION GAP SERPL CALC-SCNC: 7 MMOL/L (ref 2–12)
AST SERPL-CCNC: 11 U/L (ref 15–37)
BILIRUB SERPL-MCNC: 0.8 MG/DL (ref 0.2–1)
BUN SERPL-MCNC: 18 MG/DL (ref 6–20)
BUN/CREAT SERPL: 19 (ref 12–20)
CALCIUM SERPL-MCNC: 9.8 MG/DL (ref 8.5–10.1)
CHLORIDE SERPL-SCNC: 109 MMOL/L (ref 97–108)
CHOLEST SERPL-MCNC: 205 MG/DL
CO2 SERPL-SCNC: 26 MMOL/L (ref 21–32)
CREAT SERPL-MCNC: 0.95 MG/DL (ref 0.55–1.02)
ERYTHROCYTE [DISTWIDTH] IN BLOOD BY AUTOMATED COUNT: 14.1 % (ref 11.5–14.5)
EST. AVERAGE GLUCOSE BLD GHB EST-MCNC: 137 MG/DL
GLOBULIN SER CALC-MCNC: 3.2 G/DL (ref 2–4)
GLUCOSE SERPL-MCNC: 99 MG/DL (ref 65–100)
HBA1C MFR BLD: 6.4 % (ref 4–5.6)
HCT VFR BLD AUTO: 31.4 % (ref 35–47)
HDLC SERPL-MCNC: 75 MG/DL
HDLC SERPL: 2.7 (ref 0–5)
HGB BLD-MCNC: 10.1 G/DL (ref 11.5–16)
LDLC SERPL CALC-MCNC: 104.4 MG/DL (ref 0–100)
MCH RBC QN AUTO: 28.7 PG (ref 26–34)
MCHC RBC AUTO-ENTMCNC: 32.2 G/DL (ref 30–36.5)
MCV RBC AUTO: 89.2 FL (ref 80–99)
NRBC # BLD: 0 K/UL (ref 0–0.01)
NRBC BLD-RTO: 0 PER 100 WBC
PLATELET # BLD AUTO: 230 K/UL (ref 150–400)
PMV BLD AUTO: 12 FL (ref 8.9–12.9)
POTASSIUM SERPL-SCNC: 3.9 MMOL/L (ref 3.5–5.1)
PROT SERPL-MCNC: 7.2 G/DL (ref 6.4–8.2)
RBC # BLD AUTO: 3.52 M/UL (ref 3.8–5.2)
SODIUM SERPL-SCNC: 142 MMOL/L (ref 136–145)
TRIGL SERPL-MCNC: 128 MG/DL
URATE SERPL-MCNC: 6.1 MG/DL (ref 2.6–6)
VLDLC SERPL CALC-MCNC: 25.6 MG/DL
WBC # BLD AUTO: 4.7 K/UL (ref 3.6–11)

## 2024-12-13 DIAGNOSIS — M1A.0720 IDIOPATHIC CHRONIC GOUT OF LEFT FOOT WITHOUT TOPHUS: ICD-10-CM

## 2024-12-13 RX ORDER — ALLOPURINOL 100 MG/1
100 TABLET ORAL DAILY
Qty: 30 TABLET | Refills: 2 | OUTPATIENT
Start: 2024-12-13

## 2024-12-13 RX ORDER — ALLOPURINOL 100 MG/1
100 TABLET ORAL DAILY
Qty: 90 TABLET | Refills: 1 | Status: SHIPPED | OUTPATIENT
Start: 2024-12-13

## 2025-01-06 PROBLEM — I10 ESSENTIAL (PRIMARY) HYPERTENSION: Status: ACTIVE | Noted: 2025-01-06

## 2025-01-06 PROBLEM — C50.912 MALIGNANT NEOPLASM OF LEFT BREAST IN FEMALE, ESTROGEN RECEPTOR POSITIVE (HCC): Status: ACTIVE | Noted: 2025-01-06

## 2025-01-06 PROBLEM — Z17.0 MALIGNANT NEOPLASM OF LEFT BREAST IN FEMALE, ESTROGEN RECEPTOR POSITIVE (HCC): Status: ACTIVE | Noted: 2025-01-06

## 2025-01-06 PROBLEM — E78.00 HIGH CHOLESTEROL: Status: ACTIVE | Noted: 2025-01-06

## 2025-01-06 NOTE — PROGRESS NOTES
[facility] [place] [facility]  [address], [address] [place] [address]  W: [phone]  F: [phone]    Reason for Visit:   Sandhya Mckinney is a 69 y.o. female seen today in office for follow up of Left Breast DCIS with Microinvasion.     Treatment History:   Per Surgery Note:  2009 - LEFT breast cancer (probably DCIS) in 2009  Surgery by Dr. Castillo   Whole breast radiation by Dr. Salazar at [facility]  Did not want to take Tamoxifen     Abnormal screening mammo on LEFT 2/2020 02/18/20: LEFT breast stereo bx. PATH: DCIS, solid and comedo pattern with comedonecrosis and intraluminal coarse calcification, nuclear grade 2, ER+(99%), MD not performed  Clinical stage 0  MRI 3/5/20 no other abnormalities  Excision 3/2020 - Dr. Junior. Left breast, excision: PATH - Ductal carcinoma in situ, intermediate grade (grade 2). Size: 12 mm.   Pathologic stage: PTis NX   No XRT - Left breast previously radiated  Declined AI and Tamoxifen    2/9/22 - Abnormal mammogram  2/22/22 - LEFT Breast Core Biopsy: PATH - DCIS - ER pos, MD pos  4/14/22 - LEFT Breast Lumpectomy and SLNB - Dr. Junior: PATH - DCIS with microinvasion  T1miN0  Adjuvant hormonal therapy with Anastrozole 5/4/22 - Current      STAGE: pT1mi (pN): pN0     History of Present Illness:   Sandhya Mckinney is a 69 y.o. female seen today in office for 6 month follow up of Left Breast DCIS with Microinvasion. She has been on adjuvant hormonal therapy with Anastrozole since 5/4/22. She reports that she feels well overall today. She is tolerating Anastrozole well overall. She has vaginal dryness that is not worse since starting Anastrozole. Her appetite is good and energy levels are \"okay.\" She denies fever, chills, mouth sores, cough, SOB, CP, nausea, vomiting, diarrhea, and constipation. She denies pain today. She is here alone today.    Past Medical History:   Diagnosis Date    Arthritis     Breast cancer (HCC)     lumpectomy with

## 2025-01-09 ENCOUNTER — OFFICE VISIT (OUTPATIENT)
Age: 70
End: 2025-01-09
Payer: MEDICARE

## 2025-01-09 VITALS
TEMPERATURE: 97.8 F | RESPIRATION RATE: 18 BRPM | HEART RATE: 72 BPM | BODY MASS INDEX: 36.05 KG/M2 | OXYGEN SATURATION: 98 % | SYSTOLIC BLOOD PRESSURE: 148 MMHG | DIASTOLIC BLOOD PRESSURE: 69 MMHG | WEIGHT: 210 LBS

## 2025-01-09 DIAGNOSIS — E78.00 HIGH CHOLESTEROL: ICD-10-CM

## 2025-01-09 DIAGNOSIS — C50.912 MALIGNANT NEOPLASM OF LEFT BREAST IN FEMALE, ESTROGEN RECEPTOR POSITIVE, UNSPECIFIED SITE OF BREAST (HCC): Primary | ICD-10-CM

## 2025-01-09 DIAGNOSIS — Z79.811 USE OF ANASTROZOLE (ARIMIDEX): ICD-10-CM

## 2025-01-09 DIAGNOSIS — Z85.3 HISTORY OF LEFT BREAST CANCER: ICD-10-CM

## 2025-01-09 DIAGNOSIS — I10 ESSENTIAL (PRIMARY) HYPERTENSION: ICD-10-CM

## 2025-01-09 DIAGNOSIS — Z17.0 MALIGNANT NEOPLASM OF LEFT BREAST IN FEMALE, ESTROGEN RECEPTOR POSITIVE, UNSPECIFIED SITE OF BREAST (HCC): Primary | ICD-10-CM

## 2025-01-09 DIAGNOSIS — E11.9 TYPE II DIABETES MELLITUS, WELL CONTROLLED (HCC): ICD-10-CM

## 2025-01-09 PROCEDURE — G8417 CALC BMI ABV UP PARAM F/U: HCPCS | Performed by: NURSE PRACTITIONER

## 2025-01-09 PROCEDURE — 3077F SYST BP >= 140 MM HG: CPT | Performed by: NURSE PRACTITIONER

## 2025-01-09 PROCEDURE — 3046F HEMOGLOBIN A1C LEVEL >9.0%: CPT | Performed by: NURSE PRACTITIONER

## 2025-01-09 PROCEDURE — 1160F RVW MEDS BY RX/DR IN RCRD: CPT | Performed by: NURSE PRACTITIONER

## 2025-01-09 PROCEDURE — 1159F MED LIST DOCD IN RCRD: CPT | Performed by: NURSE PRACTITIONER

## 2025-01-09 PROCEDURE — 3017F COLORECTAL CA SCREEN DOC REV: CPT | Performed by: NURSE PRACTITIONER

## 2025-01-09 PROCEDURE — 99213 OFFICE O/P EST LOW 20 MIN: CPT | Performed by: NURSE PRACTITIONER

## 2025-01-09 PROCEDURE — 1123F ACP DISCUSS/DSCN MKR DOCD: CPT | Performed by: NURSE PRACTITIONER

## 2025-01-09 PROCEDURE — G8427 DOCREV CUR MEDS BY ELIG CLIN: HCPCS | Performed by: NURSE PRACTITIONER

## 2025-01-09 PROCEDURE — 2022F DILAT RTA XM EVC RTNOPTHY: CPT | Performed by: NURSE PRACTITIONER

## 2025-01-09 PROCEDURE — 3078F DIAST BP <80 MM HG: CPT | Performed by: NURSE PRACTITIONER

## 2025-01-09 PROCEDURE — G8399 PT W/DXA RESULTS DOCUMENT: HCPCS | Performed by: NURSE PRACTITIONER

## 2025-01-09 PROCEDURE — 1090F PRES/ABSN URINE INCON ASSESS: CPT | Performed by: NURSE PRACTITIONER

## 2025-01-09 PROCEDURE — 1036F TOBACCO NON-USER: CPT | Performed by: NURSE PRACTITIONER

## 2025-01-09 PROCEDURE — 1126F AMNT PAIN NOTED NONE PRSNT: CPT | Performed by: NURSE PRACTITIONER

## 2025-01-09 NOTE — PROGRESS NOTES
Sandhya Mckinney is a 69 y.o. female    Chief Complaint   Patient presents with    Follow-up      Left Breast DCIS with Microinvasion       1. Have you been to the ER, urgent care clinic since your last visit?  Hospitalized since your last visit?No    2. Have you seen or consulted any other health care providers outside of the [facility] since your last visit?  Include any pap smears or colon screening. Yes, Optho

## 2025-02-03 DIAGNOSIS — E11.9 TYPE II DIABETES MELLITUS, WELL CONTROLLED (HCC): ICD-10-CM

## 2025-02-03 DIAGNOSIS — I10 ESSENTIAL (PRIMARY) HYPERTENSION: ICD-10-CM

## 2025-02-03 DIAGNOSIS — I10 ESSENTIAL HYPERTENSION: ICD-10-CM

## 2025-02-03 DIAGNOSIS — E78.2 MIXED HYPERLIPIDEMIA: ICD-10-CM

## 2025-02-03 RX ORDER — PRAVASTATIN SODIUM 20 MG
TABLET ORAL
Qty: 90 TABLET | Refills: 0 | Status: SHIPPED | OUTPATIENT
Start: 2025-02-03

## 2025-02-03 RX ORDER — POTASSIUM CHLORIDE 750 MG/1
CAPSULE, EXTENDED RELEASE ORAL
Qty: 270 CAPSULE | Refills: 0 | Status: SHIPPED | OUTPATIENT
Start: 2025-02-03

## 2025-02-03 RX ORDER — INDAPAMIDE 1.25 MG/1
1.25 TABLET ORAL DAILY
Qty: 90 TABLET | Refills: 0 | Status: SHIPPED | OUTPATIENT
Start: 2025-02-03

## 2025-02-10 ENCOUNTER — HOSPITAL ENCOUNTER (OUTPATIENT)
Facility: HOSPITAL | Age: 70
Discharge: HOME | End: 2025-02-13
Payer: MEDICARE

## 2025-02-10 DIAGNOSIS — Z85.3 HISTORY OF BREAST CANCER IN FEMALE: ICD-10-CM

## 2025-02-10 PROCEDURE — G0279 TOMOSYNTHESIS, MAMMO: HCPCS

## 2025-02-11 NOTE — PROGRESS NOTES
Confirmed with pt her name and  before giving her her benign ultrasound results per Dr Fausto Parish.
[facility] pathology report received: both biopsied nodules returned benign. Jennifer Owens, will you call her to let her know the good news? She will need a follow-up ultrasound in 12 months to ensure the nodules are not growing.
Time-based billing (NON-critical care)

## 2025-02-13 NOTE — PROGRESS NOTES
discharge, skin change or tenderness.      Comments: LEFT breast - well healed incisions with contracted tissue post treatment  Musculoskeletal:      Comments: FROM - UE x 2   Lymphadenopathy:      Upper Body:      Right upper body: No supraclavicular or axillary adenopathy.      Left upper body: No supraclavicular or axillary adenopathy.   Neurological:      Mental Status: She is alert.   Psychiatric:         Attention and Perception: Attention normal.         Mood and Affect: Mood normal.         Speech: Speech normal.         Behavior: Behavior normal.          Ht 1.626 m (5' 4\")   Wt 95.3 kg (210 lb)   BMI 36.05 kg/m²       ASSESSMENT and PLAN   Diagnosis Orders   1. Intraductal carcinoma in situ of left breast        2. S/P lumpectomy of breast        3. S/P radiation therapy        4. History of breast cancer in female  [facility] BILATERAL            Normal exam with no evidence of local recurrence in patient with a history of LEFT breast cancer x 3.    BDmammogram 3D in 2/2026.  Taking Arimidex and reports that she is tolerating it well.  She continues care with Dr. Preston.    RTC in 1 year months or sooner PRN. She is comfortable with this plan.  All questions answered and she stated understanding.

## 2025-02-17 ENCOUNTER — OFFICE VISIT (OUTPATIENT)
Age: 70
End: 2025-02-17
Payer: MEDICARE

## 2025-02-17 VITALS — HEIGHT: 64 IN | WEIGHT: 210 LBS | BODY MASS INDEX: 35.85 KG/M2

## 2025-02-17 DIAGNOSIS — Z85.3 HISTORY OF BREAST CANCER IN FEMALE: ICD-10-CM

## 2025-02-17 DIAGNOSIS — Z98.890 S/P LUMPECTOMY OF BREAST: ICD-10-CM

## 2025-02-17 DIAGNOSIS — Z92.3 S/P RADIATION THERAPY: ICD-10-CM

## 2025-02-17 DIAGNOSIS — D05.12 INTRADUCTAL CARCINOMA IN SITU OF LEFT BREAST: Primary | ICD-10-CM

## 2025-02-17 PROCEDURE — G8399 PT W/DXA RESULTS DOCUMENT: HCPCS | Performed by: NURSE PRACTITIONER

## 2025-02-17 PROCEDURE — 1090F PRES/ABSN URINE INCON ASSESS: CPT | Performed by: NURSE PRACTITIONER

## 2025-02-17 PROCEDURE — G8427 DOCREV CUR MEDS BY ELIG CLIN: HCPCS | Performed by: NURSE PRACTITIONER

## 2025-02-17 PROCEDURE — G8417 CALC BMI ABV UP PARAM F/U: HCPCS | Performed by: NURSE PRACTITIONER

## 2025-02-17 PROCEDURE — 1036F TOBACCO NON-USER: CPT | Performed by: NURSE PRACTITIONER

## 2025-02-17 PROCEDURE — 1123F ACP DISCUSS/DSCN MKR DOCD: CPT | Performed by: NURSE PRACTITIONER

## 2025-02-17 PROCEDURE — 1159F MED LIST DOCD IN RCRD: CPT | Performed by: NURSE PRACTITIONER

## 2025-02-17 PROCEDURE — 3017F COLORECTAL CA SCREEN DOC REV: CPT | Performed by: NURSE PRACTITIONER

## 2025-02-17 PROCEDURE — 99213 OFFICE O/P EST LOW 20 MIN: CPT | Performed by: NURSE PRACTITIONER

## 2025-02-17 PROCEDURE — 1160F RVW MEDS BY RX/DR IN RCRD: CPT | Performed by: NURSE PRACTITIONER

## 2025-04-15 DIAGNOSIS — Z17.0 MALIGNANT NEOPLASM OF LEFT BREAST IN FEMALE, ESTROGEN RECEPTOR POSITIVE, UNSPECIFIED SITE OF BREAST: ICD-10-CM

## 2025-04-15 DIAGNOSIS — E11.9 TYPE II DIABETES MELLITUS, WELL CONTROLLED (HCC): ICD-10-CM

## 2025-04-15 DIAGNOSIS — I10 ESSENTIAL HYPERTENSION: ICD-10-CM

## 2025-04-15 DIAGNOSIS — M1A.0720 IDIOPATHIC CHRONIC GOUT OF LEFT FOOT WITHOUT TOPHUS: ICD-10-CM

## 2025-04-15 DIAGNOSIS — C50.912 MALIGNANT NEOPLASM OF LEFT BREAST IN FEMALE, ESTROGEN RECEPTOR POSITIVE, UNSPECIFIED SITE OF BREAST: ICD-10-CM

## 2025-04-15 DIAGNOSIS — I10 ESSENTIAL (PRIMARY) HYPERTENSION: ICD-10-CM

## 2025-04-15 DIAGNOSIS — E78.2 MIXED HYPERLIPIDEMIA: ICD-10-CM

## 2025-04-15 DIAGNOSIS — I10 PRIMARY HYPERTENSION: ICD-10-CM

## 2025-04-15 RX ORDER — LISINOPRIL 20 MG/1
20 TABLET ORAL DAILY
Qty: 90 TABLET | Refills: 1 | OUTPATIENT
Start: 2025-04-15

## 2025-04-15 RX ORDER — ALLOPURINOL 100 MG/1
100 TABLET ORAL DAILY
Qty: 90 TABLET | Refills: 1 | OUTPATIENT
Start: 2025-04-15

## 2025-04-15 RX ORDER — ANASTROZOLE 1 MG/1
1 TABLET ORAL DAILY
Qty: 90 TABLET | Refills: 1 | Status: SHIPPED | OUTPATIENT
Start: 2025-04-15

## 2025-04-15 RX ORDER — PRAVASTATIN SODIUM 20 MG
20 TABLET ORAL NIGHTLY
Qty: 90 TABLET | Refills: 0 | Status: SHIPPED | OUTPATIENT
Start: 2025-04-15

## 2025-04-15 RX ORDER — POTASSIUM CHLORIDE 750 MG/1
30 CAPSULE, EXTENDED RELEASE ORAL DAILY
Qty: 270 CAPSULE | Refills: 0 | Status: SHIPPED | OUTPATIENT
Start: 2025-04-15

## 2025-04-15 RX ORDER — INDAPAMIDE 1.25 MG/1
1.25 TABLET ORAL DAILY
Qty: 90 TABLET | Refills: 0 | Status: SHIPPED | OUTPATIENT
Start: 2025-04-15

## 2025-05-05 DIAGNOSIS — I10 ESSENTIAL HYPERTENSION: ICD-10-CM

## 2025-05-05 DIAGNOSIS — E11.9 TYPE II DIABETES MELLITUS, WELL CONTROLLED (HCC): ICD-10-CM

## 2025-05-05 RX ORDER — POTASSIUM CHLORIDE 750 MG/1
30 CAPSULE, EXTENDED RELEASE ORAL DAILY
Qty: 270 CAPSULE | Refills: 0 | OUTPATIENT
Start: 2025-05-05

## 2025-05-11 SDOH — ECONOMIC STABILITY: FOOD INSECURITY: WITHIN THE PAST 12 MONTHS, THE FOOD YOU BOUGHT JUST DIDN'T LAST AND YOU DIDN'T HAVE MONEY TO GET MORE.: NEVER TRUE

## 2025-05-11 SDOH — ECONOMIC STABILITY: FOOD INSECURITY: WITHIN THE PAST 12 MONTHS, YOU WORRIED THAT YOUR FOOD WOULD RUN OUT BEFORE YOU GOT MONEY TO BUY MORE.: NEVER TRUE

## 2025-05-11 SDOH — ECONOMIC STABILITY: TRANSPORTATION INSECURITY
IN THE PAST 12 MONTHS, HAS THE LACK OF TRANSPORTATION KEPT YOU FROM MEDICAL APPOINTMENTS OR FROM GETTING MEDICATIONS?: NO

## 2025-05-11 SDOH — ECONOMIC STABILITY: INCOME INSECURITY: IN THE LAST 12 MONTHS, WAS THERE A TIME WHEN YOU WERE NOT ABLE TO PAY THE MORTGAGE OR RENT ON TIME?: NO

## 2025-05-13 ENCOUNTER — OFFICE VISIT (OUTPATIENT)
Dept: PRIMARY CARE CLINIC | Facility: CLINIC | Age: 70
End: 2025-05-13
Payer: MEDICARE

## 2025-05-13 VITALS
HEIGHT: 64 IN | TEMPERATURE: 97.3 F | HEART RATE: 64 BPM | RESPIRATION RATE: 15 BRPM | SYSTOLIC BLOOD PRESSURE: 132 MMHG | DIASTOLIC BLOOD PRESSURE: 70 MMHG | WEIGHT: 209.4 LBS | BODY MASS INDEX: 35.75 KG/M2 | OXYGEN SATURATION: 99 %

## 2025-05-13 DIAGNOSIS — I10 PRIMARY HYPERTENSION: ICD-10-CM

## 2025-05-13 DIAGNOSIS — M79.605 PAIN IN BOTH LOWER EXTREMITIES: ICD-10-CM

## 2025-05-13 DIAGNOSIS — G57.93 NEUROPATHIC PAIN OF BOTH FEET: ICD-10-CM

## 2025-05-13 DIAGNOSIS — E11.9 TYPE II DIABETES MELLITUS, WELL CONTROLLED (HCC): ICD-10-CM

## 2025-05-13 DIAGNOSIS — E78.2 MIXED HYPERLIPIDEMIA: ICD-10-CM

## 2025-05-13 DIAGNOSIS — M1A.0720 IDIOPATHIC CHRONIC GOUT OF LEFT FOOT WITHOUT TOPHUS: ICD-10-CM

## 2025-05-13 DIAGNOSIS — M79.604 PAIN IN BOTH LOWER EXTREMITIES: ICD-10-CM

## 2025-05-13 DIAGNOSIS — Z00.00 MEDICARE ANNUAL WELLNESS VISIT, SUBSEQUENT: Primary | ICD-10-CM

## 2025-05-13 PROCEDURE — 1160F RVW MEDS BY RX/DR IN RCRD: CPT | Performed by: INTERNAL MEDICINE

## 2025-05-13 PROCEDURE — 1159F MED LIST DOCD IN RCRD: CPT | Performed by: INTERNAL MEDICINE

## 2025-05-13 PROCEDURE — G8399 PT W/DXA RESULTS DOCUMENT: HCPCS | Performed by: INTERNAL MEDICINE

## 2025-05-13 PROCEDURE — G0439 PPPS, SUBSEQ VISIT: HCPCS | Performed by: INTERNAL MEDICINE

## 2025-05-13 PROCEDURE — 1036F TOBACCO NON-USER: CPT | Performed by: INTERNAL MEDICINE

## 2025-05-13 PROCEDURE — G8417 CALC BMI ABV UP PARAM F/U: HCPCS | Performed by: INTERNAL MEDICINE

## 2025-05-13 PROCEDURE — 1123F ACP DISCUSS/DSCN MKR DOCD: CPT | Performed by: INTERNAL MEDICINE

## 2025-05-13 PROCEDURE — 3075F SYST BP GE 130 - 139MM HG: CPT | Performed by: INTERNAL MEDICINE

## 2025-05-13 PROCEDURE — 3017F COLORECTAL CA SCREEN DOC REV: CPT | Performed by: INTERNAL MEDICINE

## 2025-05-13 PROCEDURE — 3046F HEMOGLOBIN A1C LEVEL >9.0%: CPT | Performed by: INTERNAL MEDICINE

## 2025-05-13 PROCEDURE — 3078F DIAST BP <80 MM HG: CPT | Performed by: INTERNAL MEDICINE

## 2025-05-13 PROCEDURE — G8427 DOCREV CUR MEDS BY ELIG CLIN: HCPCS | Performed by: INTERNAL MEDICINE

## 2025-05-13 PROCEDURE — 99214 OFFICE O/P EST MOD 30 MIN: CPT | Performed by: INTERNAL MEDICINE

## 2025-05-13 PROCEDURE — 1090F PRES/ABSN URINE INCON ASSESS: CPT | Performed by: INTERNAL MEDICINE

## 2025-05-13 PROCEDURE — 2022F DILAT RTA XM EVC RTNOPTHY: CPT | Performed by: INTERNAL MEDICINE

## 2025-05-13 ASSESSMENT — ENCOUNTER SYMPTOMS
COUGH: 0
ABDOMINAL PAIN: 0
SORE THROAT: 0
DIARRHEA: 0
SHORTNESS OF BREATH: 0
COLOR CHANGE: 0
CONSTIPATION: 0
CHEST TIGHTNESS: 0
EYE DISCHARGE: 0
BACK PAIN: 1
RHINORRHEA: 0

## 2025-05-13 ASSESSMENT — PATIENT HEALTH QUESTIONNAIRE - PHQ9
SUM OF ALL RESPONSES TO PHQ QUESTIONS 1-9: 0
2. FEELING DOWN, DEPRESSED OR HOPELESS: NOT AT ALL
1. LITTLE INTEREST OR PLEASURE IN DOING THINGS: NOT AT ALL

## 2025-05-13 ASSESSMENT — LIFESTYLE VARIABLES
HOW OFTEN DO YOU HAVE A DRINK CONTAINING ALCOHOL: NEVER
HOW MANY STANDARD DRINKS CONTAINING ALCOHOL DO YOU HAVE ON A TYPICAL DAY: PATIENT DOES NOT DRINK

## 2025-05-13 NOTE — PATIENT INSTRUCTIONS
dietitian or an exercise specialist. You might also think about joining a weight-loss support group.  If you're not ready to make changes right now, try to pick a date in the future. Then make an appointment with your doctor to talk about when and how you'll get started with a plan.  Follow-up care is a key part of your treatment and safety. Be sure to make and go to all appointments, and call your doctor if you are having problems. It's also a good idea to know your test results and keep a list of the medicines you take.  How can you care for yourself as you start a weight-loss plan?  Set realistic goals. Many people expect to lose much more weight than is likely. A weight loss of 5% to 10% of your body weight may be enough to improve your health.  Get family and friends involved to provide support. Talk to them about why you are trying to lose weight, and ask them to help. They can help by participating in exercise and having meals with you, even if they may be eating something different.  Find what works best for you. If you do not have time or do not like to cook, a program that offers meal replacement bars or shakes may be better for you. Or if you like to prepare meals, finding a plan that includes daily menus and recipes may be best.  Ask your doctor about other health professionals who can help you achieve your weight-loss goals.  A dietitian can help you make healthy changes in your diet.  An exercise specialist or  can help you develop a safe and effective exercise program.  A counselor or psychiatrist can help you cope with issues such as depression, anxiety, or family problems that can make it hard to focus on weight loss.  Consider joining a support group for people who are trying to lose weight. Your doctor can suggest groups in your area.  Where can you learn more?  Go to https://www.healthwise.net/patientEd and enter U357 to learn more about \"Starting a Weight-Loss Plan: Care

## 2025-05-13 NOTE — PROGRESS NOTES
Health Decision Maker has been checked with the patient   Primary Decision Maker: Annmarie Mckeon - [facility] - [phone]     Patient has stated that the scribe can come in room    Chief Complaint   Patient presents with    Medicare AWV       \"Have you been to the ER, urgent care clinic since your last visit?  Hospitalized since your last visit?\"    NO    “Have you seen or consulted any other health care providers outside of [facility] since your last visit?”    NO      Vitals:    05/13/25 1153   BP: (!) 159/74   BP Site: Right Upper Arm   Patient Position: Sitting   BP Cuff Size: Small Adult   Pulse: 64   Resp: 15   Temp: 97.3 °F (36.3 °C)   TempSrc: Temporal   SpO2: 99%   Weight: 95 kg (209 lb 6.4 oz)   Height: 1.626 m (5' 4\")      Depression: Not at risk (12/11/2024)    PHQ-2     PHQ-2 Score: 0              Click Here for Release of Records Request    Chart reviewed: immunizations are documented.   Immunization History   Administered Date(s) Administered    COVID-19, MODERNA BLUE border, Primary or Immunocompromised, (age 12y+), IM, 100 mcg/0.5mL 03/11/2021, 04/08/2021, 12/15/2021    COVID-19, PFIZER, 2024/25, (age 12y+), IM, 30mcg/0.3mL 09/18/2024    Influenza Virus Vaccine 10/14/2019, 09/15/2020    Influenza, FLUAD, (age 65 y+), IM, Quadv, 0.5mL 09/15/2020    Influenza, FLUCELVAX, (age 6 mo+), MDCK, Quadv PF, 0.5mL 10/14/2019    Influenza, FLUZONE High Dose (age 65 y+), IM, Quadv, 0.7mL 09/13/2023, 09/18/2024    Influenza, FLUZONE High Dose, (age 65 y+), IM, Trivalent PF, 0.5mL 11/17/2018, 10/04/2021    Pneumococcal, PCV-13, PREVNAR 13, (age 6w+), IM, 0.5mL 04/10/2019    Pneumococcal, PPSV23, PNEUMOVAX 23, (age 2y+), SC/IM, 0.5mL 09/27/2016, 07/08/2020    TDaP, ADACEL (age 10y-64y), BOOSTRIX (age 10y+), IM, 0.5mL 05/04/2016, 12/02/2019    Zoster Live (Zostavax) 01/10/2017    Zoster Recombinant (Shingrix) 05/14/2019, 07/15/2019      
Medicare Annual Wellness Visit    Sandhya Mckinney is here for Medicare AWV    Assessment & Plan   Medicare annual wellness visit, subsequent  .Age appropriate Health screening and immunization discussed with patient.            Subjective       Patient's complete Health Risk Assessment and screening values have been reviewed and are found in Flowsheets. The following problems were reviewed today and where indicated follow up appointments were made and/or referrals ordered.    Positive Risk Factor Screenings with Interventions:     Cognitive:   Clock Drawing Test (CDT): (!) Abnormal  Words recalled: 3 Words Recalled  Total Score: 3  Total Score Interpretation: Normal Mini-Cog    Interventions:  Patient declines any further evaluation or treatment            Inactivity:  On average, how many days per week do you engage in moderate to strenuous exercise (like a brisk walk)?: 0 days (!) Abnormal  On average, how many minutes do you engage in exercise at this level?: 0 min    Interventions:  Patient comments: planning to buy walking pad      Abnormal BMI (obese):  Body mass index is 35.94 kg/m². (!) Abnormal    Interventions:  low carbohydrate diet, increase physical activity as follows: 30 minutes walk            Safety:  Do you have non-slip mats or non-slip surfaces or shower bars or grab bars in your shower or bathtub?: (!) No    Interventions:  Patient declined any further interventions or treatment     Advanced Directives:  Do you have a Living Will?: (!) No    Intervention:  has an advanced directive - a copy HAS NOT been provided.                                     Objective   Vitals:    05/13/25 1153 05/13/25 1234   BP: (!) 159/74 132/70   BP Site: Right Upper Arm Right Upper Arm   Patient Position: Sitting Sitting   BP Cuff Size: Small Adult    Pulse: 64    Resp: 15    Temp: 97.3 °F (36.3 °C)    TempSrc: Temporal    SpO2: 99%    Weight: 95 kg (209 lb 6.4 oz)    Height: 1.626 m (5' 4\")       Body mass 
calculated using previous equations.  The CKD-EPI equation is less accurate in patients with extremes of muscle mass, extra-renal metabolism of creatinine, excessive creatine ingestion, or following therapy that affects renal tubular secretion.      Calcium 12/11/2024 9.8  8.5 - 10.1 MG/DL Final    Total Bilirubin 12/11/2024 0.8  0.2 - 1.0 MG/DL Final    ALT 12/11/2024 31  12 - 78 U/L Final    AST 12/11/2024 11 (L)  15 - 37 U/L Final    Alk Phosphatase 12/11/2024 79  45 - 117 U/L Final    Total Protein 12/11/2024 7.2  6.4 - 8.2 g/dL Final    Albumin 12/11/2024 4.0  3.5 - 5.0 g/dL Final    Globulin 12/11/2024 3.2  2.0 - 4.0 g/dL Final    Albumin/Globulin Ratio 12/11/2024 1.3  1.1 - 2.2   Final    Albumin Urine 12/11/2024 1.52  MG/DL Final    No reference range has been established.    Creatinine, Ur 12/11/2024 119.00  mg/dL Final    No reference range has been established.    Albumin/Creatinine Ratio 12/11/2024 13  0 - 30 mg/g Final    Specimen HOld 12/11/2024 Urine on hold in Microbiology dept for 2 days.  If unpreserved urine is submitted, it cannot be used for addtional testing after 24 hours, recollection will be required.    Final         Review of Systems   Constitutional:  Negative for activity change, fatigue and unexpected weight change.   HENT:  Negative for congestion, hearing loss, rhinorrhea and sore throat.    Eyes:  Negative for discharge.   Respiratory:  Negative for cough, chest tightness and shortness of breath.    Gastrointestinal:  Negative for abdominal pain, constipation and diarrhea.   Genitourinary:  Negative for dysuria, flank pain, frequency and urgency.   Musculoskeletal:  Positive for arthralgias and back pain. Negative for myalgias.   Skin:  Negative for color change and rash.   Neurological:  Negative for dizziness, light-headedness and headaches.   Psychiatric/Behavioral:  Negative for dysphoric mood and sleep disturbance. The patient is not nervous/anxious.           BP (!) 159/74 (BP

## 2025-05-14 LAB
ALBUMIN SERPL-MCNC: 4.1 G/DL (ref 3.5–5)
ALBUMIN/GLOB SERPL: 1.2 (ref 1.1–2.2)
ALP SERPL-CCNC: 89 U/L (ref 45–117)
ALT SERPL-CCNC: 25 U/L (ref 12–78)
ANION GAP SERPL CALC-SCNC: 4 MMOL/L (ref 2–12)
AST SERPL-CCNC: 16 U/L (ref 15–37)
BILIRUB SERPL-MCNC: 0.8 MG/DL (ref 0.2–1)
BUN SERPL-MCNC: 15 MG/DL (ref 6–20)
BUN/CREAT SERPL: 16 (ref 12–20)
CALCIUM SERPL-MCNC: 10.3 MG/DL (ref 8.5–10.1)
CHLORIDE SERPL-SCNC: 108 MMOL/L (ref 97–108)
CHOLEST SERPL-MCNC: 198 MG/DL
CO2 SERPL-SCNC: 28 MMOL/L (ref 21–32)
CREAT SERPL-MCNC: 0.96 MG/DL (ref 0.55–1.02)
CREAT UR-MCNC: 102 MG/DL
EST. AVERAGE GLUCOSE BLD GHB EST-MCNC: 137 MG/DL
GLOBULIN SER CALC-MCNC: 3.4 G/DL (ref 2–4)
GLUCOSE SERPL-MCNC: 103 MG/DL (ref 65–100)
HBA1C MFR BLD: 6.4 % (ref 4–5.6)
HDLC SERPL-MCNC: 73 MG/DL
HDLC SERPL: 2.7 (ref 0–5)
LDLC SERPL CALC-MCNC: 107.8 MG/DL (ref 0–100)
MICROALBUMIN UR-MCNC: 2.38 MG/DL
MICROALBUMIN/CREAT UR-RTO: 23 MG/G (ref 0–30)
POTASSIUM SERPL-SCNC: 4.2 MMOL/L (ref 3.5–5.1)
PROT SERPL-MCNC: 7.5 G/DL (ref 6.4–8.2)
SODIUM SERPL-SCNC: 140 MMOL/L (ref 136–145)
TRIGL SERPL-MCNC: 86 MG/DL
URATE SERPL-MCNC: 6 MG/DL (ref 2.6–6)
VLDLC SERPL CALC-MCNC: 17.2 MG/DL

## 2025-05-17 ENCOUNTER — RESULTS FOLLOW-UP (OUTPATIENT)
Dept: PRIMARY CARE CLINIC | Facility: CLINIC | Age: 70
End: 2025-05-17

## 2025-06-25 ENCOUNTER — TELEPHONE (OUTPATIENT)
Age: 70
End: 2025-06-25

## 2025-06-26 PROBLEM — E04.9 GOITER: Status: ACTIVE | Noted: 2025-06-26

## 2025-06-26 PROBLEM — Z86.000 HISTORY OF DUCTAL CARCINOMA IN SITU (DCIS) OF LEFT BREAST: Status: ACTIVE | Noted: 2025-06-26

## 2025-07-15 DIAGNOSIS — E78.2 MIXED HYPERLIPIDEMIA: ICD-10-CM

## 2025-07-15 DIAGNOSIS — I10 ESSENTIAL (PRIMARY) HYPERTENSION: ICD-10-CM

## 2025-07-15 RX ORDER — PRAVASTATIN SODIUM 20 MG
20 TABLET ORAL NIGHTLY
Qty: 90 TABLET | Refills: 0 | Status: SHIPPED | OUTPATIENT
Start: 2025-07-15

## 2025-07-15 RX ORDER — INDAPAMIDE 1.25 MG/1
1.25 TABLET ORAL DAILY
Qty: 90 TABLET | Refills: 0 | Status: SHIPPED | OUTPATIENT
Start: 2025-07-15

## 2025-07-16 ENCOUNTER — OFFICE VISIT (OUTPATIENT)
Age: 70
End: 2025-07-16
Payer: MEDICARE

## 2025-07-16 VITALS
SYSTOLIC BLOOD PRESSURE: 144 MMHG | WEIGHT: 215 LBS | OXYGEN SATURATION: 97 % | DIASTOLIC BLOOD PRESSURE: 68 MMHG | HEART RATE: 84 BPM | BODY MASS INDEX: 36.9 KG/M2 | TEMPERATURE: 98 F | RESPIRATION RATE: 18 BRPM

## 2025-07-16 DIAGNOSIS — Z86.000 HISTORY OF DUCTAL CARCINOMA IN SITU (DCIS) OF LEFT BREAST: ICD-10-CM

## 2025-07-16 DIAGNOSIS — E78.00 HIGH CHOLESTEROL: ICD-10-CM

## 2025-07-16 DIAGNOSIS — Z79.811 USE OF ANASTROZOLE (ARIMIDEX): ICD-10-CM

## 2025-07-16 DIAGNOSIS — Z17.0 MALIGNANT NEOPLASM OF LEFT BREAST IN FEMALE, ESTROGEN RECEPTOR POSITIVE, UNSPECIFIED SITE OF BREAST (HCC): Primary | ICD-10-CM

## 2025-07-16 DIAGNOSIS — E11.9 TYPE II DIABETES MELLITUS, WELL CONTROLLED (HCC): ICD-10-CM

## 2025-07-16 DIAGNOSIS — E04.9 GOITER: ICD-10-CM

## 2025-07-16 DIAGNOSIS — Z98.890 HISTORY OF LUMPECTOMY OF LEFT BREAST: ICD-10-CM

## 2025-07-16 DIAGNOSIS — I10 ESSENTIAL (PRIMARY) HYPERTENSION: ICD-10-CM

## 2025-07-16 DIAGNOSIS — C50.912 MALIGNANT NEOPLASM OF LEFT BREAST IN FEMALE, ESTROGEN RECEPTOR POSITIVE, UNSPECIFIED SITE OF BREAST (HCC): Primary | ICD-10-CM

## 2025-07-16 PROCEDURE — 1036F TOBACCO NON-USER: CPT | Performed by: NURSE PRACTITIONER

## 2025-07-16 PROCEDURE — 3017F COLORECTAL CA SCREEN DOC REV: CPT | Performed by: NURSE PRACTITIONER

## 2025-07-16 PROCEDURE — 99213 OFFICE O/P EST LOW 20 MIN: CPT | Performed by: NURSE PRACTITIONER

## 2025-07-16 PROCEDURE — 1126F AMNT PAIN NOTED NONE PRSNT: CPT | Performed by: NURSE PRACTITIONER

## 2025-07-16 PROCEDURE — G8417 CALC BMI ABV UP PARAM F/U: HCPCS | Performed by: NURSE PRACTITIONER

## 2025-07-16 PROCEDURE — 3078F DIAST BP <80 MM HG: CPT | Performed by: NURSE PRACTITIONER

## 2025-07-16 PROCEDURE — G8399 PT W/DXA RESULTS DOCUMENT: HCPCS | Performed by: NURSE PRACTITIONER

## 2025-07-16 PROCEDURE — 3044F HG A1C LEVEL LT 7.0%: CPT | Performed by: NURSE PRACTITIONER

## 2025-07-16 PROCEDURE — 3077F SYST BP >= 140 MM HG: CPT | Performed by: NURSE PRACTITIONER

## 2025-07-16 PROCEDURE — 1159F MED LIST DOCD IN RCRD: CPT | Performed by: NURSE PRACTITIONER

## 2025-07-16 PROCEDURE — G8427 DOCREV CUR MEDS BY ELIG CLIN: HCPCS | Performed by: NURSE PRACTITIONER

## 2025-07-16 PROCEDURE — 1123F ACP DISCUSS/DSCN MKR DOCD: CPT | Performed by: NURSE PRACTITIONER

## 2025-07-16 PROCEDURE — 1090F PRES/ABSN URINE INCON ASSESS: CPT | Performed by: NURSE PRACTITIONER

## 2025-07-16 PROCEDURE — 2022F DILAT RTA XM EVC RTNOPTHY: CPT | Performed by: NURSE PRACTITIONER

## 2025-07-16 PROCEDURE — 1160F RVW MEDS BY RX/DR IN RCRD: CPT | Performed by: NURSE PRACTITIONER

## 2025-07-16 NOTE — PROGRESS NOTES
Sandhya Mckinney is a 70 y.o. female    Chief Complaint   Patient presents with    Follow-up      Left Breast DCIS with Microinvasion       1. Have you been to the ER, urgent care clinic since your last visit?  Hospitalized since your last visit?No    2. Have you seen or consulted any other health care providers outside of the [facility] since your last visit?  Include any pap smears or colon screening. Yes, Dr. Austen Christian/edmundo.    
  Patient agrees with plan.  Goal is cure.     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 LEFT Breast DCIS 2009 at [facility] [place] with hx of lumpectomy/radiation and no Tamoxifen by choice.   She was not interested in taking a pill for prevention in 2020.  She could not have radiation as had this in past.  DCISionRT and low risk.  Genetic testing and VUS       2) DM/HTN/High Cholesterol/Goiter  Management per PCP.       3) Psychosocial  Mood good, coping well.  She has good family support.   NN/SW support as needed.    Call if questions.  Follow up in 12 months in office, considering transferring to [facility] as much closer to her home.   I appreciate the opportunity to participate in Ms. Sandhya Mckinney's care.    Signed By: CHRIS Alvarez NP

## 2025-07-21 DIAGNOSIS — I10 PRIMARY HYPERTENSION: ICD-10-CM

## 2025-07-21 RX ORDER — LISINOPRIL 20 MG/1
20 TABLET ORAL DAILY
Qty: 90 TABLET | Refills: 0 | Status: SHIPPED | OUTPATIENT
Start: 2025-07-21

## 2025-08-11 DIAGNOSIS — E11.9 TYPE II DIABETES MELLITUS, WELL CONTROLLED (HCC): ICD-10-CM

## 2025-08-11 DIAGNOSIS — I10 ESSENTIAL HYPERTENSION: ICD-10-CM

## 2025-08-11 DIAGNOSIS — M1A.0720 IDIOPATHIC CHRONIC GOUT OF LEFT FOOT WITHOUT TOPHUS: ICD-10-CM

## 2025-08-11 RX ORDER — ALLOPURINOL 100 MG/1
100 TABLET ORAL DAILY
Qty: 90 TABLET | Refills: 0 | Status: SHIPPED | OUTPATIENT
Start: 2025-08-11

## 2025-08-11 RX ORDER — POTASSIUM CHLORIDE 750 MG/1
30 CAPSULE, EXTENDED RELEASE ORAL DAILY
Qty: 270 CAPSULE | Refills: 0 | Status: SHIPPED | OUTPATIENT
Start: 2025-08-11

## (undated) DEVICE — HANDLE LT SNAP ON ULT DURABLE LENS FOR TRUMPF ALC DISPOSABLE

## (undated) DEVICE — ELECTRODE PT RET AD L9FT HI MOIST COND ADH HYDRGEL CORDED

## (undated) DEVICE — SYR 10ML LUER LOK 1/5ML GRAD --

## (undated) DEVICE — DRAPE,CHEST,FENES,15X10,STERIL: Brand: MEDLINE

## (undated) DEVICE — ENDOSCOPIC KIT COMPLIANCE ENDOKIT

## (undated) DEVICE — PENCIL SMK EVAC L10FT DIA95MM TBNG NONSTICK W ADPT TO 22MM

## (undated) DEVICE — SUTURE VCRL SZ 3-0 L27IN ABSRB UD L26MM SH 1/2 CIR J416H

## (undated) DEVICE — (D)PREP SKN CHLRAPRP APPL 26ML -- CONVERT TO ITEM 371833

## (undated) DEVICE — DRAPE,REIN 53X77,STERILE: Brand: MEDLINE

## (undated) DEVICE — TRAP FLUID BUFFALO FLTR

## (undated) DEVICE — IV START KIT: Brand: MEDLINE

## (undated) DEVICE — PACK,BASIC,SIRUS,V: Brand: MEDLINE

## (undated) DEVICE — SMOKE EVACUATION TUBING WITH 8 IN INTEGRAL WAND AND SPONGE GUARD: Brand: BUFFALO FILTER

## (undated) DEVICE — INJECTION THERAPY NEEDLE CATHETER: Brand: INTERJECT

## (undated) DEVICE — INTENDED FOR TISSUE SEPARATION, AND OTHER PROCEDURES THAT REQUIRE A SHARP SURGICAL BLADE TO PUNCTURE OR CUT.: Brand: BARD-PARKER ® CARBON RIB-BACK BLADES

## (undated) DEVICE — CUFF BLD PRSS AD CLTH SGL TB W/ BAYNT CONN ROUNDED CORNER

## (undated) DEVICE — SOLUTION IRRIG 1000ML 0.9% SOD CHL USP POUR PLAS BTL

## (undated) DEVICE — TIP SUCT TRNSPAR RIB SURF STD BLB RIG NVENT W/ 5IN1 CONN DYND50138] MEDLINE INDUSTRIES INC]

## (undated) DEVICE — PREP SKN CHLRAPRP APL 26ML STR --

## (undated) DEVICE — TOWEL SURG W17XL27IN STD BLU COT NONFENESTRATED PREWASHED

## (undated) DEVICE — SOLUTION IV 1000ML 0.9% SOD CHL

## (undated) DEVICE — INFECTION CONTROL KIT SYS

## (undated) DEVICE — SUTURE MCRYL SZ 4-0 L27IN ABSRB UD L19MM PS-2 1/2 CIR PRIM Y426H

## (undated) DEVICE — GARMENT,MEDLINE,DVT,INT,CALF,MED, GEN2: Brand: MEDLINE

## (undated) DEVICE — INSULATED BLADE ELECTRODE: Brand: EDGE

## (undated) DEVICE — SET GRAV CK VLV NEEDLESS ST 3 GANGED 4WAY STPCOCK HI FLO 10

## (undated) DEVICE — GOWN,NON-REINFORCED,XXL: Brand: MEDLINE

## (undated) DEVICE — STERILE POLYISOPRENE POWDER-FREE SURGICAL GLOVES: Brand: PROTEXIS

## (undated) DEVICE — DRAIN SURG 19FR 100% SIL RADPQ RND CHN FULL FLUT

## (undated) DEVICE — HYPODERMIC SAFETY NEEDLE: Brand: MAGELLAN

## (undated) DEVICE — REM POLYHESIVE ADULT PATIENT RETURN ELECTRODE: Brand: VALLEYLAB

## (undated) DEVICE — GLOVE ORANGE PI 7 1/2   MSG9075

## (undated) DEVICE — DERMABOND SKIN ADH 0.7ML -- DERMABOND ADVANCED 12/BX

## (undated) DEVICE — COVER US PRB W5XL96IN LTX W/ GEL

## (undated) DEVICE — SUT SLK 2-0SH 30IN BLK --

## (undated) DEVICE — SMOKE EVACUATION PENCIL: Brand: VALLEYLAB

## (undated) DEVICE — NEEDLE HYPO 22GA L1.5IN BLK S STL HUB POLYPR SHLD REG BVL

## (undated) DEVICE — SURGICAL PROCEDURE PACK BASIN MAJ SET CUST NO CAUT

## (undated) DEVICE — BASIN ST MAJOR-NO CAUTERY: Brand: MEDLINE INDUSTRIES, INC.